# Patient Record
Sex: MALE | Race: WHITE | NOT HISPANIC OR LATINO | Employment: OTHER | URBAN - METROPOLITAN AREA
[De-identification: names, ages, dates, MRNs, and addresses within clinical notes are randomized per-mention and may not be internally consistent; named-entity substitution may affect disease eponyms.]

---

## 2017-02-15 ENCOUNTER — OFFICE VISIT (OUTPATIENT)
Dept: PSYCHIATRY | Facility: CLINIC | Age: 62
End: 2017-02-15
Attending: NURSE PRACTITIONER
Payer: MEDICARE

## 2017-02-15 VITALS — WEIGHT: 137.6 LBS | SYSTOLIC BLOOD PRESSURE: 137 MMHG | DIASTOLIC BLOOD PRESSURE: 77 MMHG | HEART RATE: 73 BPM

## 2017-02-15 DIAGNOSIS — F42.9 OCD (OBSESSIVE COMPULSIVE DISORDER): ICD-10-CM

## 2017-02-15 PROCEDURE — 99212 OFFICE O/P EST SF 10 MIN: CPT | Mod: ZF

## 2017-02-15 NOTE — MR AVS SNAPSHOT
After Visit Summary   2/15/2017    Berto Pretty    MRN: [id number]           Patient Information     Date Of Birth          1955        Visit Information        Provider Department      2/15/2017 1:30 PM Jerica Shah APRN CNP Psychiatry Clinic        Today's Diagnoses     OCD (obsessive compulsive disorder)           Follow-ups after your visit        Follow-up notes from your care team     Return in about 12 weeks (around 5/10/2017).      Your next 10 appointments already scheduled     May 10, 2017  1:30 PM CDT   Adult Med Follow UP with MANUELITO Leslie CNP   Psychiatry Clinic ([facility])    [address]  [address] [facility] [address] [phone]              Who to contact     Please call your clinic at [phone] to:    Ask questions about your health    Make or cancel appointments    Discuss your medicines    Learn about your test results    Speak to your doctor   If you have compliments or concerns about an experience at your clinic, or if you wish to file a complaint, please contact [facility] Patient Relations at [phone] or email us at [email or web address]         Additional Information About Your Visit        MyChart Information     Wigixhart is an electronic gateway that provides easy, online access to your medical records. With NI, you can request a clinic appointment, read your test results, renew a prescription or communicate with your care team.     To sign up for ngmocot visit the website at [email or web address]   You will be asked to enter the access code listed below, as well as some personal information. Please follow the directions to create your username and password.     Your access code is: [email or web address]  Expires: 2017  2:12 PM     Your access code will  in 90 days. If you need help or a new code, please contact your [facility] or call [phone] for assistance.        Care EveryWhere ID     This is your Care EveryWhere ID. This could be used by other organizations to access your [place] medical records  [id number]        Your Vitals Were     Pulse                   73            Blood Pressure from Last 3 Encounters:   02/15/17 137/77   11/03/16 124/75   06/27/16 121/79    Weight from Last 3 Encounters:   02/15/17 62.4 kg (137 lb 9.6 oz)   11/03/16 64.2 kg (141 lb 9.6 oz)   06/27/16 63.7 kg (140 lb 6.4 oz)              Today, you had the following     No orders found for display       Primary Care Provider Office Phone # Fax #    Antwon Mcmillan [phone]       [facility] [address]  [facility] [address]        Thank you!     Thank you for choosing PSYCHIATRY CLINIC  for your care. Our goal is always to provide you with excellent care. Hearing back from our patients is one way we can continue to improve our services. Please take a few minutes to complete the written survey that you may receive in the mail after your visit with us. Thank you!             Your Updated Medication List - Protect others around you: Learn how to safely use, store and throw away your medicines at www.disposemymeds.org.          This list is accurate as of: 2/15/17  2:12 PM.  Always use your most recent med list.                   Brand Name Dispense Instructions for use    escitalopram 10 MG tablet    LEXAPRO    30 tablet    Take 1 tablet (10 mg) by mouth daily along with one 20 mg tablet for a daily dose of 30 mg       loratadine 10 MG capsule      Take 10 mg by mouth daily.       MULTIVITAMIN ADULT PO      Take 1 tablet by mouth       VITAMIN D (CHOLECALCIFEROL) PO      Take 2,000 Units by mouth daily

## 2017-02-15 NOTE — PROGRESS NOTES
"  Outpatient Psychiatry Progress Note     Provider: MANUELITO Ambriz CNP  Date: 2/15/2017  Service:  Medication management.   Patient Identification: Berto Pretty  : 1955   MRN: [id number]    Berto Pretty is a 61 year old year old male who presents for ongoing psychiatric care.  Berto Pretty was last seen in clinic on 11/3/16.   At that time, he chose to continue Lexapro 30mg daily.    2/15/2017  Today Berto reports he is doing better and is pleased to share he has been purging some of the items he's hoarded in his house. He was pleased to share he had his HVAC system replaced which he tolerated better than he thought as a team of 4-5 people were in and out of his house.    He denies side effects of medication.    Psychiatric Review of Systems:  He denies significant depression and anxiety symptoms.     With family history of OCD in his paternal cousins; client report OCD onset in childhood. His obsessions manifest differently from year to year; he is not focused on germs recently but more increasing his awareness about compulsively buying things that he hoards and willingness to get rid of his belongings.     He denies lethality, óscar, psychosis.     Review of Medical Systems:  Appetite: \"good\"  Sleep: onset of sleep changes between 11p and 3a every 3-4 days; sleep ranges between 8-12 hours overnight    Self Care: enjoys reading, classical music, walking; wants to go skiing again, has started home exercise regimen to increase his muscle strength and ability to ski; serves at his [employer]; enjoys collecting movies    Housework and hygiene: managing as is normal for him  Energy: adequate  Concentration: intact    Current Substance Use:  Social History     Social History     Marital status: Single     Spouse name: N/A     Number of children: N/A     Years of education: N/A     Social History Main Topics     Smoking status: Former Smoker     Types: Cigarettes     Quit date: 1974     Smokeless " tobacco: Not on file     Alcohol use No     Drug use: No     Sexual activity: Not Currently     Other Topics Concern     Not on file     Social History Narrative     Denies all substances, trying to taper soda    Past Medical History: No past medical history on file.    Medical health update: recent diarrhea, will follow up with PCP if needed; blood pressure stable when he's out at [facility]; continues vit D3 2000 IUs daily    Allergies:   Allergies   Allergen Reactions     Pollen Extract         Current Medications     Current Outpatient Prescriptions Ordered in [facility]   Medication Sig Dispense Refill     escitalopram (LEXAPRO) 10 MG tablet Take 1 tablet (10 mg) by mouth daily along with one 20 mg tablet for a daily dose of 30 mg 30 tablet 0     Multiple Vitamins-Minerals (MULTIVITAMIN ADULT PO) Take 1 tablet by mouth       VITAMIN D, CHOLECALCIFEROL, PO Take 2,000 Units by mouth daily       loratadine 10 MG capsule Take 10 mg by mouth daily.       No current Epic-ordered facility-administered medications on file.       Mental Status Exam     Appearance:  Casually dressed and Adequately groomed  Behavior/relationship to examiner/demeanor:  Cooperative and Engaged  Orientation: Oriented to person, place, time and situation  Psychomotor: WNL  Speech Rate:  Normal and Rapid  Speech Spontaneity:  Normal  Mood:  better  Affect:  Appropriate/mood-congruent and Euthymic  Thought Process (Associations):  Goal directed and Circumstantial  Thought Content:  no evidence of suicidal or homicidal ideation, denies suicidal ideation, intent or thoughts and patient denies auditory and visual hallucinations  Abnormal Perception:  None  Attention/Concentration:  Normal  Language:  Intact  Insight:  Fair  Judgment:  Adequate for safety      Results     Vital signs: /77  Pulse 73  Wt 62.4 kg (137 lb 9.6 oz)    Laboratory Data:   Lab Results   Component Value Date    WBC 5.2 09/11/2015    HGB 14.8 09/11/2015    HCT 44.4 09/11/2015      09/11/2015    CHOL 199 03/19/2014    TRIG 41 03/19/2014    HDL 84 03/19/2014    TSH 1.68 09/11/2015     Assessment & Plan      Berto Pretty is seen today for medication management.     Diagnosis  Axis 1: OCD, no evidence of seasonal mood symptoms today, takes D3 supplement  Axis 2: OCPD traits    Plan:  He chooses to continue Lexapro 30mg (10mg + 20mg tabs) daily (he wants his pharmacy to call when he needs a RF as he has unknown RFs remaining from Dr. Matthews) and RTC in 12 weeks, sooner as needed. Encouraged self care with exercise and at his Jehovah's witness.    He voices understanding of the treatment plan including discussion of options, risks/ benefits. He has clinic contact information and will seek emergency services if urgent or life threatening symptoms present. He understands risks associated with drug and alcohol use.     Over 50% of this time was spent counseling the patient and/or coordinating care regarding review of social and occupational functioning.  In addition patient was counseled on health and wellness practices to augment medication treatment of symptoms. See note for details.    PHQ-9 score:  14  PHQ-9 SCORE 11/3/2016   Total Score -   Total Score 13     GAD7: No flowsheet data found.    : 11/2016    MANUELITO Ambriz CNP 2/15/2017

## 2017-02-17 ASSESSMENT — PATIENT HEALTH QUESTIONNAIRE - PHQ9: SUM OF ALL RESPONSES TO PHQ QUESTIONS 1-9: 13

## 2017-06-29 ENCOUNTER — OFFICE VISIT (OUTPATIENT)
Dept: PSYCHIATRY | Facility: CLINIC | Age: 62
End: 2017-06-29
Attending: NURSE PRACTITIONER
Payer: MEDICARE

## 2017-06-29 VITALS — WEIGHT: 141.6 LBS | SYSTOLIC BLOOD PRESSURE: 137 MMHG | HEART RATE: 80 BPM | DIASTOLIC BLOOD PRESSURE: 85 MMHG

## 2017-06-29 DIAGNOSIS — F42.2 MIXED OBSESSIONAL THOUGHTS AND ACTS: ICD-10-CM

## 2017-06-29 PROCEDURE — 99212 OFFICE O/P EST SF 10 MIN: CPT | Mod: ZF

## 2017-06-29 RX ORDER — ESCITALOPRAM OXALATE 10 MG/1
10 TABLET ORAL DAILY
Qty: 30 TABLET | Refills: 0 | Status: SHIPPED | OUTPATIENT
Start: 2017-06-29 | End: 2017-07-10

## 2017-06-29 RX ORDER — ESCITALOPRAM OXALATE 20 MG/1
TABLET ORAL
Qty: 30 TABLET | Refills: 5 | Status: SHIPPED | OUTPATIENT
Start: 2017-06-29 | End: 2017-12-22

## 2017-06-29 NOTE — PROGRESS NOTES
Outpatient Psychiatry Progress Note     Provider: MANUELITO Ambriz CNP  Date: 2017  Service:  Medication management.   Patient Identification: Berto Pretty  : 1955   MRN: [id number]    Berto Pretty is a 61 year old year old male who presents for ongoing psychiatric care.  Berto was last seen in clinic on  2/15/17.  At that time, he chose to continue Lexapro 30mg daily (10mg and 20mg tabs) daily.    PERTINENT HISTORY:  Onset of OCD as a child. He recalls his Dad checking things. Obsessions/ compulsions involve thoughts about germs, asbestos, things that might harm him; he performs handwashing, checking the oven, door knobs, lights. He wears long sleeve shirts to limit skin exposure. Buys clothing, movies, books compulsively. Most recent inpatient stay was in the 1970s. History of late arrivals for his appts. Has worked for the post office and hospital, stopped attending [facility] due to OCD. SSDI started for mental health.  - Prozac (only mildly helpful for OCD); Citalopram (80mg; mildly helpful for OCD); Parnate (not helpful); Stelazine (not helpful); Haldol (not helpful). Clomipramine (only as augmentation; highest dose 25mg)    He arrives alone and >15 min late.    2017  Today Berto reports he rated his mood in the high 70s last week on his personal scale where 100 is the best. The week before his mood worsened. He discusses a pattern of symptoms and mood fluctuations that wax and wane. Seasonal allergies have impacted his mood but these recently resolved.    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 Discusses his home in the [facility] but he enjoys his home in [place] more for the local walking trails. He is thinking on bidding on a new home in [place].    Daily med compliant and he denies side effects of medication.    Psychiatric Review of Systems:  He  "denies significant depression, anxiety and irritability. He wonders if he suffers SAD and his improved mood is due to the timing of the summer sun.    He denies lethality.    Review of Medical Systems:  Appetite: intact  Sleep: varied, appears to follow his mood charting/ rating scale; stays up when he feels better and keeps engaged in hobbies that interest him.  Self Care: encouraged, he enjoys reading, listening to music; signed up tennis lessons   Housework and hygiene: managing as is normal for him; hoarding habit is well documented  Energy: intact  Concentration: intact    Current Substance Use:  Social History     Social History     Marital status: Single     Spouse name: N/A     Number of children: N/A     Years of education: N/A     Social History Main Topics     Smoking status: Former Smoker     Types: Cigarettes     Quit date: 1/1/1974     Smokeless tobacco: None     Alcohol use No     Drug use: No     Sexual activity: Not Currently     Other Topics Concern     None     Social History Narrative     He denies substances; continues trying to limit soda (drinks 5-6 sodas 8oz, or 2-3 cans of soda \"when I'm really thirsty\"; wants to drink more gatorade and avoid high fructose corn syrup; avoids coffee.     Past Medical History: No past medical history on file.    Medical health update: recently resolved seasonal allergies; he wonders if his low energy is related to his stopping vit D supplements    Allergies:   Allergies   Allergen Reactions     Pollen Extract         Current Medications     Current Outpatient Prescriptions Ordered in [facility]   Medication Sig Dispense Refill     escitalopram (LEXAPRO) 10 MG tablet Take 1 tablet (10 mg) by mouth daily along with one 20 mg tablet for a daily dose of 30 mg 30 tablet 0     Multiple Vitamins-Minerals (MULTIVITAMIN ADULT PO) Take 1 tablet by mouth       VITAMIN D, CHOLECALCIFEROL, PO Take 2,000 Units by mouth daily       loratadine 10 MG capsule Take 10 mg by mouth " "daily.       No current Epic-ordered facility-administered medications on file.       Mental Status Exam     Appearance:  Casually dressed and Disheveled  Behavior/relationship to examiner/demeanor:  Cooperative, Engaged and Pleasant  Orientation: Oriented to person, place, time and situation  Psychomotor: WNL  Speech Rate:  Normal  Speech Spontaneity:  Normal  Mood:  \"okay\"  Affect:  Appropriate/mood-congruent, Euthymic and Bright  Thought Process (Associations):  Logical, Linear, Goal directed and analytical  Thought Content:  no evidence of suicidal or homicidal ideation, denies suicidal ideation, intent or thoughts and patient denies auditory and visual hallucinations  Abnormal Perception:  None  Attention/Concentration:  Normal  Language:  Intact  Insight:  Limited  Judgment:  Adequate for safety      Results     Vital signs: /85  Pulse 80  Wt 64.2 kg (141 lb 9.6 oz)    Assessment & Plan      Berto Pretty is seen today for follow up.    Diagnosis  Axis 1: OCD; waking earlier and motivated with the sun; monitor SAD  Axis 2: OCPD traits     Plan:  He chooses to continue Lexapro 30mg daily and declines any med change. He requests refills today. He elects to RTC in 6 months, sooner as needed.      He voices understanding of the treatment plan including discussion of options, risks/ benefits. He has clinic contact information and will seek emergency services if urgent or life threatening symptoms present. He understands risks associated with drug and alcohol use.      Over 50% of this time was spent counseling the patient and/or coordinating care regarding review of social and occupational functioning.  In addition patient was counseled on health and wellness practices to augment medication treatment of symptoms. See note for details.     PHQ-9 score:  13, very difficult on daily functioning  PHQ-9 SCORE 2/15/2017   Total Score 13     GAD7: No flowsheet data found.  : 11/2016  Jerica Shah, MANUELITO CNP " 6/29/2017

## 2017-06-29 NOTE — PATIENT INSTRUCTIONS
Thank you for coming to the PSYCHIATRY CLINIC.    Lab Testing:  If you had lab testing today and your results are reassuring or normal they will be mailed to you or sent through [employer] within 7 days.   If the lab tests need quick action we will call you with the results.  The phone number we will call with results is # [phone] (home) . If this is not the best number please call our clinic and change the number.    Medication Refills:  If you need any refills please call your pharmacy and they will contact us. Our fax number for refills is [phone]. Please allow three business for refill processing.   If you need to  your refill at a new pharmacy, please contact the new pharmacy directly. The new pharmacy will help you get your medications transferred.     Scheduling:  If you have any concerns about today's visit or wish to schedule another appointment please call our office during normal business hours [phone] (8-5:00 M-F)    Contact Us:  Please call [phone] during business hours (8-5:00 M-F).  If after clinic hours, or on the weekend, please call  [phone].    Financial Assistance [phone]  [facility] Billing [phone]  [facility] Billing [phone]  Medical Records [phone]      MENTAL HEALTH CRISIS NUMBERS:  [facility]:   [facility] - [phone]   Crisis Residence [facility] - [phone]   Walk-In Counseling [facility] [phone]   COPE 24/7 [place] Mobile Team for Adults - [[phone]]; Child - [[phone]]     Crisis Connection - [phone]     [facility]:   [facility] - [phone]   Walk-in counseling [facility] - [phone]   Walk-in counseling [facility] - [phone]   Crisis Residence [facility] - [phone]   Urgent Care Adult Mental Health:   --Drop-in, 24/7 crisis line, and [place] Mobile Team [[phone]]    CRISIS TEXT  LINE: Text [phone] from anywhere, anytime, any crisis 24/7;    OR SEE [email or web address]     Poison Control Center - [phone]    CHILD: [facility] needs assessment team - [phone]     [facility] - [phone]; or [facility] - [phone]    If you have a medical emergency please call 911or go to the nearest ER.                    _____________________________________________    Again thank you for choosing PSYCHIATRY CLINIC and please let us know how we can best partner with you to improve you and your family's health.  You may be receiving a survey in the mail regarding this appointment. We would love to have your feedback, both positive and negative, so please fill out the survey and return it using the provided envolpe. The survey is done by an external company, so your answers are anonymous.

## 2017-06-29 NOTE — NURSING NOTE
Chief Complaint   Patient presents with     Recheck Medication     OCD     Reviewed allergies, smoking status, and pharmacy preference  Administered abuse screening questions   Obtained weight, blood pressure and heart rate

## 2017-06-29 NOTE — MR AVS SNAPSHOT
After Visit Summary   6/29/2017    Berto Pretty    MRN: [id number]           Patient Information     Date Of Birth          1955        Visit Information        Provider Department      6/29/2017 12:30 PM Jerica Shah APRN [facility]        Today's Diagnoses     Mixed obsessional thoughts and acts          Care Instructions    Thank you for coming to the PSYCHIATRY CLINIC.    Lab Testing:  If you had lab testing today and your results are reassuring or normal they will be mailed to you or sent through Xymogen within 7 days.   If the lab tests need quick action we will call you with the results.  The phone number we will call with results is # [phone] (home) . If this is not the best number please call our clinic and change the number.    Medication Refills:  If you need any refills please call your pharmacy and they will contact us. Our fax number for refills is [phone]. Please allow three business for refill processing.   If you need to  your refill at a new pharmacy, please contact the new pharmacy directly. The new pharmacy will help you get your medications transferred.     Scheduling:  If you have any concerns about today's visit or wish to schedule another appointment please call our office during normal business hours [phone] (8-5:00 M-F)    Contact Us:  Please call [phone] during business hours (8-5:00 M-F).  If after clinic hours, or on the weekend, please call  [phone].    Financial Assistance [phone]  [employer] Billing [phone]  [place] Billing [phone]  Medical Records [phone]      MENTAL HEALTH CRISIS NUMBERS:  [facility]:   [facility] - [phone]   Crisis Residence [facility] - [phone]   Walk-In Counseling Center [facility] - [phone]   COPE 24/7 [place] Mobile Team for Adults - [[phone]]; Child - [[phone]]     Crisis Connection - [phone]     [place]:   [facility] - [phone]   Walk-in counseling [facility] - [phone]   Walk-in counseling [facility] - [phone]   Crisis Residence [facility] - [phone]   Urgent Care Adult Mental Health:   --Drop-in, 24/7 crisis line, and [place] Mobile Team [[phone]]    CRISIS TEXT LINE: Text [phone] from anywhere, anytime, any crisis 24/7;    OR SEE www.crisistextline.org     Poison Control Center - [phone]    CHILD: [facility] needs assessment team - [phone]     Trans Lifeline - 1-795.186.7935; or [employer] Lifeline - 1-744.137.7454    If you have a medical emergency please call 911or go to the nearest ER.                    _____________________________________________    Again thank you for choosing PSYCHIATRY CLINIC and please let us know how we can best partner with you to improve you and your family's health.  You may be receiving a survey in the mail regarding this appointment. We would love to have your feedback, both positive and negative, so please fill out the survey and return it using the provided envolpe. The survey is done by an external company, so your answers are anonymous.             Follow-ups after your visit        Your next 10 appointments already scheduled     Dec 22, 2017  1:30 PM CST   Adult Med Follow UP with MANUELITO Leslie CNP   Psychiatry Clinic ([facility])    [address] [facility] [address] [phone]              Who to contact     Please call your clinic at [phone] to:    Ask questions about your health    Make or cancel appointments    Discuss your medicines    Learn about your test results    Speak to your doctor   If you have compliments or concerns about an experience at your clinic, or if you wish to file a complaint, please contact [facility] Patient Relations at  [phone] or email us at [email or web address]         Additional Information About Your Visit        Liquipel Information     Liquipel is an electronic gateway that provides easy, online access to your medical records. With Liquipel, you can request a clinic appointment, read your test results, renew a prescription or communicate with your care team.     To sign up for Liquipel visit the website at [email or web address]   You will be asked to enter the access code listed below, as well as some personal information. Please follow the directions to create your username and password.     Your access code is: [email or web address]  Expires: 2017  1:19 PM     Your access code will  in 90 days. If you need help or a new code, please contact your [facility] or call [phone] for assistance.        Care EveryWhere ID     This is your Care EveryWhere ID. This could be used by other organizations to access your [place] medical records  [id number]        Your Vitals Were     Pulse                   80            Blood Pressure from Last 3 Encounters:   17 137/85   02/15/17 137/77   16 124/75    Weight from Last 3 Encounters:   17 64.2 kg (141 lb 9.6 oz)   02/15/17 62.4 kg (137 lb 9.6 oz)   16 64.2 kg (141 lb 9.6 oz)              Today, you had the following     No orders found for display         Today's Medication Changes          These changes are accurate as of: 17  1:19 PM.  If you have any questions, ask your nurse or doctor.               These medicines have changed or have updated prescriptions.        Dose/Directions    * escitalopram 10 MG tablet   Commonly known as:  LEXAPRO   This may have changed:  Another medication with the same name was added. Make sure you understand how and when to take each.   Used for:  Mixed obsessional thoughts and acts   Changed by:  Jerica Shah, MANUELITO CNP        Dose:  10 mg   Take 1 tablet (10  mg) by mouth daily along with one 20 mg tablet for a daily dose of 30 mg   Quantity:  30 tablet   Refills:  0       * escitalopram 20 MG tablet   Commonly known as:  LEXAPRO   This may have changed:  You were already taking a medication with the same name, and this prescription was added. Make sure you understand how and when to take each.   Used for:  Mixed obsessional thoughts and acts   Changed by:  Jerica Shah, MANUELITO CNP        Take 20mg tab with 10mg tab daily for a combined daily dose of 30mg daily   Quantity:  30 tablet   Refills:  5       * Notice:  This list has 2 medication(s) that are the same as other medications prescribed for you. Read the directions carefully, and ask your doctor or other care provider to review them with you.         Where to get your medicines      These medications were sent to [facility] [address]  [address], [facility] [address]     Phone:  [phone]     escitalopram 10 MG tablet    escitalopram 20 MG tablet                Primary Care Provider Office Phone # Fax #    Antwon Mcmillan [phone]       [facility] [address] [facility] [address]  [place] [address]        Equal Access to Services     MAITE VICENTE : Hadii sumanth diazo Soloni, waaxda luqadaha, qaybta kaalmada ademaryyada, holger salmaanca. So [facility] [phone].    ATENCIÓN: Si habla español, tiene a hu disposición servicios gratuitos de asistencia lingüística. Jayme al [phone].    We comply with applicable federal civil rights laws and [place] laws. We do not discriminate on the basis of race, color, national origin, age, disability sex, sexual orientation or gender identity.            Thank you!     Thank you for choosing PSYCHIATRY CLINIC  for your care. Our goal is always to provide you with excellent care. Hearing back from our patients is one way we can continue to improve our services.  Please take a few minutes to complete the written survey that you may receive in the mail after your visit with us. Thank you!             Your Updated Medication List - Protect others around you: Learn how to safely use, store and throw away your medicines at www.disposemymeds.org.          This list is accurate as of: 6/29/17  1:19 PM.  Always use your most recent med list.                   Brand Name Dispense Instructions for use Diagnosis    * escitalopram 10 MG tablet    LEXAPRO    30 tablet    Take 1 tablet (10 mg) by mouth daily along with one 20 mg tablet for a daily dose of 30 mg    Mixed obsessional thoughts and acts       * escitalopram 20 MG tablet    LEXAPRO    30 tablet    Take 20mg tab with 10mg tab daily for a combined daily dose of 30mg daily    Mixed obsessional thoughts and acts       loratadine 10 MG capsule      Take 10 mg by mouth daily.        MULTIVITAMIN ADULT PO      Take 1 tablet by mouth        VITAMIN D (CHOLECALCIFEROL) PO      Take 2,000 Units by mouth daily        * Notice:  This list has 2 medication(s) that are the same as other medications prescribed for you. Read the directions carefully, and ask your doctor or other care provider to review them with you.

## 2017-07-01 ASSESSMENT — PATIENT HEALTH QUESTIONNAIRE - PHQ9: SUM OF ALL RESPONSES TO PHQ QUESTIONS 1-9: 13

## 2017-07-10 RX ORDER — ESCITALOPRAM OXALATE 10 MG/1
10 TABLET ORAL DAILY
Qty: 30 TABLET | Refills: 5 | Status: SHIPPED | OUTPATIENT
Start: 2017-07-10 | End: 2017-12-22

## 2017-12-22 ENCOUNTER — OFFICE VISIT (OUTPATIENT)
Dept: PSYCHIATRY | Facility: CLINIC | Age: 62
End: 2017-12-22
Attending: NURSE PRACTITIONER
Payer: MEDICARE

## 2017-12-22 VITALS — WEIGHT: 138.6 LBS | DIASTOLIC BLOOD PRESSURE: 90 MMHG | HEART RATE: 80 BPM | SYSTOLIC BLOOD PRESSURE: 137 MMHG

## 2017-12-22 DIAGNOSIS — F42.2 MIXED OBSESSIONAL THOUGHTS AND ACTS: ICD-10-CM

## 2017-12-22 PROCEDURE — 99212 OFFICE O/P EST SF 10 MIN: CPT | Mod: ZF

## 2017-12-22 RX ORDER — ESCITALOPRAM OXALATE 10 MG/1
10 TABLET ORAL DAILY
Qty: 30 TABLET | Refills: 5 | Status: SHIPPED | OUTPATIENT
Start: 2017-12-22 | End: 2018-10-26

## 2017-12-22 RX ORDER — ESCITALOPRAM OXALATE 20 MG/1
TABLET ORAL
Qty: 30 TABLET | Refills: 5 | Status: SHIPPED | OUTPATIENT
Start: 2017-12-22 | End: 2018-10-26

## 2017-12-22 ASSESSMENT — PATIENT HEALTH QUESTIONNAIRE - PHQ9: SUM OF ALL RESPONSES TO PHQ QUESTIONS 1-9: 12

## 2017-12-22 NOTE — PATIENT INSTRUCTIONS
Thank you for coming to the PSYCHIATRY CLINIC.    Lab Testing:  If you had lab testing today and your results are reassuring or normal they will be mailed to you or sent through E-Generator within 7 days.   If the lab tests need quick action we will call you with the results.  The phone number we will call with results is # [phone] (home) . If this is not the best number please call our clinic and change the number.    Medication Refills:  If you need any refills please call your pharmacy and they will contact us. Our fax number for refills is [phone]. Please allow three business for refill processing.   If you need to  your refill at a new pharmacy, please contact the new pharmacy directly. The new pharmacy will help you get your medications transferred.     Scheduling:  If you have any concerns about today's visit or wish to schedule another appointment please call our office during normal business hours [phone] (8-5:00 M-F)    Contact Us:  Please call [phone] during business hours (8-5:00 M-F).  If after clinic hours, or on the weekend, please call  [phone].    Financial Assistance [phone]  [employer] Billing [phone]  [employer] Billing [phone]  Medical Records [phone]      MENTAL HEALTH CRISIS NUMBERS:  [facility]:   [facility] - [phone]   Crisis Residence [facility] - [phone]   Walk-In Counseling [facility] [phone]   COPE 24/7 [place] Mobile Team for Adults - [[phone]]; Child - [[phone]]     Crisis Connection - [phone]     [facility]:   [facility] - [phone]   Walk-in counseling [facility] - [phone]   Walk-in counseling [facility] - [phone]   Crisis Residence [facility] - [phone]   Urgent Care Adult Mental Health:   --Drop-in, 24/7 crisis line, and [place] Mobile Team [[phone]]    CRISIS TEXT  LINE: Text [phone] from anywhere, anytime, any crisis 24/7;    OR SEE www.crisistextline.org     Poison Control Center - [phone]    CHILD: [facility] needs assessment team - [phone]     [facility] - [phone]; or [employer] Lifeline - [phone]    If you have a medical emergency please call 911or go to the nearest ER.                    _____________________________________________    Again thank you for choosing PSYCHIATRY CLINIC and please let us know how we can best partner with you to improve you and your family's health.  You may be receiving a survey in the mail regarding this appointment. We would love to have your feedback, both positive and negative, so please fill out the survey and return it using the provided envolpe. The survey is done by an external company, so your answers are anonymous.

## 2017-12-22 NOTE — MR AVS SNAPSHOT
After Visit Summary   12/22/2017    Berto Pretty    MRN: [id number]           Patient Information     Date Of Birth          1955        Visit Information        Provider Department      12/22/2017 3:00 PM Jerica Shah APRN [facility]        Today's Diagnoses     Mixed obsessional thoughts and acts          Care Instructions    Thank you for coming to the PSYCHIATRY CLINIC.    Lab Testing:  If you had lab testing today and your results are reassuring or normal they will be mailed to you or sent through [employer] within 7 days.   If the lab tests need quick action we will call you with the results.  The phone number we will call with results is # [phone] (home) . If this is not the best number please call our clinic and change the number.    Medication Refills:  If you need any refills please call your pharmacy and they will contact us. Our fax number for refills is [phone]. Please allow three business for refill processing.   If you need to  your refill at a new pharmacy, please contact the new pharmacy directly. The new pharmacy will help you get your medications transferred.     Scheduling:  If you have any concerns about today's visit or wish to schedule another appointment please call our office during normal business hours [phone] (8-5:00 M-F)    Contact Us:  Please call [phone] during business hours (8-5:00 M-F).  If after clinic hours, or on the weekend, please call  [phone].    Financial Assistance [phone]  [employer] Billing [phone]  [place] Billing [phone]  Medical Records [phone]      MENTAL HEALTH CRISIS NUMBERS:  [facility]:   [facility] - [phone]   Crisis Residence [facility] - [phone]   Walk-In Counseling Center [facility] - [phone]   COPE 24/7 [place] Mobile Team for Adults - [[phone]]; Child - [[phone]]     Crisis Connection - [phone]     [place]:   [facility] - [phone]   Walk-in counseling [facility] - [phone]   Walk-in counseling [facility] - [phone]   Crisis Residence [facility] - [phone]   Urgent Care Adult Mental Health:   --Drop-in, 24/7 crisis line, and [place] Mobile Team [[phone]]    CRISIS TEXT LINE: Text [phone] from anywhere, anytime, any crisis 24/7;    OR SEE www.crisistextline.org     Poison Control Center - [phone]    CHILD: [facility] needs assessment team - [phone]     Trans Lifeline - 1-711.435.5205; or [employer] Lifeline - 1-452.500.1947    If you have a medical emergency please call 911or go to the nearest ER.                    _____________________________________________    Again thank you for choosing PSYCHIATRY CLINIC and please let us know how we can best partner with you to improve you and your family's health.  You may be receiving a survey in the mail regarding this appointment. We would love to have your feedback, both positive and negative, so please fill out the survey and return it using the provided envolpe. The survey is done by an external company, so your answers are anonymous.             Follow-ups after your visit        Your next 10 appointments already scheduled     May 24, 2018  3:00 PM CDT   Adult Med Follow UP with MANUELITO Leslie CNP   Psychiatry Clinic ([facility])    [address] [facility] [address] [phone]              Who to contact     Please call your clinic at [phone] to:    Ask questions about your health    Make or cancel appointments    Discuss your medicines    Learn about your test results    Speak to your doctor   If you have compliments or concerns about an experience at your clinic, or if you wish to file a complaint, please contact [facility] Patient Relations at  [phone] or email us at [email or web address]         Additional Information About Your Visit        Renkoohart Information     RiskIQt is an electronic gateway that provides easy, online access to your medical records. With Devtoo, you can request a clinic appointment, read your test results, renew a prescription or communicate with your care team.     To sign up for Devtoo visit the website at [email or web address]   You will be asked to enter the access code listed below, as well as some personal information. Please follow the directions to create your username and password.     Your access code is: [email or web address]  Expires: 3/22/2018  3:49 PM     Your access code will  in 90 days. If you need help or a new code, please contact your [facility] or call [phone] for assistance.        Care EveryWhere ID     This is your Care EveryWhere ID. This could be used by other organizations to access your [place] medical records  [id number]        Your Vitals Were     Pulse                   80            Blood Pressure from Last 3 Encounters:   17 137/90   17 137/85   02/15/17 137/77    Weight from Last 3 Encounters:   17 62.9 kg (138 lb 9.6 oz)   17 64.2 kg (141 lb 9.6 oz)   02/15/17 62.4 kg (137 lb 9.6 oz)              Today, you had the following     No orders found for display         Where to get your medicines      These medications were sent to [facility]  [address]  [address], [facility] [address]     Phone:  [phone]     escitalopram 10 MG tablet    escitalopram 20 MG tablet          Primary Care Provider Office Phone # Fax #    Antwon Mcmillan -[phone]       [facility] [address] [facility] [address]  [facility] [address]        Equal Access to Services     GLADYS VICENTE AH: Phan Valladares, grayson mcdaniels, smita quesada  holger sloanmary mezaaan ah. [facility] [phone].    ATENCIÓN: Si alf martinez, tiene a hu disposición servicios gratuitos de asistencia lingüística. Jayme al [phone].    We comply with applicable federal civil rights laws and [place] laws. We do not discriminate on the basis of race, color, national origin, age, disability, sex, sexual orientation, or gender identity.            Thank you!     Thank you for choosing PSYCHIATRY CLINIC  for your care. Our goal is always to provide you with excellent care. Hearing back from our patients is one way we can continue to improve our services. Please take a few minutes to complete the written survey that you may receive in the mail after your visit with us. Thank you!             Your Updated Medication List - Protect others around you: Learn how to safely use, store and throw away your medicines at www.disposemymeds.org.          This list is accurate as of: 12/22/17  3:49 PM.  Always use your most recent med list.                   Brand Name Dispense Instructions for use Diagnosis    * escitalopram 10 MG tablet    LEXAPRO    30 tablet    Take 1 tablet (10 mg) by mouth daily along with one 20 mg tablet for a daily dose of 30 mg    Mixed obsessional thoughts and acts       * escitalopram 20 MG tablet    LEXAPRO    30 tablet    Take 20mg tab with 10mg tab daily for a combined daily dose of 30mg daily    Mixed obsessional thoughts and acts       loratadine 10 MG capsule      Take 10 mg by mouth daily.        MULTIVITAMIN ADULT PO      Take 1 tablet by mouth        VITAMIN D (CHOLECALCIFEROL) PO      Take 2,000 Units by mouth daily        * Notice:  This list has 2 medication(s) that are the same as other medications prescribed for you. Read the directions carefully, and ask your doctor or other care provider to review them with you.

## 2017-12-22 NOTE — PROGRESS NOTES
Outpatient Psychiatry Progress Note     Provider: MANUELITO Ambriz CNP  Date: 2017  Service:  Medication management.   Patient Identification: Berto Pretty  : 1955   MRN: [id number]    Berto Pretty is a 62 year old male who presents for ongoing psychiatric care.  Berto was last seen in clinic on 17. At that time, he chose to continue Lexapro 30mg daily.    PERTINENT HISTORY:  Onset of OCD as a child. He recalls his Dad checking things. Obsessions/ compulsions involve thoughts about germs, asbestos, things that might harm him; he performs handwashing, checking the oven, door knobs, lights. He wears long sleeve shirts to limit skin exposure. Buys clothing, movies, books compulsively. Most recent inpatient stay was in the 1970s. History of late arrivals for his appts. Has worked for the post office and hospital, stopped attending [facility] [employer] [facility] due to OCD. SSDI started for mental health.    MED HISTORY:  - Prozac (mild efficacy for OCD)  - Citalopram (at 80mg, mild efficacy for OCD)  - Parnate (ineffective)  - Stelazine (ineffective)  - Haldol (ineffective)   - Clomipramine (prescribed only as adjunct, highest dose 25mg)    2017  Today Berto reports he's had his ups and downs. PHQ 14 with extreme difficulty on daily functioning    - He didn't leave his house for 3 weeks in August. He also stopped taking his meds, including Lexapro.   -  He felt well in  and July. He primarily enjoyed Sept and Oct but his mood worsened a bit in later Oct and Nov which he attributes to seasonal symptoms.   - Active in his [employer]. 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 He acted in their nativity play as one of the shepherds.  - he wants to work on cleaning and organizing his home now that his [employer] commitments are lessening.  - he plans to spend Galt Otilia with his Mom's side of the family. He is helping at [employer] for  "their Nicole Day services and dinner.  - motivation to reduce clutter at home and organize in part because he considers selling his two homes, buying a new one and easing the moving process    Compliance: daily  Side effects: denies    Psychiatric Review of Systems:  Depression: mood varied, he noticed some mood decrease starting with the shorter days in October but this he thinks will improve with the longer days after the solstice yesterday. He's not tried a light box before but feels his symptoms are improving and doesn't need to trial this year.    Anxiety: insignificant  OCD: describes his obsessions as \"the same\"; he obsesses about hygiene, toxic chemicals, germs, washing his hands frequently and for longer periods than he'd like. Can now use hand  rather than washing his hands in the sink  Lethality: denies    Review of Medical Systems:  Appetite: OK, weight stable  Sleep: his sleep is improving and has been over the last two weeks; waking earlier when he feels rested after sleeping 11p-7a.  Self Care: enjoys his Taoism, seeing movies, stock market  Housework and hygiene: managing as is normal for him  Energy: adequate  Concentration: intact    Current Substance Use:    Social History     Social History     Marital status: Single     Spouse name: N/A     Number of children: N/A     Years of education: N/A     Social History Main Topics     Smoking status: Former Smoker     Types: Cigarettes     Quit date: 1/1/1974     Smokeless tobacco: Not on file     Alcohol use No     Drug use: No     Sexual activity: Not Currently     Other Topics Concern     Not on file     Social History Narrative     He drinks more soda than he'd like (40-50oz daily) and reports this can cause diarrhea. Denies other substances. Wants to drink green tea.    Past Medical History: No past medical history on file.  Medical health update: sees PCP next week for his annual physical.    Allergies:   Allergies   Allergen Reactions " "    Pollen Extract         Current Medications     Current Outpatient Prescriptions Ordered in [facility]   Medication Sig Dispense Refill     escitalopram (LEXAPRO) 10 MG tablet Take 1 tablet (10 mg) by mouth daily along with one 20 mg tablet for a daily dose of 30 mg 30 tablet 5     escitalopram (LEXAPRO) 20 MG tablet Take 20mg tab with 10mg tab daily for a combined daily dose of 30mg daily 30 tablet 5     Multiple Vitamins-Minerals (MULTIVITAMIN ADULT PO) Take 1 tablet by mouth       VITAMIN D, CHOLECALCIFEROL, PO Take 2,000 Units by mouth daily       loratadine 10 MG capsule Take 10 mg by mouth daily.       No current Epic-ordered facility-administered medications on file.       Mental Status Exam     Appearance:  Casually dressed and Disheveled  Behavior/relationship to examiner/demeanor:  Cooperative, Engaged and Pleasant  Orientation: Oriented to person, place, time and situation  Psychomotor: WNL  Speech Rate:  Normal  Speech Spontaneity:  Normal  Mood:  \"okay\"  Affect:  Appropriate/mood-congruent, Euthymic and Bright; can hold his eyes closed during conversation  Thought Process (Associations):  Logical, Linear, Goal directed, analytical  Thought Content:  no evidence of suicidal or homicidal ideation, no overt psychosis, denies suicidal ideation, intent or thoughts, patient denies auditory and visual hallucinations, patient does not appear to be responding to internal stimuli and Obsessions in partial remission  Abnormal Perception:  None  Attention/Concentration:  Normal  Language:  Intact  Insight:  Limited  Judgment:  Adequate for safety      Results     Vital signs: There were no vitals taken for this visit.    Laboratory Data:   Lab Results   Component Value Date    WBC 5.2 09/11/2015    HGB 14.8 09/11/2015    HCT 44.4 09/11/2015     09/11/2015    CHOL 199 03/19/2014    TRIG 41 03/19/2014    HDL 84 03/19/2014    TSH 1.68 09/11/2015     Assessment & Plan     Berto is seen today for follow up.  - " discussed risks of dosing above FDA's max dose; he'll inform all providers of med and dosing.    Diagnosis  Axis 1: OCD, monitor SAD  Axis 2: OCPD traits    Plan:  Medication: continue Lexapro 30mg daily, he thinks he has a few RFs remaining from June 2017 prescriptions  OTC Recommendations: none  Other: none, he declines to start light box today but will consider  Lab Orders: none  Referrals: none  Release of Information: none  Future Treatment Considerations: monitor tolerability and efficacy  Return for Follow Up: 6 months, sooner as needed    He voices understanding of the treatment plan including discussion of options, risks/ benefits. He has clinic contact information and will seek emergency services if urgent or life threatening symptoms present. Berto understands risks associated with drug and alcohol use.     Visit was spent counseling the patient and/or coordinating care regarding review of social and occupational functioning.  In addition patient was counseled on health and wellness practices to augment medication treatment of symptoms. See note for details.    PHQ-9 score:    PHQ-9 SCORE 6/29/2017   Total Score -   Total Score 13     GAD7: No flowsheet data found.  : 12/2017- no file  MANUELITO Ambriz CNP 12/22/2017     PSYCHIATRY CLINIC INDIVIDUAL PSYCHOTHERAPY NOTE                                                     [16]   Start time: 3:30  End time: 3:46  Date reviewed - 12/22/2017       Date next due - 2/22/17     Subjective: This supportive psychotherapy session addressed issues related to goals of therapy.  Patient's reaction: Contemplation in the context of mental status appropriate for ambulatory setting.  Progress: fair  Plan: RTC 6 months   Psychotherapy services during this visit included myself and Berto.  TREATMENT  PLAN          SYMPTOMS; PROBLEMS   MEASURABLE GOALS;    FUNCTIONAL IMPROVEMENT INTERVENTIONS;   GAINS MADE DISCHARGE CRITERIA   Anxiety: obsessions [monitoring symptoms  and medicine dose]   take medications as prescribed on a daily basis monitoring of refills and med compliance to support his symptoms marked symptom improvement   Psychosocial: hoarding   make a plan to manage 2-3 anxiety-provoking situations and work on organizing and reducing clutter in his home wants to build on the progress he made last fall to declutter marked symptom improvement and significant improvement in self-reports for 6 consecutive visits     PROVIDER:  MANUELITO Ambriz CNP

## 2017-12-27 ENCOUNTER — OFFICE VISIT - HEALTHEAST (OUTPATIENT)
Dept: INTERNAL MEDICINE | Facility: CLINIC | Age: 62
End: 2017-12-27

## 2017-12-27 DIAGNOSIS — F42.2 MIXED OBSESSIONAL THOUGHTS AND ACTS: ICD-10-CM

## 2017-12-27 DIAGNOSIS — Z79.899 MEDICATION MANAGEMENT: ICD-10-CM

## 2017-12-27 DIAGNOSIS — Z12.5 SCREENING PSA (PROSTATE SPECIFIC ANTIGEN): ICD-10-CM

## 2017-12-27 DIAGNOSIS — E55.9 VITAMIN D DEFICIENCY: ICD-10-CM

## 2017-12-27 ASSESSMENT — MIFFLIN-ST. JEOR: SCORE: 1347.58

## 2017-12-28 LAB
CHOLEST SERPL-MCNC: 208 MG/DL
FASTING STATUS PATIENT QL REPORTED: ABNORMAL
HDLC SERPL-MCNC: 78 MG/DL
LDLC SERPL CALC-MCNC: 116 MG/DL
PSA SERPL-MCNC: 0.7 NG/ML (ref 0–4.5)
TRIGL SERPL-MCNC: 70 MG/DL

## 2017-12-29 ENCOUNTER — COMMUNICATION - HEALTHEAST (OUTPATIENT)
Dept: INTERNAL MEDICINE | Facility: CLINIC | Age: 62
End: 2017-12-29

## 2017-12-29 ENCOUNTER — COMMUNICATION - HEALTHEAST (OUTPATIENT)
Dept: SCHEDULING | Facility: CLINIC | Age: 62
End: 2017-12-29

## 2018-03-06 ENCOUNTER — COMMUNICATION - HEALTHEAST (OUTPATIENT)
Dept: INTERNAL MEDICINE | Facility: CLINIC | Age: 63
End: 2018-03-06

## 2018-03-14 ENCOUNTER — AMBULATORY - HEALTHEAST (OUTPATIENT)
Dept: INTERNAL MEDICINE | Facility: CLINIC | Age: 63
End: 2018-03-14

## 2018-03-14 ENCOUNTER — OFFICE VISIT - HEALTHEAST (OUTPATIENT)
Dept: INTERNAL MEDICINE | Facility: CLINIC | Age: 63
End: 2018-03-14

## 2018-03-14 DIAGNOSIS — F42.9 OBSESSIVE-COMPULSIVE DISORDER: ICD-10-CM

## 2018-03-14 DIAGNOSIS — I63.9 CVA (CEREBRAL VASCULAR ACCIDENT) (H): ICD-10-CM

## 2018-03-14 DIAGNOSIS — E04.1 THYROID NODULE: ICD-10-CM

## 2018-03-14 ASSESSMENT — MIFFLIN-ST. JEOR: SCORE: 1329.44

## 2018-03-15 LAB
ANION GAP SERPL CALCULATED.3IONS-SCNC: 11 MMOL/L (ref 5–18)
BUN SERPL-MCNC: 14 MG/DL (ref 8–22)
CALCIUM SERPL-MCNC: 9.1 MG/DL (ref 8.5–10.5)
CHLORIDE BLD-SCNC: 109 MMOL/L (ref 98–107)
CHOLEST SERPL-MCNC: 120 MG/DL
CO2 SERPL-SCNC: 25 MMOL/L (ref 22–31)
CREAT SERPL-MCNC: 0.79 MG/DL (ref 0.7–1.3)
FASTING STATUS PATIENT QL REPORTED: NORMAL
GFR SERPL CREATININE-BSD FRML MDRD: >60 ML/MIN/1.73M2
GLUCOSE BLD-MCNC: 78 MG/DL (ref 70–125)
HDLC SERPL-MCNC: 56 MG/DL
LDLC SERPL CALC-MCNC: 56 MG/DL
POTASSIUM BLD-SCNC: 3.6 MMOL/L (ref 3.5–5)
SODIUM SERPL-SCNC: 145 MMOL/L (ref 136–145)
T4 FREE SERPL-MCNC: 0.9 NG/DL (ref 0.7–1.8)
TRIGL SERPL-MCNC: 41 MG/DL
TSH SERPL DL<=0.005 MIU/L-ACNC: 1.12 UIU/ML (ref 0.3–5)

## 2018-03-19 ENCOUNTER — COMMUNICATION - HEALTHEAST (OUTPATIENT)
Dept: INTERNAL MEDICINE | Facility: CLINIC | Age: 63
End: 2018-03-19

## 2018-03-23 ENCOUNTER — CARE COORDINATION (OUTPATIENT)
Dept: PSYCHIATRY | Facility: CLINIC | Age: 63
End: 2018-03-23

## 2018-03-23 NOTE — PROGRESS NOTES
Message  Received: Today       Andreia Lin, Andreia Man, RN       Phone Number: [phone]                       Previous Messages       ----- Message -----      From: Bia Larson      Sent: 3/22/2018   4:52 PM        To: KRISTA Lake, would like for you to know that he had a stroke and he's on some new medications that he wants to discuss with Jerica Shah.     Thanks!

## 2018-03-23 NOTE — PROGRESS NOTES
Writer returned pt's phone call, but pt did not answer. Left VM for pt asking for a c/b to discuss new medications. Will route to provider as FYI.

## 2018-03-26 NOTE — PROGRESS NOTES
Message  Received: Today       Annamarie Bhatti RN Pratt, Laura, RN       Phone Number: [phone]                       Previous Messages       ----- Message -----      From: Carol Santos      Sent: 3/23/2018   4:41 PM        To: KRISTA Lacy/Annamarie     Patient is caller. He says that he is returning a call to Andreia. He says that since the last time he was in he has had a stroke and he would like to talk about this and the medications that his dr are prescribing.

## 2018-03-26 NOTE — PROGRESS NOTES
Pt returned writer's phone call and discussed recent medication changes. Pt states he had a stroke during the month of February. At this time, he stopped taking the Lexapro and started 85 mg of Aspirin and Atorvastatin 40 mg daily. Pt is wondering if it's okay to start right up with the 30 mg of Lexapro again or if he should start at a lower dose. Pt is also concerned that the stroke may be caused by a heart arrhythmia, and pt has heard from prior psychiatrists that it's possible high dosages of Lexapro can cause heart arrhythmias. He would like to discuss this further with provider, along with other lifestyle changes.

## 2018-03-26 NOTE — PROGRESS NOTES
Following CVA in late Feb, he stopped Lexapro completely. He's not noticed a return of OCD, depression or anxiety symptoms.    He plans to start 30 day cardiac monitoring to rule out an arrhythmia. He was started on aspirin 81mg and atorvastatin. Discussed risks for bleeding with SSRI and aspirin.    His Lexapro dose was reduced, as he recalls, from 40mg to 30mg daily several years ago.    Discussed risks and benefits of antidepressants. He chooses to continue off Lexapro until the results of his cardiac study are known. 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     He is open to consider how he's handling hoarding symptoms after emergency responders took pictures when they answered his 911 call. He asks about the fire department and a  conducting a follow up at his house in a couple weeks and his preference to not meet until after his taxes are filed about 4/15/18.    He will keep his 5/24/18 appt and call the office sooner as needed if he'd like to restart Lexapro.

## 2018-04-17 ENCOUNTER — RECORDS - HEALTHEAST (OUTPATIENT)
Dept: ADMINISTRATIVE | Facility: OTHER | Age: 63
End: 2018-04-17

## 2018-04-27 ENCOUNTER — COMMUNICATION - HEALTHEAST (OUTPATIENT)
Dept: INTERNAL MEDICINE | Facility: CLINIC | Age: 63
End: 2018-04-27

## 2018-04-27 ENCOUNTER — OFFICE VISIT - HEALTHEAST (OUTPATIENT)
Dept: INTERNAL MEDICINE | Facility: CLINIC | Age: 63
End: 2018-04-27

## 2018-04-27 DIAGNOSIS — F42.2 MIXED OBSESSIONAL THOUGHTS AND ACTS: ICD-10-CM

## 2018-04-27 DIAGNOSIS — I63.9 CEREBROVASCULAR ACCIDENT (CVA), UNSPECIFIED MECHANISM (H): ICD-10-CM

## 2018-04-27 ASSESSMENT — MIFFLIN-ST. JEOR: SCORE: 1315.83

## 2018-05-08 ENCOUNTER — COMMUNICATION - HEALTHEAST (OUTPATIENT)
Dept: SCHEDULING | Facility: CLINIC | Age: 63
End: 2018-05-08

## 2018-05-10 ENCOUNTER — RECORDS - HEALTHEAST (OUTPATIENT)
Dept: ADMINISTRATIVE | Facility: OTHER | Age: 63
End: 2018-05-10

## 2018-05-10 ENCOUNTER — COMMUNICATION - HEALTHEAST (OUTPATIENT)
Dept: INTERNAL MEDICINE | Facility: CLINIC | Age: 63
End: 2018-05-10

## 2018-05-30 ENCOUNTER — RECORDS - HEALTHEAST (OUTPATIENT)
Dept: ADMINISTRATIVE | Facility: OTHER | Age: 63
End: 2018-05-30

## 2018-07-05 ENCOUNTER — COMMUNICATION - HEALTHEAST (OUTPATIENT)
Dept: INTERNAL MEDICINE | Facility: CLINIC | Age: 63
End: 2018-07-05

## 2018-07-06 ENCOUNTER — COMMUNICATION - HEALTHEAST (OUTPATIENT)
Dept: INTERNAL MEDICINE | Facility: CLINIC | Age: 63
End: 2018-07-06

## 2018-07-12 ENCOUNTER — COMMUNICATION - HEALTHEAST (OUTPATIENT)
Dept: INTERNAL MEDICINE | Facility: CLINIC | Age: 63
End: 2018-07-12

## 2018-07-24 ENCOUNTER — COMMUNICATION - HEALTHEAST (OUTPATIENT)
Dept: INTERNAL MEDICINE | Facility: CLINIC | Age: 63
End: 2018-07-24

## 2018-07-24 ENCOUNTER — RECORDS - HEALTHEAST (OUTPATIENT)
Dept: ADMINISTRATIVE | Facility: OTHER | Age: 63
End: 2018-07-24

## 2018-08-17 ENCOUNTER — OFFICE VISIT - HEALTHEAST (OUTPATIENT)
Dept: INTERNAL MEDICINE | Facility: CLINIC | Age: 63
End: 2018-08-17

## 2018-08-17 ENCOUNTER — COMMUNICATION - HEALTHEAST (OUTPATIENT)
Dept: INTERNAL MEDICINE | Facility: CLINIC | Age: 63
End: 2018-08-17

## 2018-08-17 DIAGNOSIS — G47.33 OSA (OBSTRUCTIVE SLEEP APNEA): ICD-10-CM

## 2018-08-17 DIAGNOSIS — I63.9 CEREBROVASCULAR ACCIDENT (CVA), UNSPECIFIED MECHANISM (H): ICD-10-CM

## 2018-08-17 DIAGNOSIS — F42.2 MIXED OBSESSIONAL THOUGHTS AND ACTS: ICD-10-CM

## 2018-08-17 ASSESSMENT — MIFFLIN-ST. JEOR: SCORE: 1320.37

## 2018-08-24 ENCOUNTER — COMMUNICATION - HEALTHEAST (OUTPATIENT)
Dept: INTERNAL MEDICINE | Facility: CLINIC | Age: 63
End: 2018-08-24

## 2018-10-19 ENCOUNTER — OFFICE VISIT (OUTPATIENT)
Dept: PSYCHIATRY | Facility: CLINIC | Age: 63
End: 2018-10-19
Attending: NURSE PRACTITIONER
Payer: MEDICARE

## 2018-10-19 VITALS — DIASTOLIC BLOOD PRESSURE: 80 MMHG | HEART RATE: 82 BPM | WEIGHT: 137.4 LBS | SYSTOLIC BLOOD PRESSURE: 144 MMHG

## 2018-10-19 DIAGNOSIS — F42.2 MIXED OBSESSIONAL THOUGHTS AND ACTS: Primary | ICD-10-CM

## 2018-10-19 PROCEDURE — G0463 HOSPITAL OUTPT CLINIC VISIT: HCPCS | Mod: ZF

## 2018-10-19 ASSESSMENT — PAIN SCALES - GENERAL: PAINLEVEL: NO PAIN (0)

## 2018-10-19 NOTE — MR AVS SNAPSHOT
After Visit Summary   10/19/2018    Berto Pretty    MRN: [id number]           Patient Information     Date Of Birth          1955        Visit Information        Provider Department      10/19/2018 3:00 PM Jerica Shah APRN CNP Psychiatry Clinic        Care Instructions    Thank you for coming to the PSYCHIATRY CLINIC.    Lab Testing:  If you had lab testing today and your results are reassuring or normal they will be mailed to you or sent through [employer] within 7 days.   If the lab tests need quick action we will call you with the results.  The phone number we will call with results is # [phone] (home) . If this is not the best number please call our clinic and change the number.    Medication Refills:  If you need any refills please call your pharmacy and they will contact us. Our fax number for refills is [phone]. Please allow three business for refill processing.   If you need to  your refill at a new pharmacy, please contact the new pharmacy directly. The new pharmacy will help you get your medications transferred.     Scheduling:  If you have any concerns about today's visit or wish to schedule another appointment please call our office during normal business hours [phone] (8-5:00 M-F)    Contact Us:  Please call [phone] during business hours (8-5:00 M-F).  If after clinic hours, or on the weekend, please call  [phone].    Financial Assistance [phone]  [employer] Billing [phone]  [place] Billing [phone]  Medical Records [phone]      MENTAL HEALTH CRISIS NUMBERS:  [facility]:   [facility] - [phone]   Crisis Residence [facility] - [phone]   Walk-In Counseling Center [facility] - [phone]   COPE 24/7 [place] Mobile Team for Adults - [[phone]]; Child - [[phone]]     Crisis Connection - [phone]     [facility]:   [facility] - [phone]   Walk-in counseling  [facility] - [phone]   Walk-in counseling [facility] - [phone]   Crisis Residence [facility] - [phone]   Urgent Care Adult Mental Health:   --Drop-in, 24/7 crisis line, and [facility] Mobile Team [[phone]]    CRISIS TEXT LINE: Text [phone] from anywhere, anytime, any crisis 24/7;    OR SEE www.crisistextline.org     Poison Control Center - [phone]    CHILD: [facility] needs assessment team - [phone]     Trans Lifeline - 1-991.810.3883; or "[employer] Lifeline - 1-314.502.8521    If you have a medical emergency please call 911or go to the nearest ER.                    _____________________________________________    Again thank you for choosing PSYCHIATRY CLINIC and please let us know how we can best partner with you to improve you and your family's health.  You may be receiving a survey in the mail regarding this appointment. We would love to have your feedback, both positive and negative, so please fill out the survey and return it using the provided envelope. The survey is done by an external company, so your answers are anonymous.             Follow-ups after your visit        Your next 10 appointments already scheduled     Feb 15, 2019  3:00 PM CST   Adult Med Follow UP with MANUELITO Leslie CNP   Psychiatry Clinic ([facility])    [address] [address] [phone]              Who to contact     Please call your clinic at [phone] to:    Ask questions about your health    Make or cancel appointments    Discuss your medicines    Learn about your test results    Speak to your doctor            Additional Information About Your Visit        Care EveryWhere ID     This is your Care EveryWhere ID. This could be used by other organizations to access your [place] medical records  [id number]        Your Vitals Were     Pulse                    82            Blood Pressure from Last 3 Encounters:   10/19/18 144/80   12/22/17 137/90   06/29/17 137/85    Weight from Last 3 Encounters:   10/19/18 62.3 kg (137 lb 6.4 oz)   12/22/17 62.9 kg (138 lb 9.6 oz)   06/29/17 64.2 kg (141 lb 9.6 oz)              Today, you had the following     No orders found for display       Primary Care Provider Office Phone # Fax #    Antwon Mcmillan [phone]       [facility] [address] [facility] [address]  [facility] [address]        Equal Access to Services     [facility] CenterSUBHASH : Hadii sumanth Valladares, wamerleda arslan, qaybta kaalmada luther, holger corley . So [facility] [phone].    ATENCIÓN: Si habla español, tiene a hu disposición servicios gratuitos de asistencia lingüística. Ll[facility] [phone].    We comply with applicable federal civil rights laws and [place] laws. We do not discriminate on the basis of race, color, national origin, age, disability, sex, sexual orientation, or gender identity.            Thank you!     Thank you for choosing PSYCHIATRY CLINIC  for your care. Our goal is always to provide you with excellent care. Hearing back from our patients is one way we can continue to improve our services. Please take a few minutes to complete the written survey that you may receive in the mail after your visit with us. Thank you!             Your Updated Medication List - Protect others around you: Learn how to safely use, store and throw away your medicines at www.disposemymeds.org.          This list is accurate as of 10/19/18  3:41 PM.  Always use your most recent med list.                   Brand Name Dispense Instructions for use Diagnosis    ASPIRIN PO      Take 81 mg by mouth daily        ATORVASTATIN CALCIUM PO      Take 40 mg by mouth daily        * escitalopram 10 MG tablet    LEXAPRO    30 tablet    Take 1 tablet (10 mg) by mouth daily along with one 20 mg tablet for a daily dose of 30 mg     Mixed obsessional thoughts and acts       * escitalopram 20 MG tablet    LEXAPRO    30 tablet    Take 20mg tab with 10mg tab daily for a combined daily dose of 30mg daily    Mixed obsessional thoughts and acts       loratadine 10 MG capsule      Take 10 mg by mouth daily.        MULTIVITAMIN ADULT PO      Take 1 tablet by mouth        VITAMIN D (CHOLECALCIFEROL) PO      Take 2,000 Units by mouth daily        * Notice:  This list has 2 medication(s) that are the same as other medications prescribed for you. Read the directions carefully, and ask your doctor or other care provider to review them with you.

## 2018-10-19 NOTE — PATIENT INSTRUCTIONS
Thank you for coming to the PSYCHIATRY CLINIC.    Lab Testing:  If you had lab testing today and your results are reassuring or normal they will be mailed to you or sent through [employer] within 7 days.   If the lab tests need quick action we will call you with the results.  The phone number we will call with results is # [phone] (home) . If this is not the best number please call our clinic and change the number.    Medication Refills:  If you need any refills please call your pharmacy and they will contact us. Our fax number for refills is [phone]. Please allow three business for refill processing.   If you need to  your refill at a new pharmacy, please contact the new pharmacy directly. The new pharmacy will help you get your medications transferred.     Scheduling:  If you have any concerns about today's visit or wish to schedule another appointment please call our office during normal business hours [phone] (8-5:00 M-F)    Contact Us:  Please call [phone] during business hours (8-5:00 M-F).  If after clinic hours, or on the weekend, please call  [phone].    Financial Assistance [phone]  [employer] Billing [phone]  [employer] Billing [phone]  Medical Records [phone]      MENTAL HEALTH CRISIS NUMBERS:  [facility]:   [facility] - [phone]   Crisis Residence [facility] - [phone]   Walk-In Counseling [facility] [phone]   COPE 24/7 [employer] Mobile Team for Adults - [[phone]]; Child - [[phone]]     Crisis Connection - [phone]     [facility]:   [facility] - [phone]   Walk-in counseling [facility] - [phone]   Walk-in counseling [facility] - [phone]   Crisis Residence [facility] - [phone]   Urgent Care Adult Mental Health:   --Drop-in, 24/7 crisis line, and [place] Mobile Team [[phone]]    CRISIS TEXT  LINE: Text [phone] from anywhere, anytime, any crisis 24/7;    OR SEE www.crisistextline.org     Poison Control Center - [phone]    CHILD: [facility] needs assessment team - [phone]     [facility] - [phone]; or [employer] Lifeline - 2-543-283-4039    If you have a medical emergency please call 911or go to the nearest ER.                    _____________________________________________    Again thank you for choosing PSYCHIATRY CLINIC and please let us know how we can best partner with you to improve you and your family's health.  You may be receiving a survey in the mail regarding this appointment. We would love to have your feedback, both positive and negative, so please fill out the survey and return it using the provided envelope. The survey is done by an external company, so your answers are anonymous.

## 2018-10-19 NOTE — NURSING NOTE
Chief Complaint   Patient presents with     Recheck Medication     Mixed obsessional thoughts and acts
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
## 2018-10-23 ASSESSMENT — PATIENT HEALTH QUESTIONNAIRE - PHQ9: SUM OF ALL RESPONSES TO PHQ QUESTIONS 1-9: 16

## 2018-12-07 ENCOUNTER — OFFICE VISIT - HEALTHEAST (OUTPATIENT)
Dept: INTERNAL MEDICINE | Facility: CLINIC | Age: 63
End: 2018-12-07

## 2018-12-07 DIAGNOSIS — G47.33 OSA (OBSTRUCTIVE SLEEP APNEA): ICD-10-CM

## 2018-12-07 DIAGNOSIS — F42.2 MIXED OBSESSIONAL THOUGHTS AND ACTS: ICD-10-CM

## 2018-12-07 DIAGNOSIS — I63.449 CEREBROVASCULAR ACCIDENT (CVA) DUE TO EMBOLISM OF CEREBELLAR ARTERY, UNSPECIFIED BLOOD VESSEL LATERALITY (H): ICD-10-CM

## 2018-12-07 ASSESSMENT — MIFFLIN-ST. JEOR: SCORE: 1284.08

## 2019-02-08 ENCOUNTER — TELEPHONE (OUTPATIENT)
Dept: PSYCHIATRY | Facility: CLINIC | Age: 64
End: 2019-02-08

## 2019-02-08 NOTE — TELEPHONE ENCOUNTER
[facility] Call Center    Phone Message    May a detailed message be left on voicemail: yes    Reason for Call: Other:   Patient called to cancel appointment on 2/15/19 because he has been doing better than he was previously. He will call in the next couple months to schedule another appointment. If needed, Jerica or RN can call the patient to check in, etc.    Action Taken: Message routed to:  Other: RHETT Liriano

## 2019-02-11 NOTE — TELEPHONE ENCOUNTER
Writer placed a call to patient at [phone] to gather additional details on the reason for cancellation. Per patient, will call back in few weeks to reschedule an appt. At this time he is doing well without any medications. Reports OCD has gotten better at this time. He also informed this writer has has had multiple strokes since the past year but is recovering well from that. Denies any concerns at this time.     Will route to provider as FYI.

## 2019-03-05 ENCOUNTER — COMMUNICATION - HEALTHEAST (OUTPATIENT)
Dept: SCHEDULING | Facility: CLINIC | Age: 64
End: 2019-03-05

## 2019-03-06 ENCOUNTER — COMMUNICATION - HEALTHEAST (OUTPATIENT)
Dept: INTERNAL MEDICINE | Facility: CLINIC | Age: 64
End: 2019-03-06

## 2019-03-08 ENCOUNTER — COMMUNICATION - HEALTHEAST (OUTPATIENT)
Dept: INTERNAL MEDICINE | Facility: CLINIC | Age: 64
End: 2019-03-08

## 2019-05-01 ENCOUNTER — OFFICE VISIT - HEALTHEAST (OUTPATIENT)
Dept: INTERNAL MEDICINE | Facility: CLINIC | Age: 64
End: 2019-05-01

## 2019-05-01 DIAGNOSIS — I10 BENIGN ESSENTIAL HTN: ICD-10-CM

## 2019-05-01 DIAGNOSIS — Z12.5 SCREENING PSA (PROSTATE SPECIFIC ANTIGEN): ICD-10-CM

## 2019-05-01 DIAGNOSIS — R91.8 PULMONARY NODULES: ICD-10-CM

## 2019-05-01 LAB
ALBUMIN SERPL-MCNC: 3.8 G/DL (ref 3.5–5)
ALP SERPL-CCNC: 79 U/L (ref 45–120)
ALT SERPL W P-5'-P-CCNC: 28 U/L (ref 0–45)
ANION GAP SERPL CALCULATED.3IONS-SCNC: 9 MMOL/L (ref 5–18)
AST SERPL W P-5'-P-CCNC: 29 U/L (ref 0–40)
BILIRUB SERPL-MCNC: 0.5 MG/DL (ref 0–1)
BUN SERPL-MCNC: 15 MG/DL (ref 8–22)
CALCIUM SERPL-MCNC: 9.6 MG/DL (ref 8.5–10.5)
CHLORIDE BLD-SCNC: 108 MMOL/L (ref 98–107)
CHOLEST SERPL-MCNC: 146 MG/DL
CO2 SERPL-SCNC: 27 MMOL/L (ref 22–31)
CREAT SERPL-MCNC: 0.85 MG/DL (ref 0.7–1.3)
ERYTHROCYTE [DISTWIDTH] IN BLOOD BY AUTOMATED COUNT: 11.5 % (ref 11–14.5)
FASTING STATUS PATIENT QL REPORTED: NORMAL
GFR SERPL CREATININE-BSD FRML MDRD: >60 ML/MIN/1.73M2
GLUCOSE BLD-MCNC: 78 MG/DL (ref 70–125)
HCT VFR BLD AUTO: 41.9 % (ref 40–54)
HDLC SERPL-MCNC: 78 MG/DL
HGB BLD-MCNC: 14.1 G/DL (ref 14–18)
LDLC SERPL CALC-MCNC: 60 MG/DL
MCH RBC QN AUTO: 30.5 PG (ref 27–34)
MCHC RBC AUTO-ENTMCNC: 33.7 G/DL (ref 32–36)
MCV RBC AUTO: 91 FL (ref 80–100)
PLATELET # BLD AUTO: 178 THOU/UL (ref 140–440)
PMV BLD AUTO: 7.9 FL (ref 7–10)
POTASSIUM BLD-SCNC: 4.5 MMOL/L (ref 3.5–5)
PROT SERPL-MCNC: 6.3 G/DL (ref 6–8)
PSA SERPL-MCNC: 0.7 NG/ML (ref 0–4.5)
RBC # BLD AUTO: 4.62 MILL/UL (ref 4.4–6.2)
SODIUM SERPL-SCNC: 144 MMOL/L (ref 136–145)
TRIGL SERPL-MCNC: 40 MG/DL
WBC: 5 THOU/UL (ref 4–11)

## 2019-05-01 ASSESSMENT — MIFFLIN-ST. JEOR: SCORE: 1320.37

## 2019-05-03 ENCOUNTER — COMMUNICATION - HEALTHEAST (OUTPATIENT)
Dept: INTERNAL MEDICINE | Facility: CLINIC | Age: 64
End: 2019-05-03

## 2019-09-06 ENCOUNTER — OFFICE VISIT - HEALTHEAST (OUTPATIENT)
Dept: INTERNAL MEDICINE | Facility: CLINIC | Age: 64
End: 2019-09-06

## 2019-09-06 DIAGNOSIS — F42.2 MIXED OBSESSIONAL THOUGHTS AND ACTS: ICD-10-CM

## 2019-09-06 DIAGNOSIS — J98.4 CALCIFIED GRANULOMA OF LUNG: ICD-10-CM

## 2019-09-06 DIAGNOSIS — Z12.11 SPECIAL SCREENING FOR MALIGNANT NEOPLASMS, COLON: ICD-10-CM

## 2019-09-06 DIAGNOSIS — E78.2 MIXED HYPERLIPIDEMIA: ICD-10-CM

## 2019-09-06 ASSESSMENT — MIFFLIN-ST. JEOR: SCORE: 1293.15

## 2019-09-06 ASSESSMENT — PATIENT HEALTH QUESTIONNAIRE - PHQ9: SUM OF ALL RESPONSES TO PHQ QUESTIONS 1-9: 11

## 2020-01-29 NOTE — NURSING NOTE
Chief Complaint   Patient presents with     Recheck Medication     OCD    Reviewed allergies, smoking status, and pharmacy preference  Administered abuse screening questions   Obtained weight, blood pressure and heart rate      Wt Readings from Last 3 Encounters:   12/11/14 71 2 kg (157 lb)   11/21/14 70 3 kg (155 lb)   10/27/14 70 8 kg (156 lb)       Does not appear fluid overloaded  EF 20-25%  Hold lasix for now in setting of dehydration

## 2020-02-19 ENCOUNTER — COMMUNICATION - HEALTHEAST (OUTPATIENT)
Dept: INTERNAL MEDICINE | Facility: CLINIC | Age: 65
End: 2020-02-19

## 2020-03-06 ENCOUNTER — COMMUNICATION - HEALTHEAST (OUTPATIENT)
Dept: SCHEDULING | Facility: CLINIC | Age: 65
End: 2020-03-06

## 2020-03-13 ENCOUNTER — OFFICE VISIT - HEALTHEAST (OUTPATIENT)
Dept: INTERNAL MEDICINE | Facility: CLINIC | Age: 65
End: 2020-03-13

## 2020-03-13 DIAGNOSIS — R41.0 CONFUSION: ICD-10-CM

## 2020-03-13 DIAGNOSIS — G47.33 OSA (OBSTRUCTIVE SLEEP APNEA): ICD-10-CM

## 2020-03-13 DIAGNOSIS — E55.9 VITAMIN D DEFICIENCY: ICD-10-CM

## 2020-03-13 DIAGNOSIS — F42.2 MIXED OBSESSIONAL THOUGHTS AND ACTS: ICD-10-CM

## 2020-03-13 DIAGNOSIS — I63.449 CEREBROVASCULAR ACCIDENT (CVA) DUE TO EMBOLISM OF CEREBELLAR ARTERY, UNSPECIFIED BLOOD VESSEL LATERALITY (H): ICD-10-CM

## 2020-03-13 LAB
ALBUMIN SERPL-MCNC: 4 G/DL (ref 3.5–5)
ALP SERPL-CCNC: 92 U/L (ref 45–120)
ALT SERPL W P-5'-P-CCNC: 21 U/L (ref 0–45)
ANION GAP SERPL CALCULATED.3IONS-SCNC: 11 MMOL/L (ref 5–18)
AST SERPL W P-5'-P-CCNC: 27 U/L (ref 0–40)
BILIRUB SERPL-MCNC: 0.7 MG/DL (ref 0–1)
BUN SERPL-MCNC: 19 MG/DL (ref 8–22)
CALCIUM SERPL-MCNC: 9.3 MG/DL (ref 8.5–10.5)
CHLORIDE BLD-SCNC: 105 MMOL/L (ref 98–107)
CHOLEST SERPL-MCNC: 140 MG/DL
CO2 SERPL-SCNC: 24 MMOL/L (ref 22–31)
CREAT SERPL-MCNC: 0.96 MG/DL (ref 0.7–1.3)
ERYTHROCYTE [DISTWIDTH] IN BLOOD BY AUTOMATED COUNT: 12.4 % (ref 11–14.5)
FASTING STATUS PATIENT QL REPORTED: YES
GFR SERPL CREATININE-BSD FRML MDRD: >60 ML/MIN/1.73M2
GLUCOSE BLD-MCNC: 98 MG/DL (ref 70–125)
HCT VFR BLD AUTO: 43.1 % (ref 40–54)
HDLC SERPL-MCNC: 73 MG/DL
HGB BLD-MCNC: 14.5 G/DL (ref 14–18)
LDLC SERPL CALC-MCNC: 59 MG/DL
MCH RBC QN AUTO: 31 PG (ref 27–34)
MCHC RBC AUTO-ENTMCNC: 33.7 G/DL (ref 32–36)
MCV RBC AUTO: 92 FL (ref 80–100)
PLATELET # BLD AUTO: 160 THOU/UL (ref 140–440)
PMV BLD AUTO: 8.1 FL (ref 7–10)
POTASSIUM BLD-SCNC: 3.7 MMOL/L (ref 3.5–5)
PROT SERPL-MCNC: 6.9 G/DL (ref 6–8)
RBC # BLD AUTO: 4.69 MILL/UL (ref 4.4–6.2)
SODIUM SERPL-SCNC: 140 MMOL/L (ref 136–145)
TRIGL SERPL-MCNC: 40 MG/DL
TSH SERPL DL<=0.005 MIU/L-ACNC: 0.63 UIU/ML (ref 0.3–5)
VIT B12 SERPL-MCNC: 607 PG/ML (ref 213–816)
WBC: 6.2 THOU/UL (ref 4–11)

## 2020-03-13 ASSESSMENT — MIFFLIN-ST. JEOR: SCORE: 1302.22

## 2020-03-15 LAB — 25(OH)D3 SERPL-MCNC: 38.7 NG/ML (ref 30–80)

## 2020-03-16 ENCOUNTER — COMMUNICATION - HEALTHEAST (OUTPATIENT)
Dept: INTERNAL MEDICINE | Facility: CLINIC | Age: 65
End: 2020-03-16

## 2020-03-16 ENCOUNTER — HOSPITAL ENCOUNTER (OUTPATIENT)
Dept: MRI IMAGING | Facility: HOSPITAL | Age: 65
Discharge: HOME | End: 2020-03-16

## 2020-03-16 DIAGNOSIS — R41.0 CONFUSION: ICD-10-CM

## 2020-03-16 DIAGNOSIS — I63.449 CEREBROVASCULAR ACCIDENT (CVA) DUE TO EMBOLISM OF CEREBELLAR ARTERY, UNSPECIFIED BLOOD VESSEL LATERALITY (H): ICD-10-CM

## 2020-03-17 ENCOUNTER — COMMUNICATION - HEALTHEAST (OUTPATIENT)
Dept: INTERNAL MEDICINE | Facility: CLINIC | Age: 65
End: 2020-03-17

## 2020-03-20 ENCOUNTER — COMMUNICATION - HEALTHEAST (OUTPATIENT)
Dept: INTERNAL MEDICINE | Facility: CLINIC | Age: 65
End: 2020-03-20

## 2020-03-20 DIAGNOSIS — I63.449 CEREBROVASCULAR ACCIDENT (CVA) DUE TO EMBOLISM OF CEREBELLAR ARTERY, UNSPECIFIED BLOOD VESSEL LATERALITY (H): ICD-10-CM

## 2020-03-30 ENCOUNTER — RECORDS - HEALTHEAST (OUTPATIENT)
Dept: ADMINISTRATIVE | Facility: OTHER | Age: 65
End: 2020-03-30

## 2020-04-16 ENCOUNTER — COMMUNICATION - HEALTHEAST (OUTPATIENT)
Dept: INTERNAL MEDICINE | Facility: CLINIC | Age: 65
End: 2020-04-16

## 2020-04-17 ENCOUNTER — OFFICE VISIT - HEALTHEAST (OUTPATIENT)
Dept: INTERNAL MEDICINE | Facility: CLINIC | Age: 65
End: 2020-04-17

## 2020-04-17 DIAGNOSIS — F33.1 MODERATE EPISODE OF RECURRENT MAJOR DEPRESSIVE DISORDER (H): ICD-10-CM

## 2020-04-17 DIAGNOSIS — I63.449 CEREBROVASCULAR ACCIDENT (CVA) DUE TO EMBOLISM OF CEREBELLAR ARTERY, UNSPECIFIED BLOOD VESSEL LATERALITY (H): ICD-10-CM

## 2020-04-17 DIAGNOSIS — F42.2 MIXED OBSESSIONAL THOUGHTS AND ACTS: ICD-10-CM

## 2020-04-17 ASSESSMENT — PATIENT HEALTH QUESTIONNAIRE - PHQ9: SUM OF ALL RESPONSES TO PHQ QUESTIONS 1-9: 19

## 2020-05-26 ENCOUNTER — RECORDS - HEALTHEAST (OUTPATIENT)
Dept: LAB | Facility: HOSPITAL | Age: 65
End: 2020-05-26

## 2020-05-26 ENCOUNTER — HOSPITAL ENCOUNTER (OUTPATIENT)
Dept: ULTRASOUND IMAGING | Facility: HOSPITAL | Age: 65
Discharge: HOME | End: 2020-05-26
Attending: PSYCHIATRY & NEUROLOGY

## 2020-05-26 DIAGNOSIS — I63.413 CEREBRAL INFARCTION DUE TO BILATERAL EMBOLISM OF MIDDLE CEREBRAL ARTERIES (H): ICD-10-CM

## 2020-05-26 DIAGNOSIS — I63.9 CEREBROVASCULAR ACCIDENT (CVA), UNSPECIFIED MECHANISM (H): ICD-10-CM

## 2020-05-26 LAB
FOLATE SERPL-MCNC: 15.3 NG/ML
TSH SERPL DL<=0.005 MIU/L-ACNC: 0.92 UIU/ML (ref 0.3–5)
VIT B12 SERPL-MCNC: 702 PG/ML (ref 213–816)

## 2020-05-27 LAB — T PALLIDUM AB SER QL: NEGATIVE

## 2020-05-28 LAB — LYME TOTAL ANTIBODY - HISTORICAL: 0.04 INDEX VALUE

## 2020-05-29 LAB — METHYLMALONATE SERPL-SCNC: 0.11 UMOL/L (ref 0–0.4)

## 2020-05-31 LAB
ARSENIC, WHOLE BLOOD: <10 NG/ML
LAB SAMPLE TYPE: NORMAL
LAB STATE REPORTED TO: NORMAL
LEAD, WHOLE BLOOD - HISTORICAL: <1 UG/DL
MERCURY, WHOLE BLOOD - HISTORICAL: <5 NG/ML
SPECIMEN STATUS: NORMAL

## 2020-06-03 LAB
ACHR BIND IGG+IGM SER IA-SCNC: 0 NMOL/L
ACHR MOD AB/ACHR TOTAL SFR SER: 0 %
IMMUNOLOGIST REVIEW: NORMAL
STRIA MUS AB TITR SER: NEGATIVE TITER

## 2020-06-05 ENCOUNTER — AMBULATORY - HEALTHEAST (OUTPATIENT)
Dept: SLEEP MEDICINE | Facility: CLINIC | Age: 65
End: 2020-06-05

## 2020-06-11 ENCOUNTER — COMMUNICATION - HEALTHEAST (OUTPATIENT)
Dept: SCHEDULING | Facility: CLINIC | Age: 65
End: 2020-06-11

## 2020-06-18 ENCOUNTER — OFFICE VISIT - HEALTHEAST (OUTPATIENT)
Dept: SLEEP MEDICINE | Facility: CLINIC | Age: 65
End: 2020-06-18

## 2020-06-18 DIAGNOSIS — Z86.69 HISTORY OF SLEEP APNEA: ICD-10-CM

## 2020-06-18 DIAGNOSIS — R06.83 SNORING: ICD-10-CM

## 2020-06-18 DIAGNOSIS — G47.10 HYPERSOMNIA: ICD-10-CM

## 2020-06-18 DIAGNOSIS — Z86.73 HISTORY OF CVA (CEREBROVASCULAR ACCIDENT): ICD-10-CM

## 2020-06-22 DIAGNOSIS — R06.83 SNORING: ICD-10-CM

## 2020-06-22 DIAGNOSIS — G47.10 HYPERSOMNIA: ICD-10-CM

## 2020-06-22 DIAGNOSIS — Z86.73 HISTORY OF CVA (CEREBROVASCULAR ACCIDENT): ICD-10-CM

## 2020-06-22 DIAGNOSIS — Z86.69 HISTORY OF OBSTRUCTIVE SLEEP APNEA: Primary | ICD-10-CM

## 2020-06-30 ENCOUNTER — ANCILLARY PROCEDURE (OUTPATIENT)
Dept: NEUROLOGY | Facility: CLINIC | Age: 65
End: 2020-06-30
Attending: PSYCHIATRY & NEUROLOGY
Payer: MEDICARE

## 2020-06-30 ENCOUNTER — OFFICE VISIT (OUTPATIENT)
Dept: NEUROLOGY | Facility: CLINIC | Age: 65
End: 2020-06-30
Payer: MEDICARE

## 2020-06-30 VITALS
BODY MASS INDEX: 20.16 KG/M2 | WEIGHT: 121 LBS | DIASTOLIC BLOOD PRESSURE: 71 MMHG | HEIGHT: 65 IN | HEART RATE: 58 BPM | SYSTOLIC BLOOD PRESSURE: 169 MMHG

## 2020-06-30 DIAGNOSIS — R41.89 PSEUDODEMENTIA: Primary | ICD-10-CM

## 2020-06-30 DIAGNOSIS — R41.3 MEMORY CHANGES: ICD-10-CM

## 2020-06-30 PROBLEM — E78.5 HYPERLIPIDEMIA: Status: ACTIVE | Noted: 2018-02-26

## 2020-06-30 PROBLEM — R53.81 OTHER MALAISE AND FATIGUE: Status: ACTIVE | Noted: 2020-06-30

## 2020-06-30 PROBLEM — R53.83 OTHER MALAISE AND FATIGUE: Status: ACTIVE | Noted: 2020-06-30

## 2020-06-30 PROBLEM — Z80.0 FAMILY HISTORY OF COLON CANCER: Status: ACTIVE | Noted: 2018-02-26

## 2020-06-30 PROBLEM — E55.9 VITAMIN D DEFICIENCY: Status: ACTIVE | Noted: 2018-02-26

## 2020-06-30 PROBLEM — G47.33 OSA (OBSTRUCTIVE SLEEP APNEA): Status: ACTIVE | Noted: 2018-08-18

## 2020-06-30 PROBLEM — E78.2 MIXED HYPERLIPIDEMIA: Status: ACTIVE | Noted: 2018-02-26

## 2020-06-30 PROBLEM — I63.419 CEREBRAL INFARCTION DUE TO EMBOLISM OF MIDDLE CEREBRAL ARTERY (H): Status: ACTIVE | Noted: 2020-06-30

## 2020-06-30 PROBLEM — I63.9 CEREBROVASCULAR ACCIDENT (CVA) (H): Status: ACTIVE | Noted: 2018-02-27

## 2020-06-30 PROCEDURE — 95816 EEG AWAKE AND DROWSY: CPT | Performed by: PSYCHIATRY & NEUROLOGY

## 2020-06-30 PROCEDURE — 99214 OFFICE O/P EST MOD 30 MIN: CPT | Mod: 25 | Performed by: PSYCHIATRY & NEUROLOGY

## 2020-06-30 RX ORDER — ATORVASTATIN CALCIUM 40 MG/1
40 TABLET, FILM COATED ORAL
COMMUNITY
Start: 2020-03-13 | End: 2021-12-09

## 2020-06-30 ASSESSMENT — MIFFLIN-ST. JEOR: SCORE: 1260.73

## 2020-06-30 NOTE — NURSING NOTE
Chief Complaint   Patient presents with     Results     Discuss US, Lab and EEG results- Patient is feeling better      Leslie Burrows CMA on 6/30/2020 at 10:13 AM

## 2020-06-30 NOTE — PATIENT INSTRUCTIONS
Patient Education     Using Blood Thinners (Anticoagulants)  Blood thinners or anticoagulants are medicines that help prevent blood clots from forming. They include warfarin, heparin, dabigatran, rivaroxaban, apixaban, and edoxaban. Your healthcare provider will help you decide which medicine is best for you.    Taking an anticoagulant safely  When you are taking a blood thinner, you will need to take certain steps to stay safe. Too much blood thinner puts you at risk for bleeding. Too little puts you at risk for stroke. Follow these guidelines. Also follow any others that your healthcare provider gives you.    You may be told you need regular lab testing while taking these medicines. Warfarin requires routine testing while the other medicines do not.    Tell your doctor about all medicines you take. This includes over-the-counter medicines, supplements, or herbal remedies. Don't take any medicines (including ones you buy over-the-counter) that your doctor doesn t know about. Some medicines can interact with blood thinners and cause serious problems.    Tell any healthcare provider that you see for care (such as doctors, dentists, chiropractors, home health nurses) that you take a blood thinner.    Carry a medical ID card or wear a medical-alert bracelet that says you take an anticoagulant.    Before taking aspirin, check with your doctor. Aspirin can significantly increase your risk of bleeding.    This medicine makes bleeding harder to stop. To protect yourself:  ? Don't do any activities that may cause injury. If you fall or are injured, contact your healthcare provider right away. Blood thinners prevent clotting, so you could be bleeding inside without realizing it.  ? Use a soft-bristle toothbrush and waxed dental floss. Shave with an electric razor rather than a blade.  ? Don t go barefoot. Don t trim corns or calluses yourself.  Warfarin: Other important information  Several precautions are especially  important when you are taking warfarin. Always keep these points in mind:    Be sure to follow your healthcare provider's instructions for taking warfarin.    Take this medicine at the same time each day. Take it with a full glass of water, with or without food. If you miss a dose, contact your doctor to find out how much to take. Don't take a double dose.    Warfarin is an effective medicine, but it can be dangerous if not taken correctly. It makes your blood less likely to form clots. If you take too much, it can cause serious internal or external bleeding.    You will need to have regular monitoring while you are taking warfarin. This includes blood tests to check your international normalized ratio (INR) and prothrombin time (PT). These tests show how quickly your blood clots. You will also have a complete blood count (CBC) once in a while. This looks at your blood and platelet levels. Both of these need to be followed while you're on warfarin. Talk with your healthcare provider about whether you need to visit the clinic every week, or if services are available for monitoring in your home.    Certain medicines can affect your INR and PT levels. Tell your healthcare provider if there are any changes in your medicines. This includes any over-the-counter medicines, supplements like vitamin K, or herbal remedies.    Your diet can also affect your INR and PT levels. Because of this, it's important to eat a consistent diet. It is especially important to eat a consistent amount of foods that are high in vitamin K. Talk with your healthcare provider before making any big changes in your diet.    Remember that warfarin increases your risk of bleeding. Be careful not to injure yourself. If you have a significant injury, contact your healthcare provider right away. It's important to alert your provider if you've fallen or hurt yourself, even if you don't break your skin. You could be bleeding inside your body without  realizing it.     Warfarin: Watch your INR/PT blood levels  Two tests are used to find out how your blood is clotting. One is prothrombin time (PT), the other is the international normalized ratio (INR).    Go for your blood tests (INR/PT) as often as directed. Your diet and the other medicines you take can affect your INR/PT levels.    My next INR/PT blood draw is due on _____________ (date) at ___________ (time) by ___________ (name of doctor or clinic).    The name of the doctor who is monitoring my anticoagulation therapy is _____________________ and the phone number is _________________.    Follow up with your doctor or as advised by his or her staff. It usually takes a few hours for your doctor to get the results of your clotting tests. Call to get your lab results to find out if your doctor needs to make further changes to your warfarin dose.    If your blood is drawn for these tests at a location other than your doctor's office, tell your doctor as soon as you get your lab results.   Warfarin: Watch what you eat  Vitamin K helps your blood clot. So you have to watch how much you eat of foods that contain vitamin K. These foods can affect the way warfarin works. They don't affect the other non-warfarin blood thinners. Here are some specific tips:    Try to keep your diet about the same each day. If you change your diet for any reason, such as for illness or to lose weight, tell your doctor.    Each day, eat the same amount of foods that are high in vitamin K. These include asparagus, avocado, broccoli, cabbage, kale, spinach, and some other leafy green vegetables. Oils, such as soybean, canola, and olive oils, are also high in vitamin K.    Limit fats to 2 to 4 tablespoons a day.    Ask your healthcare provider if you should not drink alcohol while you are taking a blood thinner.    Avoid teas that contain sweet clover, sweet robbi, or tonka beans. These can affect how your medicine works.    Talk with  your healthcare provider and pharmacist about specific foods or special diets that can affect anticoagulant levels. These include grapefruit juice, cranberries and cranberry juice, fish oil supplements, garlic, derrick, licorice, turmeric, and herbal teas and supplements.  Talk with your healthcare provider if you have concerns about these or other food products and their effects on warfarin.     When to call your healthcare provider  Call your provider right away if you have any of these:    Bleeding that doesn t stop in 10 minutes    A heavier-than-normal menstrual period or bleeding between periods    Coughing or throwing up blood    Bloody diarrhea or bleeding hemorrhoids     Dark-colored urine or black stools    Red or black-and-blue marks on the skin that get larger    Dizziness or fatigue    Chest pain or trouble breathing  Allergic reactions:    Rash    Itching    Swelling    Trouble swallowing or breathing   Medical conditions and anticoagulants  Before starting a blood thinner, tell your healthcare provider if you have any of these conditions:    Stomach ulcer now or in the past    Vomited blood or had bloody stools (black or red color)    Aneurysm, pericarditis, or pericardial effusion    Blood disorder    Recent surgery, stroke, mini-stroke, or spinal puncture    Kidney or liver disease, uncontrolled high blood pressure, diabetes, vasculitis, heart failure, lupus, or other collagen-vascular disease, or high cholesterol    Pregnancy or breastfeeding    Younger than 18 years old    Recent or planned dental procedure  Medicine interactions and anticoagulants  Many medicines interfere with the effect of blood thinners. Before starting these medicines, tell your healthcare provider about any prescription, over-the-counter, or herbal supplements you take. In particular, tell your provider about:    Antibiotics    Heart medicines    Cimetidine    Aspirin or other anti-inflammatory drugs such as ibuprofen,  naproxen, ketoprofen, or other arthritis medicines    Medicines for depression, cancer, HIV (protease inhibitors), diabetes, seizures, gout, high cholesterol, or thyroid replacement    Vitamins containing vitamin K or herbal products such as ginkgo, Co-Q10, garlic, or Amanda's wort     Note: This information topic may not include all directions, precautions, medical conditions, medicine/food interactions, and warnings for these medicines. Check with your doctor, nurse, or pharmacist for any questions you have.    Date Last Reviewed: 7/1/2017 2000-2019 [employer]. [address] [place] [id number]. All rights reserved. This information is not intended as a substitute for professional medical care. Always follow your healthcare professional's instructions.

## 2020-06-30 NOTE — PROGRESS NOTES
NEUROLOGY FOLLOW UP VISIT  NOTE       [facility] NEUROLOGY [place]  [address] [place] [address]  Tel: [phone]  Fax: [phone]  [email or web address]     Berto Pretty,  1955, MRN [id number]  PCP: Antwon Mcmillan, [phone]  Date: 2020     ASSESSMENT & PLAN     Diagnosis code: Pseudodementia     Pseudodementia  64-year-old male with history of OCD, depression who was recently evaluated for cognitive decline along with feeling fatigued, groggy and slowing down.  He is quite concerned about the possibility of Alzheimer's dementia.  He had extensive work-up that included carotid ultrasound, MRI, EEG and lab work for common causes of cognitive decline that were normal.  I have reassured him that his work-up is negative and that his symptoms are likely related to his underlying OCD and depression.  Previously he was on Celexa that he discontinued after he had a CVA and I have encouraged him to talk to his primary care physician and start taking that medicine.  Follow-up will be on PRN basis.    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  He had an extensive work-up that included a 30-day event monitor that did not show any cardiac arrhythmia.  He is on aspirin and a statin.  Recently MRI was repeated that did not show any recurrent stroke.  Also as noted above, carotid ultrasound was normal.  I have encouraged him to continue on current medication and that no further work-up is needed.  Follow-up will be on PRN basis    Thank you again for this referral, please feel free to contact me if you have any questions.    Rk Alfred MD  [facility]  (Former[facility], [facility]     HISTORY OF PRESENT ILLNESS     Patient is 64-year-old male with history of hyperlipidemia, OCD,  RACHNA, vitamin D deficiency and left precentral gyrus infarction who was seen on 3/30/2020 for evaluation of feeling fatigued, groggy and slowing down.  He was initially seen in our clinic in 2018 when he was admitted to [facility] with right-sided weakness and his MRI of the head at that time showed left precentral gyrus infarction.  He was started on aspirin and statin.  At that time he also had a carotid ultrasound that did not show any hemodynamically significant stenosis.  He had some recurrent symptoms and was readmitted and was told he had a TIA.  He also had a 30-day event monitor that did not show any cardiac source of emboli.  He was on Prozac that was discontinued but afterwards started feeling more slow in his cognition and was slow in answering question.  According to patient that he felt even sharper after 2018 stroke and was concerned that he is developed cognitive delay.  He had lab work since his last visit that included a normal folic acid, heavy metal panel, B12, TSH, methylmalonic acid level, Lyme titer, RPR and myasthenia gravis panel.  He also had a repeat carotid ultrasound that did not show any hemodynamically significant stenosis.       PROBLEM LIST   Patient Active Problem List   Diagnosis Code     Obsessive compulsive disorder F42.9     Cerebral infarction due to embolism of middle cerebral artery (H) I63.419     Cerebrovascular accident (CVA) (H) I63.9     Mixed hyperlipidemia E78.2     RACHNA (obstructive sleep apnea) G47.33     Other malaise and fatigue R53.81, R53.83     Vitamin D deficiency E55.9     Family history of colon cancer Z80.0         PAST MEDICAL & SURGICAL HISTORY     Past Medical History:   Patient  has no past medical history on file.    Surgical History:  He  has no past surgical history on file.     SOCIAL HISTORY     Reviewed, and he  reports that he quit smoking about 46 years ago. His smoking use included cigarettes. He has never used smokeless tobacco. He reports  "that he does not drink alcohol or use drugs.     FAMILY HISTORY     Reviewed, and family history includes Cancer in his father; Cerebrovascular Disease in his father and mother.     ALLERGIES     Allergies   Allergen Reactions     Pollen Extract      Latex Rash         REVIEW OF SYSTEMS     A 12 point review of system was performed and was negative except as outlined in the history of present illness.     HOME MEDICATIONS       Current Outpatient Medications:      ASPIRIN PO, Take 81 mg by mouth daily, Disp: , Rfl:      atorvastatin (LIPITOR) 40 MG tablet, Take 40 mg by mouth, Disp: , Rfl:      loratadine 10 MG capsule, Take 10 mg by mouth daily., Disp: , Rfl:       PHYSICAL EXAM     Vital signs  BP (!) 169/71 (BP Location: Right arm)   Pulse 58   Ht 1.651 m (5' 5\")   Wt 54.9 kg (121 lb)   BMI 20.14 kg/m      Weight:   121 lbs 0 oz    GENERAL PHYSICAL EXAM: Patient is alert and oriented x 4 in no acute distress. Neck was supple, no carotid bruits, thyromegaly, lymphadenopathy or JVD noted.     NEUROLOGICAL EXAM:  MENTAL STATUS  Patient is A&O x 4. Patient recalls 3/3 objects at 5 minutes.  SPEECH  Speech is clear and fluent with good repetition, comprehension, and naming both for objects and parts of an object. Written and verbal comprehension is intact.  CRANIAL NERVES  CN II: Visual fields are full to confrontation. Fundoscopic exam is normal with sharp discs and no vascular changes. Venous pulsations are present bilaterally. Pupils are equal and reactive to light.   CN III, IV, VI: EOMI, PERRLA  CN V: Facial sensation is intact to pinprick in all 3 divisions bilaterally. Corneal responses are intact.  CN VII: Face is symmetric with normal eye closure and smile.  CN VII: Hearing is normal to rubbing fingers  CN IX, X: Palate elevates symmetrically. Phonation is normal.  CN XI: Head turning and shoulder shrug are intact  CN XII: Tongue is midline with normal movements and no atrophy.  MOTOR  Muscle bulk and " tone are normal. No pronator drift. Strength is 5/5 bilaterally. No fasciculations noted.  REFLEXES  Reflexes are symmetric. Plantar responses are flexor.  SENSORY  Light touch, pinprick, position sense, and vibration sense are intact bilaterally. No astereognosia, agraphesthesia or extinction to bilateral simultaneous stimulation.  COORDINATION  Rapid alternating movements and fine finger movements are intact. No dysmetria on FNF and HKS. Romberg negative.  GAIT  Posture is normal.Tandem gait is normal. Able to walk on toes and heels.     DIAGNOSTIC STUDIES     PERTINENT RADIOLOGY  Following imaging studies were reviewed     EEG 6/30/2020  Normal awake and sleep    Carotid ultrasound 5/26/2020  1. Mild atheromatous plaque in the carotid arteries.  2. No significant stenosis on either side.     Evaluation based on velocities and NASCET criteria.    MRI HEAD 3/16/20  1. No acute/subacute infarction, intracranial hemorrhage, mass effect, hydrocephalus, or abnormal enhancement.  2. Chronic infarctions in the left precentral gyrus and right cerebellum with additional presumed sequelae of mild to moderate chronic small vessel ischemic disease [1]    CUS 2/26/18  1. Mild atheromatous plaque in the left carotid bulb.  2. No significant stenosis on either side.  3. Mostly cystic nodule involving the right lobe of the thyroid. This is   likely  benign [2]    BRAIN MRI 2/25/18  1. 8 mm acute or early subacute infarct involving the left precentral   gyrus in   the posterior frontal lobe. No significant mass effect or evidence of   associated   hemorrhage.   2. Underlying mild cerebral volume loss and presumed chronic small vessel   ischemic changes.   3. There is a tiny chronic infarct in the right cerebellar hemisphere. [3]    30 Day Event Monitor 7/19/18  Normal      PERTINENT LABS  Following labs were reviewed  Result Name Current Result Previous Result Reference Range   TSH Cascade (uIU/mL)  0.92 5/26/2020  0.30 - 5.00    Vitamin B12 Level (pg/mL)  702 5/26/2020  213 - 816   Folate (ng/mL)  15.3 5/26/2020  >=3.5 -    Treponema Ab  Negative 5/26/2020  Negative -    Lyme Ab  0.04 5/26/2020   - <0.90   Methylmalonic Acid (umol/L)  0.11 5/26/2020  0.00 - 0.40   Arsenic, Whole Bld (ng/mL)  <10 5/26/2020   - <10   Lead, Whole Blood (ug/dL)  <1 5/26/2020   - <5   Metals Comments I  [place] 5/26/2020     Sample Type  Venous 5/26/2020     Mercury, Whole Blood (ng/mL)  <5 5/26/2020   - <8   Myasthenia Gravis Eval  SEE BELOW 5/26/2020  0 5/26/2020    AChR Receptor Ab  0.00 5/26/2020   - <=0.02   Striated Muscle Ab Scrn (Titer)  Negative 5/26/2020   - <1:120              Total time spent for face to face visit, reviewing labs/imaging studies, counseling and coordination of care was: 30 Minutes More than 50% of this time was spent on counseling and coordination of care.      This note was dictated using voice recognition software.  Any grammatical or context distortions are unintentional and inherent to the software.

## 2020-06-30 NOTE — LETTER
2020         RE: Berto Pretty  [address] [facility] [address]        Dear Colleague,    Thank you for referring your patient, Berto Pretty, to the [facility] NEUROLOGY [place]. Please see a copy of my visit note below.    NEUROLOGY FOLLOW UP VISIT  NOTE       [facility]UROLOGY [place]  [address] [place] [address]  Tel: [phone]  Fax: [phone]  [email or web address]     Berto Pretty,  1955, MRN [id number]  PCP: Antwon Mcmillan, [phone]  Date: 2020     ASSESSMENT & PLAN     Diagnosis code: Pseudodementia     Pseudodementia  64-year-old male with history of OCD, depression who was recently evaluated for cognitive decline along with feeling fatigued, groggy and slowing down.  He is quite concerned about the possibility of Alzheimer's dementia.  He had extensive work-up that included carotid ultrasound, MRI, EEG and lab work for common causes of cognitive decline that were normal.  I have reassured him that his work-up is negative and that his symptoms are likely related to his underlying OCD and depression.  Previously he was on Celexa that he discontinued after he had a CVA and I have encouraged him to talk to his primary care physician and start taking that medicine.  Follow-up will be on PRN basis.    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  He had an extensive work-up that included a 30-day event monitor that did not show any cardiac arrhythmia.  He is on aspirin and a statin.  Recently MRI was repeated that did not show any recurrent stroke.  Also as noted above, carotid ultrasound was normal.  I have encouraged him to continue on current medication and that no further work-up is needed.  Follow-up will be on PRN basis    Thank you again for this referral, please feel free  to contact me if you have any questions.    Rk Alfred MD  [facility]  ([facility], [facility]     HISTORY OF PRESENT ILLNESS     Patient is 64-year-old male with history of hyperlipidemia, OCD, RACHNA, vitamin D deficiency and left precentral gyrus infarction who was seen on 3/30/2020 for evaluation of feeling fatigued, groggy and slowing down.  He was initially seen in our clinic in 2018 when he was admitted to [facility] with right-sided weakness and his MRI of the head at that time showed left precentral gyrus infarction.  He was started on aspirin and statin.  At that time he also had a carotid ultrasound that did not show any hemodynamically significant stenosis.  He had some recurrent symptoms and was readmitted and was told he had a TIA.  He also had a 30-day event monitor that did not show any cardiac source of emboli.  He was on Prozac that was discontinued but afterwards started feeling more slow in his cognition and was slow in answering question.  According to patient that he felt even sharper after 2018 stroke and was concerned that he is developed cognitive delay.  He had lab work since his last visit that included a normal folic acid, heavy metal panel, B12, TSH, methylmalonic acid level, Lyme titer, RPR and myasthenia gravis panel.  He also had a repeat carotid ultrasound that did not show any hemodynamically significant stenosis.       PROBLEM LIST   Patient Active Problem List   Diagnosis Code     Obsessive compulsive disorder F42.9     Cerebral infarction due to embolism of middle cerebral artery (H) I63.419     Cerebrovascular accident (CVA) (H) I63.9     Mixed hyperlipidemia E78.2     RACHNA (obstructive sleep apnea) G47.33     Other malaise and fatigue R53.81, R53.83     Vitamin D deficiency E55.9     Family history of colon cancer Z80.0         PAST MEDICAL & SURGICAL HISTORY     Past Medical History:   Patient  has no past medical  "history on file.    Surgical History:  He  has no past surgical history on file.     SOCIAL HISTORY     Reviewed, and he  reports that he quit smoking about 46 years ago. His smoking use included cigarettes. He has never used smokeless tobacco. He reports that he does not drink alcohol or use drugs.     FAMILY HISTORY     Reviewed, and family history includes Cancer in his father; Cerebrovascular Disease in his father and mother.     ALLERGIES     Allergies   Allergen Reactions     Pollen Extract      Latex Rash         REVIEW OF SYSTEMS     A 12 point review of system was performed and was negative except as outlined in the history of present illness.     HOME MEDICATIONS       Current Outpatient Medications:      ASPIRIN PO, Take 81 mg by mouth daily, Disp: , Rfl:      atorvastatin (LIPITOR) 40 MG tablet, Take 40 mg by mouth, Disp: , Rfl:      loratadine 10 MG capsule, Take 10 mg by mouth daily., Disp: , Rfl:       PHYSICAL EXAM     Vital signs  BP (!) 169/71 (BP Location: Right arm)   Pulse 58   Ht 1.651 m (5' 5\")   Wt 54.9 kg (121 lb)   BMI 20.14 kg/m      Weight:   121 lbs 0 oz    GENERAL PHYSICAL EXAM: Patient is alert and oriented x 4 in no acute distress. Neck was supple, no carotid bruits, thyromegaly, lymphadenopathy or JVD noted.     NEUROLOGICAL EXAM:  MENTAL STATUS  Patient is A&O x 4. Patient recalls 3/3 objects at 5 minutes.  SPEECH  Speech is clear and fluent with good repetition, comprehension, and naming both for objects and parts of an object. Written and verbal comprehension is intact.  CRANIAL NERVES  CN II: Visual fields are full to confrontation. Fundoscopic exam is normal with sharp discs and no vascular changes. Venous pulsations are present bilaterally. Pupils are equal and reactive to light.   CN III, IV, VI: EOMI, PERRLA  CN V: Facial sensation is intact to pinprick in all 3 divisions bilaterally. Corneal responses are intact.  CN VII: Face is symmetric with normal eye closure and " smile.  CN VII: Hearing is normal to rubbing fingers  CN IX, X: Palate elevates symmetrically. Phonation is normal.  CN XI: Head turning and shoulder shrug are intact  CN XII: Tongue is midline with normal movements and no atrophy.  MOTOR  Muscle bulk and tone are normal. No pronator drift. Strength is 5/5 bilaterally. No fasciculations noted.  REFLEXES  Reflexes are symmetric. Plantar responses are flexor.  SENSORY  Light touch, pinprick, position sense, and vibration sense are intact bilaterally. No astereognosia, agraphesthesia or extinction to bilateral simultaneous stimulation.  COORDINATION  Rapid alternating movements and fine finger movements are intact. No dysmetria on FNF and HKS. Romberg negative.  GAIT  Posture is normal.Tandem gait is normal. Able to walk on toes and heels.     DIAGNOSTIC STUDIES     PERTINENT RADIOLOGY  Following imaging studies were reviewed     EEG 6/30/2020  Normal awake and sleep    Carotid ultrasound 5/26/2020  1. Mild atheromatous plaque in the carotid arteries.  2. No significant stenosis on either side.     Evaluation based on velocities and NASCET criteria.    MRI HEAD 3/16/20  1. No acute/subacute infarction, intracranial hemorrhage, mass effect, hydrocephalus, or abnormal enhancement.  2. Chronic infarctions in the left precentral gyrus and right cerebellum with additional presumed sequelae of mild to moderate chronic small vessel ischemic disease [1]    CUS 2/26/18  1. Mild atheromatous plaque in the left carotid bulb.  2. No significant stenosis on either side.  3. Mostly cystic nodule involving the right lobe of the thyroid. This is   likely  benign [2]    BRAIN MRI 2/25/18  1. 8 mm acute or early subacute infarct involving the left precentral   gyrus in   the posterior frontal lobe. No significant mass effect or evidence of   associated   hemorrhage.   2. Underlying mild cerebral volume loss and presumed chronic small vessel   ischemic changes.   3. There is a tiny  chronic infarct in the right cerebellar hemisphere. [3]    30 Day Event Monitor 7/19/18  Normal      PERTINENT LABS  Following labs were reviewed  Result Name Current Result Previous Result Reference Range   TSH Cascade (uIU/mL)  0.92 5/26/2020  0.30 - 5.00   Vitamin B12 Level (pg/mL)  702 5/26/2020  213 - 816   Folate (ng/mL)  15.3 5/26/2020  >=3.5 -    Treponema Ab  Negative 5/26/2020  Negative -    Lyme Ab  0.04 5/26/2020   - <0.90   Methylmalonic Acid (umol/L)  0.11 5/26/2020  0.00 - 0.40   Arsenic, Whole Bld (ng/mL)  <10 5/26/2020   - <10   Lead, Whole Blood (ug/dL)  <1 5/26/2020   - <5   Metals Comments I  [place] 5/26/2020     Sample Type  Venous 5/26/2020     Mercury, Whole Blood (ng/mL)  <5 5/26/2020   - <8   Myasthenia Gravis Eval  SEE BELOW 5/26/2020  0 5/26/2020    AChR Receptor Ab  0.00 5/26/2020   - <=0.02   Striated Muscle Ab Scrn (Titer)  Negative 5/26/2020   - <1:120              Total time spent for face to face visit, reviewing labs/imaging studies, counseling and coordination of care was: 30 Minutes More than 50% of this time was spent on counseling and coordination of care.      This note was dictated using voice recognition software.  Any grammatical or context distortions are unintentional and inherent to the software.               Again, thank you for allowing me to participate in the care of your patient.        Sincerely,        Rk Alfred MD

## 2020-07-30 ENCOUNTER — COMMUNICATION - HEALTHEAST (OUTPATIENT)
Dept: INTERNAL MEDICINE | Facility: CLINIC | Age: 65
End: 2020-07-30

## 2020-08-26 ENCOUNTER — COMMUNICATION - HEALTHEAST (OUTPATIENT)
Dept: SLEEP MEDICINE | Facility: CLINIC | Age: 65
End: 2020-08-26

## 2020-08-28 ENCOUNTER — COMMUNICATION - HEALTHEAST (OUTPATIENT)
Dept: SCHEDULING | Facility: CLINIC | Age: 65
End: 2020-08-28

## 2020-10-02 ENCOUNTER — THERAPY VISIT (OUTPATIENT)
Dept: SLEEP MEDICINE | Facility: CLINIC | Age: 65
End: 2020-10-02
Payer: MEDICARE

## 2020-10-02 ENCOUNTER — COMMUNICATION - HEALTHEAST (OUTPATIENT)
Dept: INTERNAL MEDICINE | Facility: CLINIC | Age: 65
End: 2020-10-02

## 2020-10-02 DIAGNOSIS — F42.2 MIXED OBSESSIONAL THOUGHTS AND ACTS: ICD-10-CM

## 2020-10-02 DIAGNOSIS — Z86.73 HISTORY OF CVA (CEREBROVASCULAR ACCIDENT): ICD-10-CM

## 2020-10-02 DIAGNOSIS — Z86.69 HISTORY OF OBSTRUCTIVE SLEEP APNEA: ICD-10-CM

## 2020-10-02 DIAGNOSIS — G47.10 HYPERSOMNIA: ICD-10-CM

## 2020-10-02 DIAGNOSIS — R06.83 SNORING: ICD-10-CM

## 2020-10-02 PROCEDURE — 95810 POLYSOM 6/> YRS 4/> PARAM: CPT | Performed by: INTERNAL MEDICINE

## 2020-10-07 LAB — SLPCOMP: NORMAL

## 2020-10-12 ENCOUNTER — TELEPHONE (OUTPATIENT)
Dept: SLEEP MEDICINE | Facility: CLINIC | Age: 65
End: 2020-10-12

## 2020-10-12 ENCOUNTER — VIRTUAL VISIT (OUTPATIENT)
Dept: SLEEP MEDICINE | Facility: CLINIC | Age: 65
End: 2020-10-12
Payer: MEDICARE

## 2020-10-12 DIAGNOSIS — G47.10 HYPERSOMNIA: ICD-10-CM

## 2020-10-12 DIAGNOSIS — G47.33 OBSTRUCTIVE SLEEP APNEA: Primary | ICD-10-CM

## 2020-10-12 PROCEDURE — 99443 PR PHYSICIAN TELEPHONE EVALUATION 21-30 MIN: CPT | Performed by: INTERNAL MEDICINE

## 2020-10-12 NOTE — PROGRESS NOTES
"The patient has been notified of following:      \"This telephone visit will be conducted via a call between you and your physician/provider. We have found that certain health care needs can be provided without the need for a physical exam.  This service lets us provide the care you need with a short phone conversation.  If a prescription is necessary we can send it directly to your pharmacy.  If lab work is needed we can place an order for that and you can then stop by our lab to have the test done at a later time.     Telephone visits are billed at different rates depending on your insurance coverage. During this emergency period, for some insurers they may be billed the same as an in-person visit.  Please reach out to your insurance provider with any questions.     If during the course of the call the physician/provider feels a telephone visit is not appropriate, you will not be charged for this service.\"     Patient has given verbal consent to a Telephone visit? Yes     What phone number would you like to be contacted at? [phone]     Patient would like to receive their AVS by AVS Preference: Mail    Thank you for the opportunity to participate in the care of Berto Pretty.     He is a 65 year old  male patient who comes to the sleep medicine clinic for review of sleep study results. The study was completed on 10/02/20  which showed that the patient had mild obstructive sleep apnea and REM sleep without atonia    Assessment and Plan:  In summary Berto Pretty is a 65 year old year old male here for review of sleep study results.  1. Obstructive Sleep Apnea  We had an extensive conversation to review the results of his sleep study and to  him on the importance of treating sleep apnea. We discussed the options of treatment with oral appliance versus CPAP. Patient decided to proceed with CPAP. He will start using the device as soon as he receives it with the intention to use if for the entire night. " We discussed some tips to increase PAP tolerance as well as the normal curve of adaptation. CPAP is going to provide improved respiratory function during the night but it can cause some sleep disruption that tends to improve with continuous usage. He should return to the clinic in 6 weeks to review compliance and efficacy monitoring. Since the patient does not have any preference, we will use [employer] as the durable medical equipment company.   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      Current Outpatient Medications   Medication Sig Dispense Refill     ASPIRIN PO Take 81 mg by mouth daily       atorvastatin (LIPITOR) 40 MG tablet Take 40 mg by mouth       loratadine 10 MG capsule Take 10 mg by mouth daily.         Allergies   Allergen Reactions     Pollen Extract      Latex Rash        Labs/Studies:  - We reviewed the results of the overnight study as described on the HPI.     No components found for: FERRITIN      Patient verbalized understanding of these issues, agrees with the plan and all questions were answered today. Patient was given an opportuntity to voice any other symptoms or concerns not listed above. Patient did not have any other symptoms or concerns.        Devon Ramirez DO  Board Certified in Internal Medicine and Sleep Medicine    I spent a total of 28 minutes of phone encounter and in preparation for this clinic visit.    (Note created with Dragon voice recognition and unintended spelling errors and word substitutions may occur)

## 2020-10-12 NOTE — PATIENT INSTRUCTIONS
"What is sleep apnea?    Sleep apnea is a condition that makes you stop breathing for short periods while you are asleep. There are 2 types of sleep apnea. One is called \"obstructive sleep apnea,\" and the other is called \"central sleep apnea.\"  In obstructive sleep apnea, you stop breathing because your throat narrows or closes. In central sleep apnea, you stop breathing because your brain does not send the right signals to your muscles to make you breathe. When people talk about sleep apnea, they are usually referring to obstructive sleep apnea, which is what this article is about.  People with sleep apnea do not know that they stop breathing when they are asleep. But they do sometimes wake up startled or gasping for breath. They also often hear from loved ones that they snore.  What are the symptoms of sleep apnea? -- The main symptoms of sleep apnea are loud snoring, tiredness, and daytime sleepiness. Other symptoms can include:  ?Restless sleep  ?Waking up choking or gasping  ?Morning headaches, dry mouth, or sore throat  ?Waking up often to urinate  ?Waking up feeling unrested or groggy  ?Trouble thinking clearly or remembering things  Some people with sleep apnea don't have symptoms, or they don't know they have them. They might figure that it's normal to be tired or to snore a lot.  Should I see a doctor or nurse? -- Yes. If you think you might have sleep apnea, see your doctor.  Is there a test for sleep apnea? -- Yes. If your doctor or nurse suspects you have sleep apnea, he or she might send you for a \"sleep study.\" Sleep studies can sometimes be done at home, but they are usually done in a sleep lab. For the study, you spend the night in the lab, and you are hooked up to different machines that monitor your heart rate, breathing, and other body functions. The results of the test will tell your doctor or nurse if you have the disorder.  Is there anything I can do on my own to help my sleep apnea? -- Yes. " "Here are some things that might help:  ?Stay off your back when sleeping. (This is not always practical, because people cannot control their position while asleep. Plus, it only helps some people.)  ?Lose weight, if you are overweight  ?Avoid alcohol, because it can make sleep apnea worse  How is sleep apnea treated? -- The most effective treatment for sleep apnea is a device that keeps your airway open while you sleep. Treatment with this device is called \"continuous positive airway pressure,\" or CPAP. People getting CPAP wear a face mask at night that keeps them breathing.  If your doctor or nurse recommends a CPAP machine, try to be patient about using it. The mask might seem uncomfortable to wear at first, and the machine might seem noisy, but using the machine can really pay off. People with sleep apnea who use a CPAP machine feel more rested and generally feel better.  There is also another device that you wear in your mouth called an \"oral appliance\" or \"mandibular advancement device.\" It also helps keep your airway open while you sleep.  In rare cases, when nothing else helps, doctors recommend surgery to keep the airway open. Surgery to do this is not always effective, and even when it is, the problem can come back.  Is sleep apnea dangerous? -- It can be. People with sleep apnea do not get good-quality sleep, so they are often tired and not alert. This puts them at risk for car accidents and other types of accidents. Plus, studies show that people with sleep apnea are more likely than others to have high blood pressure, heart attacks, and other serious heart problems. In people with severe sleep apnea, getting treated (for example, with a CPAP machine) can help prevent some of these problems.  Normally when a person sleeps, the airway remains open, and air can pass from the nose and mouth to the lungs. In a person with sleep apnea, parts of the throat and mouth drop into the airway and block off the flow " of air. This can cause loud snoring and interrupt breathing for short periods.    The CPAP mask gently blows air into your nose while you sleep. It puts just enough pressure on your airway to keep it from closing. The mask in this picture fits over just the nose. Other CPAP devices have masks that fit over the nose and mouth.         Pressure Desensitization Protocol    1.  Put the mask on your face without putting the straps on while doing an activity that you enjoy such as watching TV, listening to the radio, surfing the Internet, or reading.  Try to do this for 15 minutes a day for one week.    2.  Put the mask on your face with the straps on while doing activity that you enjoy.  Try to do this for 15 minutes a day for another week.    3.  Put the mask on and attach the hose to the machine and turn on the machine.  Try to focus on activities that you enjoy for 15 minutes.  Perform this activity for one week.    4.  The mask on and attach the hose in the machine while doing an activity as you enjoy for 30 minutes.  Try this for one week.    5.  Repeat step 4.  Until you can increase the hours of usage to 2 hours.    May go back to previous steps if there is any discomfort at any time.  Also try to sleep with the machine every night for as long as you can tolerate it.    Equipment Instructions    We will process your PAP order and send it to a Durable Medical Equipment (DME) provider.    The medical equipment company should call you within 7 days.  If you have not heard from the company, please contact them to see if they received your order and are planning to call you.    Please call us at [phone] if you are unable to contact the medical equipment company or if they do not have the order.    If you are starting a new PAP machine, please call us after you use it the first night to let us know how it went. This call also helps us know that you received your equipment and that everything is ready. Please use  our central phone number [phone]    Contact information for [employer] company:    [facility] Tel: [phone]

## 2020-10-13 ENCOUNTER — COMMUNICATION - HEALTHEAST (OUTPATIENT)
Dept: INTERNAL MEDICINE | Facility: CLINIC | Age: 65
End: 2020-10-13

## 2020-10-15 ENCOUNTER — VIRTUAL VISIT (OUTPATIENT)
Dept: PSYCHIATRY | Facility: CLINIC | Age: 65
End: 2020-10-15
Attending: NURSE PRACTITIONER
Payer: MEDICARE

## 2020-10-15 DIAGNOSIS — F42.2 MIXED OBSESSIONAL THOUGHTS AND ACTS: Primary | ICD-10-CM

## 2020-10-15 PROCEDURE — 99443 PR PHYSICIAN TELEPHONE EVALUATION 21-30 MIN: CPT | Mod: 95 | Performed by: NURSE PRACTITIONER
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
## 2020-10-16 ENCOUNTER — COMMUNICATION - HEALTHEAST (OUTPATIENT)
Dept: SCHEDULING | Facility: CLINIC | Age: 65
End: 2020-10-16

## 2020-10-17 ENCOUNTER — COMMUNICATION - HEALTHEAST (OUTPATIENT)
Dept: SCHEDULING | Facility: CLINIC | Age: 65
End: 2020-10-17

## 2020-10-17 ENCOUNTER — TELEPHONE (OUTPATIENT)
Dept: SLEEP MEDICINE | Facility: CLINIC | Age: 65
End: 2020-10-17

## 2020-10-17 NOTE — TELEPHONE ENCOUNTER
Called 10/17 and left a voicemail for patient to please return call to schedule 1 month follow up with Dr. Ramirez.    Shimon Taylor  Sleep Clinic Specialist - [place]

## 2020-10-19 ENCOUNTER — OFFICE VISIT - HEALTHEAST (OUTPATIENT)
Dept: INTERNAL MEDICINE | Facility: CLINIC | Age: 65
End: 2020-10-19

## 2020-10-19 ENCOUNTER — TELEPHONE (OUTPATIENT)
Dept: SLEEP MEDICINE | Facility: CLINIC | Age: 65
End: 2020-10-19

## 2020-10-19 DIAGNOSIS — I63.449 CEREBROVASCULAR ACCIDENT (CVA) DUE TO EMBOLISM OF CEREBELLAR ARTERY, UNSPECIFIED BLOOD VESSEL LATERALITY (H): ICD-10-CM

## 2020-10-19 DIAGNOSIS — G47.33 OSA (OBSTRUCTIVE SLEEP APNEA): ICD-10-CM

## 2020-10-19 DIAGNOSIS — F42.2 MIXED OBSESSIONAL THOUGHTS AND ACTS: ICD-10-CM

## 2020-10-19 ASSESSMENT — ANXIETY QUESTIONNAIRES
1. FEELING NERVOUS, ANXIOUS, OR ON EDGE: NEARLY EVERY DAY
IF YOU CHECKED OFF ANY PROBLEMS ON THIS QUESTIONNAIRE, HOW DIFFICULT HAVE THESE PROBLEMS MADE IT FOR YOU TO DO YOUR WORK, TAKE CARE OF THINGS AT HOME, OR GET ALONG WITH OTHER PEOPLE: EXTREMELY DIFFICULT
4. TROUBLE RELAXING: NEARLY EVERY DAY
5. BEING SO RESTLESS THAT IT IS HARD TO SIT STILL: MORE THAN HALF THE DAYS
GAD7 TOTAL SCORE: 20
6. BECOMING EASILY ANNOYED OR IRRITABLE: NEARLY EVERY DAY
2. NOT BEING ABLE TO STOP OR CONTROL WORRYING: NEARLY EVERY DAY
7. FEELING AFRAID AS IF SOMETHING AWFUL MIGHT HAPPEN: NEARLY EVERY DAY
3. WORRYING TOO MUCH ABOUT DIFFERENT THINGS: NEARLY EVERY DAY

## 2020-10-19 ASSESSMENT — PATIENT HEALTH QUESTIONNAIRE - PHQ9: SUM OF ALL RESPONSES TO PHQ QUESTIONS 1-9: 17

## 2020-10-19 NOTE — TELEPHONE ENCOUNTER
I called patient today 10/19/2020 at 9:58 am to see if he would like to get the pap machine. No answer, left message with my direct phone and [facility] phone number to call back and schedule pap machine setup appointment.

## 2020-10-19 NOTE — TELEPHONE ENCOUNTER
Received a voicemail from patient who states that he has not yet received his CPAP machine and how does he obtain his device.      Called 10/19 and left a voicemail for patient to please reach out to [facility] with number. Once he has received his device please call the clinic to schedule his 1 month CPAP follow up with provider.    Shimon Taylor  Sleep Clinic Specialist - [place]

## 2020-10-28 ENCOUNTER — DOCUMENTATION ONLY (OUTPATIENT)
Dept: SLEEP MEDICINE | Facility: CLINIC | Age: 65
End: 2020-10-28

## 2020-10-28 NOTE — PROGRESS NOTES
Patient was offered choice of vendor and chose [facility].  Patient Berto Pretty was set up at [facility]  on October 28, 2020. Patient received a Resmed AirSense 10 Auto. Pressures were set at 5-15 cm H2O.   Patient s ramp is 5 cm H2O for Off and FLEX/EPR is EPR, 2.  Patient received a Resmed Mask name: Airfit [id number]  Nasal mask size Medium, heated tubing and heated humidifier.  Patient does need to meet compliance. Patient has a follow up on TBD with Dr. Ramirez.    Lorne Ortega

## 2020-11-02 ENCOUNTER — DOCUMENTATION ONLY (OUTPATIENT)
Dept: SLEEP MEDICINE | Facility: CLINIC | Age: 65
End: 2020-11-02
Payer: MEDICARE

## 2020-11-02 PROCEDURE — 99457 RPM TX MGMT 1ST 20 MIN: CPT | Performed by: INTERNAL MEDICINE

## 2020-11-02 NOTE — PROGRESS NOTES
3 DAY STM VISIT    Diagnostic AHI: 12.3    PSG    Patient contacted for 3 day STM visit    Confirmed with patient at time of call- N/A Patient is still interested in STM service     Message left for patient to return call    Replacement device: No  STM ordered by provider: Yes     Device type: Auto-CPAP  PAP settings from order::  CPAP min 5 cm  H20       CPAP max 15 cm  H20       Mask type:    Nasal Mask           Assessment: No usage reporting but has a profile in [employer].  Action plan: Patient to have 14 day STM visit. Patient has a follow up visit scheduled:   not required by insurance.     Total time spent on accessing and  interpreting remote patient PAP therapy data  10 minutes  Total time spent counseling, coaching  and reviewing PAP therapy data with patient  0 minutes  [id number] no

## 2020-11-02 NOTE — PROGRESS NOTES
Patient returned call.    Subjective measures:   Discussed therapy with patient.  He has only used one day during the day.     Reviewed therapy and mask in detail and RACHNA in detail.     Assessment: pt to have 14 day STM     Total time spent counseling, coaching  and reviewing PAP therapy data with patient  10 minutes     [id number] yes (this call)    [id number] no (previous call)

## 2020-11-03 ENCOUNTER — OFFICE VISIT - HEALTHEAST (OUTPATIENT)
Dept: INTERNAL MEDICINE | Facility: CLINIC | Age: 65
End: 2020-11-03

## 2020-11-03 DIAGNOSIS — G47.33 OSA (OBSTRUCTIVE SLEEP APNEA): ICD-10-CM

## 2020-11-03 DIAGNOSIS — R35.0 INCREASED FREQUENCY OF URINATION: ICD-10-CM

## 2020-11-03 DIAGNOSIS — F42.2 MIXED OBSESSIONAL THOUGHTS AND ACTS: ICD-10-CM

## 2020-11-03 DIAGNOSIS — R07.89 CHEST DISCOMFORT: ICD-10-CM

## 2020-11-03 DIAGNOSIS — Z12.5 SCREENING FOR PROSTATE CANCER: ICD-10-CM

## 2020-11-03 DIAGNOSIS — Z23 NEED FOR IMMUNIZATION AGAINST INFLUENZA: ICD-10-CM

## 2020-11-03 DIAGNOSIS — I63.449 CEREBROVASCULAR ACCIDENT (CVA) DUE TO EMBOLISM OF CEREBELLAR ARTERY, UNSPECIFIED BLOOD VESSEL LATERALITY (H): ICD-10-CM

## 2020-11-03 DIAGNOSIS — Z00.00 WELLNESS EXAMINATION: ICD-10-CM

## 2020-11-03 DIAGNOSIS — R63.4 WEIGHT LOSS: ICD-10-CM

## 2020-11-03 LAB
ALBUMIN SERPL-MCNC: 4.4 G/DL (ref 3.5–5)
ALBUMIN UR-MCNC: NEGATIVE MG/DL
ALP SERPL-CCNC: 91 U/L (ref 45–120)
ALT SERPL W P-5'-P-CCNC: 23 U/L (ref 0–45)
AMYLASE SERPL-CCNC: 104 U/L (ref 5–120)
ANION GAP SERPL CALCULATED.3IONS-SCNC: 11 MMOL/L (ref 5–18)
APPEARANCE UR: CLEAR
AST SERPL W P-5'-P-CCNC: 29 U/L (ref 0–40)
ATRIAL RATE - MUSE: 82 BPM
BACTERIA #/AREA URNS HPF: ABNORMAL HPF
BILIRUB DIRECT SERPL-MCNC: 0.4 MG/DL
BILIRUB SERPL-MCNC: 0.9 MG/DL (ref 0–1)
BILIRUB UR QL STRIP: ABNORMAL
BUN SERPL-MCNC: 23 MG/DL (ref 8–22)
CALCIUM SERPL-MCNC: 9.5 MG/DL (ref 8.5–10.5)
CHLORIDE BLD-SCNC: 103 MMOL/L (ref 98–107)
CHOLEST SERPL-MCNC: 145 MG/DL
CO2 SERPL-SCNC: 27 MMOL/L (ref 22–31)
COLOR UR AUTO: YELLOW
CREAT SERPL-MCNC: 0.94 MG/DL (ref 0.7–1.3)
DIASTOLIC BLOOD PRESSURE - MUSE: NORMAL
ERYTHROCYTE [DISTWIDTH] IN BLOOD BY AUTOMATED COUNT: 11.7 % (ref 11–14.5)
FASTING STATUS PATIENT QL REPORTED: YES
GFR SERPL CREATININE-BSD FRML MDRD: >60 ML/MIN/1.73M2
GLUCOSE BLD-MCNC: 84 MG/DL (ref 70–125)
GLUCOSE UR STRIP-MCNC: NEGATIVE MG/DL
HCT VFR BLD AUTO: 42.7 % (ref 40–54)
HDLC SERPL-MCNC: 83 MG/DL
HGB BLD-MCNC: 14.5 G/DL (ref 14–18)
HGB UR QL STRIP: ABNORMAL
INTERPRETATION ECG - MUSE: NORMAL
KETONES UR STRIP-MCNC: ABNORMAL MG/DL
LDLC SERPL CALC-MCNC: 50 MG/DL
LEUKOCYTE ESTERASE UR QL STRIP: NEGATIVE
LIPASE SERPL-CCNC: 60 U/L (ref 0–52)
MCH RBC QN AUTO: 32 PG (ref 27–34)
MCHC RBC AUTO-ENTMCNC: 34 G/DL (ref 32–36)
MCV RBC AUTO: 94 FL (ref 80–100)
NITRATE UR QL: NEGATIVE
P AXIS - MUSE: 78 DEGREES
PH UR STRIP: 5.5 [PH] (ref 5–8)
PLATELET # BLD AUTO: 198 THOU/UL (ref 140–440)
PMV BLD AUTO: 7.8 FL (ref 7–10)
POTASSIUM BLD-SCNC: 3.8 MMOL/L (ref 3.5–5)
PR INTERVAL - MUSE: 148 MS
PROT SERPL-MCNC: 7 G/DL (ref 6–8)
PSA SERPL-MCNC: 0.7 NG/ML (ref 0–4.5)
QRS DURATION - MUSE: 84 MS
QT - MUSE: 378 MS
QTC - MUSE: 441 MS
R AXIS - MUSE: 73 DEGREES
RBC # BLD AUTO: 4.54 MILL/UL (ref 4.4–6.2)
RBC #/AREA URNS AUTO: >100 HPF
SODIUM SERPL-SCNC: 141 MMOL/L (ref 136–145)
SP GR UR STRIP: >=1.03 (ref 1–1.03)
SQUAMOUS #/AREA URNS AUTO: ABNORMAL LPF
SYSTOLIC BLOOD PRESSURE - MUSE: NORMAL
T AXIS - MUSE: 79 DEGREES
TRIGL SERPL-MCNC: 58 MG/DL
TSH SERPL DL<=0.005 MIU/L-ACNC: 1.07 UIU/ML (ref 0.3–5)
UROBILINOGEN UR STRIP-ACNC: ABNORMAL
VENTRICULAR RATE- MUSE: 82 BPM
WBC #/AREA URNS AUTO: ABNORMAL HPF
WBC: 5.6 THOU/UL (ref 4–11)

## 2020-11-03 ASSESSMENT — MIFFLIN-ST. JEOR: SCORE: 1252.33

## 2020-11-04 ENCOUNTER — COMMUNICATION - HEALTHEAST (OUTPATIENT)
Dept: INTERNAL MEDICINE | Facility: CLINIC | Age: 65
End: 2020-11-04

## 2020-11-06 ENCOUNTER — COMMUNICATION - HEALTHEAST (OUTPATIENT)
Dept: INTERNAL MEDICINE | Facility: CLINIC | Age: 65
End: 2020-11-06

## 2020-11-07 ENCOUNTER — HOSPITAL ENCOUNTER (OUTPATIENT)
Dept: CT IMAGING | Facility: CLINIC | Age: 65
Discharge: HOME | End: 2020-11-07

## 2020-11-07 DIAGNOSIS — R35.0 INCREASED FREQUENCY OF URINATION: ICD-10-CM

## 2020-11-07 DIAGNOSIS — R63.4 WEIGHT LOSS: ICD-10-CM

## 2020-11-10 ENCOUNTER — COMMUNICATION - HEALTHEAST (OUTPATIENT)
Dept: INTERNAL MEDICINE | Facility: CLINIC | Age: 65
End: 2020-11-10

## 2020-11-10 ENCOUNTER — OFFICE VISIT - HEALTHEAST (OUTPATIENT)
Dept: INTERNAL MEDICINE | Facility: CLINIC | Age: 65
End: 2020-11-10

## 2020-11-10 DIAGNOSIS — N32.89 BLADDER WALL THICKENING: ICD-10-CM

## 2020-11-10 DIAGNOSIS — R31.29 MICROSCOPIC HEMATURIA: ICD-10-CM

## 2020-11-10 DIAGNOSIS — F42.2 MIXED OBSESSIONAL THOUGHTS AND ACTS: ICD-10-CM

## 2020-11-11 ENCOUNTER — COMMUNICATION - HEALTHEAST (OUTPATIENT)
Dept: INTERNAL MEDICINE | Facility: CLINIC | Age: 65
End: 2020-11-11

## 2020-11-12 ENCOUNTER — COMMUNICATION - HEALTHEAST (OUTPATIENT)
Dept: INTERNAL MEDICINE | Facility: CLINIC | Age: 65
End: 2020-11-12

## 2020-11-13 ENCOUNTER — DOCUMENTATION ONLY (OUTPATIENT)
Dept: SLEEP MEDICINE | Facility: CLINIC | Age: 65
End: 2020-11-13
Payer: MEDICARE

## 2020-11-13 NOTE — PROGRESS NOTES
14  DAY STM VISIT    Diagnostic AHI: 12.3  PSG    Subjective measures:   Patient very involved in his care and had many questions about his data.  Patient stated he is is starting to feel more rested.     Assessment: Pt meeting objective benchmarks.  Patient meeting subjective benchmarks.     Action plan: pt to have 30 day STM visit.      Device type: Auto-CPAP    PAP settings: CPAP min 5.0 cm  H20       CPAP max 15.0 cm  H20      95th% pressure 11.5 cm  H20        RESMED EPR level Setting: TWO    RESMED Soft response setting:  OFF    Mask type:  Nasal Mask    Objective measures: 14 day rolling measures      Compliance  69 %      Leak  15  lpm  last  upload      AHI 22.7   last  upload      Average number of minutes 298      Objective measure goal  Compliance   Goal >70%  Leak   Goal < 24 lpm  AHI  Goal < 5  Usage  Goal >240        Total time spent on accessing and interpreting remote patient PAP therapy data  10 minutes    Total time spent counseling, coaching  and reviewing PAP therapy data with patient  31 minutes    [id number]  [id number]  no (3 day STM)

## 2020-12-02 ENCOUNTER — DOCUMENTATION ONLY (OUTPATIENT)
Dept: SLEEP MEDICINE | Facility: CLINIC | Age: 65
End: 2020-12-02
Payer: MEDICARE

## 2020-12-02 NOTE — PROGRESS NOTES
30 DAY STM VISIT    Diagnostic AHI: 12.3  PSG    Message left for patient to return call.     Assessment: Pt meeting objective benchmarks.     Action plan: waiting for patient to return call.  and pt to have 6 month [facility] visit  Patient has scheduled a follow up visit with Dr. Ramirez on 12/8/2020.   Device type: Auto-CPAP  PAP settings: CPAP min 5.0 cm  H20     CPAP max 15.0 cm  H20    95th% pressure 12.3 cm  H20      RESMED EPR level Setting: TWO    RESMED Soft response setting:  OFF  Mask type:  Nasal Mask  Objective measures: 14 day rolling measures      Compliance  64 %      Leak  20.04 lpm  last  upload      AHI 18.93   last  upload      Average number of minutes 203      Objective measure goal  Compliance   Goal >70%  Leak   Goal < 24 lpm  AHI  Goal < 5  Usage  Goal >240        Total time spent on accessing and interpreting remote patient PAP therapy data  10 minutes    Total time spent counseling, coaching  and reviewing PAP therapy data with patient  1 minutes     [id number] this call  [id number] no  at 3 or 14 day [facility]

## 2020-12-07 ENCOUNTER — OFFICE VISIT - HEALTHEAST (OUTPATIENT)
Dept: BEHAVIORAL HEALTH | Facility: CLINIC | Age: 65
End: 2020-12-07

## 2020-12-07 DIAGNOSIS — F42.2 MIXED OBSESSIONAL THOUGHTS AND ACTS: ICD-10-CM

## 2020-12-08 ENCOUNTER — VIRTUAL VISIT (OUTPATIENT)
Dept: SLEEP MEDICINE | Facility: CLINIC | Age: 65
End: 2020-12-08
Payer: MEDICARE

## 2020-12-08 DIAGNOSIS — G47.20 CIRCADIAN DYSREGULATION: ICD-10-CM

## 2020-12-08 DIAGNOSIS — G47.10 HYPERSOMNIA: ICD-10-CM

## 2020-12-08 DIAGNOSIS — G47.33 OBSTRUCTIVE SLEEP APNEA: Primary | ICD-10-CM

## 2020-12-08 PROCEDURE — 99443 PR PHYSICIAN TELEPHONE EVALUATION 21-30 MIN: CPT | Performed by: INTERNAL MEDICINE

## 2020-12-08 NOTE — PROGRESS NOTES
"The patient has been notified of following:      \"This telephone visit will be conducted via a call between you and your physician/provider. We have found that certain health care needs can be provided without the need for a physical exam.  This service lets us provide the care you need with a short phone conversation.  If a prescription is necessary we can send it directly to your pharmacy.  If lab work is needed we can place an order for that and you can then stop by our lab to have the test done at a later time.     Telephone visits are billed at different rates depending on your insurance coverage. During this emergency period, for some insurers they may be billed the same as an in-person visit.  Please reach out to your insurance provider with any questions.     If during the course of the call the physician/provider feels a telephone visit is not appropriate, you will not be charged for this service.\"     Patient has given verbal consent to a Telephone visit? Yes     What phone number would you like to be contacted at? [phone]     Patient would like to receive their AVS by AVS Preference: Mail    I spent a total of 24 minutes of phone encounter and in preparation for this clinic visit.    Video was not available for this visit.    Thank you for the opportunity to participate in the care of Berto Pretty.     He is a 65 year old y/o male patient who comes to the sleep medicine clinic for follow up.  This is the patient's 1st clinical visit since starting CPAP therapy. The patient states that he is feeling a bit more alert in the morning since starting CPAP. He does feel the pressure is a bit too high. He has difficulty inhalation past the pressure of 9 cwp.     Assessment and Plan:  In summary Berto Pretty is a 65 year old year old male who is here for follow up.    1. Obstructive sleep apnea  I congratulated patient on his excellent usage of CPAP.  As per his request I will narrow his pressure " range to 5-9 CWP.  His elevated AHI is most likely secondary to central events due to his history of CVA.  I suspect it may decrease over time.  - COMPREHENSIVE DME    2. Hypersomnia  Improving    3. Circadian dysregulation  We discussed instituting proper sleep hygiene such as regular sleep-wake schedules.  I also recommend decreasing lighted surface exposure past 7 PM.  I will also give the patient a handout on sleep hygiene.      12/7/2020   Number days on 36   AHI Rolling Average 14 Day (New) 22.18   AHI Rolling Average 30 Days (New) 19.61   AHI Rolling Average 180 Days (New) 20.81   Apnea-Hypopnea Index 25.0  Apnea-hypopnea index in events per hour   ObString Typeructive Apnea Index 0.9  Obstructive apnea index in events per hour   Central Apnea Index 22.7  Central apnea index in events per hour   Hypopnea Index 1.1  Hypopnea index in events per hour   (Retired) % compliance greater than four hours accumulative [id number]   % compliance greater than four hours rolling average 14 days 42   Time mask on face 14 day average 163   Time mask on face 30 day average 228   Mask Removal Events 2  Number of mask on/off events   Usage 264  Total usage duration in minutes   95% of leak in LPM (ResMed) 15.6   95% OF Leak in litres Rolling Average 14 Days 17.55   95% OF Leak in litres Rolling Average 30 Days 19.2   Minimum CPAP/ASV Pressure setting 5.0  Minimum allowable pressure in cmH2O   Maximum CPAP/ASV Pressure setting 15.0  Maximum allowable pressure in cmH2O   IPAP 95% 8.52  95% of target IPAP in cmH2O   Target IPAP (95% of Target) 14 day average (Resmed) 11.7   Target IPAP (95% of Target) 30 day average (Resmed) 11.8         Past Medical History:   Diagnosis Date     Cerebrovascular accident (H)      Dyslipidemia      RACHNA (obstructive sleep apnea)        No past surgical history on file.    Social History     Socioeconomic History     Marital status: Single     Spouse name: Not on file     Number of  children: Not on file     Years of education: Not on file     Highest education level: Not on file   Occupational History     Not on file   Social Needs     Financial resource strain: Not on file     Food insecurity     Worry: Not on file     Inability: Not on file     Transportation needs     Medical: Not on file     Non-medical: Not on file   Tobacco Use     Smoking status: Former Smoker     Types: Cigarettes     Quit date: 1974     Years since quittin.9     Smokeless tobacco: Never Used   Substance and Sexual Activity     Alcohol use: No     Drug use: No     Sexual activity: Not Currently   Lifestyle     Physical activity     Days per week: Not on file     Minutes per session: Not on file     Stress: Not on file   Relationships     Social connections     Talks on phone: Not on file     Gets together: Not on file     Attends [employer] service: Not on file     Active member of club or organization: Not on file     Attends meetings of clubs or organizations: Not on file     Relationship status: Not on file     Intimate partner violence     Fear of current or ex partner: Not on file     Emotionally abused: Not on file     Physically abused: Not on file     Forced sexual activity: Not on file   Other Topics Concern     Parent/sibling w/ CABG, MI or angioplasty before 65F 55M? Not Asked   Social History Narrative     Not on file       Current Outpatient Medications   Medication Sig Dispense Refill     ASPIRIN PO Take 81 mg by mouth daily       atorvastatin (LIPITOR) 40 MG tablet Take 40 mg by mouth       loratadine 10 MG capsule Take 10 mg by mouth daily.         Allergies   Allergen Reactions     Pollen Extract      Latex Rash     Labs/Studies:    I reviewed the efficacy and compliance report from his device. Data summarized on the HPI and the PAP compliance flow sheet.      Patient verbalized understanding of these issues, agrees with the plan and all questions were answered today. Patient was given an  opportuntity to voice any other symptoms or concerns not listed above. Patient did not have any other symptoms or concerns.      Devon Ramirez DO  Board Certified in Internal Medicine and Sleep Medicine    (Note created with Dragon voice recognition and unintended spelling errors and word substitutions may occur)

## 2020-12-08 NOTE — PATIENT INSTRUCTIONS
"SLEEP HYGIENE    Sleep only as much as you need to feel rested and then get out of bed   Keep a regular sleep schedule   Avoid forcing sleep   Exercise regularly for at least 20 minutes, preferably 4 to 5 hours before bedtime   Avoid caffeinated beverages after lunch   Avoid alcohol near bedtime: no \"night cap\"   Avoid smoking, especially in the evening   Do not go to bed hungry   Adjust bedroom environment   Avoid prolonged use of light-emitting screens before bedtime    Deal with your worries before bedtime         "

## 2020-12-16 ENCOUNTER — HOSPITAL ENCOUNTER (OUTPATIENT)
Dept: NEUROLOGY | Facility: CLINIC | Age: 65
Setting detail: THERAPIES SERIES
Discharge: STILL ADMITTED OR ADMINISTRATIVE | End: 2020-12-16
Attending: PSYCHIATRY & NEUROLOGY

## 2020-12-16 DIAGNOSIS — F42.2 MIXED OBSESSIONAL THOUGHTS AND ACTS: ICD-10-CM

## 2020-12-31 ENCOUNTER — HOSPITAL ENCOUNTER (OUTPATIENT)
Dept: NEUROLOGY | Facility: CLINIC | Age: 65
Setting detail: THERAPIES SERIES
Discharge: STILL ADMITTED OR ADMINISTRATIVE | End: 2020-12-31
Attending: PSYCHIATRY & NEUROLOGY

## 2020-12-31 DIAGNOSIS — F42.2 MIXED OBSESSIONAL THOUGHTS AND ACTS: ICD-10-CM

## 2021-01-08 ENCOUNTER — HOSPITAL ENCOUNTER (OUTPATIENT)
Dept: NEUROLOGY | Facility: CLINIC | Age: 66
Setting detail: THERAPIES SERIES
Discharge: STILL ADMITTED OR ADMINISTRATIVE | End: 2021-01-08
Attending: PSYCHIATRY & NEUROLOGY

## 2021-01-08 DIAGNOSIS — F33.2 SEVERE EPISODE OF RECURRENT MAJOR DEPRESSIVE DISORDER, WITHOUT PSYCHOTIC FEATURES (H): ICD-10-CM

## 2021-02-01 ENCOUNTER — HOSPITAL ENCOUNTER (OUTPATIENT)
Dept: NEUROLOGY | Facility: CLINIC | Age: 66
Setting detail: THERAPIES SERIES
Discharge: STILL ADMITTED OR ADMINISTRATIVE | End: 2021-02-01
Attending: PSYCHIATRY & NEUROLOGY

## 2021-02-01 DIAGNOSIS — F33.2 SEVERE EPISODE OF RECURRENT MAJOR DEPRESSIVE DISORDER, WITHOUT PSYCHOTIC FEATURES (H): ICD-10-CM

## 2021-02-08 ENCOUNTER — HOSPITAL ENCOUNTER (OUTPATIENT)
Dept: NEUROLOGY | Facility: CLINIC | Age: 66
Setting detail: THERAPIES SERIES
Discharge: STILL ADMITTED OR ADMINISTRATIVE | End: 2021-02-08
Attending: PSYCHIATRY & NEUROLOGY

## 2021-02-08 ENCOUNTER — OFFICE VISIT - HEALTHEAST (OUTPATIENT)
Dept: BEHAVIORAL HEALTH | Facility: CLINIC | Age: 66
End: 2021-02-08

## 2021-02-08 DIAGNOSIS — F42.2 MIXED OBSESSIONAL THOUGHTS AND ACTS: ICD-10-CM

## 2021-02-08 DIAGNOSIS — F33.2 SEVERE EPISODE OF RECURRENT MAJOR DEPRESSIVE DISORDER, WITHOUT PSYCHOTIC FEATURES (H): ICD-10-CM

## 2021-02-15 ENCOUNTER — HOSPITAL ENCOUNTER (OUTPATIENT)
Dept: NEUROLOGY | Facility: CLINIC | Age: 66
Setting detail: THERAPIES SERIES
Discharge: STILL ADMITTED OR ADMINISTRATIVE | End: 2021-02-15
Attending: PSYCHIATRY & NEUROLOGY

## 2021-02-15 DIAGNOSIS — F33.2 SEVERE EPISODE OF RECURRENT MAJOR DEPRESSIVE DISORDER, WITHOUT PSYCHOTIC FEATURES (H): ICD-10-CM

## 2021-03-08 ENCOUNTER — HOSPITAL ENCOUNTER (OUTPATIENT)
Dept: NEUROLOGY | Facility: CLINIC | Age: 66
Setting detail: THERAPIES SERIES
Discharge: STILL ADMITTED OR ADMINISTRATIVE | End: 2021-03-08
Attending: PSYCHIATRY & NEUROLOGY

## 2021-03-08 DIAGNOSIS — F33.2 SEVERE EPISODE OF RECURRENT MAJOR DEPRESSIVE DISORDER, WITHOUT PSYCHOTIC FEATURES (H): ICD-10-CM

## 2021-03-11 ENCOUNTER — AMBULATORY - HEALTHEAST (OUTPATIENT)
Dept: NURSING | Facility: CLINIC | Age: 66
End: 2021-03-11

## 2021-03-29 ENCOUNTER — HOSPITAL ENCOUNTER (OUTPATIENT)
Dept: NEUROLOGY | Facility: CLINIC | Age: 66
Setting detail: THERAPIES SERIES
Discharge: STILL ADMITTED OR ADMINISTRATIVE | End: 2021-03-29
Attending: PSYCHIATRY & NEUROLOGY

## 2021-03-29 DIAGNOSIS — F33.2 SEVERE EPISODE OF RECURRENT MAJOR DEPRESSIVE DISORDER, WITHOUT PSYCHOTIC FEATURES (H): ICD-10-CM

## 2021-04-05 ENCOUNTER — AMBULATORY - HEALTHEAST (OUTPATIENT)
Dept: NURSING | Facility: CLINIC | Age: 66
End: 2021-04-05

## 2021-04-06 ENCOUNTER — OFFICE VISIT - HEALTHEAST (OUTPATIENT)
Dept: INTERNAL MEDICINE | Facility: CLINIC | Age: 66
End: 2021-04-06

## 2021-04-06 DIAGNOSIS — R31.29 MICROSCOPIC HEMATURIA: ICD-10-CM

## 2021-04-06 DIAGNOSIS — E78.2 MIXED HYPERLIPIDEMIA: ICD-10-CM

## 2021-04-06 DIAGNOSIS — F42.2 MIXED OBSESSIONAL THOUGHTS AND ACTS: ICD-10-CM

## 2021-04-06 DIAGNOSIS — I63.449 CEREBROVASCULAR ACCIDENT (CVA) DUE TO EMBOLISM OF CEREBELLAR ARTERY, UNSPECIFIED BLOOD VESSEL LATERALITY (H): ICD-10-CM

## 2021-04-06 LAB
ALBUMIN SERPL-MCNC: 3.8 G/DL (ref 3.5–5)
ALP SERPL-CCNC: 93 U/L (ref 45–120)
ALT SERPL W P-5'-P-CCNC: 72 U/L (ref 0–45)
ANION GAP SERPL CALCULATED.3IONS-SCNC: 7 MMOL/L (ref 5–18)
AST SERPL W P-5'-P-CCNC: 56 U/L (ref 0–40)
BILIRUB SERPL-MCNC: 0.9 MG/DL (ref 0–1)
BUN SERPL-MCNC: 13 MG/DL (ref 8–22)
CALCIUM SERPL-MCNC: 9 MG/DL (ref 8.5–10.5)
CHLORIDE BLD-SCNC: 105 MMOL/L (ref 98–107)
CHOLEST SERPL-MCNC: 153 MG/DL
CO2 SERPL-SCNC: 29 MMOL/L (ref 22–31)
CREAT SERPL-MCNC: 0.8 MG/DL (ref 0.7–1.3)
ERYTHROCYTE [DISTWIDTH] IN BLOOD BY AUTOMATED COUNT: 13.3 % (ref 11–14.5)
FASTING STATUS PATIENT QL REPORTED: YES
GFR SERPL CREATININE-BSD FRML MDRD: >60 ML/MIN/1.73M2
GLUCOSE BLD-MCNC: 75 MG/DL (ref 70–125)
HCT VFR BLD AUTO: 37.9 % (ref 40–54)
HDLC SERPL-MCNC: 81 MG/DL
HGB BLD-MCNC: 12.4 G/DL (ref 14–18)
LDLC SERPL CALC-MCNC: 65 MG/DL
MCH RBC QN AUTO: 31.6 PG (ref 27–34)
MCHC RBC AUTO-ENTMCNC: 32.7 G/DL (ref 32–36)
MCV RBC AUTO: 97 FL (ref 80–100)
PLATELET # BLD AUTO: 135 THOU/UL (ref 140–440)
PMV BLD AUTO: 10.3 FL (ref 7–10)
POTASSIUM BLD-SCNC: 4.2 MMOL/L (ref 3.5–5)
PROT SERPL-MCNC: 6.3 G/DL (ref 6–8)
RBC # BLD AUTO: 3.92 MILL/UL (ref 4.4–6.2)
SODIUM SERPL-SCNC: 141 MMOL/L (ref 136–145)
TRIGL SERPL-MCNC: 35 MG/DL
TSH SERPL DL<=0.005 MIU/L-ACNC: 0.84 UIU/ML (ref 0.3–5)
WBC: 4.9 THOU/UL (ref 4–11)

## 2021-04-06 ASSESSMENT — PATIENT HEALTH QUESTIONNAIRE - PHQ9: SUM OF ALL RESPONSES TO PHQ QUESTIONS 1-9: 2

## 2021-04-07 ENCOUNTER — COMMUNICATION - HEALTHEAST (OUTPATIENT)
Dept: INTERNAL MEDICINE | Facility: CLINIC | Age: 66
End: 2021-04-07

## 2021-04-07 DIAGNOSIS — D64.9 ANEMIA, UNSPECIFIED TYPE: ICD-10-CM

## 2021-04-12 ENCOUNTER — HOSPITAL ENCOUNTER (OUTPATIENT)
Dept: NEUROLOGY | Facility: CLINIC | Age: 66
Setting detail: THERAPIES SERIES
Discharge: STILL ADMITTED OR ADMINISTRATIVE | End: 2021-04-12
Attending: PSYCHIATRY & NEUROLOGY

## 2021-04-12 DIAGNOSIS — F33.1 MAJOR DEPRESSIVE DISORDER, RECURRENT EPISODE, MODERATE (H): ICD-10-CM

## 2021-05-02 ENCOUNTER — HOSPITAL ENCOUNTER (OUTPATIENT)
Dept: NEUROLOGY | Facility: CLINIC | Age: 66
Setting detail: THERAPIES SERIES
Discharge: STILL ADMITTED OR ADMINISTRATIVE | End: 2021-05-03
Attending: PSYCHIATRY & NEUROLOGY
Payer: COMMERCIAL

## 2021-05-02 DIAGNOSIS — F33.1 MAJOR DEPRESSIVE DISORDER, RECURRENT EPISODE, MODERATE (H): ICD-10-CM

## 2021-05-03 ENCOUNTER — OFFICE VISIT - HEALTHEAST (OUTPATIENT)
Dept: BEHAVIORAL HEALTH | Facility: CLINIC | Age: 66
End: 2021-05-03

## 2021-05-03 DIAGNOSIS — F42.2 MIXED OBSESSIONAL THOUGHTS AND ACTS: ICD-10-CM

## 2021-05-26 ASSESSMENT — PATIENT HEALTH QUESTIONNAIRE - PHQ9
SUM OF ALL RESPONSES TO PHQ QUESTIONS 1-9: 17
SUM OF ALL RESPONSES TO PHQ QUESTIONS 1-9: 11

## 2021-05-27 ASSESSMENT — PATIENT HEALTH QUESTIONNAIRE - PHQ9
SUM OF ALL RESPONSES TO PHQ QUESTIONS 1-9: 2
SUM OF ALL RESPONSES TO PHQ QUESTIONS 1-9: 19

## 2021-05-28 ASSESSMENT — ANXIETY QUESTIONNAIRES: GAD7 TOTAL SCORE: 20

## 2021-05-28 NOTE — PROGRESS NOTES
Office Visit - Follow Up   Berto Pretty   63 y.o. male    Date of Visit: 5/1/2019    Chief Complaint   Patient presents with     Follow-up     Fasting for lab work - 5 month check / check lump on right lower abdomen, no pain, first noticed last summer / Getting panic attacks since stroke last year / Feels like his mind isn't working right         Assessment and Plan   1. Pulmonary nodules  I reviewed the x-rays with Berto.  He has small very calcified nodular densities.  Await radiology's interpretation in comparison to previous x-rays.  SPECT that these are all stable and do not need any further intervention.  - XR Chest 2 Views    2. Screening PSA (prostate specific antigen)    - PSA (Prostatic-Specific Antigen), Annual Screen    3. Benign essential HTN  He is not on any blood pressure medications.  Blood pressure today is 110 systolic.  Comes in fasting for labs.  Continue on atorvastatin and he takes aspirin.  History of a cerebellar stroke in 2/2018.  No residual neurologic deficit.  - HM2(CBC w/o Differential)  - Comprehensive Metabolic Panel  - Lipid Cascade          No follow-ups on file.     History of Present Illness   This 63 y.o. old comes in fasting for routine follow-up.  He needs a repeat chest x-ray today to follow-up on some rather benign-appearing pulmonary nodules seen on previous chest x-ray.  Has a history of a cerebellar stroke as described above.  He has had no further symptoms with this he has no residual effect.  Remains on aspirin and atorvastatin.  I will by psychiatry for his OCD.  He had a panic attack earlier this winter.  He had a slightly less common attacks where he feels panicky.  He then has a trouble concentrating his mind seems to stop.  I note that his psychiatrist stopped Lexapro in February and these attacks began in March.  I told him he might need to restart that medication and he should talk with his psychiatrist.  He complains of a mild lump in his right lower  "quadrant for many months.  Is not getting any larger.  It is a bit worse when he uses his rectus abdominal muscles.  There is no pain with this.    Review of Systems: A comprehensive review of systems was negative except as noted.     Medications, Allergies and Problem List   Reviewed, reconciled and updated     Physical Exam   General Appearance:       /74 (Patient Site: Right Arm, Patient Position: Sitting, Cuff Size: Adult Large)   Pulse 76   Ht 5' 4.5\" (1.638 m)   Wt 137 lb (62.1 kg)   SpO2 99%   BMI 23.15 kg/m      Blood pressure is excellent as above..  Heart rhythm is regular no murmurs.  Abdomen is soft and benign.  He has a mild weakness in the right lower quadrant of his abdominal wall.  There really is not any bulging hernia at this point.  I do not think he needs surgical evaluation at this time but we can continue to watch this.  He has no abdominal masses throughout his abdomen.  Lungs are clear.  Extremities show no edema.     Additional Information   Current Outpatient Medications   Medication Sig Dispense Refill     aspirin 81 MG EC tablet Take 81 mg by mouth daily.       atorvastatin (LIPITOR) 40 MG tablet Take 1 tablet (40 mg total) by mouth daily. 30 tablet 11     loratadine 10 mg cap Take 10 mg by mouth as needed.       No current facility-administered medications for this visit.      Allergies   Allergen Reactions     Latex Rash     Social History     Tobacco Use     Smoking status: Former Smoker     Last attempt to quit: 1974     Years since quittin.3     Smokeless tobacco: Never Used   Substance Use Topics     Alcohol use: Not on file     Drug use: Not on file     Xr Chest 2 Views    Result Date: 2019  EXAM: XR CHEST 2 VIEWS LOCATION: [facility] DATE/TIME: 2019 4:18 PM INDICATION: Cough. COMPARISON: 2018. FINDINGS: Negative chest. Lungs are clear. Stable calcified granulomas. No significant change.       Review and/or order of clinical lab " tests:  Review and/or order of radiology tests:  Review and/or order of medicine tests:  Discussion of test results with performing physician:  Decision to obtain old records and/or obtain history from someone other than the patient:  Review and summarization of old records and/or obtaining history from someone other than the patient and.or discussion of case with another health care provider:  Independent visualization of image, tracing or specimen itself:    Time: total time spent with the patient was 25 minutes of which >50% was spent in counseling and coordination of care     Antwon Mcmillan MD

## 2021-05-31 VITALS — HEIGHT: 65 IN | WEIGHT: 143 LBS | BODY MASS INDEX: 23.82 KG/M2

## 2021-05-31 VITALS — WEIGHT: 139 LBS | BODY MASS INDEX: 23.16 KG/M2 | HEIGHT: 65 IN

## 2021-06-01 ENCOUNTER — COMMUNICATION - HEALTHEAST (OUTPATIENT)
Dept: INTERNAL MEDICINE | Facility: CLINIC | Age: 66
End: 2021-06-01

## 2021-06-01 VITALS — WEIGHT: 136 LBS | BODY MASS INDEX: 22.66 KG/M2 | HEIGHT: 65 IN

## 2021-06-01 VITALS — HEIGHT: 65 IN | WEIGHT: 137 LBS | BODY MASS INDEX: 22.82 KG/M2

## 2021-06-01 NOTE — PROGRESS NOTES
Office Visit - Follow Up   Berto Pretty   64 y.o. male    Date of Visit: 2019    Chief Complaint   Patient presents with     Follow-up     4 month follow up - Stress and anxiety more lately (bought new home and moving)         Assessment and Plan   1. Mixed obsessional thoughts and acts  Followed by his psychiatrist at the [facility] he is on no medications at this time.    2. Mixed hyperlipidemia  Needs fasting lipid panel in 6 months    3. Special screening for malignant neoplasms, colon  Last colonoscopy was in 10/2014.  He has a family history of colon cancer in his father who  of this disease.  Thus he is on the 5-year plan.  - Ambulatory referral for Colonoscopy    4. Calcified granuloma of lung (H)  Has benign-appearing calcified granulomas of the lung.  Last chest x-ray was done 6 months ago.  I think he should have one more chest x-ray done in 6 months and if these are stable I would quit following these lesions.    5.  History of cerebellar stroke with no residual      No follow-ups on file.     History of Present Illness   This 64 y.o. old eccentric and somewhat verbose patient who lives on his own alone in [place].  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  At the [facility] for OCD.  19 at that time his blood pressure is good we did laboratory studies were that were all okay.  Cholesterol levels were good.  We did a chest x-ray and that showed no change in his calcified granulomas.  He complains again with a bit of bulging in his right lower quadrant of his abdomen.  Last visit I was not impressed that there was any hernia present.  He did like this rechecked again today.    He is a history of a cerebellar stroke with inpatient evaluation done at [facility].  He has no residual from this.  He continues on daily aspirin and Lipitor.        Review of Systems: A comprehensive review of systems was negative except as noted.     Medications, Allergies and Problem List  "  Reviewed, reconciled and updated  Post Discharge Medication Reconciliation Status:      Physical Exam   General Appearance:       /72 (Patient Site: Left Arm, Patient Position: Sitting, Cuff Size: Adult Large)   Pulse 75   Ht 5' 4.5\" (1.638 m)   Wt 131 lb (59.4 kg)   SpO2 97%   BMI 22.14 kg/m      Blood pressure is excellent..  Weight is down 6 pounds.  I do not feel any ventral hernia in his right lower quadrant of his abdomen.  No abdominal masses.  No skin masses.  Liver and spleen are not enlarged.  Extremities show no edema.     Additional Information   Current Outpatient Medications   Medication Sig Dispense Refill     aspirin 81 MG EC tablet Take 81 mg by mouth daily.       atorvastatin (LIPITOR) 40 MG tablet Take 1 tablet (40 mg total) by mouth daily. 30 tablet 11     loratadine 10 mg cap Take 10 mg by mouth as needed.       No current facility-administered medications for this visit.      Allergies   Allergen Reactions     Latex Rash     Social History     Tobacco Use     Smoking status: Former Smoker     Last attempt to quit: 1974     Years since quittin.7     Smokeless tobacco: Never Used   Substance Use Topics     Alcohol use: Not on file     Drug use: Not on file       Review and/or order of clinical lab tests:  Review and/or order of radiology tests:  Review and/or order of medicine tests:  Discussion of test results with performing physician:  Decision to obtain old records and/or obtain history from someone other than the patient:  Review and summarization of old records and/or obtaining history from someone other than the patient and.or discussion of case with another health care provider:  Independent visualization of image, tracing or specimen itself:    Time: total time spent with the patient was 25 minutes of which >50% was spent in counseling and coordination of care     Antwon Mcmillan MD    "

## 2021-06-02 VITALS — WEIGHT: 137 LBS | HEIGHT: 65 IN | BODY MASS INDEX: 22.82 KG/M2

## 2021-06-02 VITALS — BODY MASS INDEX: 21.49 KG/M2 | HEIGHT: 65 IN | WEIGHT: 129 LBS

## 2021-06-03 ENCOUNTER — AMBULATORY - HEALTHEAST (OUTPATIENT)
Dept: LAB | Facility: CLINIC | Age: 66
End: 2021-06-03

## 2021-06-03 VITALS
SYSTOLIC BLOOD PRESSURE: 110 MMHG | HEIGHT: 65 IN | DIASTOLIC BLOOD PRESSURE: 72 MMHG | HEART RATE: 75 BPM | WEIGHT: 131 LBS | OXYGEN SATURATION: 97 % | BODY MASS INDEX: 21.83 KG/M2

## 2021-06-03 DIAGNOSIS — D64.9 ANEMIA, UNSPECIFIED TYPE: ICD-10-CM

## 2021-06-03 LAB
ERYTHROCYTE [DISTWIDTH] IN BLOOD BY AUTOMATED COUNT: 12.8 % (ref 11–14.5)
FERRITIN SERPL-MCNC: 141 NG/ML (ref 27–300)
HCT VFR BLD AUTO: 39.1 % (ref 40–54)
HGB BLD-MCNC: 12.7 G/DL (ref 14–18)
MCH RBC QN AUTO: 30.8 PG (ref 27–34)
MCHC RBC AUTO-ENTMCNC: 32.5 G/DL (ref 32–36)
MCV RBC AUTO: 95 FL (ref 80–100)
PLATELET # BLD AUTO: 153 THOU/UL (ref 140–440)
PMV BLD AUTO: 10.4 FL (ref 7–10)
RBC # BLD AUTO: 4.13 MILL/UL (ref 4.4–6.2)
VIT B12 SERPL-MCNC: 697 PG/ML (ref 213–816)
WBC: 4.4 THOU/UL (ref 4–11)

## 2021-06-04 VITALS
HEIGHT: 65 IN | WEIGHT: 133 LBS | DIASTOLIC BLOOD PRESSURE: 80 MMHG | BODY MASS INDEX: 22.16 KG/M2 | OXYGEN SATURATION: 100 % | SYSTOLIC BLOOD PRESSURE: 102 MMHG | HEART RATE: 100 BPM

## 2021-06-04 VITALS
WEIGHT: 122 LBS | HEART RATE: 101 BPM | DIASTOLIC BLOOD PRESSURE: 82 MMHG | SYSTOLIC BLOOD PRESSURE: 100 MMHG | HEIGHT: 65 IN | BODY MASS INDEX: 20.33 KG/M2 | OXYGEN SATURATION: 96 %

## 2021-06-05 VITALS
BODY MASS INDEX: 21.8 KG/M2 | DIASTOLIC BLOOD PRESSURE: 68 MMHG | HEART RATE: 80 BPM | OXYGEN SATURATION: 98 % | WEIGHT: 129 LBS | SYSTOLIC BLOOD PRESSURE: 124 MMHG

## 2021-06-06 NOTE — TELEPHONE ENCOUNTER
Please read his letter to him on his labs.  His MRI showed the chronic changes from his previous strokes but no new strokes.  There were not changes of alzheimer's noted but that is a disease that we diagnose more with memory testing.  I would not change anything that he is doing.  If he would like a referral to Neurology, we could sure set that up.  Thanks.

## 2021-06-06 NOTE — PROGRESS NOTES
Office Visit   Berto Pretty   64 y.o. male    Date of Visit: 3/13/2020    Chief Complaint   Patient presents with     Get established visit     Mind is foggy and feels like he may have had a stroke        Assessment and Plan   1. Cerebrovascular accident (CVA) due to embolism of cerebellar artery, unspecified blood vessel laterality (H)  He has a history of strokes and is on atorvastatin.  Recently he has been feeling groggy and having some difficulty with word finding and possible confusion.  States it feels like he might be having more strokes.  He has not had any chest pain, fevers or chills or other systemic symptoms.  I will go ahead and do labs today.  He is fasting.  I will get back to him with his test results.  I am also going to repeat his MRI to look for any new abnormalities.  He is in agreement with this plan.  - Lipid Cascade  - Comprehensive Metabolic Panel  - Thyroid Cascade  - Vitamin B12  - MR Brain With Without Contrast; Future  - atorvastatin (LIPITOR) 40 MG tablet; Take 1 tablet (40 mg total) by mouth daily.  Dispense: 90 tablet; Refill: 3    2. Confusion  - HM2(CBC w/o Differential)  - Comprehensive Metabolic Panel  - Thyroid Cascade  - Vitamin B12  - MR Brain With Without Contrast; Future    3. Mixed obsessional thoughts and acts  He has chronic problems with OCD.  He feels he has been a little bit more depressed recently.  He has not been on any medication and I do think he should follow-up with his psychiatrist.    4. RACHNA (obstructive sleep apnea)  He has been diagnosed with sleep apnea but does not have a CPAP machine.  He is interested in seeing a sleep specialist to be reassessed.  - Ambulatory referral to Sleep Medicine    5. Vitamin D deficiency  - Vitamin D, Total (25-Hydroxy)       Return in about 2 months (around 5/13/2020) for Annual physical.     History of Present Illness   This 64 y.o. old male comes in to establish care and discuss new symptoms.  He has a history of stroke  "in the past of unclear etiology.  He does not have any ongoing deficits.  Over the last couple of weeks he has felt more foggy and slightly confused.  At times he is felt that he could not come up with words.  He is concerned that he is having more strokes.  He has not had any chest pain or palpitations.  He has not had any symptoms of infection.  He is on atorvastatin.  He also has a history of significant OCD and has worked closely with a psychiatrist over the years.  He has not been on any medication the last couple of years and feels that he is becoming a little bit more depressed.  He is interested in going back on medication.  He has no other acute concerns today.    Review of Systems: As above, systems otherwise reviewed and negative.     Medications, Allergies and Problem List   Patient Active Problem List   Diagnosis     Mixed hyperlipidemia     Obsessive Compulsive Disorder     Fatigue     Vitamin D Deficiency     Cerebrovascular accident (CVA) (H)     Family history of colon cancer     RACHNA (obstructive sleep apnea)     Current Outpatient Medications   Medication Sig Dispense Refill     aspirin 81 MG EC tablet Take 81 mg by mouth daily.       loratadine 10 mg cap Take 10 mg by mouth as needed.       atorvastatin (LIPITOR) 40 MG tablet Take 1 tablet (40 mg total) by mouth daily. 90 tablet 3     No current facility-administered medications for this visit.      Allergies   Allergen Reactions     Latex Rash          Physical Exam     /80 (Patient Site: Right Arm, Patient Position: Sitting)   Pulse 100   Ht 5' 4.5\" (1.638 m)   Wt 133 lb (60.3 kg)   SpO2 100%   BMI 22.48 kg/m      General:  Patient is alert and in no apparent distress.  Neck:  Supple with no adenopathy or thyroid abnormality noted.  Cardiovascular:  Regular rate and rhythm, normal S1/S2, no murmurs, rubs, or gallop.  Pulmonary:  Lungs are clear to auscultation bilaterally with normal respiratory effort.  Neurologic Cranial nerves are " intact.  No focal deficits.  Psychiatric:  Pleasant, no confusion or agitation          Additional Information   Social History     Tobacco Use     Smoking status: Former Smoker     Last attempt to quit: 1974     Years since quittin.2     Smokeless tobacco: Never Used   Substance Use Topics     Alcohol use: Not on file     Drug use: Not on file            Tyesha Ventura MD

## 2021-06-06 NOTE — TELEPHONE ENCOUNTER
1. Ok to take lipitor at any time including morning   2. He can skip todays dose if he is not sure if he has taken it or not

## 2021-06-06 NOTE — TELEPHONE ENCOUNTER
Medication Question or Clarification  Who is calling: Patient  What medication are you calling about (include dose and sig)?:   Disp Refills Start End     atorvastatin (LIPITOR) 40 MG tablet 30 tablet 11 3/6/2019 3/5/2020    Sig - Route: Take 1 tablet (40 mg total) by mouth daily. - Oral    Sent to pharmacy as: atorvastatin (LIPITOR) 40 MG tablet    E-Prescribing Status: Receipt confirmed by pharmacy (3/6/2019  9:34 AM CST)      Who prescribed the medication?: Dr. Sidhu  What is your question/concern?: Patient stated he has 2 questions:    1) Patient stated he was told by his pharmacist that the patient can take the Lipitor at any time of the day and not just at night. Patient stated he wanted to make sure that it's okay that he takes it in morning.    2) Patient stated he does not recall if he took his atorvastatin this morning and he would like to know if he should take another dose or should he skip today's dose?    Please call the patient back with an answer to these questions.  Requested Pharmacy: n/a  Okay to leave a detailed message?: Yes

## 2021-06-06 NOTE — TELEPHONE ENCOUNTER
"Pt reports he 2-3 strokes about two years ago. Was seeing Dr. Mcmillan about every six months. \"In the last month going down hill\". \"Battling depression, feel like in a dark hole and getting darker, problems with brain and thinking and exhausted every day\". Pt reports \"long standing problems with OCD and depression\". Pt reports he thinks he may be having mini strokes and is most concerned with some physical cause for his depression, declines crisis mental health information. Pt reports he has not seen a mental health therapist in about two years and previously \"just saw someone for meds\". Pt no longer takes an anti depressant. Pt wondering if possible to see Dr. Ventura sooner than his scheduled visit for 3/12/20.     Advised pt to be seen within 24 hours per protocol and advised pt at this time that would be the ER.     Pt reports he will go to the ER if he feels worse. He requests a call back from clinic today to let him know if a sooner appointment than 3/12/20 is available.       Reason for Disposition    [1] Depression AND [2] worsening (e.g.,sleeping poorly, less able to do activities of daily living)    Answer Assessment - Initial Assessment Questions  1. CONCERN: \"What happened that made you call today?\"      Worsening depression \"feel foggy\"   2. DEPRESSION SYMPTOM SCREENING: \"How are you feeling overall?\" (e.g., decreased energy, increased sleeping or difficulty sleeping, difficulty concentrating, feelings of sadness, guilt, hopelessness, or worthlessness)      Lack of energy, fogginess etc. See note  3. RISK OF HARM - SUICIDAL IDEATION:  \"Do you ever have thoughts of hurting or killing yourself?\"  (e.g., yes, no, no but preoccupation with thoughts about death)    - INTENT:  \"Do you have thoughts of hurting or killing yourself right NOW?\" (e.g., yes, no, N/A)    - PLAN: \"Do you have a specific plan for how you would do this?\" (e.g., gun, knife, overdose, no plan, N/A)      Pt denies  4. RISK OF HARM - " "HOMICIDAL IDEATION:  \"Do you ever have thoughts of hurting or killing someone else?\"  (e.g., yes, no, no but preoccupation with thoughts about death)    - INTENT:  \"Do you have thoughts of hurting or killing someone right NOW?\" (e.g., yes, no, N/A)    - PLAN: \"Do you have a specific plan for how you would do this?\" (e.g., gun, knife, no plan, N/A)       Pt denies  5. FUNCTIONAL IMPAIRMENT: \"How have things been going for you overall in your life? Have you had any more difficulties than usual doing your normal daily activities?\"  (e.g., better, same, worse; self-care, school, work, interactions)     Pt reports increased difficulty with daily actitivites  6. SUPPORT: \"Who is with you now?\" \"Who do you live with?\" \"Do you have family or friends nearby who you can talk to?\"       n/a  7. THERAPIST: \"Do you have a counselor or therapist? Name?\"      Not for past two years  8. STRESSORS: \"Has there been any new stress or recent changes in your life?\"      n/a  9. DRUG ABUSE/ALCOHOL: \"Do you drink alcohol or use any illegal drugs?\"      n/a  10. OTHER: \"Do you have any other health or medical symptoms right now?\" (e.g., fever)       Feeling very tired, mild confusion (pt does not sound confused to Writer  11. PREGNANCY: \"Is there any chance you are pregnant?\" \"When was your last menstrual period?\"       n/a    Protocols used: DEPRESSION-A-AH      "

## 2021-06-06 NOTE — TELEPHONE ENCOUNTER
"Test Results  Who is calling?:  The patient   Who ordered the test:  The patient states is calling for the lab and MRI results. This writer informed the patient the letter was sent of his lab results.   Type of test: Lab and Imaging  Date of test:  3/16/20  Where was the test performed:  MRI [place]   What are your questions/concerns?:  The patient states \" did I have another small CVA or new brain damage ?\" The patient also wants to know if he has an early onset of Alzheimers ?  Okay to leave a detailed message?:  Yes         "

## 2021-06-06 NOTE — TELEPHONE ENCOUNTER
Left detailed message for the patient relaying message below from Dr. Ventura.  Asked that the patient call with any further questions.    Renee CORREA CMA/EDILIA....................8:27 AM

## 2021-06-06 NOTE — TELEPHONE ENCOUNTER
Spoke with the patient and relayed message below from Dr. Ventura.  Patient verbalized understanding and had no further questions at this time.  He has been scheduled for Monday, 3/9/2020 at 11 am.  Renee CORREA CMA/EDILIA....................4:32 PM

## 2021-06-07 NOTE — TELEPHONE ENCOUNTER
Patient Returning Call  Reason for call:  Patient stated he has more questions.  Information relayed to patient:  n/a  Patient has additional questions:  Yes  If YES, what are your questions/concerns:  Patient stated he would like to know if there was any plaque billed up from the contrast.  Okay to leave a detailed message?: Yes

## 2021-06-07 NOTE — TELEPHONE ENCOUNTER
Referral Request  Type of referral: Neurology  Who s requesting: Patient  Why the request: See test result note. Patient would like a referral to see a neurologist.  Have you been seen for this request: Yes  Does patient have a preference on a group/provider? No but patient wants to be seen in the [place].  Okay to leave a detailed message?  Yes  [phone]

## 2021-06-07 NOTE — TELEPHONE ENCOUNTER
Who is calling:  Patient  Reason for Call:  Pt wanting to make sure referral information gets sent to Neurology appointment.  Writer stated if they are in fact [facility] they will have access to records.  Pt agrees, and understands.  Date of last appointment with primary care: N/A  Okay to leave a detailed message: No

## 2021-06-07 NOTE — TELEPHONE ENCOUNTER
I've ordered a Neurology consult.  Also, to answer his other questions regarding if there was plaque seen on the MRI, this was not a vascular test so there were no images of his arteries.  Please help him schedule the MRI.

## 2021-06-07 NOTE — PROGRESS NOTES
"Berto Pretty is a 64 y.o. male who is being evaluated via a billable telephone visit.      The patient has been notified of following:     \"This telephone visit will be conducted via a call between you and your physician/provider. We have found that certain health care needs can be provided without the need for a physical exam.  This service lets us provide the care you need with a short phone conversation.  If a prescription is necessary we can send it directly to your pharmacy.  If lab work is needed we can place an order for that and you can then stop by our lab to have the test done at a later time.    Telephone visits are billed at different rates depending on your insurance coverage. During this emergency period, for some insurers they may be billed the same as an in-person visit.  Please reach out to your insurance provider with any questions.    If during the course of the call the physician/provider feels a telephone visit is not appropriate, you will not be charged for this service.\"    Patient has given verbal consent to a Telephone visit? Yes      Telephone Visit   Berto Pretty   64 y.o. male    Date of Visit: 4/17/2020    Chief Complaint   Patient presents with     Follow-up     Pt complains of loss of appetite, pt feels \"foggy\"        Assessment and Plan   1. Moderate episode of recurrent major depressive disorder (H)  He is having worsening depression.  He states he has been feeling very foggy and tired.  He also has underlying obsessive-compulsive disorder and some panic attacks.  He is in the process of being evaluated by neurology for the fogginess.  He has an EEG scheduled as well as some lab work.  He has a follow-up scheduled in June.  I did discuss that if his symptoms are not able to be treated or explained through his neurologic work-up then I do think we need to get him back to psychiatry to discuss his mood and his anxiety and OCD.  He is in agreement with this plan.  In the past he " was on escitalopram but at this point is not interested in resuming that.  He will let me know if he changes his mind.    2. 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 Mixed obsessional thoughts and acts       No follow-ups on file.     History of Present Illness   This 64 y.o. old male was evaluated with a telephone visit today.  He has a history of depression, OCD, panic attacks, and multiple strokes in the past.  He has been having difficulty with fogginess and decreased energy.  We did blood work about a month ago that was all normal.  He had an MRI of the brain that did not show any new strokes.  He has been working with neurology and is going to be doing an EEG as well as a recheck and some blood work in the next month or so.  He did do a PHQ 9 today and at 19.  He has no suicidal thoughts or ideation.  In the past he was on escitalopram which worked pretty well for him.  He does not want to go on a right now.  He does not currently have a psychiatrist that he is working with but would be willing to go back to psychiatry once the neurology evaluation is over.  Recently he has been having a little bit of weight loss because he states it is been difficult for him to go buy groceries due to the pandemic.  He has been driving through though and feels that this is leveled off and he is starting to put some weight back on.  He has no other acute concerns today.    Review of Systems: As above, systems otherwise reviewed and negative.     Medications, Allergies and Problem List   Patient Active Problem List   Diagnosis     Mixed hyperlipidemia     Obsessive Compulsive Disorder     Fatigue     Vitamin D Deficiency     Cerebrovascular accident (CVA) (H)     Family history of colon cancer     RACHNA (obstructive sleep apnea)     Current Outpatient Medications   Medication Sig Dispense Refill     aspirin 81 MG EC tablet Take 81 mg by mouth daily.        atorvastatin (LIPITOR) 40 MG tablet Take 1 tablet (40 mg total) by mouth daily. 90 tablet 3     loratadine 10 mg cap Take 10 mg by mouth as needed.       No current facility-administered medications for this visit.      Allergies   Allergen Reactions     Latex Rash          Physical Exam     There were no vitals taken for this visit.    During our discussion there was no evidence of shortness of breath.     Additional Information   Social History     Tobacco Use     Smoking status: Former Smoker     Last attempt to quit: 1974     Years since quittin.3     Smokeless tobacco: Never Used   Substance Use Topics     Alcohol use: Not on file     Drug use: Not on file            Tyesha Ventura MD          Phone call duration:  30 minutes    Manjula Sanders, [facility]

## 2021-06-07 NOTE — TELEPHONE ENCOUNTER
Symptom    Describe your symptoms:    Decrease appetite  4/1,2,3/2020 500 calories  4/4,5,6/2020- no food at all  4/7,8/[id number]   4/9,10- no food at all.    Eating about [id number] simeon per day with take out.    Lost 10 lb    Any pain: no    New/Ongoing: New     How long have you been having symptoms: 2  Week(s)    Have you been seen for this:    No     Appointment offered?:   Patient declines     Triage offered?:   Yes, declined     Home remedies tried:   NA    Requested Pharmacy:   [id number] - [address]      Okay to leave a detailed message?   Yes     Question if theres any tests that should be done to check if he any problems developed from poor nutrition intake?    Please call patient to discuss concerns.

## 2021-06-07 NOTE — TELEPHONE ENCOUNTER
Dr. Ventura,  Patient had a MRI done 3/16/2020.  Did you want another one ordered?   Please advise.  Thank you.  Renee CORREA, NEETU/EDILIA....................10:09 AM

## 2021-06-08 NOTE — PROGRESS NOTES
Sleep study records received today: 6/5/2020    From: [employer]    They have been sent to scan into patients chart.

## 2021-06-08 NOTE — PROGRESS NOTES
"Berto Pretty is a 64 y.o. male who is being evaluated via a billable telephone visit.      The patient has been notified of following:     \"This telephone visit will be conducted via a call between you and your physician/provider. We have found that certain health care needs can be provided without the need for a physical exam.  This service lets us provide the care you need with a short phone conversation.  If a prescription is necessary we can send it directly to your pharmacy.  If lab work is needed we can place an order for that and you can then stop by our lab to have the test done at a later time.    Telephone visits are billed at different rates depending on your insurance coverage. During this emergency period, for some insurers they may be billed the same as an in-person visit.  Please reach out to your insurance provider with any questions.    If during the course of the call the physician/provider feels a telephone visit is not appropriate, you will not be charged for this service.\"    Patient has given verbal consent to a Telephone visit? Yes    What phone number would you like to be contacted at? [phone] (H)    Patient would like to receive their AVS by AVS Preference: Mail a copy.    Shyla NAINASherron DE ANDA, SLEEP MEDICINE, 6/18/2020 8:58 AM    Dear [facility]  [address] [place] [address]    Thank you for the opportunity to participate in the care of Mr. Berto Pretty.    Assessment and Plan:    In summary Berto Pretty is a 64 y.o. year old male here for transfer of care.  1. History of sleep apnea  Since it has been over a year when the patient had the sleep study, I will repeat that patient's sleep study to see if he still qualifies for CPAP therapy. We will invite the patient to return to review the results of the sleep study with me after it has been completed. I educated the patient on the underlying pathophysiology of RACHNA  - Baseline Diagnostic Sleep Study; " Future    2. Hypersomnia  - Baseline Diagnostic Sleep Study; Future    3. Snoring  - Baseline Diagnostic Sleep Study; Future    4. History of CVA (cerebrovascular accident)  - Baseline Diagnostic Sleep Study; Future      History of present illness:    He is a 64 y.o. male who comes to the clinic for the transfer of care of his obstructive sleep apnea. The patient was diagnosed with obstructive sleep apnea in 2018 from another center. He did not pursue CPAP at that time. As a result he continues to suffer from excessive daytime sleepiness that has been going on for more than 2 years. He still continues to snore and feels that he stops breathing during sleep. The patient is a stroke survivor and wants to see if RACHNA might have contributed to his risk of CVA. The patient admits that he has poor sleep cycle 2nd to his OCD. The patient's review of systems is otherwise negative.     Ideal Sleep-Wake Cycle(devoid of societal pressure):    Patient would try to initiate sleep at around 11-12 at night with a sleep latency of less than 20 minutes. The patient would have variable awakenings. Final wake up time is around 7-8 AM.    Patient told to return in one week after the sleep study is interpreted.    Past Medical History  Past Medical History:   Diagnosis Date     OCD (obsessive compulsive disorder)      RACHNA (obstructive sleep apnea)         Past Surgical History  History reviewed. No pertinent surgical history.     Meds  Current Outpatient Medications   Medication Sig Dispense Refill     aspirin 81 MG EC tablet Take 81 mg by mouth daily.       atorvastatin (LIPITOR) 40 MG tablet Take 1 tablet (40 mg total) by mouth daily. 90 tablet 3     loratadine 10 mg cap Take 10 mg by mouth as needed.       No current facility-administered medications for this visit.         Allergies  Latex     Social History  Social History     Socioeconomic History     Marital status: Single     Spouse name: Not on file     Number of children: 0      Years of education: Not on file     Highest education level: Not on file   Occupational History     Occupation: Disability due to OCD   Social Needs     Financial resource strain: Not on file     Food insecurity     Worry: Not on file     Inability: Not on file     Transportation needs     Medical: Not on file     Non-medical: Not on file   Tobacco Use     Smoking status: Former Smoker     Last attempt to quit: 1974     Years since quittin.4     Smokeless tobacco: Never Used   Substance and Sexual Activity     Alcohol use: Not on file     Drug use: Not on file     Sexual activity: Not on file   Lifestyle     Physical activity     Days per week: Not on file     Minutes per session: Not on file     Stress: Not on file   Relationships     Social connections     Talks on phone: Not on file     Gets together: Not on file     Attends [employer] service: Not on file     Active member of club or organization: Not on file     Attends meetings of clubs or organizations: Not on file     Relationship status: Not on file     Intimate partner violence     Fear of current or ex partner: Not on file     Emotionally abused: Not on file     Physically abused: Not on file     Forced sexual activity: Not on file   Other Topics Concern     Not on file   Social History Narrative     Not on file        Family History  Family History   Problem Relation Age of Onset     Stroke Mother      Snoring Mother      Snoring Father      Review of Systems:  Constitutional: Negative except as noted in HPI.   Eyes: Negative except as noted in HPI.   ENT: Negative except as noted in HPI.   Cardiovascular: Negative except as noted in HPI.   Respiratory: Negative except as noted in HPI.   Gastrointestinal: Negative except as noted in HPI.   Genitourinary: Negative except as noted in HPI.   Musculoskeletal: Negative except as noted in HPI.   Integumentary: Negative except as noted in HPI.   Neurological: Negative except as noted in HPI.    Psychiatric: Negative except as noted in HPI.   Endocrine: Negative except as noted in HPI.   Hematologic/Lymphatic: Negative except as noted in HPI.       Labs/Studies:     Lab Results   Component Value Date    WBC 6.2 03/13/2020    HGB 14.5 03/13/2020    HCT 43.1 03/13/2020    MCV 92 03/13/2020     03/13/2020         Chemistry        Component Value Date/Time     03/13/2020 1545    K 3.7 03/13/2020 1545     03/13/2020 1545    CO2 24 03/13/2020 1545    BUN 19 03/13/2020 1545    CREATININE 0.96 03/13/2020 1545    GLU 98 03/13/2020 1545        Component Value Date/Time    CALCIUM 9.3 03/13/2020 1545    ALKPHOS 92 03/13/2020 1545    AST 27 03/13/2020 1545    ALT 21 03/13/2020 1545    BILITOT 0.7 03/13/2020 1545            No results found for: FERRITIN  Lab Results   Component Value Date    TSH 0.92 05/26/2020         Patient verbalized understanding of these issues, agrees with the plan and all questions were answered today. Patient was given an opportuntity to voice any other symptoms or concerns not listed above. Patient did not have any other symptoms or concerns.      Devon Ramirez DO  Board Certified in Internal Medicine and Sleep Medicine    I spent a total of 34 minutes of phone encounter and more than 50% of the encounter was used for counseling or coordination of care.    The patient does not have video capability.

## 2021-06-08 NOTE — TELEPHONE ENCOUNTER
Pt is calling in about finding a wood tick on him that must have latched onto him yesterday, on the left leg behind, and above the knee. Pt found it 3 hours ago, is trying to take it off now and is looking for instructions. Pt reports it is flat, but will save the tick after he gets it off just in case. Pt also will call if he develops a headache and fever or bullseye rash.   Care advice given, how to remove, and signs and symptoms to watch for was advised. Advised pt to apply antibiotic ointment on the bite after the tick is removed. Pt agrees with plan, and will call back if she has further questions, or develops symptoms.     Reed Hammonds, RN [facility] Triage/Medication Refill    Reason for Disposition    Tick bite with no complications    Protocols used: TICK BITE-A-

## 2021-06-10 NOTE — TELEPHONE ENCOUNTER
Upcoming Appointment Question  When is the appointment: Today  What is your appointment for?: AWV  Who is your appointment scheduled with?: PCP only  What is your question/concern?: Patient is unable to make the 1:40 appointment.  Could Dr. Ventura see him later today?  Patient had a dead battery in his car and just got it fixed.  But he can't make it by 1:40 p.m.  Okay to leave a detailed message?: Yes

## 2021-06-11 NOTE — TELEPHONE ENCOUNTER
"Patient states he has Left foot and Left leg swelling since yesterday.  Describes as \"moderate.\" Has been sitting for extended number of hours past 2 days.  Denies pain. Describes as Left calf feels \"lagunas\" than usual.  Able to walk and bear weight.    Afebrile.  No redness or red streaks.  Denies shortness of breath. No chest pain.    History of leg swelling. States this is worse than in the past.  History of obsessive compulsive disorder per patient.   States it has been getting worse since Spring.   States would like a call from PCP next week.    Protocol- Leg Swelling   Care advice given.  Disposition- Per Visit Selection Guide  Advised to go to Urgent Care  Advised to call back if symptoms change or progress.    COVID 19 Nurse Triage Plan/Patient Instructions    Please be aware that novel coronavirus (COVID-19) may be circulating in the community. If you develop symptoms such as fever, cough, or SOB or if you have concerns about the presence of another infection including coronavirus (COVID-19), please contact your health care provider or visit [email or web address].     Disposition/Instructions    In-Person Visit with provider recommended. Reference Visit Selection Guide.    Thank you for taking steps to prevent the spread of this virus.  o Limit your contact with others.  o Wear a simple mask to cover your cough.  o Wash your hands well and often.    Resources    [facility]: About COVID-19: [email or web address]    CDC: What to Do If You're Sick: www.cdc.gov/coronavirus/2019-ncov/about/steps-when-sick.html    CDC: Ending Home Isolation: www.cdc.gov/coronavirus/2019-ncov/hcp/disposition-in-home-patients.html     CDC: Caring for Someone: www.cdc.gov/coronavirus/2019-ncov/if-you-are-sick/care-for-someone.html     [facility]: Interim Guidance for Hospital Discharge to Home: www.health.[facility].[place].us/diseases/coronavirus/hcp/hospdischarge.pdf    [facility] clinical trials (COVID-19 research " studies): clinicalaffairs.[facility]-clinical-trials     Below are the [facility]-19 hotlines at the [place] [facility] ([facility]). Interpreters are available.   o For health questions: Call 351-908-0077 or 1-333.444.5994 (7 a.m. to 7 p.m.)  o For questions about schools and childcare: Call 223-279-9870 or 1-278.440.6863 (7 a.m. to 7 p.m.)         Additional Information    Negative: Severe difficulty breathing (e.g., struggling for each breath, speaks in single words)    Negative: Looks like a broken bone or dislocated joint (e.g., crooked or deformed)    Negative: Sounds like a life-threatening emergency to the triager    Negative: Difficulty breathing at rest    Negative: Entire foot is cool or blue in comparison to other side    Negative: [1] Can't walk or can barely walk AND [2] new onset    Negative: [1] Difficulty breathing with exertion (e.g., walking) AND [2] new onset or worsening    Negative: [1] Red area or streak AND [2] fever    Negative: [1] Swelling is painful to touch AND [2] fever    Negative: [1] Cast on leg or ankle AND [2] now increased pain    Negative: Patient sounds very sick or weak to the triager    Negative: SEVERE leg swelling (e.g., swelling extends above knee, entire leg is swollen, weeping fluid)    Negative: [1] Red area or streak [2] large (> 2 in. or 5 cm)    Negative: [1] Thigh or calf pain AND [2] only 1 side AND [3] present > 1 hour    [1] Thigh, calf, or ankle swelling AND [2] only 1 side    Protocols used: LEG SWELLING AND EDEMA-A-AH

## 2021-06-11 NOTE — TELEPHONE ENCOUNTER
Who is calling:  Patient   Reason for Call:  Patient stated to please call him back tomorrow around 8 am.   Date of last appointment with primary care: n/a  Okay to leave a detailed message: Yes  [phone]

## 2021-06-11 NOTE — TELEPHONE ENCOUNTER
Who is calling:  Patient   Reason for Call:  Patient stated that he was expecting a call back from clinical staffs. Patient stated he does not want to speak to a nurse triage and he only wants to speak to Tyesha Ventura MD or her assistant. Patient stated he was advised to go to urgent care but he didn't go and was expecting a call from the clinic. Patient stated to call him back today if possible.  Date of last appointment with primary care: n/a  Okay to leave a detailed message: Yes  [phone]

## 2021-06-12 NOTE — TELEPHONE ENCOUNTER
Upcoming Appointment Question  When is the appointment: Today  What is your appointment for?: AWV and Chronic disease management  Who is your appointment scheduled with?: PCP only  What is your question/concern?: Need to reschedule will be over 10 min. Late. The next appointment is out to Nov.   Okay to leave a detailed message?: Yes

## 2021-06-12 NOTE — TELEPHONE ENCOUNTER
Who is calling:  Patient   Reason for Call:  Caller stated that he has some concerns that he would like to speak with Tyesha Ventura MD nurse only. If they can call him back to discuss appointments and sleeps studies.   Date of last appointment with primary care:   Okay to leave a detailed message: Yes

## 2021-06-12 NOTE — TELEPHONE ENCOUNTER
I think he could start escitalopram before his visit with psychiatry but it's really important that he have that visit.  Also, I don't think he needs to see me any sooner than his current visit which is not far off.  If he agrees, I'll send the prescription to his pharmacy. Thanks.

## 2021-06-12 NOTE — PROGRESS NOTES
1. Wellness examination  His examination is satisfactory.  We will provide the flu shot today.  He is fasting and will do labs today.  We reviewed the documentation for his wellness visit.  He has severe depression and anxiety with OCD.  He does not have an advanced directive.  We have given him information on getting 1.  Memory screening is satisfactory.  He is otherwise up-to-date on age-appropriate health maintenance.    2. Mixed obsessional thoughts and acts  He has significant OCD and has not been doing well.  I am going to refer him to psychiatry.  We will also increase his Lexapro dosing to 20 mg daily.  - Ambulatory referral to Psychiatry  - escitalopram oxalate (LEXAPRO) 20 MG tablet; Take 1 tablet (20 mg total) by mouth daily.  Dispense: 90 tablet; Refill: 3  - Lipid Cascade    3. Cerebrovascular accident (CVA) due to embolism of cerebellar artery, unspecified blood vessel laterality (H)  He recently saw neurology and will continue with risk factor modification.  We will check his lipids today.  - HM2(CBC w/o Differential)  - Lipid [place]    4. RACHNA (obstructive sleep apnea)  He recently got set up with CPAP.  He has been doing well with that.    5. Chest discomfort  He has had a couple episodes of chest discomfort.  I am going to get an EKG today.  Recently it has gone away.  Is not exertional in nature.  - Electrocardiogram Perform - Clinic    6. Weight loss  His weight loss is of unclear etiology.  I am going to set up a CT scan of his abdomen.  He has had some discomfort in the mid epigastric area.  We will also check his blood work today and a chest x-ray.  We will get back to him with those results.  I do think he should add Ensure or boost to his daily regimen to try to get his weight back up.  - HM2(CBC w/o Differential)  - Thyroid Cascade  - Basic Metabolic Panel  - Hepatic Profile  - Lipase  - Amylase  - Urinalysis-UC if Indicated  - CT Abdomen Pelvis Without Oral With IV Contrast; Future  - XR  Chest 2 Views    7. Increased frequency of urination  He has noticed a change in his urine.  It could be related to his decreased p.o. intake.  I will check his urine analysis today as well as a CT.  - Urinalysis-UC if Indicated  - CT Abdomen Pelvis Without Oral With IV Contrast; Future    8. Screening for prostate cancer  - PSA (Prostatic-Specific Antigen), Annual Screen    9. Need for immunization against influenza  - Influenza,Quad,High Dose,PF 65 YR+      The patient's current medical problems were reviewed.      The following health maintenance schedule was reviewed with the patient and provided in printed form in the after visit summary:   Health Maintenance   Topic Date Due     DEPRESSION ACTION PLAN  1955     ZOSTER VACCINES (1 of 2) 08/11/2004     Pneumococcal Vaccine: 65+ Years (1 of 1 - PPSV23) 08/11/2019     INFLUENZA VACCINE RULE BASED (1) 08/01/2020     MEDICARE ANNUAL WELLNESS VISIT  11/03/2021     FALL RISK ASSESSMENT  11/03/2021     TD 18+ HE  12/27/2021     ADVANCE CARE PLANNING  12/27/2022     COLORECTAL CANCER SCREENING  10/10/2024     LIPID  03/13/2025     Pneumococcal Vaccine: Pediatrics (0 to 5 Years) and At-Risk Patients (6 to 64 Years)  Aged Out     HEPATITIS B VACCINES  Aged Out     HEPATITIS C SCREENING  Discontinued        Subjective:   Chief Complaint: Berto Pretty is an 65 y.o. male here for an Annual Wellness visit.   HPI: He comes in for an annual wellness evaluation and to discuss multiple medical concerns.  He has a history of OCD which has been poorly controlled recently.  The pandemic has been very difficult for him.  He is currently taking Lexapro 10 mg daily.  He has not been working with a psychiatrist.  I think he would benefit from seeing a psychiatrist and have recommended that a couple times in the past.  I am going to plan to just order a psychiatry consult for him.  He has not had any problems with memory and no problems with falling.  He does not have an  advanced directive and we discussed getting 1.  He is due for flu shot today as well as a prostate cancer screen.  He also notes significant weight loss.  He is lost about 12 pounds over the last several months.  Has times when he just does not eat.  He is also had a couple of episodes where he had a discomfort in the mid epigastric and chest area.  It was not related to exertion.  He has not had any fevers or chills but does state he just feels foggy and fatigued.  That has been going on a long time and he was evaluated by neurology last spring.  He had a negative evaluation.    Review of Systems:   Please see above.  The rest of the review of systems are negative for all systems.    Patient Care Team:  Tyesha Ventura MD as PCP - General (Internal Medicine)  Tyesha Ventura MD as Assigned PCP     Patient Active Problem List   Diagnosis     Mixed hyperlipidemia     Obsessive Compulsive Disorder     Fatigue     Vitamin D Deficiency     Cerebrovascular accident (CVA) (H)     Family history of colon cancer     RACHNA (obstructive sleep apnea)     Past Medical History:   Diagnosis Date     OCD (obsessive compulsive disorder)      RACHNA (obstructive sleep apnea)       History reviewed. No pertinent surgical history.   Family History   Problem Relation Age of Onset     Stroke Mother      Snoring Mother      Snoring Father       Social History     Socioeconomic History     Marital status: Single     Spouse name: Not on file     Number of children: 0     Years of education: Not on file     Highest education level: Not on file   Occupational History     Occupation: Disability due to OCD   Social Needs     Financial resource strain: Not on file     Food insecurity     Worry: Not on file     Inability: Not on file     Transportation needs     Medical: Not on file     Non-medical: Not on file   Tobacco Use     Smoking status: Former Smoker     Quit date: 1974     Years since quittin.8     Smokeless tobacco: Never  "Used   Substance and Sexual Activity     Alcohol use: Not on file     Drug use: Not on file     Sexual activity: Not on file   Lifestyle     Physical activity     Days per week: Not on file     Minutes per session: Not on file     Stress: Not on file   Relationships     Social connections     Talks on phone: Not on file     Gets together: Not on file     Attends Restorationist service: Not on file     Active member of club or organization: Not on file     Attends meetings of clubs or organizations: Not on file     Relationship status: Not on file     Intimate partner violence     Fear of current or ex partner: Not on file     Emotionally abused: Not on file     Physically abused: Not on file     Forced sexual activity: Not on file   Other Topics Concern     Not on file   Social History Narrative     Not on file      Current Outpatient Medications   Medication Sig Dispense Refill     aspirin 81 MG EC tablet Take 81 mg by mouth daily.       atorvastatin (LIPITOR) 40 MG tablet Take 1 tablet (40 mg total) by mouth daily. 90 tablet 3     escitalopram oxalate (LEXAPRO) 20 MG tablet Take 1 tablet (20 mg total) by mouth daily. 90 tablet 3     loratadine 10 mg cap Take 10 mg by mouth as needed.       No current facility-administered medications for this visit.       Objective:   Vital Signs:   Visit Vitals  /82 (Patient Site: Left Arm, Patient Position: Sitting, Cuff Size: Adult Large)   Pulse (!) 101   Ht 5' 4.5\" (1.638 m)   Wt 122 lb (55.3 kg)   SpO2 96%   BMI 20.62 kg/m           VisionScreening:  No exam data present     PHYSICAL EXAM  General:  Patient is alert and in no apparent distress.  Neck:  Supple with no adenopathy or thyroid abnormality noted.  Cardiovascular:  Regular rate and rhythm, normal S1/S2, no murmurs, rubs, or gallop.  Pulmonary:  Lungs are clear to auscultation bilaterally with normal respiratory effort.  Gastrointestinal:  Abdomen is soft, non-tender, non-distended, with no organomegaly, rebound or " guarding.  Extremities:  No joint abnormalities with no LE edema.  Strong dorsalis pedis pulses.  Neurologic Cranial nerves are intact.  No focal deficits.  Psychiatric:  Pleasant, no confusion or agitation   Skin:  Warm and well perfused with no rashes or abnormalities.        Assessment Results 11/3/2020   Get Up and Go Score Less than 12 seconds   Mini Cog Total Score 4   Some recent data might be hidden     A Mini-Cog score of 0-2 suggests the possibility of dementia, score of 3-5 suggests no dementia        Identified Health Risks:     The patient was provided with suggestions to help him develop a healthy physical lifestyle.   He is at risk for lack of exercise and has been provided with information to increase physical activity for the benefit of his well-being.  The patient was counseled and encouraged to consider modifying their diet and eating habits. He was provided with information on recommended healthy diet options.  The patient was provided with suggestions to help him develop a healthy emotional lifestyle.   Information regarding advance directives (living [place]), including where he can download the appropriate form, was provided to the patient via the AVS.

## 2021-06-12 NOTE — TELEPHONE ENCOUNTER
He has a wellness exam coming up in 2 weeks. He is just not feeling well, and doesn't know if he can wait that long to be seen.  He started Lexapro 5 days ago.  He talked with the pharmacist to see if the medications were the cause of the way he was feeling, and they didn't think so. He is feeling tense, weak, feelings of dread,filled with anxiety,he felt like his body was just going to give out and a decrease in appetite. He feels like his mind and body is shutting down. He found out he has severe sleep apnea with poor quality of sleep, and has to figure that out.  He didn't eat yesterday , because it seemed like too much work. He's hungry today. He now has local numbers for mental health that he can call if he needs them. The plan is to make an appointment on Monday to be seen,and if anything changes over the weekend, to call us back or go into the ED to be evaluated.  Obsessive compulsive disorder.    Giovana Warner RN, Nurse Advisor      Reason for Disposition    Symptoms interfere with work or school    Additional Information    Negative: SEVERE difficulty breathing (e.g., struggling for each breath, speaks in single words)    Negative: Bluish (or gray) lips or face    Negative: Difficult to awaken or acting confused  (e.g., disoriented, slurred speech)    Negative: Hysterical or combative behavior    Negative: Sounds like a life-threatening emergency to the triager    Negative: Chest pain    Negative: Cough is main symptom    Negative: Palpitations, skipped heart beat, or rapid heart beat    Negative: Suicide thoughts, threats, attempts, or questions    Negative: Depression is main problem or symptom (e.g., feelings of sadness or hopelessness)    Negative: Difficulty breathing and persists > 10 minutes and not relieved by reassurance provided by triager    Negative: Lightheadedness or dizziness and persists > 10 minutes and not relieved by reassurance provided by triager    Negative: Alcohol or drug  abuse, known or suspected, and feeling very shaky (i.e., visible tremors of hands)    Negative: Patient sounds very sick or weak to the triager    Negative: Patient sounds very upset or troubled to the triager    Protocols used: ANXIETY AND PANIC ATTACK-A-OH

## 2021-06-12 NOTE — PROGRESS NOTES
Berto Pretty is a 65 y.o. male who is being evaluated via a billable telephone visit.        Telephone Visit   Berto Pretty   65 y.o. male    Date of Visit: 10/19/2020    Chief Complaint   Patient presents with     Anxiety     Has gotten worse in the last week        Assessment and Plan   1. Mixed obsessional thoughts and acts  He has restarted his escitalopram and is now working with his psychiatrist.  I recommend he continue to work with his Psychiatrist.  I recommend he continue to work with her as he has pretty significant OCD and I am not comfortable monitoring this problems.  Recommend when he has increased symptoms, he should reach out to his psychiatrist.    2. RACHNA (obstructive sleep apnea)  He recently had a sleep study and is awaiting a CPAP machine.    3. Cerebrovascular accident (CVA) due to embolism of cerebellar artery, unspecified blood vessel laterality (H)  He recently had an evaluation in Neurology.  No new problems were noted.         No follow-ups on file.     History of Present Illness   This 65 y.o. old male is evaluated with a telephone visit today.  He has a history of OCD, CVA, and RACHNA.  He hasn't been feeling well.  Feeling tired, anxious, not wanting to eat, not going out, sometimes very tired like he might fall over.  Just resumed Lexapro a couple weeks ago.  Had a recent evaluation with Neurology which did not find a cause for his symptoms and his Neurologist felt they were likely due to his OCD.  He had a sleep study recently and is awaiting CPAP.  He has many symptoms and questions.  He is scheduled for a physical with me in 2 weeks.    Review of Systems: As above, systems otherwise reviewed and negative.     Medications, Allergies and Problem List   Patient Active Problem List   Diagnosis     Mixed hyperlipidemia     Obsessive Compulsive Disorder     Fatigue     Vitamin D Deficiency     Cerebrovascular accident (CVA) (H)     Family history of colon cancer     RACHNA (obstructive  "sleep apnea)     Current Outpatient Medications   Medication Sig Dispense Refill     aspirin 81 MG EC tablet Take 81 mg by mouth daily.       atorvastatin (LIPITOR) 40 MG tablet Take 1 tablet (40 mg total) by mouth daily. 90 tablet 3     escitalopram oxalate (LEXAPRO) 10 MG tablet Take 1 tablet (10 mg total) by mouth daily. 90 tablet 3     loratadine 10 mg cap Take 10 mg by mouth as needed.       No current facility-administered medications for this visit.      Allergies   Allergen Reactions     Latex Rash          Physical Exam     There were no vitals taken for this visit.    During our discussion there was no evidence of shortness of breath.     Additional Information   Social History     Tobacco Use     Smoking status: Former Smoker     Quit date: 1974     Years since quittin.8     Smokeless tobacco: Never Used   Substance Use Topics     Alcohol use: Not on file     Drug use: Not on file            Tyesha Ventura MD    The patient has been notified of following:     \"This telephone visit will be conducted via a call between you and your physician/provider. We have found that certain health care needs can be provided without the need for a physical exam.  This service lets us provide the care you need with a short phone conversation.  If a prescription is necessary we can send it directly to your pharmacy.  If lab work is needed we can place an order for that and you can then stop by our lab to have the test done at a later time.    Telephone visits are billed at different rates depending on your insurance coverage. During this emergency period, for some insurers they may be billed the same as an in-person visit.  Please reach out to your insurance provider with any questions.    If during the course of the call the physician/provider feels a telephone visit is not appropriate, you will not be charged for this service.\"    Patient has given verbal consent to a Telephone visit? Yes    What phone number " would you like to be contacted at? [phone]    Patient would like to receive their AVS by AVS Preference: Lee.      Phone call duration: 35 minutes    Manjula Sanders, CMA

## 2021-06-12 NOTE — TELEPHONE ENCOUNTER
Pt is calling in to verify note in chart from yesterdays conversation. Pt also wants hours for the Urgent Care, and to know if he should go to the ER or the Urgent Care. Per note, MD suggested ER if he needs to be seen before Monday. Pt does not feel he needs to go now, but wants to clarify this just in case. Pt is scheduled for a Monday morning appointment with his PCP in the clinic.  Pt was advised that the [facility] is closed until tomorrow and if he feels he needs to be seen today before Mondays appointment he needs to go to the ER. Pt verbalized understanding.     Reed Hammonds RN [facility] Triage/Medication Refill

## 2021-06-12 NOTE — TELEPHONE ENCOUNTER
"Saw you in March regarding memory concerns, \"foggyness\" had scan to rule out any new strokes. Few phone calls around spring regarding not feeling himself. He saw Dr Alfred in June and had EEG, US carotid artery. No neurological issues. June and July were pretty good months for him. Starting walking approx 5-6 miles per week. Now, August and September have been awful. Noticed swelling in L foot. Now getting better. Goes 3-4 days without eating. To tired to get up and make it, simply eating and/or preparing food. To scared to go into a busy store. Has gone to get food, but to many cars in parking lot scares him so he goes home. Most of his food for the past 6 months has been through a fast food place.  Patient takes a baby aspirin and atorvastatin. Patient ran out of atorvastatin on July 13 and is to scared to go to [facility]. Patient is scheduled to do the over night sleep study tonight. He is wondering if he should see you sooner then scheduled on 10/13/2020 for his AWV. He has concerns on why he is so exhausted to even function. Feels like he has panic attacks at night. His OCD is worse. He is having a telephone visit with Jerica Swanson on 10/15/2020 regarding estalapram. Would you like him to start it now? Wait till visit with Jerica Swanson since she is the one he started seeing 2 years ago for his OCD. He is wondering if he has early on set dementia? Mind feels like he is on the verge of his brain stop working. Feels like he is drowning and no one can help. Single man with no kids, no family. If someone needs to call him back, please leave a detailed message.    Delfina Kang, [facility]      "

## 2021-06-12 NOTE — TELEPHONE ENCOUNTER
Spoke with patient and he would like to start the medication now if you are willing to prescribe. Thank you.    Delfina Kang, CMA

## 2021-06-12 NOTE — TELEPHONE ENCOUNTER
Pt self cleaned and redressed own dressing.   Meal tray at bedside   Would you want to fit him in, or just schedule next available?     Delfina Kang, CMA

## 2021-06-13 NOTE — TELEPHONE ENCOUNTER
Left message to call back for: Vic  Information to relay to patient:  Please relay message from Dr Ventura below and let us know if pt would like to see urology. Thank you.

## 2021-06-13 NOTE — TELEPHONE ENCOUNTER
Patient Returning Call  Reason for call:  Return call   Information relayed to patient:    Patient has additional questions:  Yes  If YES, what are your questions/concerns:  Caller stated that he needed all his labs mailed to him and that he spoke to Leslie and she mention to tell Yael but he forgot.   Okay to leave a detailed message?: Yes

## 2021-06-13 NOTE — TELEPHONE ENCOUNTER
Patient Returning Call  Reason for call:  Returning phone call.  Information relayed to patient: Below message.  Patient has additional questions:  Yes  If YES, what are your questions/concerns:  Requesting results be mailed to address on file. Patient has many questions in regards to the CT Scan. Please reach out to the patient and advise.  Okay to leave a detailed message?: No

## 2021-06-13 NOTE — PROGRESS NOTES
Berto Pretty is a 65 y.o. male who is being evaluated via a billable telephone visit.        Telephone visit   Berto Pretty   65 y.o. male    Date of Visit: 11/10/2020    Chief Complaint   Patient presents with     Follow-up     Ct scan results        Assessment and Plan   1. Bladder wall thickening  He was found on CT to have some thickening of the bladder wall.  He also had red cells in his urine.  I am going to set him up with urology to evaluate this further.  He is in agreement with this plan.  - Ambulatory referral to Urology    2. Microscopic hematuria  - Ambulatory referral to Urology    3. Mixed obsessional thoughts and acts  He has significant OCD.  He is currently on Lexapro.  He does need to see psychiatry and I ordered a consult about a week ago.  Unfortunately that has not been scheduled yet and will look into getting that scheduled for him.           No follow-ups on file.     History of Present Illness   This 65 y.o. old male is evaluated with a telephone visit.  I saw him about a week ago for an annual exam.  He was having some changes in his urine we did a urine analysis.  It showed red cells.  He was also having some weight loss so we did a CT scan of the abdomen.  Today we are reviewing his test results and he has multiple questions regarding them.  The CT did not show any abnormalities other than some mild thickening of the bladder wall.  He has no acute new concerns today.  He has OCD and I did order a psychiatry consult last week but unfortunately that has not been scheduled yet.  He has seen neurology in the past year due to some memory concerns.  That evaluation was normal.    Review of Systems: As above, systems otherwise reviewed and negative.     Medications, Allergies and Problem List   Patient Active Problem List   Diagnosis     Mixed hyperlipidemia     Obsessive Compulsive Disorder     Fatigue     Vitamin D Deficiency     Cerebrovascular accident (CVA) (H)     Family history of  "colon cancer     RACHNA (obstructive sleep apnea)     Current Outpatient Medications   Medication Sig Dispense Refill     aspirin 81 MG EC tablet Take 81 mg by mouth daily.       atorvastatin (LIPITOR) 40 MG tablet Take 1 tablet (40 mg total) by mouth daily. 90 tablet 3     escitalopram oxalate (LEXAPRO) 20 MG tablet Take 1 tablet (20 mg total) by mouth daily. 90 tablet 3     loratadine 10 mg cap Take 10 mg by mouth as needed.       No current facility-administered medications for this visit.      Allergies   Allergen Reactions     Latex Rash          Physical Exam     There were no vitals taken for this visit.    General: During our discussion there was no evidence of shortness of breath.     Additional Information   Social History     Tobacco Use     Smoking status: Former Smoker     Quit date: 1974     Years since quittin.8     Smokeless tobacco: Never Used   Substance Use Topics     Alcohol use: Not on file     Drug use: Not on file            Tyesha Ventura MD      The patient has been notified of following:     \"This telephone visit will be conducted via a call between you and your physician/provider. We have found that certain health care needs can be provided without the need for a physical exam.  This service lets us provide the care you need with a short phone conversation.  If a prescription is necessary we can send it directly to your pharmacy.  If lab work is needed we can place an order for that and you can then stop by our lab to have the test done at a later time.    Telephone visits are billed at different rates depending on your insurance coverage. During this emergency period, for some insurers they may be billed the same as an in-person visit.  Please reach out to your insurance provider with any questions.    If during the course of the call the physician/provider feels a telephone visit is not appropriate, you will not be charged for this service.\"    Patient has given verbal consent " to a Telephone visit? Yes    What phone number would you like to be contacted at? [phone]    Patient would like to receive their AVS by AVS Preference: Lee.      Phone call duration:  21 minutes    Manjula Sanders, [facility]

## 2021-06-13 NOTE — TELEPHONE ENCOUNTER
----- Message from Tyesha Ventura MD sent at 11/9/2020 12:27 PM CST -----  Please call and let him know that fortunately his CT did not show any alarming finding or tumors.  In his bladder, there is an area that is a little bit thickened and with the blood in his urine, I recommend we have him see Urology so they can look in his bladder to evaluate this.  If he agrees, I'll order that consultation.  Thanks.

## 2021-06-13 NOTE — PROGRESS NOTES
This video/telephone visit will be conducted via a call between you and your physician/provider. We have found that certain health care needs can be provided without the need for an in-person physical exam. This service lets us provide the care you need with a video /telephone conversation. If a prescription is necessary we can send it directly to your pharmacy. If lab work is needed we can place an order for that and you can then stop by our lab to have the test done at a later time.    Just as we bill insurance for in-person visits, we also bill insurance for video/telephone visits. If you have questions about your insurance coverage, we recommend that you speak with your insurance company.    Patient has given verbal consent for video/Telephone visit? Yes  Patient would like telephone visit, please call:  [phone]  NEETU/GAGE WHITE CMA    Patient verified allergies, medications and pharmacy via phone. Patient states he is ready for visit.    Unable to review [place] , awaiting access from provider.
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
## 2021-06-13 NOTE — TELEPHONE ENCOUNTER
Test Results  Who is calling?:  Patient  Who ordered the test:    Tyesha Ventura MD  Type of test: Other, ECG  Date of test:  11/3/2020  Where was the test performed:    [facility]  What are your questions/concerns?:    Patient is requesting ECG results.  Patient is wanting to know what the test is supposed to show.  Please reach out to patient and advise.  Thank you.  Okay to leave a detailed message?:  Yes

## 2021-06-13 NOTE — TELEPHONE ENCOUNTER
Left message to call back for: Vic  Information to relay to patient:  Please relay message below from Dr. Ventura.  Thank you.  Renee CORREA, NEETU/CMT....................8:09 AM

## 2021-06-13 NOTE — PATIENT INSTRUCTIONS - HE
Please refer for the patient for an individual therapist to address his longstanding OCD history.  At this time we are going to continue with his Lexapro at 20 mg a day.  He will continue to follow-up with his medical team including his primary doctor, neurologist and sleep clinic doctor.  He will return to this clinic, virtually, in 2 months for a medication management visit.

## 2021-06-13 NOTE — TELEPHONE ENCOUNTER
Pt called back, message relayed to him.    Pt would like a copy of the ECG with Dr. Ventura's note mailed to him so he can keep a record of it. Thanks!

## 2021-06-13 NOTE — TELEPHONE ENCOUNTER
His ECG which looks for irregular heart rhythms or signs of heart attack was normal.  Please let him know.  Thanks.

## 2021-06-13 NOTE — TELEPHONE ENCOUNTER
Left message to notify patient that all physical labs, CT andchest X Ray reports have been mailed.    Delfina Kang, CMA

## 2021-06-13 NOTE — PROGRESS NOTES
Psychology Progress Note    Date: December 17, 2020    Time length and type of treatment: 52 minutes (11:04 AM - 11:56 AM), individual therapy    After review of the patient's situation, this visit was changed from an in-person visit to a telephone visit to reduce the risk of COVID 19 exposure. Patient was informed that policies and procedures that govern in-person sessions would also apply to telephone sessions. Patient was also informed that telephone sessions would be discontinued when COVID 19 exposure is no longer a concern (as determined by [facility]).     Patient location: Patient home in [place], [place]  Provider location:  [facility], [place], [place]    Patient was in agreement with proceeding with a telephone session.    Necessity: This session is necessary to establish rapport, complete the mental health and addiction care informed consent form, and obtain preliminary information regarding patient concerns. The reader is invited to review the patient's full treatment plan in the Media section of the patient's Epic medical record.    Intervention: This writer utilized motivational interviewing, active listening, reassurance and support in the context of cognitive behavioral therapy to address the above.      Mental Status:   Grooming: Unable to determine due to telephone visit  Attire: Unable to determine due to telephone visit  Age: Unable to determine due to telephone visit  Behavior Towards Examiner: Cooperative  Motor Activity:  Unable to determine due to telephone visit  Eye Contact: Unable to determine due to telephone visit  Mood: Unable to determine due to telephone visit  Affect: Anxious  Speech/Language: Mildly pressured  Attention: Distractible  Concentration: Brief  Thought Process: Circumstantial  Thought Content: No evidence of delusions or hallucinations  Orientation: Fully oriented to person, place, date, and time  Memory: No Evidence of  Impairment  Judgement: No Evidence of Impairment  Estimated Intelligence: Average  Demonstrated Insight: Adequate  Fund of Knowledge: adequate      Progress:   Patient reported that he has struggled with Obsessive Compulsive Disorder (OCD) and anxiety since childhood, with hoarding since he was in his forties, and, for the last year, has been gradually descending into a deep depression.  Patient noted that over the last few weeks, his depression has lifted some, which he credits to Lexapro, but noted that he remains very depressed.  Patient had many questions about the therapeutic process, and responses were remarkable for extensive details.      Patient affirmed that he has struggled with a range of obsessions, including about germs, the thought that his shirt was buttoned incorrectly, etc., and engages in a range of compulsions to manage his obsessions, including checking and handwashing. He spends several hours each day in compulsive behavior, which he finds distressing.  A diagnosis of Obsessive Compulsive Disorder, with good insight, is appropriate.  Other diagnoses will be confirmed as part of an initial assessment.      Plan:   We will meet again in 2 weeks to complete an initial assessment.    Diagnosis:  Obsessive Compulsive Disorder, with good insight

## 2021-06-13 NOTE — PROGRESS NOTES
Psychology Progress Note    Date: December 16, 2020    Time length and type of treatment: 52 minutes (11:04 AM - 11:56 AM), individual therapy    After review of the patient's situation, this visit was changed from an in-person visit to a telephone visit to reduce the risk of COVID 19 exposure. Patient was informed that policies and procedures that govern in-person sessions would also apply to telephone sessions. Patient was also informed that telephone sessions would be discontinued when COVID 19 exposure is no longer a concern (as determined by [facility]).     Patient location: Patient home in [place], [place]  Provider location:  [facility], [place], [place]    Patient was in agreement with proceeding with a telephone session.      Necessity: This session is necessary to establish rapport, complete the mental health and addiction care informed consent form, and obtain preliminary information regarding patient concerns.    Intervention: This writer utilized motivational interviewing, active listening, reassurance and support in the context of cognitive behavioral therapy to address the above.      Mental Status:   Grooming: Unable to observe due to telephone visit  Attire: Unable to observe due to telephone visit  Age: Unable to observe due to telephone visit  Behavior Towards Examiner: Cooperative  Motor Activity:  Unable to observe due to telephone visit  Eye Contact: Unable to observe due to telephone visit  Mood: Anxious  Affect: Anxious  Speech/Language: Within normal  Attention: Within normal  Concentration: Within normal  Thought Process: Circumstantial  Thought Content: No evidence of delusions or hallucinations  Orientation: Fully oriented to person, place, date, and time  Memory: No Evidence of Impairment  Judgement: No Evidence of Impairment  Estimated Intelligence: Average  Demonstrated Insight: Adequate  Fund of Knowledge: adequate      Progress:   Patient  reported that he has struggled with OCD and anxiety since childhood, with hoarding since he was in his forties, and with a gradually worsening depression over the last year.  He stated that his depression and anxiety have improved over the last few weeks, which he attributes to restarting Lexapro, but noted that he remains both anxious and depressed.    Patient affirmed that he has multiple obsessions, including of germs, the fear that his shirt is buttoned incorrectly, and the fear that lights are on.  He manages these obsessions by engaging in a variety of compulsions, including a lengthy hand washing and frequent checking.  These tasks take up several hours of the patient's day, and he acknowledged that it is distressing how much time he wastes on OCD-related tasks.  A diagnosis of Obsessive Compulsive Disorder with good insight is appropriate.  Further diagnoses will be clarified as we complete a diagnostic assessment.    Plan:   We will meet again in 2 weeks to complete a diagnostic assessment.     Diagnosis:  Obsessive Compulsive Disorder, with good insight

## 2021-06-14 ENCOUNTER — OFFICE VISIT - HEALTHEAST (OUTPATIENT)
Dept: NEUROLOGY | Facility: CLINIC | Age: 66
End: 2021-06-14

## 2021-06-14 ENCOUNTER — COMMUNICATION - HEALTHEAST (OUTPATIENT)
Dept: FAMILY MEDICINE | Facility: CLINIC | Age: 66
End: 2021-06-14

## 2021-06-14 DIAGNOSIS — G47.33 OSA (OBSTRUCTIVE SLEEP APNEA): ICD-10-CM

## 2021-06-14 DIAGNOSIS — E78.5 HYPERLIPIDEMIA LDL GOAL <100: ICD-10-CM

## 2021-06-14 DIAGNOSIS — R79.9 ABNORMAL FINDING OF BLOOD CHEMISTRY, UNSPECIFIED: ICD-10-CM

## 2021-06-14 DIAGNOSIS — R74.8 ELEVATED LIVER ENZYMES: ICD-10-CM

## 2021-06-14 DIAGNOSIS — I63.449 CEREBROVASCULAR ACCIDENT (CVA) DUE TO EMBOLISM OF CEREBELLAR ARTERY, UNSPECIFIED BLOOD VESSEL LATERALITY (H): ICD-10-CM

## 2021-06-14 DIAGNOSIS — F33.1 MAJOR DEPRESSIVE DISORDER, RECURRENT EPISODE, MODERATE (H): ICD-10-CM

## 2021-06-14 DIAGNOSIS — D64.89 ANEMIA DUE TO OTHER CAUSE, NOT CLASSIFIED: ICD-10-CM

## 2021-06-14 DIAGNOSIS — E55.9 VITAMIN D DEFICIENCY: ICD-10-CM

## 2021-06-14 NOTE — PROGRESS NOTES
Psychology Progress Note    Date: February 01, 2021    Time length and type of treatment: 52 minutes (11:03 AM - 11:55 AM), individual therapy    After review of the patient's situation, this visit was changed from an in-person visit to a telephone visit to reduce the risk of COVID 19 exposure. Patient was informed that policies and procedures that govern in-person sessions would also apply to telephone sessions. Patient was also informed that telephone sessions would be discontinued when COVID 19 exposure is no longer a concern (as determined by [facility]).     Patient location: Patient home in [place], [place]  Provider location:  [facility], [place], [place]    Patient was in agreement with proceeding with a  session.      Necessity: This session is necessary to address the patient's depression, anxiety, hoarding, and OCD.  Today we focus on the patient's treatment plan, specifically exploring coping strategies. The reader is invited to review the patient's full treatment plan in the Media section of the patient's Epic medical record.    Intervention: This writer utilized motivational interviewing, active listening, reassurance and support in the context of cognitive behavioral therapy to address the above.      Mental Status:   Grooming: Unable to determine due to telephone visit  Attire: Unable to determine due to telephone visit  Age: Unable to determine due to telephone visit  Behavior Towards Examiner: Cooperative  Motor Activity:  Unable to determine due to telephone visit  Eye Contact: Unable to determine due to telephone visit  Mood: Anxious  Affect: Anxious  Speech/Language: Within normal  Attention: Distractible  Concentration: Brief  Thought Process: Circumstantial  Thought Content: No evidence of delusions or hallucinations  Orientation: Appeared oriented to person, place, and time, though not formally established  Memory: No Evidence of Impairment  Judgement:  No Evidence of Impairment  Estimated Intelligence: Average  Demonstrated Insight: Adequate  Fund of Knowledge: adequate      Progress:   Patient was provided psychoeducation about OCD and anxiety, with special attention on the importance of distress tolerance.  Some distress tolerance skills were reviewed, and anxiety was reframed as a part of life rather than something aversive that needs to be eliminated.  We discussed the importance of becoming comfortable with feeling uncomfortable. Patient noted that his mental health difficulties are ego syntonic, and we discussed using his imagination to explore what he might do if he had two extra hours in a day to spend however he wished.  Patient was also encouraged to identify one obsession he would like to start working on reducing or eliminating.    Plan:   We will meet again in 1 week to address the patient's depression, anxiety, hoarding, and OCD.  Estimated duration of treatment is 10+ individual therapy sessions (81843) at weekly intervals. Treatment is expected to be completed by January of 2022.     Diagnosis:  Major Depressive Disorder, recurrent, severe  Generalized Anxiety Disorder  Hoarding Disorder  Obsessive Compulsive Disorder (OCD)
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
## 2021-06-14 NOTE — PROGRESS NOTES
Psychology Progress Note    Date: January 08, 2020    Time length and type of treatment: 52 minutes (10:04 AM - 10:56 AM), individual therapy    After review of the patient's situation, this visit was changed from an in-person visit to a telephone visit to reduce the risk of COVID 19 exposure. Patient was informed that policies and procedures that govern in-person sessions would also apply to telephone sessions. Patient was also informed that telephone sessions would be discontinued when COVID 19 exposure is no longer a concern (as determined by [facility]).     Patient location: Patient home in [place], [place]  Provider location:  [facility], [place], [place]    Patient was in agreement with proceeding with a telephone session.      Necessity: This session is necessary to develop a treatment plan for the patient's depression, anxiety, obsessive compulsive disorder (OCD), and hoarding.  The reader is invited to review the patient's full treatment plan in the Media section of the patient's Epic medical record.    Intervention: This writer utilized motivational interviewing, active listening, reassurance and support in the context of cognitive behavioral therapy to address the above.      Mental Status:   Grooming: Unable to determine due to telephone visit  Attire: Unable to determine due to telephone visit  Age: Unable to determine due to telephone visit  Behavior Towards Examiner: Cooperative  Motor Activity:  Unable to determine due to telephone visit  Eye Contact: Unable to determine due to telephone visit  Mood: Anxious  Affect: Anxious  Speech/Language: Pressured  Attention: Within normal  Concentration: Within normal  Thought Process: Circumstantial  Thought Content: No evidence of delusions or hallucinations  Orientation: Appeared oriented to person, place, and time, though not formally established  Memory: No Evidence of Impairment  Judgement: No Evidence of  Impairment  Estimated Intelligence: Above Average  Demonstrated Insight: Adequate  Fund of Knowledge: adequate      Progress:   The diagnostic assessment was explained and patient questions were answered.  A treatment plan was jointly developed. Patient acknowledged that OCD is ego syntonic at this point in his life, and he's unsure how willing he genuinely is to work on addressing his OCD, though he would like to have compulsions take up less of his time.  Patient identified getting rid of 50% of the things that are in his primary residence as a reasonable hoarding goal, and framing donating things to [employer] as a way of helping the community was suggested as a cognitive message that could help the patient as he works toward this goal.      Plan:   We will meet again in 1 week to address the patient's depression, anxiety, hoarding, and OCD.  Estimated duration of treatment is 10+ individual therapy sessions (36918) at weekly intervals. Treatment is expected to be completed by January of 2022.     Diagnosis:  Major Depressive Disorder, recurrent, severe  Generalized Anxiety Disorder  Hoarding Disorder  Obsessive-Compulsive Disorder (OCD)

## 2021-06-15 NOTE — PATIENT INSTRUCTIONS - HE
I will make no psychotropic medication changes today.  The patient continues on Lexapro and seems to be tolerating that.  The patient will continue with his individual therapy.  He will continue to follow-up with his urologist as well as his sleep apnea clinic.  He will return to this clinic in 3 months for medication check and agrees to call before then with any questions or concerns.  I told him that we would be happy to refill his Lexapro for him in the future so he does not have to go through his primary care clinic.

## 2021-06-15 NOTE — PROGRESS NOTES
This video/telephone visit will be conducted via a call between you and your physician/provider. We have found that certain health care needs can be provided without the need for an in-person physical exam. This service lets us provide the care you need with a video /telephone conversation. If a prescription is necessary we can send it directly to your pharmacy. If lab work is needed we can place an order for that and you can then stop by our lab to have the test done at a later time.    Just as we bill insurance for in-person visits, we also bill insurance for video/telephone visits. If you have questions about your insurance coverage, we recommend that you speak with your insurance company.    Patient has given verbal consent for video/Telephone visit? Yes  Patient would like telephone visit, please call:  [phone]  [facility]MA    Patient verified allergies, medications and pharmacy via phone.  Patient states he is ready for visit.    Unable to review [place] , awaiting access from provider.

## 2021-06-15 NOTE — PROGRESS NOTES
Psychology Progress Note    Date: February 15, 2021    Time length and type of treatment: 52 minutes (11:01 AM - 11:53 AM), individual therapy    After review of the patient's situation, this visit was changed from an in-person visit to a telephone visit to reduce the risk of COVID 19 exposure. Patient was informed that policies and procedures that govern in-person sessions would also apply to telephone sessions. Patient was also informed that telephone sessions would be discontinued when COVID 19 exposure is no longer a concern (as determined by [facility]).     Patient location: Patient home in [place], [place]  Provider location:  [facility], [place], [place]    Patient was in agreement with proceeding with a telephone session.      Necessity: This session is necessary to address the patient's depression, anxiety, hoarding, and OCD.  Today we focus on the patient's treatment plan, specifically exploring coping strategies and decreasing social isolation.  The reader is invited to review the patient's full treatment plan in the Media section of the patient's Epic medical record.    Intervention: This writer utilized motivational interviewing, active listening, reassurance and support in the context of cognitive behavioral therapy to address the above.      Mental Status:   Grooming: Unable to observe due to telephone visit  Attire: Unable to observe due to telephone visit  Age: Unable to observe due to telephone visit  Behavior Towards Examiner: Cooperative  Motor Activity:  Unable to observe due to telephone visit  Eye Contact: Unable to observe due to telephone visit  Mood: Anxious  Affect: Anxious  Speech/Language: Mildly pressured  Attention: Distractible  Concentration: Brief  Thought Process: Circumstantial  Thought Content: No evidence of delusions or hallucinations  Orientation: Appeared oriented to person, place, and time, though not formally established  Memory: No  Evidence of Impairment  Judgement: No Evidence of Impairment  Estimated Intelligence: Average  Demonstrated Insight: Adequate  Fund of Knowledge: adequate      Progress:   Patient stated he has been doing relatively well, noting that he has started writing poems again, which he hasn't done in 5 years, has been exercising every day despite the cold by walking his stairs, and has found that he has spent less time on his compulsions as he has busied himself with other tasks. We again discussed distress tolerance and opposite to action. Patient also expressed pride that he has been eating every day.  This is an improvement from the past where he would go 2 or even 3 days without eating, but has meant he has relied heavily on fast food which has a drive through.  Patient's fear of COVID-19 has him unwilling to enter a restaurant, even briefly, to  food.  Patient was informed that some restaurants will bring food to his car, and encouraged to work on continuing to work on improving his diet. Patient has only done some sorting through and discarding things, but expressed pride that he was able to move more quickly through his pile.  Patient admitted he has chosen piles he knew would be easier to sort, and this choice was validated and reinforced, noting that small successes can help the patient make and maintain longer term changes.  Patient was encouraged to continue reclaiming activities he used to enjoy, to continue writing and exercising, and committed to opening [employer] cards he still hasn't read and to call the senders in order to work on his commitment to decrease isolation.     Plan:   We will meet again in 1 week to address the patient's depression, anxiety, hoarding, and OCD.  Estimated duration of treatment is 10+ individual therapy sessions (40017) at weekly intervals. Treatment is expected to be completed by January 2022.     Diagnosis:  Major Depressive Disorder, recurrent, severe  Generalized  Anxiety Disorder  Hoarding Disorder  Obsessive Compulsive Disorder (OCD)

## 2021-06-15 NOTE — PROGRESS NOTES
________________________________________  Medications Phoned  to Pharmacy [] yes [x]no  Name of Pharmacist:  List Medications, including dose, quantity and instructions    Medications ordered this visit were e-scribed.  Verified by order class [] yes  [x] no    Medication changes or discontinuations were communicated to patient's pharmacy: [] yes  [x] no    Dictation completed at time of chart check: [x] yes  [] no    I have checked the documentation for today s encounters and the above information has been reviewed and completed.

## 2021-06-15 NOTE — PROGRESS NOTES
Psychology Progress Note    Date: February 08, 2021    Time length and type of treatment: 52 minutes (11:05 AM - 11:57 AM), individual therapy    After review of the patient's situation, this visit was changed from an in-person visit to a telephone visit to reduce the risk of COVID 19 exposure. Patient was informed that policies and procedures that govern in-person sessions would also apply to telephone sessions. Patient was also informed that telephone sessions would be discontinued when COVID 19 exposure is no longer a concern (as determined by [facility]).     Patient location: Patient home in [place], [place]  Provider location:  [facility], [place], [place]    Patient was in agreement with proceeding with a telephone session.      Necessity: This session is necessary to address the patient's depression, anxiety, hoarding, and OCD.  Today we focus on the patient's treatment plan, specifically exploring coping strategies and cognitive messages that exacerbate anxiety. The reader is invited to review the patient's full treatment plan in the Media section of the patient's Epic medical record.    Intervention: This writer utilized motivational interviewing, active listening, reassurance and support in the context of cognitive behavioral therapy to address the above.      Mental Status:   Grooming: Unable to determine due to telephone visits  Attire: Unable to determine due to telephone visits  Age: Unable to determine due to telephone visits  Behavior Towards Examiner: Cooperative  Motor Activity:  Unable to determine due to telephone visits  Eye Contact: Unable to determine due to telephone visits  Mood: Anxious  Affect: Anxious  Speech/Language: Pressured  Attention: Distractible  Concentration: Brief  Thought Process: Circumstantial  Thought Content: No evidence of delusions or hallucinations  Orientation: Appeared oriented to person, place, and time, though not formally  established  Memory: No Evidence of Impairment  Judgement: No Evidence of Impairment  Estimated Intelligence: Average  Demonstrated Insight: Adequate  Fund of Knowledge: adequate      Progress:   Patient was encouraged to reflect on who he is underneath the OCD in an effort to make his obsessions and compulsions ego dystonic.  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 Yoga and Dilshad [employer] were discussed, but patient does not have a computer and COVID-19 limits access to classes. Patient was encouraged to identify activities that are incompatible with his OCD, are enjoyable, and are sufficiently engaging to distract him from his OCD. He identified trading stocks, reading, and taking walks. Given the below zero weather, patient identified walking up an down his stairs as a reasonable alternative, and he was encouraged to commit to these activities. Patient has done some decluttering but progress has been very slow because he has spent an excess amount of time looking at each newspaper before he can throw it out. Patient committed to working on being more efficient with this process, and to spend more time thinking about activities he can reclaim.      Plan:   We will meet again in 1 week to address the patient's depression, anxiety, hoarding, and OCD.  Estimated duration of treatment is 10+ individual therapy sessions (92863) at weekly intervals. Treatment is expected to be completed by January 2022.     Diagnosis:  Major Depressive Disorder, recurrent, severe  Generalized Anxiety Disorder  Hoarding Disorder  Obsessive Compulsive Disorder (OCD)

## 2021-06-15 NOTE — PROGRESS NOTES
Psychology Progress Note    Date: March 08, 2021    Time length and type of treatment: 52 minutes (10:00 AM - 10:52 AM), individual therapy    After review of the patient's situation, this visit was changed from an in-person visit to a telephone visit  to reduce the risk of COVID 19 exposure. Patient was informed that policies and procedures that govern in-person sessions would also apply to telephone sessions. Patient was also informed that telephone sessions would be discontinued when COVID 19 exposure is no longer a concern (as determined by [facility]).     Patient location: Patient home in [place], [place]  Provider location:  [facility], [place], [place]    Patient was in agreement with proceeding with a telephone session.      Necessity: This session is necessary to address the patient's depression, anxiety, hoarding, and OCD.  Today we focus on the patient's treatment plan, specifically exploring increasing involvement in meaningful activities, and thoughts and expectations about self and others. The reader is invited to review the patient's full treatment plan in the Media section of the patient's Epic medical record.    Psychotherapeutic Technique: This writer utilized motivational interviewing, active listening, reassurance and support in the context of cognitive behavioral therapy to address the above.      Mental Status:   Grooming: Unable to determine due to telephone visit  Attire: Unable to determine due to telephone visit  Age: Unable to determine due to telephone visit  Behavior Towards Examiner: Cooperative  Motor Activity: Unable to determine due to telephone visit  Eye Contact: Unable to determine due to telephone visit  Mood: Anxious  Affect: Anxious  Speech/Language: Pressured  Attention: Distractible  Concentration: Brief  Thought Process: Circumstantial  Thought Content: No evidence of delusions or hallucinations  Orientation: Appeared oriented to  person, place, and time, though not formally established  Memory: No Evidence of Impairment  Judgement: No Evidence of Impairment  Estimated Intelligence: Average  Demonstrated Insight: Adequate  Fund of Knowledge: adequate      Intervention:   Patient reported he is generally doing well, noting he continues to exercise and has been writing a lot of poetry, and both have helped reduce the frequency and intensity of his OCD.  OCD remains ego syntonic and we discussed managing and reducing the frequency and intensity of his OCD, not eliminating it, which would be overwhelming for the patient.  Patient has also made slow progress on his hoarding and discussed both his happiness that he has made some progress, and disappointment that he is not able to work on this as consistently as he would like.  Patient feelings were validated and normalized, and patient was encouraged to build on his success.  Patient also reported that he wears Depends because of diarrhea, then shared that when he canceled our last appointment, it was due to diarrhea which he attributed to eating nothing but peanut butter and soda for 6 days.  We discussed the impact diet can have on mood and physical health, and patient was encouraged to consider a meal service since his COVID-19 related fears interfere with his willingness to order take out from restaurants or buy premade meals at the grocery store.     Progress:  Mr. Pretty has shown improvement in ability to utilize coping skills. We agreed to switch to meeting once every two weeks.    Plan:   We will meet again in 1 week to address the patient's depression, anxiety, hoarding, and OCD.  Estimated duration of treatment is 10+ individual therapy sessions (63791) at twice monthly intervals. Treatment is expected to be completed by January 2022.     Diagnosis:  Major Depressive Disorder, recurrent,severe  Generalized Anxiety Disorder  Hoarding Disorder  Obsessive Compulsive Disorder

## 2021-06-15 NOTE — PROGRESS NOTES
Initial Outpatient Psychiatry Consult Note     The patient presents for an initial psychiatric consultation on December 7 of 2020.  He carries a diagnosis of obsessive-compulsive disorder and has had symptoms throughout his entire life.  He presents at this time on Lexapro 20 mg a day.  He has been on this dose for 1 month having started it October 12, 2020 and increased 1 month prior to his initial intake with me.  He has been on psychotropic medication most of his adult life although has been off psychotropic medication for about 1 to 2 years prior to recent restarting of that medication.  Over the years the patient has been on a variety of psychotropic medication with limited success.  He has been on Prozac which she described as having mild efficacy for his OCD symptoms, Celexa with mild efficacy at doses of 80 mg a day, Parnate, Stelazine, Haldol and clomipramine as an adjunct at 25 mg which were all described as ineffective.    The patient describes having onset of OCD symptoms as a child.  His symptoms included obsessive thoughts and compulsive behaviors with constant worry and thoughts about germs, asbestos and he engaged in frequent handwashing checking doors and lights and excessively bought movies and books.  He had an inpatient hospital stay most recently in the 1970s.  He has OCD symptoms caused him to stop attendance at the [facility] and he has been on SSDI for years due to this.    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  It was at this time that he chose to come off his Lexapro.  He did fairly well from a psychiatric standpoint for a number of months but since the spring 2020 he has had an increase in obsessive thoughts and anxiety symptoms.  As stated previously he restarted his Lexapro on October 12 of 2020 and that was increased to 20 mg a day 1 month later.    At the initial visit, in part due to the fact that the patient had recently been increased  on his Lexapro we continued with that treatment plan.  He had previously been off all psychotropic medication for a about a year and a half.  He had done fairly well off that medication until the spring 2020 when he had return of his anxiety symptoms.  He was in the process of being reassessed for his sleep apnea.        HPI:  Patient returns today for a telephone visit.  We did increase the patient's Lexapro at his last visit.  He is restarted individual therapy.   On my interview with the patient today he again was quite talkative and sought quite a bit of reassurance.  He however does report he is doing a bit better.  He states he believes the individual therapy has been helpful.  He has had 4 appointments the first 3 hours to complete his intake he states it took him a while to provide all the necessary information and that he had a lot of questions.  He states however with the last phone call they have begun to work on the compulsive behavior and is hoping that leads to improvement.  He states he continues to frequently check light switches he still perseverates on worrying about germs which has been heightened during the coronavirus.  He does however report his mood is better.  He believes the Lexapro is helping and he denies side effects.    He denies having any desire to be dead or thoughts of suicide.  He denies having any psychotic symptoms.  He has had a bit better sleep as he is begun using the CPAP machine more since November.  He states he continues to perseverate on his diet and food.  He wanted to go to his refrigerator to read me off the various items he has in his refrigerator but I was able to redirect him.  He denied having any new medical issues or concerns.  He did want to talk over the overall treatment plan again in detail and we did spend some time doing that.  He responded fairly well to the reassurance today.        Current Medications:  Medications were personally reviewed at this  meeting.  Please see the chart for current medication list.      Mental Status Exam:  Appearance: The patient was interviewed by phone.  Behavior: The patient again was quite pleasant and polite.  He again sought reassurance and was extremely talkative but was able to engage and was appropriate with the conversation.  Speech: Again the patient was quite talkative.  His answers however were fairly targeted with only needing occasional redirection.  He again sought reassurance throughout the entire interview.  Not thick or slurred.  Mood/Affect: Pleasant with no obvious depression.  No irritability or lability.  Thought Content: No hallucinations or delusions.  Suicidal or Homicidal Thoughts: None apparent or reported  Thought Process/Formulation: Fairly rapid but was able to stay on task.  He again was somewhat overinclusive and tended to control the interview.  Associations: Grossly adequate  Fund of Knowledge: Grossly intact  Attention/Concentration: Attentive.  A bit more easily redirected today.  Concentration appeared adequate.  Over inclusion of detail again.  Insight: Fair..  He spent quite a bit of time self analyzing his thought pattern and behaviors.  Judgement: Fair  Memory: Grossly intact  Motor Status: Patient denies having any new issues.  No new tremors or asymmetries.  Fund of Knowledge: Adequate  Orientation: Grossly oriented      Recent Labs:  Please see the chart.      Diagnosis:  OCD, by history  Major depressive disorder, recurrent, moderate, without psychotic features      The patient's chart review, interview and documentation review took 36 minutes    Plan:  I recommend that I follow the patient from a psychiatric standpoint.    We will continue with the current medication regime.  The patient is tolerating the Lexapro.        I will assess for the appropriateness of possible psychotropic medication trials/changes.  The patient will seek out appropriate emergency services should that become  necessary.  I will make myself available if any questions, concerns, or problems arise.    Elie Jones MD

## 2021-06-16 NOTE — PROGRESS NOTES
Psychology Progress Note    Date: April 12, 2021    Time length and type of treatment: 52 minutes (9:01 AM to 9:53 AM), individual therapy    After review of the patient's situation, this visit was changed from an in-person visit to a  telephone visit to reduce the risk of COVID 19 exposure. Patient was informed that policies and procedures that govern in-person sessions would also apply to  telephone sessions. Patient was also informed that  telephone sessions would be discontinued when COVID 19 exposure is no longer a concern (as determined by [facility]).     Patient location: Patient home in [place], [place]  Provider location:  [facility], [place], [place]    Patient was in agreement with proceeding with a  telephone session.      Necessity: This session is necessary to address the patient's depression, anxiety, hoarding, and OCD.  Today we focus on revising the patient's treatment plan. The reader is invited to review the patient's full treatment plan in the Media section of the patient's Epic medical record.    Psychotherapeutic Technique: This writer utilized motivational interviewing, active listening, reassurance and support in the context of cognitive behavioral therapy to address the above.      Mental Status:   Grooming: Unable to determine due to telephone visit  Attire: Unable to determine due to telephone visit  Age: Unable to determine due to telephone visit  Behavior Towards Examiner: Cooperative  Motor Activity: Unable to determine due to telephone visit  Eye Contact: Unable to determine due to telephone visit  Mood: Anxious  Affect: Anxious  Speech/Language: Mildly pressured  Attention: Within normal  Concentration: Within normal  Thought Process: Circumstantial  Thought Content: No evidence of delusions or hallucinations  Orientation: Appeared oriented to person, place, and time, though not formally established  Memory: No Evidence of Impairment  Judgement:  No Evidence of Impairment  Estimated Intelligence: Average  Demonstrated Insight: Adequate  Fund of Knowledge: adequate      Intervention:   Patient was administered the PHQ-9 and JHON-7 as part of revising his treatment plan. His score of 12 on the PHQ-9 falls in the range suggestive of moderate depression and is a substantial improvement over his previous score of 22. His score of 11 on the JHON-7 is suggestive of moderate anxiety and is also much improved over his previous score of 17. Patient agreed that both his depression and anxiety have reduced from severe to moderate and this will be reflected with a revision of his depression diagnosis. Patient opined that he has made modest progress with both hoarding and OCD, though acknowledged he has a great deal of work remaining. He expressed pride that he is actively getting rid of things, rather than adding to how much he hoards. Although not a formal treatment target, patient also expressed pride that he has gained 7 pounds, noting that although he remains underweight, he is proud that he is no longer going 2 - 3 days without eating.  Patient's treatment plan was jointly revised and remaining time was spent providing psychoeducation about sleep hygiene and introducing sleep hygiene strategies.      Progress:  Patient's depression and anxiety have reduced from the range suggestive of severe depression and anxiety to the range suggestive of moderate depression and anxiety.  Patient is making modest progress on discarding things he has hoarded and is not accumulating any new items.  He expressed pride that he is spending less time on checking behaviors, though this remains a significant impairment.      Plan:   We will meet again in 2 weeks to address the patient's depression, anxiety, hoarding, and OCD.  Estimated duration of treatment is 10+ individual therapy sessions (42554) at weekly intervals. Treatment is expected to be completed by January 2022.      Diagnosis:  Major Depressive Disorder, recurrent, moderate  Generalized Anxiety Disorder  Hoarding Disorder  Obsessive Compulsive Disorder (OCD)

## 2021-06-16 NOTE — TELEPHONE ENCOUNTER
Telephone Encounter by Delfina Walden at 3/12/2019  2:20 PM     Author: Delfina Walden Service: -- Author Type: --    Filed: 3/12/2019  2:32 PM Encounter Date: 3/8/2019 Status: Signed    : Delfina Walden       Tier exception was denied.  Medication is currently a Tier 2.       Patient has not tried and failed any lower cost medication that [employer] offers.   If provider would like to appeal please provide a letter of medical necessity with the following information:

## 2021-06-16 NOTE — PROGRESS NOTES
Psychology Progress Note    Date: March 29, 2021    Time length and type of treatment: 40 minutes (9:12 AM - 10:02 AM), individual therapy    After review of the patient's situation, this visit was changed from an in-person visit to a telephone visit to reduce the risk of COVID 19 exposure. Patient was informed that policies and procedures that govern in-person sessions would also apply to telephone sessions. Patient was also informed that telephone sessions would be discontinued when COVID 19 exposure is no longer a concern (as determined by [facility]).     Patient location: Patient home in [place], [place]  Provider location:  [facility], [place], [place]    Patient was in agreement with proceeding with a telephone session.      Necessity: This session is necessary to address the patient's depression, anxiety, hoarding, and OCD.  Today we focus on the patient's treatment plan, specifically coping strategies, and exploring developing an awareness of irrational fears and learning reality based messages.  The reader is invited to review the patient's full treatment plan in the Media section of the patient's Epic medical record.    Psychotherapeutic Technique: This writer utilized motivational interviewing, active listening, reassurance and support in the context of cognitive behavioral therapy to address the above.      Mental Status:   Grooming: Unable to determine due to telephone visit  Attire: Unable to determine due to telephone visit  Age: Unable to determine due to telephone visit  Behavior Towards Examiner: Cooperative  Motor Activity: Unable to determine due to telephone visit  Eye Contact: Unable to determine due to telephone visit  Mood: Anxious  Affect: Anxious  Speech/Language: Mildly pressured  Attention: Distractible  Concentration: Brief  Thought Process: Circumstantial  Thought Content: No evidence of delusions or hallucinations  Orientation: Appeared oriented  "to person, place, and time, though not formally established  Memory: No Evidence of Impairment  Judgement: No Evidence of Impairment  Estimated Intelligence: Average  Demonstrated Insight: Adequate  Fund of Knowledge: adequate      Intervention:   Patient lost track of time and requested the call start after 9:10 am, which was respected. Even with these additional 10 minutes, patient reported he was unable to wash his hands, and we used this as an opportunity to practice exposure therapy.  Patient was able to use his recognition of irrational behavior in others to help him with his OCD. He reported some regression in overall progress, explaining that he felt very fatigued after getting his COVID-19 shot and found it easier to engage in his compulsions than to try to \"fight\" them. We discussed alternative metaphors to \"fight\" and patient affirmed that it takes less energy to side step his compulsions than to fight them. Patient continues to work on eating regular meals and expressed pride that at least he is eating every day, even if many of his choices aren't healthy. We again problem solved to help patient eat a healthier diet.     Progress:  Patient has shown increased understanding of coping skills.     Plan:   We will meet again in 1 week to address the patient's depression, anxiety, hoarding, and OCD.  Estimated duration of treatment is 10+ individual therapy sessions (12071) at weekly intervals. Treatment is expected to be completed by January 2022.     Diagnosis:  Major Depressive Disorder, recurrent, severe  Generalized Anxiety Disorder  Hoarding Disorder  Obsessive Compulsive Disorder (OCD)  "

## 2021-06-16 NOTE — PROGRESS NOTES
"  Office Visit - Follow Up   Berto Pretty   62 y.o. male    Date of Visit: 3/14/2018    Chief Complaint   Patient presents with     Hospital Visit Follow Up     Fasting(new on Lipitor)- had a stroke 2-25 - feeling pretty good, tired - Needs to have thyroid checked        Assessment and Plan   1. CVA (cerebral vascular accident)  This is a small 8 mm possibly embolic stroke affecting the left pericentral gyrus/left hemisphere presenting with mild right arm weakness only.  This is completely resolved at this time.  - Lipid Profile  - Basic Metabolic Panel    2. Thyroid nodule  Thyroid nodule was noted ultrasound of the neck.  Should check these labs  - Thyroid Stimulating Hormone (TSH)  - T4, Free    3. Obsessive-compulsive disorder  Followed at the [facility] with current treatment of citalopram.          No Follow-up on file.     History of Present Illness   This 62 y.o. old suffered a stroke on 2/25/18 he presented to the emergency room at [facility] with tingling in his right fingers with subsequent weakness in his right arm.  MRI scan confirmed a stroke in the left hemisphere.  Echogram was done and this was normal.  Ultrasound was done showed minimal plaque in his carotid arteries and a cyst on his thyroid.  He is having a 7 day event monitor to see if there is any cardiac arrhythmia that could have contributed to his stroke.  He now is on Lipitor for stroke prevention in addition to daily aspirin.    Review of Systems: A comprehensive review of systems was negative except as noted.     Medications, Allergies and Problem List   Reviewed and updated     Physical Exam   General Appearance:       /66 (Patient Site: Left Arm, Patient Position: Sitting, Cuff Size: Adult Regular)  Pulse 64  Ht 5' 4.5\" (1.638 m)  Wt 139 lb (63 kg)  SpO2 97%  BMI 23.49 kg/m2     strength is quite normal and equal bilaterally.  He has a strong .  Upper arm and shoulder strength is normal.  Gait is " normal.  Cranial nerves III through XII are intact.  Heart rhythm is regular.  No murmurs.  No edema.     Additional Information   Current Outpatient Prescriptions   Medication Sig Dispense Refill     aspirin 81 mg chewable tablet Chew 81 mg daily.       atorvastatin (LIPITOR) 40 MG tablet Take 40 mg by mouth daily.       escitalopram oxalate (LEXAPRO) 10 MG tablet Take 20 mg by mouth daily.       loratadine 10 mg cap Take 10 mg by mouth as needed.       No current facility-administered medications for this visit.      Allergies   Allergen Reactions     Latex Rash     Social History   Substance Use Topics     Smoking status: Former Smoker     Quit date: 1/1/1974     Smokeless tobacco: Never Used     Alcohol use None       Review and/or order of clinical lab tests:  Review and/or order of radiology tests:  Review and/or order of medicine tests:  Discussion of test results with performing physician:  Decision to obtain old records and/or obtain history from someone other than the patient:  Review and summarization of old records and/or obtaining history from someone other than the patient and.or discussion of case with another health care provider:  Independent visualization of image, tracing or specimen itself:    Time: total time spent with the patient was 25 minutes of which >50% was spent in counseling and coordination of care     Antwon Mcmillan MD

## 2021-06-16 NOTE — PROGRESS NOTES
Office Visit   Berto Pretty   65 y.o. male    Date of Visit: 4/6/2021    Chief Complaint   Patient presents with     Follow-up     Medication Management        Assessment and Plan   1. Mixed hyperlipidemia  He is fasting and would like to get a recheck of his labs today.  We will go ahead and do so.  He has not had any recent cardiac symptoms.  His lipids have been well controlled in the past.  - HM2(CBC w/o Differential)  - Lipid Cascade  - Thyroid [place]  - Comprehensive Metabolic Panel    2. Cerebrovascular accident (CVA) due to embolism of cerebellar artery, unspecified blood vessel laterality (H)  He has not had any new strokelike symptoms.  He worries a little bit about his cognition.  He saw neurology last May and did recommend that he follow-up in May for an annual checkup.  He is in agreement with this plan.  - HM2(CBC w/o Differential)  - Lipid Cascade  - atorvastatin (LIPITOR) 40 MG tablet; Take 1 tablet (40 mg total) by mouth daily.  Dispense: 90 tablet; Refill: 3    3. Microscopic hematuria  He is scheduled to see urology to evaluate this further in a couple weeks.    4. Mixed obsessional thoughts and acts  He has been working with psychiatry and is doing better with escitalopram as well.           Return in about 3 months (around 7/6/2021) for Establish [facility] - [place].     History of Present Illness   This 65 y.o. old male comes into follow-up.  He has a history of a CVA and hyperlipidemia.  He is fasting and is interested in a recheck of his labs today.  He has not had any worrisome cardiac symptoms or neurologic symptoms.  He is worried about early dementia but had a normal MRI about a year ago and was evaluated by neurology last May.  He is thinking about following up with them.  He has not noticed any severe symptoms but just is concerned.  He also has a history of obsessive-compulsive disorder.  He is now on Lexapro and is doing much better.  He is working closely with psychiatry as well  as a therapist.  He was found to have hematuria and some thickening of his bladder.  He is scheduled to see urology in a couple weeks.  He has no other acute concerns today.    Review of Systems: As above, systems otherwise reviewed and negative.     Medications, Allergies and Problem List   Patient Active Problem List   Diagnosis     Mixed hyperlipidemia     Obsessive Compulsive Disorder     Fatigue     Vitamin D Deficiency     Cerebrovascular accident (CVA) (H)     Family history of colon cancer     RACHNA (obstructive sleep apnea)     Current Outpatient Medications   Medication Sig Dispense Refill     aspirin 81 MG EC tablet Take 81 mg by mouth daily.       atorvastatin (LIPITOR) 40 MG tablet Take 1 tablet (40 mg total) by mouth daily. 90 tablet 3     escitalopram oxalate (LEXAPRO) 20 MG tablet Take 1 tablet (20 mg total) by mouth daily. 90 tablet 3     loratadine 10 mg cap Take 10 mg by mouth as needed.       No current facility-administered medications for this visit.      Allergies   Allergen Reactions     Latex Rash          Physical Exam     /68 (Patient Site: Left Arm, Patient Position: Sitting, Cuff Size: Adult Regular)   Pulse 80   Wt 129 lb (58.5 kg)   SpO2 98%   BMI 21.80 kg/m      General: This is an alert male in no apparent distress.     Additional Information   Social History     Tobacco Use     Smoking status: Former Smoker     Quit date: 1974     Years since quittin.2     Smokeless tobacco: Never Used   Substance Use Topics     Alcohol use: Yes     Frequency: Monthly or less     Drinks per session: 1 or 2     Binge frequency: Never     Drug use: Never            Tyesha Ventura MD

## 2021-06-17 NOTE — PROGRESS NOTES
Psychology Progress Note    Date: May 3, 2021    Time length and type of treatment: 52 minutes (1:06 PM - 1:58 PM), individual therapy    After review of the patient's situation, this visit was changed from an in-person visit to a  telephone visit to reduce the risk of COVID 19 exposure. Patient was informed that policies and procedures that govern in-person sessions would also apply to  telephone sessions. Patient was also informed that  telephone sessions would be discontinued when COVID 19 exposure is no longer a concern (as determined by [facility]).     Patient location: Patient home in [place], [place]  Provider location:  [facility], [place], [place]    Patient was in agreement with proceeding with a  telephone session.      Necessity: This session is necessary to address the patient's depression, anxiety, hoarding, and OCD.  Today we focus on the patient's treatment plan, specifically exploring compliance with medical treatment plan and sleep hygiene.  The reader is invited to review the patient's full treatment plan in the Media section of the patient's Epic medical record.    Psychotherapeutic Technique: This writer utilized motivational interviewing, active listening, reassurance and support in the context of cognitive behavioral therapy to address the above.      Mental Status:   Grooming: Unable to determine due to telephone visit  Attire: Unable to determine due to telephone visit  Age: Unable to determine due to telephone visit  Behavior Towards Examiner: Cooperative  Motor Activity: Unable to determine due to telephone visit  Eye Contact: Unable to determine due to telephone visit  Mood: Anxious  Affect: Congruent w/content of speech  Speech/Language: Mildly pressured  Attention: Within normal  Concentration: Within normal  Thought Process: Circumstantial  Thought Content: No evidence of delusions or hallucinations  Orientation: Appeared oriented to person,  place, and time, though not formally established  Memory: No Evidence of Impairment  Judgement: No Evidence of Impairment  Estimated Intelligence: Average  Demonstrated Insight: Adequate  Fund of Knowledge: adequate      Intervention:   Patient continues to struggle with his diet and reported feeling physically unwell, in part because he has been getting the majority of his calories from soda.  We discussed healthier alternatives and the interaction between physical and emotional well being. Patient noted that when he wasn't feeling well, he started spending more time on compulsions, and it was only when he made a conscious decision to resume exercising and eat a healthier diet that his mood started to improve.  This was validated and reinforced. Patient continues to struggle with getting enough sleep. We re-reviewed sleep strategies taught last session and patient reported which strategies seemed to be most helpful.  Additional sleep strategies were also identified.      Progress:  Patient has shown an increased commitment to working on     Plan:   We will meet again in 2 weeks to address the patient's depression, anxiety, hoarding, and OCD.  Estimated duration of treatment is 10+ individual therapy sessions (67586) at twice monthly intervals. Treatment is expected to be completed by January 2022.     Diagnosis:  Major Depressive Disorder, recurrent, moderate  Generalized Anxiety Disorder  Hoarding Disorder  Obsessive Compulsive Disorder (OCD)

## 2021-06-17 NOTE — PATIENT INSTRUCTIONS - HE
We will continue with the current medication regime and his individual therapy which I believe has been helpful.  The patient is tolerating the medication.  He will return to this clinic in 3 months for medication check.  He agrees to call before then with any questions or concerns.

## 2021-06-17 NOTE — PROGRESS NOTES
Outpatient Psychiatry Note     The patient presented for an initial psychiatric consultation on December 7 of 2020.  He carried a diagnosis of obsessive-compulsive disorder and has had symptoms throughout his entire life.  He presents at this time on Lexapro 20 mg a day.  He has been on this dose for 1 month having started it October 12, 2020 and increased 1 month prior to his initial intake with me.  He has been on psychotropic medication most of his adult life although has been off psychotropic medication for about 1 to 2 years prior to recent restarting of that medication.  Over the years the patient has been on a variety of psychotropic medication with limited success.  He has been on Prozac which she described as having mild efficacy for his OCD symptoms, Celexa with mild efficacy at doses of 80 mg a day, Parnate, Stelazine, Haldol and clomipramine as an adjunct at 25 mg which were all described as ineffective.    The patient describes having onset of OCD symptoms as a child.  His symptoms included obsessive thoughts and compulsive behaviors with constant worry and thoughts about germs, asbestos and he engaged in frequent handwashing checking doors and lights and excessively bought movies and books.  He had an inpatient hospital stay most recently in the 1970s.  He has OCD symptoms caused him to stop attendance at the [facility] and he has been on SSDI for years due to this.    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  It was at this time that he chose to come off his Lexapro.  He did fairly well from a psychiatric standpoint for a number of months but since the spring 2020 he has had an increase in obsessive thoughts and anxiety symptoms.  As stated previously he restarted his Lexapro on October 12 of 2020 and that was increased to 20 mg a day 1 month later.    At the initial visit, in part due to the fact that the patient had recently been increased on his Lexapro  we continued with that treatment plan.  He had previously been off all psychotropic medication for a about a year and a half.  He had done fairly well off that medication until the spring 2020 when he had return of his anxiety symptoms.  He was in the process of being reassessed for his sleep apnea.    The patient return to the clinic on February 8 of 2021.  He had restarted individual psychotherapy.  He reported he was doing a bit better and stated that therapy was quite helpful.  He believed he was improving and responding well to the therapy and reassurance.  He was tolerating the recently restarted medication and we continued with that treatment plan.        HPI:  On my interview with the patient today he again was quite talkative and sought reassurance.  He felt that he was perhaps slightly more anxious and again was worried that he might be getting dementia but was vague on specifics.  He admits that his OCD symptoms continue.  He still hoarding and has obsessive thoughts and now was again quite clear during the interview as he was quite perseverative and difficult to redirect.  He did tell me that his mood was perhaps slightly down over the last month but significantly improved since November and December of this year.  He has had no desire to be dead or thoughts of suicide.  He has had no psychotic symptoms.  No changes in sleep.  He reports he continues to take quite a bit of time hand washing up to 1/2-hour a day.  He also was perseverative on his recent meeting with his primary care doctor.  He apparently will be switching providers and needed quite a bit of reassurance around that.  He states he has gained 7 pounds and now weighs approximately 130 pounds.  He continues with some diarrhea but states he still excessively uses soda pop and peanut butter and we did again discuss that.    Patient believes that he is overall improving.  He is able to contract for safety.  He states his individual therapy with  Dr. Stout is going extremely well and he believes he is making some gains.  We discussed the importance of going for walks and maintaining other activities and he states he is working on that.  He has no other questions or concerns and denies having any side effects to the medication.  He denies having any new medical issues or concerns.        Current Medications:  Medications were personally reviewed at this meeting.  Please see the chart for current medication list.      Mental Status Exam:  Appearance: The patient was interviewed by phone.  Behavior: Patient was cooperative and pleasant but again was quite perseverative.  He needed frequent reassurance.  Speech: Talkative.  Perseverative.  Sentence structure was intact.  He needed occasional redirection and was again somewhat difficult to redirect as he wanted to complete a idea completely before moving on.  Mood/Affect: Patient was pleasant and polite.  No evidence of any significant agitation.  No evidence of any significant depression.  No evidence of any óscar.  Thought Content: No hallucinations or delusions.  Suicidal or Homicidal Thoughts: None apparent or reported  Thought Process/Formulation: Patient again was a bit perseverative with somewhat rapid thought process but he was redirectable with some effort.  He again tended to try and control the interview.  Associations: Grossly adequate  Fund of Knowledge: Grossly intact  Attention/Concentration: Attentive.  Again he was a bit hyper alert and perseverative.  He was directable with some effort.  Insight: Fair.  No significant change.  Judgement: Fair  Memory: Grossly intact  Motor Status: Patient denies having any new issues.  No new tremors or asymmetries.  Fund of Knowledge: Adequate  Orientation: Grossly oriented      Recent Labs:  Please see the chart.      Diagnosis:  OCD, by history  Major depressive disorder, recurrent, moderate, without psychotic features      The patient's chart review,  interview and documentation review took 25 min    Plan:    We will continue with the current medication regime.  The patient is tolerating the Lexapro.    He continues in individual therapy which apparently has been helpful.    The patient will seek out appropriate emergency services should that become necessary.  I will make myself available if any questions, concerns, or problems arise.    Elie Jones MD

## 2021-06-17 NOTE — PROGRESS NOTES
Office Visit - Follow Up   Berto Pretty   62 y.o. male    Date of Visit: 4/27/2018    Chief Complaint   Patient presents with     Hospital Visit Follow Up     [facility] - follow up TIA - feeling okay, more stressed and tired        Assessment and Plan   1. Cerebrovascular accident (CVA), unspecified mechanism  CVA occurred on 2/18/2018 involving the right cerebellum.  This was small and have the appearance of a lacunar infarct he has no residual.  He had a five-minute spell that may be a TIA on 4/17/2018 was again evaluated at [facility] and not readmitted.  Further follow-up as outlined below through neurology.    2. Mixed obsessional thoughts and acts  He has a follow-up with his OCD psychiatrist on 5/24/18.  3.  He has a 5 mm pulmonary nodule seen on chest x-ray and likely will need a follow-up image study.  He never had a CT scan.  He seeing a sleep specialist on 5/7/who is also a pulmonologist.  He will discuss further follow-up on his pulmonary nodule and a [id number] with him.        No Follow-up on file.     History of Present Illness   This 62 y.o. old was treated at [facility] with a sudden onset of dizziness and gait instability.  He was found to have a right cerebellar infarct.  Evaluation at [facility] showed no  embolic source.  He had a complete recovery from the stroke.  He was discharged on aspirin and atorvastatin.  He now had a spell on 4/17/18 which included some confusion and aphasia and right hand numbness that all lasted 5 minutes.  He went back to the emergency  Room   [facility].  Evaluation needed and he was discharged from the ER.  Being followed by neurology at [facility] further follow-up includes an MRA to be done on 5/4.  A 30 day heart monitor is to be done on 5/7 he previously had 24 hour Holter monitor that was normal.  He also has a follow-up with his sleep specialist on 5/7 and he will talk with this pulmonology about his 5 mm pulmonary nodule as  Routing refill request to provider for review/approval because:  Drug not on the [facility] refill protocol         Maddison Mariee RN  [facility]       "described above.    Medications are reviewed today.  He is not taking his Lexapro.  This was held after his stroke.  On varying doses between 10 and 30 mg.  He will discuss resuming this medication with his psychiatrist on 5/24/18.    Review of Systems: A comprehensive review of systems was negative except as noted.     Medications, Allergies and Problem List   Reviewed and updated     Physical Exam   General Appearance:       /68 (Patient Site: Left Arm, Patient Position: Sitting, Cuff Size: Adult Regular)  Pulse 90  Ht 5' 4.5\" (1.638 m)  Wt 136 lb (61.7 kg)  SpO2 96%  BMI 22.98 kg/m2  He is alert and oriented.  His speech is normal.  Gait is normal.  Cranial nerves are intact.  Grasp is normal.  Heart rhythm is regular without murmurs.        Additional Information   Current Outpatient Prescriptions   Medication Sig Dispense Refill     aspirin 81 mg chewable tablet Chew 81 mg daily.       atorvastatin (LIPITOR) 40 MG tablet Take 40 mg by mouth daily.       loratadine 10 mg cap Take 10 mg by mouth as needed.       No current facility-administered medications for this visit.      Allergies   Allergen Reactions     Latex Rash     Social History   Substance Use Topics     Smoking status: Former Smoker     Quit date: 1/1/1974     Smokeless tobacco: Never Used     Alcohol use None       Review and/or order of clinical lab tests:  Review and/or order of radiology tests:  Review and/or order of medicine tests:  Discussion of test results with performing physician:  Decision to obtain old records and/or obtain history from someone other than the patient:  Review and summarization of old records and/or obtaining history from someone other than the patient and.or discussion of case with another health care provider:  Independent visualization of image, tracing or specimen itself:    Time: total time spent with the patient was 25 minutes of which >50% was spent in counseling and coordination of care     Antwon LEO" MD [place]

## 2021-06-18 NOTE — PATIENT INSTRUCTIONS - HE
Patient Instructions by Devon Ramirez DO at 6/18/2020  9:20 AM     Author: Devon Ramirez DO Service: -- Author Type: Physician    Filed: 6/18/2020  9:50 AM Encounter Date: 6/18/2020 Status: Signed    : Devon Ramirez DO (Physician)         Patient education: What is a sleep study?     What is a sleep study? -- A sleep study is a test that measures how well you sleep and checks for sleep problems. For some sleep studies, you stay overnight in a sleep lab at a hospital or sleep center.     What happens during a sleep study? -- Before you go to sleep, a technician attaches small, sticky patches called electrodes to your head, chest, and legs. He or she will also place a small tube beneath your nose and might wrap 1 or 2 belts around your chest.   Each of these items has wires that connect to monitors. The monitors record your movement, brain activity, breathing, and other body functions while you sleep.  If you have a history of trouble falling asleep, your doctor might prescribe a medicine to help you fall asleep in the lab. If you have never taken the medicine before, your doctor might ask you take it on a night before your sleep study to see how it affects you.   Why might my doctor order a sleep study? -- Your doctor will order a sleep study if he or she thinks you have sleep apnea or a different condition that makes you:   ?Have sudden jerking leg movements while you sleep, called periodic limb movements.   ?Feel very sleepy during the day and fall asleep all of a sudden, called narcolepsy.   ?Have trouble falling asleep or staying asleep over a long period of time, called chronic insomnia.   ?Do odd things while you sleep, such as walking.  How should I prepare for a sleep study? -- On the day of your sleep study, you should:   ?Avoid alcohol   ?Avoid drinking coffee, tea, sodas, and other drinks that have caffeine in the afternoon and evening   ?Take all of your regular medicines    The cost of  care estimate line is [phone]. They are able to give the patient an estimate of the charges and also an estimate of their insurance coverage/patient responsibility.  After your sleep study is performed, please call us at [phone] to schedule for a follow up to review the results of the sleep study.    Please bring one tab of low dose melatonin 3 mg or less to the night of the study.    Melatonin intake is completely voluntary.    You may take own melatonin after arrival to sleep center. Do not drive or operate machinery after intake of melatonin.

## 2021-06-19 NOTE — PROGRESS NOTES
Office Visit - Follow Up   Berto Pretty   63 y.o. male    Date of Visit: 8/17/2018    Chief Complaint   Patient presents with     Follow-up     Follow up stroke, sleep study and heart monitor - Feeling down, somewaht depressed        Assessment and Plan   1. Cerebrovascular accident (CVA), unspecified mechanism (H)   This occurred in 2/2018 hospitalized at [facility] he has had no residual neurologic deficit. I was concerned that the stroke was embolic. He has now completed a 50 day heart monitoring. I reviewed these results with Berto today there was no significant arrhythmia found. He had heart rates as high as 130 with only mild exercise. He has heard that you can get an implantable monitoring device that can measure your heart rates for up to three years. I think a 50 day event monitor is more than sufficient to exclude any atrial fibrillation as a cause for his stroke. I counseled him not to get an implantable device.   He remains on aspirin 81 mg per day    2. Mixed obsessional thoughts and acts   Followed at the [facility]. He has been off his Lexapro since his stroke in 2/2018. He thinks he feels a bit depressed being off this medication. He will reconnect with his psychiatrist at the [facility] And consider restarting Lexapro.    3. RACHNA (obstructive sleep apnea)   Since last visit he had a formal sleep study. This showed that he seems to have moderately severe obstructive sleep apnea. He is reluctant to get a CPAP machine in his home as he didn't sleep well during the sleep study when the CPAP machine was trialed.  I strongly encouraged him to get a CPAP machine and to work hard at trying to get comfortable with this. I discussed with him that newer machines can ramp up the pressure settings as you're falling asleep and this makes it more comfortable. He should get back to his sleep physician and start his home trial of a CPAP machine.          No Follow-up on file.  "    History of Present Illness   This 63 y.o. old  Returns for follow-up with several issues. Number one he wanted to go over and discuss with me the results of his 50 day heart monitor. I reviewed these with him as described above.   Number two he wanted to go over his results of his sleep study. These results were reviewed with him today. He was strongly encouraged to begin a trial of a home CPAP machine.   Number three he's concerned that his blood pressure needs to be excellent after his stroke. His at-home blood pressure readings are 105 - 115. Blood pressure today is excellent at 120/80. He is not on any blood pressure medications.   number four he complains of a bit of blurring of his vision ever since his stroke. He's had no loss of visions or amaurosis type visual changes. I suggested he get a ophthalmology evaluation.   Number five a chest x-ray done  At [facility] by their pulmonary physicians showed a 5 mm nodule that appeared to mostly be calcified. A CT scan or follow-up chest x-ray was advised. I told him with this small of a nodule he does not need a CT scan. He could get a follow-up chest x-ray in six months to ensure stability. By current criteria with no significant risk factors no further  follow-up  Is entirely needed.    Review of Systems: A comprehensive review of systems was negative except as noted.     Medications, Allergies and Problem List   Reviewed and updated     Physical Exam   General Appearance:       /80 (Patient Site: Right Arm, Patient Position: Sitting, Cuff Size: Adult Large)  Pulse 100  Ht 5' 4.5\" (1.638 m)  Wt 137 lb (62.1 kg)  SpO2 96%  BMI 23.15 kg/m2     His speech is normal. His gait is normal. Equal strength throughout. Cranial nerves are intact. Lungs are clear. Extremity show no edema.     Additional Information   Current Outpatient Prescriptions   Medication Sig Dispense Refill     aspirin 81 mg chewable tablet Chew 81 mg daily.       atorvastatin " (LIPITOR) 40 MG tablet Take 40 mg by mouth daily.       loratadine 10 mg cap Take 10 mg by mouth as needed.       No current facility-administered medications for this visit.      Allergies   Allergen Reactions     Latex Rash     Social History   Substance Use Topics     Smoking status: Former Smoker     Quit date: 1/1/1974     Smokeless tobacco: Never Used     Alcohol use None       Review and/or order of clinical lab tests:  Review and/or order of radiology tests:  Review and/or order of medicine tests:  Discussion of test results with performing physician:  Decision to obtain old records and/or obtain history from someone other than the patient:  Review and summarization of old records and/or obtaining history from someone other than the patient and.or discussion of case with another health care provider:  Independent visualization of image, tracing or specimen itself:    Time: total time spent with the patient was 25 minutes of which >50% was spent in counseling and coordination of care     Antwon Mcmillan MD

## 2021-06-19 NOTE — LETTER
Letter by Antwon Mcmillan MD at      Author: Antwon Mcmillan MD Service: -- Author Type: --    Filed:  Encounter Date: 5/3/2019 Status: (Other)         Berto Pretty  [address] [facility] [address]     Berto,   Your laboratory studies all look very good.  Your cholesterol numbers are nice and low.  Radiologist agreed with me that there is no significant problems with your chest x-ray and those white spots are completely benign calcified lesions.        May 3, 2019         Dear Mr. Pretty,    Below are the results from your recent visit:    Resulted Orders   HM2(CBC w/o Differential)   Result Value Ref Range    WBC 5.0 4.0 - 11.0 thou/uL    RBC 4.62 4.40 - 6.20 mill/uL    Hemoglobin 14.1 14.0 - 18.0 g/dL    Hematocrit 41.9 40.0 - 54.0 %    MCV 91 80 - 100 fL    MCH 30.5 27.0 - 34.0 pg    MCHC 33.7 32.0 - 36.0 g/dL    RDW 11.5 11.0 - 14.5 %    Platelets 178 140 - 440 thou/uL    MPV 7.9 7.0 - 10.0 fL   Comprehensive Metabolic Panel   Result Value Ref Range    Sodium 144 136 - 145 mmol/L    Potassium 4.5 3.5 - 5.0 mmol/L    Chloride 108 (H) 98 - 107 mmol/L    CO2 27 22 - 31 mmol/L    Anion Gap, Calculation 9 5 - 18 mmol/L    Glucose 78 70 - 125 mg/dL    BUN 15 8 - 22 mg/dL    Creatinine 0.85 0.70 - 1.30 mg/dL    GFR MDRD Af Amer >60 >60 mL/min/1.73m2    GFR MDRD Non Af Amer >60 >60 mL/min/1.73m2    Bilirubin, Total 0.5 0.0 - 1.0 mg/dL    Calcium 9.6 8.5 - 10.5 mg/dL    Protein, Total 6.3 6.0 - 8.0 g/dL    Albumin 3.8 3.5 - 5.0 g/dL    Alkaline Phosphatase 79 45 - 120 U/L    AST 29 0 - 40 U/L    ALT 28 0 - 45 U/L    Narrative    Fasting Glucose reference range is 70-99 mg/dL per  American Diabetes Association (ADA) guidelines.   Lipid Cascade   Result Value Ref Range    Cholesterol 146 <=199 mg/dL    Triglycerides 40 <=149 mg/dL    HDL Cholesterol 78 >=40 mg/dL    LDL Calculated 60 <=129 mg/dL    Patient Fasting > 8hrs? Unknown    PSA (Prostatic-Specific Antigen), Annual Screen   Result Value Ref  Range    PSA 0.7 0.0 - 4.5 ng/mL    Narrative    Method is Abbott Prostate-Specific Antigen (PSA)  Standard-WHO 1st International (90:10)       Xr Chest 2 Views    Result Date: 5/1/2019  EXAM: XR CHEST 2 VIEWS LOCATION: [facility] DATE/TIME: 5/1/2019 4:18 PM INDICATION: Cough. COMPARISON: 4/16/2018. FINDINGS: Negative chest. Lungs are clear. Stable calcified granulomas. No significant change.       Please call with questions or contact us using [employer].    Sincerely,        Electronically signed by Antwon Mcmillan MD

## 2021-06-20 NOTE — LETTER
Letter by Tyesha Ventura MD at      Author: Tyesha Ventura MD Service: -- Author Type: --    Filed:  Encounter Date: 3/16/2020 Status: (Other)         Berto Pretty  [address] [facility] [address]             March 16, 2020         Dear Mr. Pretty,    Below are the results from your recent visit:    Resulted Orders   HM2(CBC w/o Differential)   Result Value Ref Range    WBC 6.2 4.0 - 11.0 thou/uL    RBC 4.69 4.40 - 6.20 mill/uL    Hemoglobin 14.5 14.0 - 18.0 g/dL    Hematocrit 43.1 40.0 - 54.0 %    MCV 92 80 - 100 fL    MCH 31.0 27.0 - 34.0 pg    MCHC 33.7 32.0 - 36.0 g/dL    RDW 12.4 11.0 - 14.5 %    Platelets 160 140 - 440 thou/uL    MPV 8.1 7.0 - 10.0 fL   Lipid Cascade   Result Value Ref Range    Cholesterol 140 <=199 mg/dL    Triglycerides 40 <=149 mg/dL    HDL Cholesterol 73 >=40 mg/dL    LDL Calculated 59 <=129 mg/dL    Patient Fasting > 8hrs? Yes    Comprehensive Metabolic Panel   Result Value Ref Range    Sodium 140 136 - 145 mmol/L    Potassium 3.7 3.5 - 5.0 mmol/L    Chloride 105 98 - 107 mmol/L    CO2 24 22 - 31 mmol/L    Anion Gap, Calculation 11 5 - 18 mmol/L    Glucose 98 70 - 125 mg/dL    BUN 19 8 - 22 mg/dL    Creatinine 0.96 0.70 - 1.30 mg/dL    GFR MDRD Af Amer >60 >60 mL/min/1.73m2    GFR MDRD Non Af Amer >60 >60 mL/min/1.73m2    Bilirubin, Total 0.7 0.0 - 1.0 mg/dL    Calcium 9.3 8.5 - 10.5 mg/dL    Protein, Total 6.9 6.0 - 8.0 g/dL    Albumin 4.0 3.5 - 5.0 g/dL    Alkaline Phosphatase 92 45 - 120 U/L    AST 27 0 - 40 U/L    ALT 21 0 - 45 U/L    Narrative    Fasting Glucose reference range is 70-99 mg/dL per  American Diabetes Association (ADA) guidelines.   Thyroid Cascade   Result Value Ref Range    TSH 0.63 0.30 - 5.00 uIU/mL   Vitamin B12   Result Value Ref Range    Vitamin B-12 607 213 - 816 pg/mL   Vitamin D, Total (25-Hydroxy)   Result Value Ref Range    Vitamin D, Total (25-Hydroxy) 38.7 30.0 - 80.0 ng/mL    Narrative    Deficiency <10.0 ng/mL  Insufficiency 10.0-29.9  ng/mL  Sufficiency 30.0-80.0 ng/mL  Toxicity (possible) >100.0 ng/mL       Grady Thomson.  It was nice to meet you last week.  Your labs were all satisfactory and are detailed above.  Please let me know if you have questions.  [facility]          Electronically signed by Tyesha Ventura MD

## 2021-06-21 NOTE — LETTER
Letter by Tyesha Ventura MD at      Author: Tyesha Ventura MD Service: -- Author Type: --    Filed:  Encounter Date: 11/6/2020 Status: (Other)         Berto Pretty  [address] [facility] [address]             November 6, 2020         Dear Mr. Pretty,    Below are the results from your recent visit:    Resulted Orders   HM2(CBC w/o Differential)   Result Value Ref Range    WBC 5.6 4.0 - 11.0 thou/uL    RBC 4.54 4.40 - 6.20 mill/uL    Hemoglobin 14.5 14.0 - 18.0 g/dL    Hematocrit 42.7 40.0 - 54.0 %    MCV 94 80 - 100 fL    MCH 32.0 27.0 - 34.0 pg    MCHC 34.0 32.0 - 36.0 g/dL    RDW 11.7 11.0 - 14.5 %    Platelets 198 140 - 440 thou/uL    MPV 7.8 7.0 - 10.0 fL   Lipid Cascade   Result Value Ref Range    Cholesterol 145 <=199 mg/dL    Triglycerides 58 <=149 mg/dL    HDL Cholesterol 83 >=40 mg/dL    LDL Calculated 50 <=129 mg/dL    Patient Fasting > 8hrs? Yes    Thyroid Cascade   Result Value Ref Range    TSH 1.07 0.30 - 5.00 uIU/mL   PSA (Prostatic-Specific Antigen), Annual Screen   Result Value Ref Range    PSA 0.7 0.0 - 4.5 ng/mL    Narrative    Method is Abbott Prostate-Specific Antigen (PSA)  Standard-WHO 1st International (90:10)   Basic Metabolic Panel   Result Value Ref Range    Sodium 141 136 - 145 mmol/L    Potassium 3.8 3.5 - 5.0 mmol/L    Chloride 103 98 - 107 mmol/L    CO2 27 22 - 31 mmol/L    Anion Gap, Calculation 11 5 - 18 mmol/L    Glucose 84 70 - 125 mg/dL    Calcium 9.5 8.5 - 10.5 mg/dL    BUN 23 (H) 8 - 22 mg/dL    Creatinine 0.94 0.70 - 1.30 mg/dL    GFR MDRD Af Amer >60 >60 mL/min/1.73m2    GFR MDRD Non Af Amer >60 >60 mL/min/1.73m2    Narrative    Fasting Glucose reference range is 70-99 mg/dL per  American Diabetes Association (ADA) guidelines.   Hepatic Profile   Result Value Ref Range    Bilirubin, Total 0.9 0.0 - 1.0 mg/dL    Bilirubin, Direct 0.4 <=0.5 mg/dL    Protein, Total 7.0 6.0 - 8.0 g/dL    Albumin 4.4 3.5 - 5.0 g/dL    Alkaline Phosphatase 91 45 - 120 U/L    AST 29 0  - 40 U/L    ALT 23 0 - 45 U/L   Lipase   Result Value Ref Range    Lipase 60 (H) 0 - 52 U/L   Amylase   Result Value Ref Range    Amylase 104 5 - 120 U/L   Urinalysis-UC if Indicated   Result Value Ref Range    Color, UA Yellow Colorless, Yellow, Straw, Light Yellow    Clarity, UA Clear Clear    Glucose, UA Negative Negative    Bilirubin, UA Small (!) Negative    Ketones, UA 40 mg/dL (!) Negative    Specific Gravity, UA >=1.030 1.005 - 1.030    Blood, UA Large (!) Negative    pH, UA 5.5 5.0 - 8.0    Protein, UA Negative Negative mg/dL    Urobilinogen, UA 1.0 E.U./dL 0.2 E.U./dL, 1.0 E.U./dL    Nitrite, UA Negative Negative    Leukocytes, UA Negative Negative    Bacteria, UA Few (!) None Seen hpf    RBC, UA >100 (!) None Seen, 0-2 hpf    WBC, UA None Seen None Seen, 0-5 hpf    Squam Epithel, UA 0-5 None Seen, 0-5 lpf    Narrative    UC not indicated     Please call and let him know that his blood work all looked good including liver, pancreas, thyroid, prostate, kidney, and CBC.  Lipids are excellent.  His urine did show some red cells but not an infection.  Often this is due to a small kidney stone but it is important that he proceed with the CT scan that I ordered.  Please make sure he has that set up so we can make sure his kidneys look normal.  Thanks.    Please call with questions or contact us using [employer].    Sincerely,        Electronically signed by Tyesha Ventura MD

## 2021-06-21 NOTE — LETTER
Letter by Tyesha Ventura MD at      Author: Tyesha Ventura MD Service: -- Author Type: --    Filed:  Encounter Date: 4/7/2021 Status: (Other)         Berto Pretty  [address] [facility] [address]             April 7, 2021         Dear Mr. Pretty,    Below are the results from your recent visit:    Resulted Orders   HM2(CBC w/o Differential)   Result Value Ref Range    WBC 4.9 4.0 - 11.0 thou/uL    RBC 3.92 (L) 4.40 - 6.20 mill/uL    Hemoglobin 12.4 (L) 14.0 - 18.0 g/dL    Hematocrit 37.9 (L) 40.0 - 54.0 %    MCV 97 80 - 100 fL    MCH 31.6 27.0 - 34.0 pg    MCHC 32.7 32.0 - 36.0 g/dL    RDW 13.3 11.0 - 14.5 %    Platelets 135 (L) 140 - 440 thou/uL    MPV 10.3 (H) 7.0 - 10.0 fL   Lipid Cascade   Result Value Ref Range    Cholesterol 153 <=199 mg/dL    Triglycerides 35 <=149 mg/dL    HDL Cholesterol 81 >=40 mg/dL    LDL Calculated 65 <=129 mg/dL    Patient Fasting > 8hrs? Yes    Thyroid Cascade   Result Value Ref Range    TSH 0.84 0.30 - 5.00 uIU/mL   Comprehensive Metabolic Panel   Result Value Ref Range    Sodium 141 136 - 145 mmol/L    Potassium 4.2 3.5 - 5.0 mmol/L    Chloride 105 98 - 107 mmol/L    CO2 29 22 - 31 mmol/L    Anion Gap, Calculation 7 5 - 18 mmol/L    Glucose 75 70 - 125 mg/dL    BUN 13 8 - 22 mg/dL    Creatinine 0.80 0.70 - 1.30 mg/dL    GFR MDRD Af Amer >60 >60 mL/min/1.73m2    GFR MDRD Non Af Amer >60 >60 mL/min/1.73m2    Bilirubin, Total 0.9 0.0 - 1.0 mg/dL    Calcium 9.0 8.5 - 10.5 mg/dL    Protein, Total 6.3 6.0 - 8.0 g/dL    Albumin 3.8 3.5 - 5.0 g/dL    Alkaline Phosphatase 93 45 - 120 U/L    AST 56 (H) 0 - 40 U/L    ALT 72 (H) 0 - 45 U/L    Narrative    Fasting Glucose reference range is 70-99 mg/dL per  American Diabetes Association (ADA) guidelines.       Grady Ho.  Your labs are detailed above.  You have become slightly anemic and it's not clear why.  Fortunately, you recentlynhad that CT that was pretty normal but it is important that you proceed with your planned Urology  evaluation and your colonoscopy.  I will also order a recheck of your labs for 6 weeks.  Please call the desk to arrange the lab appointment.  Also, after you complete the labs, please call the clinic to make sure one of my partners is alerted to review them.  Take care and best wishes.  [facility] hospital        Electronically signed by Tyesha Ventura MD

## 2021-06-21 NOTE — LETTER
Letter by Tyesha Ventura MD at      Author: Tyesha Ventura MD Service: -- Author Type: --    Filed:  Encounter Date: 11/12/2020 Status: (Other)         Berto Pretty  [address] [facility] [address]             November 12, 2020         Dear Mr. Pretty,    Below are the results from your recent visit:    EKG which looks for irregular heart rhythms or signs of heart attack was normal.     Ref Range & Units 11/3/20 1456     SYSTOLIC BLOOD PRESSURE      DIASTOLIC BLOOD PRESSURE      VENTRICULAR RATE BPM 82     ATRIAL RATE BPM 82     P-R INTERVAL ms 148     QRS DURATION ms 84     Q-T INTERVAL ms 378     QTC CALCULATION (BEZET) ms 441     P Axis degrees 78     R AXIS degrees 73     T AXIS degrees 79     MUSE DIAGNOSIS  Normal sinus rhythm   Right atrial enlargement   Borderline ECG   No previous ECGs available   Confirmed by YADIEL NEWSOME MD LOC:JN ([id number]) on 11/3/2020 4:30:09 PM          Please call with questions or contact us using [employer].    Sincerely,        Electronically signed by Tyesha Ventura MD

## 2021-06-22 NOTE — PROGRESS NOTES
Office Visit - Follow Up   Berto Pretty   63 y.o. male    Date of Visit: 12/7/2018    Chief Complaint   Patient presents with     Follow-up     Follow up CVA - 4 month check        Assessment and Plan   1. 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  His balance is excellent now.  he remains on 81 mg of aspirin per day only.  I think he is okay to try and resume downhill skiing this year.  He was encouraged to wear a helmet.    2. RACHNA (obstructive sleep apnea)  He had an obstructive sleep apnea trial in 8/2018.  He has not done anything yet about getting a permanent CPAP machine.  I encouraged him to get this going again    3. Mixed obsessional thoughts and acts  He did not restart Lexapro 20 mg/day.  Following closely with his psychiatrist at the [facility].  He thinks his mood and OCD is adequately managed.          No Follow-up on file.     History of Present Illness   This 63 y.o. old comes in for routine follow-up.  Since last visit he did not restart Lexapro 20 mg/day.  He had a good discussion with the psychiatrist and decided to not use any medications at this time.  He had a stroke in 2/2018 and this was felt to be likely embolic stroke to his posterior circulation.  He had some right arm numbness and some dizziness and gait instability.  No cause for embolization was discovered.  Echocardiograms were normal.  He had a 50-day monitor and this was negative for any atrial fibrillation or other arrhythmia.  He remains on aspirin therapy alone.  He is had no further symptoms.  He like to begin downhill skiing this year.  He has not skied in quite a few years.  He thinks he would like to get back to this activity this winter.  With his current status I think this would be safe to do.  He has been diagnosed with obstructive sleep apnea.  He had a brief trial of CPAP machine.  He never purchased machine.  I  "encouraged him to revisit with his sleep specialist and see if he needs a regular CPAP machine at home.  He had a chest x-ray done in 2018 and evaluated by sleep pulmonologist.  He had 5 mm actually calcified nodule seen on chest x-ray.  He was told to get a follow-up chest x-ray in about 6 months.  I told him he could get a chest x-ray done in 2019 at his next office visit.  Nodule seen at [facility] has suggestion of calcification.  He is a non-smoker.    Review of Systems: A comprehensive review of systems was negative except as noted.     Medications, Allergies and Problem List   Reviewed, reconciled and updated     Physical Exam   General Appearance:       /76 (Patient Site: Left Arm, Patient Position: Sitting, Cuff Size: Adult Large)   Pulse 68   Ht 5' 4.5\" (1.638 m)   Wt 129 lb (58.5 kg)   SpO2 100%   BMI 21.80 kg/m      Heart rhythm is regular.  Lungs are clear.  O2 sats are 100%.  Blood pressure is excellent 130/76.  His weight is down slightly.  In the nursery through 12 are intact.  His gait is excellent.  He is able to stand on one foot with good stability with both of the his right and left foot.  Rapid alternating movements are good.  Heel to toe walking is good.     Additional Information   Current Outpatient Medications   Medication Sig Dispense Refill     aspirin 81 mg chewable tablet Chew 81 mg daily.       atorvastatin (LIPITOR) 40 MG tablet Take 40 mg by mouth daily.       loratadine 10 mg cap Take 10 mg by mouth as needed.       No current facility-administered medications for this visit.      Allergies   Allergen Reactions     Latex Rash     Social History     Tobacco Use     Smoking status: Former Smoker     Last attempt to quit: 1974     Years since quittin.9     Smokeless tobacco: Never Used   Substance Use Topics     Alcohol use: Not on file     Drug use: Not on file       Review and/or order of clinical lab tests:  Review and/or order of radiology " tests:  Review and/or order of medicine tests:  Discussion of test results with performing physician:  Decision to obtain old records and/or obtain history from someone other than the patient:  Review and summarization of old records and/or obtaining history from someone other than the patient and.or discussion of case with another health care provider:  Independent visualization of image, tracing or specimen itself:    Time: total time spent with the patient was 25 minutes of which >50% was spent in counseling and coordination of care     Antwon Mcmillan MD

## 2021-06-24 NOTE — TELEPHONE ENCOUNTER
"Prior Authorization Request  Who s requesting:  Patient  Pharmacy Name and Location: [facility]  Medication Name:   atorvastatin (LIPITOR) 40 MG tablet 30 tablet 11 3/6/2019 3/5/2020    Sig - Route: Take 1 tablet (40 mg total) by mouth daily. - Oral    Sent to pharmacy as: atorvastatin (LIPITOR) 40 MG tablet        Insurance Plan: Medicare A and B  Insurance Member ID Number:  [id number]  Informed patient that prior authorizations can take up to 10 business days for response:   Yes patient is requesting this to be done ASAP. Patient stated \"I will be out of this medication on Sunday and was told by [employer] they would be faxing over this form to get this process started and approved today.\" Writer advised patient this may not be approved today as it can take up to 10 days. Patient stressed I had a stroke last year and need this medication by Sunday, I have been working on getting this medication since Tuesday, it doubled in cost from last year and [employer] told me to call you to get this approved today. Please address ASAP  Okay to leave a detailed message: Yes    "

## 2021-06-24 NOTE — TELEPHONE ENCOUNTER
Refill Request  Did you contact pharmacy: Yes  Medication name: atorvastatin 40mg once a day  Requested Prescriptions      No prescriptions requested or ordered in this encounter     Who prescribed the medication: Doctor at [facility]   Pharmacy Name and Location: [facility]   Is patient out of medication: no  Patient notified refills processed in 72 hours:  yes  Okay to leave a detailed message: yes

## 2021-06-24 NOTE — TELEPHONE ENCOUNTER
Central PA team  [phone]  Pool: HE PA MED ([id number])          PA has been initiated.       PA form completed and faxed insurance via Cover My Meds     Key:  Manually faxed      Medication:  Atorvastatin    Insurance:  [employer]        Response will be received via fax and may take up to 5-10 business days depending on plan

## 2021-06-25 ENCOUNTER — AMBULATORY - HEALTHEAST (OUTPATIENT)
Dept: LAB | Facility: CLINIC | Age: 66
End: 2021-06-25

## 2021-06-25 DIAGNOSIS — R74.8 ELEVATED LIVER ENZYMES: ICD-10-CM

## 2021-06-25 DIAGNOSIS — D64.89 ANEMIA DUE TO OTHER CAUSE, NOT CLASSIFIED: ICD-10-CM

## 2021-06-25 DIAGNOSIS — R79.9 ABNORMAL FINDING OF BLOOD CHEMISTRY, UNSPECIFIED: ICD-10-CM

## 2021-06-25 LAB
ALBUMIN SERPL-MCNC: 3.9 G/DL (ref 3.5–5)
ALP SERPL-CCNC: 73 U/L (ref 45–120)
ALT SERPL W P-5'-P-CCNC: 37 U/L (ref 0–45)
ANION GAP SERPL CALCULATED.3IONS-SCNC: 11 MMOL/L (ref 5–18)
AST SERPL W P-5'-P-CCNC: 34 U/L (ref 0–40)
BASOPHILS # BLD AUTO: 0 THOU/UL (ref 0–0.2)
BASOPHILS NFR BLD AUTO: 1 % (ref 0–2)
BILIRUB DIRECT SERPL-MCNC: 0.3 MG/DL
BILIRUB SERPL-MCNC: 0.8 MG/DL (ref 0–1)
BUN SERPL-MCNC: 11 MG/DL (ref 8–22)
CALCIUM SERPL-MCNC: 9 MG/DL (ref 8.5–10.5)
CHLORIDE BLD-SCNC: 105 MMOL/L (ref 98–107)
CO2 SERPL-SCNC: 26 MMOL/L (ref 22–31)
CREAT SERPL-MCNC: 0.9 MG/DL (ref 0.7–1.3)
EOSINOPHIL # BLD AUTO: 0.1 THOU/UL (ref 0–0.4)
EOSINOPHIL NFR BLD AUTO: 2 % (ref 0–6)
ERYTHROCYTE [DISTWIDTH] IN BLOOD BY AUTOMATED COUNT: 12.6 % (ref 11–14.5)
FERRITIN SERPL-MCNC: 152 NG/ML (ref 27–300)
FOLATE SERPL-MCNC: 19.6 NG/ML
GFR SERPL CREATININE-BSD FRML MDRD: >60 ML/MIN/1.73M2
GLUCOSE BLD-MCNC: 84 MG/DL (ref 70–125)
HCT VFR BLD AUTO: 39 % (ref 40–54)
HGB BLD-MCNC: 12.7 G/DL (ref 14–18)
IMM GRANULOCYTES # BLD: 0 THOU/UL
IMM GRANULOCYTES NFR BLD: 0 %
IRON SATN MFR SERPL: 21 % (ref 20–50)
IRON SERPL-MCNC: 68 UG/DL (ref 42–175)
LYMPHOCYTES # BLD AUTO: 1.2 THOU/UL (ref 0.8–4.4)
LYMPHOCYTES NFR BLD AUTO: 28 % (ref 20–40)
MCH RBC QN AUTO: 30.7 PG (ref 27–34)
MCHC RBC AUTO-ENTMCNC: 32.6 G/DL (ref 32–36)
MCV RBC AUTO: 94 FL (ref 80–100)
MONOCYTES # BLD AUTO: 0.4 THOU/UL (ref 0–0.9)
MONOCYTES NFR BLD AUTO: 9 % (ref 2–10)
NEUTROPHILS # BLD AUTO: 2.6 THOU/UL (ref 2–7.7)
NEUTROPHILS NFR BLD AUTO: 60 % (ref 50–70)
PLATELET # BLD AUTO: 157 THOU/UL (ref 140–440)
PMV BLD AUTO: 10.8 FL (ref 7–10)
POTASSIUM BLD-SCNC: 3.6 MMOL/L (ref 3.5–5)
PROT SERPL-MCNC: 6.2 G/DL (ref 6–8)
RBC # BLD AUTO: 4.14 MILL/UL (ref 4.4–6.2)
SODIUM SERPL-SCNC: 142 MMOL/L (ref 136–145)
TIBC SERPL-MCNC: 320 UG/DL (ref 313–563)
TRANSFERRIN SERPL-MCNC: 256 MG/DL (ref 212–360)
TRANSFERRIN SERPL-MCNC: 256 MG/DL (ref 212–360)
WBC: 4.3 THOU/UL (ref 4–11)

## 2021-06-25 NOTE — TELEPHONE ENCOUNTER
Please give this to whomever is covering Dr Ventura today. Pt is not scheduled here until 7/14 for an establish care visit. Thanks.

## 2021-06-25 NOTE — TELEPHONE ENCOUNTER
Are you okay ordering labs ahead of appointment for patient or do you prefer to see him first?  Jennifer Bush CMA ............... 12:28 PM, 06/15/21

## 2021-06-25 NOTE — TELEPHONE ENCOUNTER
Hemoglobin 12.7 which is increased from 12.4 in April    Platelets 153,000 which is improved from 135,000 in April    Iron and B12 levels are normal

## 2021-06-25 NOTE — TELEPHONE ENCOUNTER
Reason for Call:  Other      Detailed comments: pt is calling, he was a previous patient of dr salgeuro. He is scheduled 7/14 for a visit with new pcp. He had some blood work done on 6/3 and when the nurse called him back with results, the numbers had gone up slightly. He has been trying to eat more spinach etc and is hoping his labs will be even better next time.He was hoping to get an order for a lab draw b4 his visit 7/14. Can the dr order a lab only and call the patient to schedule please?    Phone Number Patient can be reached at: Home number on file [phone] (home)    Best Time: any    Can we leave a detailed message on this number?: Yes    Call taken on 6/14/2021 at 4:27 PM by Kaylen Adams

## 2021-06-25 NOTE — TELEPHONE ENCOUNTER
6-4-21  Reason for Call:  Lab test resuls    Name of test or procedure: labs    Date of test of procedure: 6-3-21    Location of the test or procedure: [place]    OK to leave the result message on voice mail or with a family member? Yes    Phone number Patient can be reached at: Cell number on file:    No relevant phone numbers on file.       Additional comments: anytime    Call taken on 6/4/2021 at 3:43 PM by Kaitlin Middleton

## 2021-06-26 NOTE — PROGRESS NOTES
Psychology Progress Note    Date: June 14, 2021    Time length and type of treatment: 43 minutes (3:14 PM - 3:57 PM), individual therapy    After review of the patient's situation, this visit was changed from an in-person visit to a telephone visit to reduce the risk of COVID 19 exposure. Patient was informed that policies and procedures that govern in-person sessions would also apply to telephone sessions. Patient was also informed that telephone sessions would be discontinued when COVID 19 exposure is no longer a concern (as determined by [facility]).     Patient location: Patient home in [place], [place]  Provider location:  [facility], [place], [place]    Patient was in agreement with proceeding with a telephone session.      Necessity: This session is necessary to address the patient's depression, anxiety, hoarding, and OCD.  Today we focus on the patient's treatment plan, specifically exploring barriers to compliance with medical treatment plan, and tracking and holding patient accountable for hoarding targets.  The reader is invited to review the patient's full treatment plan in the Media section of the patient's Epic medical record.    Psychotherapeutic Technique: This writer utilized motivational interviewing, active listening, reassurance and support in the context of cognitive behavioral therapy to address the above.      Mental Status:   Grooming: Unable to determine due to telephone visit  Attire:  Unable to determine due to telephone visit  Age:  Unable to determine due to telephone visit  Behavior Towards Examiner: Cooperative  Motor Activity:  Unable to determine due to telephone visit  Eye Contact:  Unable to determine due to telephone visit  Mood: Anxious  Affect: Anxious  Speech/Language: Pressured  Attention: Distractible  Concentration: Brief  Thought Process: Circumstantial  Thought Content: No evidence of delusions or hallucinations  Orientation: Appeared  oriented to person, place, and time, though not formally established  Memory: No Evidence of Impairment  Judgement: No Evidence of Impairment  Estimated Intelligence: Average  Demonstrated Insight: Adequate  Fund of Knowledge: adequate      Intervention:   Patient reported that sleep hygiene strategies have been helpful. He has been generally doing better with his diet, but admitted this has been inconsistent, and now he is having health problems as a result. He discussed his recent medical appointments, the recognition that poor diet may be contributing to these problems, and the importance of eating good food regularly rather than surviving on soft drinks.  The possibility of an in person visit was discussed, with patient becoming quite anxious, but he committed to considering this after a few more phone appointments.      Patient's hoarding was addressed.  Patient expressed pride that he has a couple of rooms in his home that aren't hoarded and he has stopped accumulating additional items, but he acknowledged that he has piles 4-5 feet high throughout much of his house.  He initially opined that he is too busy focusing on his health and working on his OCD to be able to tackle his hoarding behavior, but patient ultimately committed to spending at least one 4-5 hour period each week working on getting rid of things he doesn't need. He will start in a room that doesn't contain important papers he needs to review individually in order to maximize the likelihood that his efforts will yield some progress he can actually see and take pride in.      Progress:  Patient articulated a commitment to resuming work on getting rid of hoarded items in his primary home.      Plan:   We will meet again in 2 weeks to address the patient's depression, anxiety, hoarding, and OCD.  Estimated duration of treatment is 10+ individual therapy sessions (35876) at twice monthly intervals. Treatment is expected to be completed by January  2022.     Diagnosis:  Major Depressive Disorder, recurrent, moderate  Generalized Anxiety Disorder  Hoarding Disorder  Obsessive Compulsive Disorder (OCD)

## 2021-06-26 NOTE — TELEPHONE ENCOUNTER
Left voicemail for patient to return call to clinic. When patient returns call, please give them below message.    Please help schedule lab appointment?    Also is he still seeing urology?  Jennifer Bush CMA ............... 2:09 PM, 06/18/21

## 2021-06-26 NOTE — TELEPHONE ENCOUNTER
Yes thank you.    He was last seen by Dr. Ventura April 6, 2021.  Hyperlipidemia, CVA secondary to embolism of cerebral artery.  Patient is on Lipitor 40 mg daily.  Also microscopic hematuria, has follow-up with urology.  Has also been seeing psychiatry, doing better with Lexapro.    Lab Results   Component Value Date    WBC 4.4 06/03/2021    HGB 12.7 (L) 06/03/2021    HCT 39.1 (L) 06/03/2021    MCV 95 06/03/2021     06/03/2021     Hemoglobin 12.7, Bela 3, 2012 0.4, April 6, 2014 0.5 November 3, MCV normal.  Rest of the CBC is normal.  Ferritin is normal at one hundred forty-one, June 7, 2021.  Vitamin B12 normal at 697, June 30, 2021.    Patient had abnormal liver enzymes, with an AST of 56, ALT o72, April 6, 2021.  I will repeat this before he comes to see me.  April 6, 2021, TSH normal at 0.84.  TSH has never been abnormal.    Lab tests, April 6, 2021: Cholesterol 153, triglyceride 35, HDL 81, LDL 65.  BMP normal, April 6, 2021.    CT scan of his abdomen and pelvis was done November 9, 2020: No hydronephrosis or genitourinary calculi.  Bladder is incompletely distended, accentuating circumferential wall thickening.  Tiny hypodensity in the dependent portion of the right bladder near the right ureteral orifice.  Degenerative changes of the spine and bilateral hips.    Please ask the patient if he is seeing urology.    I will order: CBC, TIBC and transferrin, Ferritin, hepatic study.

## 2021-06-30 LAB
ALBUMIN PERCENT: 68.8 % (ref 51–67)
ALBUMIN SERPL ELPH-MCNC: 4.3 G/DL (ref 3.2–4.7)
ALPHA 1 PERCENT: 2.8 % (ref 2–4)
ALPHA 2 PERCENT: 9.4 % (ref 5–13)
ALPHA1 GLOB SERPL ELPH-MCNC: 0.2 G/DL (ref 0.1–0.3)
ALPHA2 GLOB SERPL ELPH-MCNC: 0.6 G/DL (ref 0.4–0.9)
B-GLOBULIN SERPL ELPH-MCNC: 0.6 G/DL (ref 0.7–1.2)
BETA PERCENT: 9 % (ref 10–17)
GAMMA GLOB SERPL ELPH-MCNC: 0.6 G/DL (ref 0.6–1.4)
GAMMA GLOBULIN PERCENT: 10 % (ref 9–20)
PATH ICD:: ABNORMAL
PROT PATTERN SERPL ELPH-IMP: ABNORMAL
PROT SERPL-MCNC: 6.2 G/DL (ref 6–8)
REVIEWING PATHOLOGIST: ABNORMAL

## 2021-07-08 NOTE — PROGRESS NOTES
Progress Notes by Luba Moore MD at 6/25/2021  3:00 PM     Author: Luba Moore MD Service: -- Author Type: Physician    Filed: 7/8/2021  2:57 PM Encounter Date: 6/25/2021 Status: Signed    : Luba Moore MD (Physician)       Please let the patient know, regarding his blood results.  He was supposed to have a preoperative history and physical this week, and we will going to discuss his blood results, from a lab visit, June 25, 2021.  He has not established primary care with me yet.  There is an abnormal protein in his blood, and he would require through the blood tests, to figure out if this is significant or not.  The rest of his blood tests are normal.    Luba Moore MD

## 2021-07-09 ENCOUNTER — COMMUNICATION - HEALTHEAST (OUTPATIENT)
Dept: INTERNAL MEDICINE | Facility: CLINIC | Age: 66
End: 2021-07-09

## 2021-07-09 NOTE — PROGRESS NOTES
Progress Notes by Peterson RYDER CMA at 6/25/2021  3:00 PM     Author: Peterson RYDER CMA Service: -- Author Type: Certified Medical Assistant    Filed: 7/9/2021  8:08 AM Encounter Date: 6/25/2021 Status: Signed    : Peterson RYDER CMA (Certified Medical Assistant)       Left message to call back for: Patient  Information to relay to patient:  See below

## 2021-07-09 NOTE — TELEPHONE ENCOUNTER
Telephone Encounter by Peterson RYDER CMA at 7/9/2021  8:04 AM     Author: Peterson RYDER CMA Service: -- Author Type: Certified Medical Assistant    Filed: 7/9/2021  8:04 AM Encounter Date: 7/9/2021 Status: Signed    : Peterson RYDER CMA (Certified Medical Assistant)       ----- Message from Luba Moore MD sent at 7/8/2021  2:57 PM CDT -----  Please let the patient know, regarding his blood results.  He was supposed to have a preoperative history and physical this week, and we will going to discuss his blood results, from a lab visit, June 25, 2021.  He has not established primary care with me yet.  There is an abnormal protein in his blood, and he would require through the blood tests, to figure out if this is significant or not.  The rest of his blood tests are normal.    Luba Moore MD

## 2021-07-09 NOTE — TELEPHONE ENCOUNTER
Telephone Encounter by Peterson RYDER CMA at 7/9/2021  8:06 AM     Author: Peterson RYDER CMA Service: -- Author Type: Certified Medical Assistant    Filed: 7/9/2021  8:07 AM Encounter Date: 7/9/2021 Status: Signed    : Peterson RYDER CMA (Certified Medical Assistant)       Left message to call back for: Patient  Information to relay to patient:  See below results.    Patient has an appointment with  on 7/26/2021

## 2021-07-12 ENCOUNTER — TELEPHONE (OUTPATIENT)
Dept: INTERNAL MEDICINE | Facility: CLINIC | Age: 66
End: 2021-07-12

## 2021-07-12 NOTE — TELEPHONE ENCOUNTER
Please see messages below, sent from [employer].    Noreen Smalls, [facility]   Certified Medical Assistant      Telephone Encounter   Sign at exiting of workspace   Creation Time:  7/12/2021 10:36 AM               Sign at exiting of workspace           LMTCB. Please refer to new epic system.                        Tag Copy   C, Peterson Calabrese [facility]   Certified Medical Assistant      Telephone Encounter   Signed   Creation Time:  7/9/2021  8:06 AM               Signed           Left message to call back for: Patient  Information to relay to patient:  See below results.     Patient has an appointment with  on 7/26/2021                           Tag Copy   C, Peterson Calabrese [facility]   Certified Medical Assistant      Telephone Encounter   Signed   Creation Time:  7/9/2021  8:04 AM               Signed           ----- Message from Luba Moore MD sent at 7/8/2021  2:57 PM CDT -----  Please let the patient know, regarding his blood results.  He was supposed to have a preoperative history and physical this week, and we will going to discuss his blood results, from a lab visit, June 25, 2021.  He has not established primary care with me yet.  There is an abnormal protein in his blood, and he would require through the blood tests, to figure out if this is significant or not.  The rest of his blood tests are normal.     Luba Moore MD

## 2021-07-12 NOTE — TELEPHONE ENCOUNTER
Telephone Encounter by Noreen Smalls CMA at 7/12/2021 10:36 AM     Author: Noreen Smalls CMA Service: -- Author Type: Certified Medical Assistant    Filed: 7/12/2021 10:41 AM Encounter Date: 7/9/2021 Status: Signed    : Noreen Smalls CMA (Certified Medical Assistant)       TCB. Please refer to new epic system.

## 2021-07-12 NOTE — TELEPHONE ENCOUNTER
Reason for Call: patient is calling to follow up on pre op, ekg. Needs to know if he is cleared for surgery this Wednesday morning.     Detailed comments: please call patient back to let him know.    Phone Number Patient can be reached at: Home number on file [phone] (home)    Best Time: any    Can we leave a detailed message on this number? YES    Call taken on 7/12/2021 at 4:44 PM by Antoinette Martinez

## 2021-07-13 NOTE — TELEPHONE ENCOUNTER
Writer spoke with:  Vic  Message relayed:  Per Dr. Villanueva, patient is cleared for surgery.  Any further questions?  No.

## 2021-07-26 ENCOUNTER — OFFICE VISIT (OUTPATIENT)
Dept: INTERNAL MEDICINE | Facility: CLINIC | Age: 66
End: 2021-07-26
Payer: MEDICARE

## 2021-07-26 ENCOUNTER — VIRTUAL VISIT (OUTPATIENT)
Dept: NEUROLOGY | Facility: CLINIC | Age: 66
End: 2021-07-26
Payer: MEDICARE

## 2021-07-26 VITALS
DIASTOLIC BLOOD PRESSURE: 80 MMHG | SYSTOLIC BLOOD PRESSURE: 110 MMHG | HEART RATE: 58 BPM | HEIGHT: 64 IN | BODY MASS INDEX: 21.34 KG/M2 | OXYGEN SATURATION: 97 % | WEIGHT: 125 LBS

## 2021-07-26 DIAGNOSIS — I63.449 CEREBROVASCULAR ACCIDENT (CVA) DUE TO EMBOLISM OF CEREBELLAR ARTERY, UNSPECIFIED BLOOD VESSEL LATERALITY (H): Primary | ICD-10-CM

## 2021-07-26 DIAGNOSIS — R77.8 ABNORMAL SERUM PROTEIN ELECTROPHORESIS: ICD-10-CM

## 2021-07-26 DIAGNOSIS — D64.9 ANEMIA, UNSPECIFIED TYPE: ICD-10-CM

## 2021-07-26 DIAGNOSIS — E78.5 HYPERLIPIDEMIA LDL GOAL <100: ICD-10-CM

## 2021-07-26 DIAGNOSIS — E55.9 VITAMIN D DEFICIENCY: ICD-10-CM

## 2021-07-26 DIAGNOSIS — N32.89 BLADDER WALL THICKENING: ICD-10-CM

## 2021-07-26 DIAGNOSIS — G47.33 OBSTRUCTIVE SLEEP APNEA SYNDROME: ICD-10-CM

## 2021-07-26 DIAGNOSIS — F41.1 GAD (GENERALIZED ANXIETY DISORDER): Primary | ICD-10-CM

## 2021-07-26 DIAGNOSIS — C67.9 MALIGNANT NEOPLASM OF URINARY BLADDER, UNSPECIFIED SITE (H): ICD-10-CM

## 2021-07-26 DIAGNOSIS — F42.2 MIXED OBSESSIONAL THOUGHTS AND ACTS: ICD-10-CM

## 2021-07-26 DIAGNOSIS — Z13.1 ENCOUNTER FOR SCREENING EXAMINATION FOR IMPAIRED GLUCOSE REGULATION AND DIABETES MELLITUS: ICD-10-CM

## 2021-07-26 DIAGNOSIS — F33.1 MODERATE EPISODE OF RECURRENT MAJOR DEPRESSIVE DISORDER (H): ICD-10-CM

## 2021-07-26 DIAGNOSIS — D47.2 MONOCLONAL PARAPROTEINEMIA: ICD-10-CM

## 2021-07-26 DIAGNOSIS — Z13.1 SCREENING FOR DIABETES MELLITUS: ICD-10-CM

## 2021-07-26 PROCEDURE — 36415 COLL VENOUS BLD VENIPUNCTURE: CPT | Performed by: INTERNAL MEDICINE

## 2021-07-26 PROCEDURE — 86334 IMMUNOFIX E-PHORESIS SERUM: CPT | Performed by: INTERNAL MEDICINE

## 2021-07-26 PROCEDURE — 82607 VITAMIN B-12: CPT | Performed by: INTERNAL MEDICINE

## 2021-07-26 PROCEDURE — 82746 ASSAY OF FOLIC ACID SERUM: CPT | Performed by: INTERNAL MEDICINE

## 2021-07-26 PROCEDURE — 80061 LIPID PANEL: CPT | Performed by: INTERNAL MEDICINE

## 2021-07-26 PROCEDURE — 99214 OFFICE O/P EST MOD 30 MIN: CPT | Performed by: INTERNAL MEDICINE

## 2021-07-26 PROCEDURE — 82306 VITAMIN D 25 HYDROXY: CPT | Performed by: INTERNAL MEDICINE

## 2021-07-26 PROCEDURE — 90834 PSYTX W PT 45 MINUTES: CPT | Mod: 95 | Performed by: PSYCHOLOGIST

## 2021-07-26 PROCEDURE — 82947 ASSAY GLUCOSE BLOOD QUANT: CPT | Performed by: INTERNAL MEDICINE

## 2021-07-26 RX ORDER — ESCITALOPRAM OXALATE 20 MG/1
TABLET ORAL
COMMUNITY
Start: 2020-11-03 | End: 2021-12-27

## 2021-07-26 ASSESSMENT — MIFFLIN-ST. JEOR: SCORE: 1258

## 2021-07-26 NOTE — PROGRESS NOTES
Assessment & Plan   Problem List Items Addressed This Visit        Nervous and Auditory    Cerebrovascular accident (CVA) (H), patient states he was seen at [facility].  The right side of his body was weak.  This has completely resolved.   MRI March 16, 2020, 1 no acute or subacute infarction, or hemorrhage.  Chronic infarction in the left precentral gyrus and right cerebellum with additional presumed sequelae of mild to moderate chronic small vessel ischemic disease.    Patient also had an MRI, February 2018, which is included below, right cerebellar infarct was present.- Primary       Respiratory    Obstructive sleep apnea syndrome, patient states that he had a current sleep study October 2020, at [facility].  He did get treatment for this.  However he is not using it for the past 2-3 months as he did not want to clean it.  This is difficult with his obsessive-compulsive disorder.  We discussed that it would be important for him to use the CPAP, because of the bad effects on his body without using it.       Digestive    Vitamin D deficiency    Relevant Orders    Vitamin D Deficiency (Completed)       Endocrine    Hyperlipidemia LDL goal <100, patient has had a stroke in the past.  He also describes having had a TIA event in 2018.  Patient is on Lipitor 40 mg daily.    Relevant Orders    Lipid Profile (Chol, Trig, HDL, LDL calc) (Completed)       Urinary    Malignant neoplasm of urinary bladder, unspecified site (H), patient says that he had cystoscopy at [facility], at [facility] July 15.  Care Everywhere notes are reviewed.  Dr. Munroe.  Small bladder tumor, just above and lateral to the right ureteral orifice.  Appears to be superficial disease.  No other bladder tumors or suspicious mucosa.  Small transurethral resection of bladder tumor performed.  All tumor was removed.  Patient said he was in hospital until the next day.  They remove the catheter.  He does not have any pain, and no  bleeding.  He still awaiting pathology.     CT scan abdomen pelvis, November 7, 2020: Prostatomegaly, bladder is incompletely stented eccentric circumferential wall thickening.  Questionable punctate opacity at the ureteral orifice/tendon bladder which is incompletely evaluated.  The lesion cannot be completely excluded.  Consider repeat imaging with CT scan or ultrasound with a full bladder or direct visualization.       Behavioral    Moderate episode of recurrent major depressive disorder (H), patient is doing well with current management.    Relevant Medications    escitalopram (LEXAPRO) 20 MG tablet       Other    Abnormal serum protein electrophoresis, since his serum for protein immunofixation.  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    Relevant Orders    Protein Immunofixation Serum (Completed)      Other Visit Diagnoses     Anemia, unspecified type, hemoglobin has been low, 12.4, April 6, 2021, 12.7, Bela 3, 2021, and 12.7, June 25, 2021.  Perhaps this will improve, with resection of his bladder cancer.    12.4 (L) 12.7 (L) 12.7 (L)           Relevant Orders    Vitamin B12 (Completed)    Folate (Completed)    Oncology/Hematology Adult Referral    Bladder wall thickening, bladder cancer diagnosis as above.        Encounter for screening examination for impaired glucose regulation and diabetes mellitus        Screening for diabetes mellitus        Relevant Orders    Glucose (Completed)    Monoclonal paraproteinemia, monoclonal Ig G kappa immunoglobulin present, on blood tests that were done today.  Referral to hematology, discussed with the patient.       Relevant Orders    Oncology/Hematology Adult Referral         Obsessive-compulsive disorder.  Patient says that he did not leave the house, and his 20's for a year and a half.  He is on Social Security disability due to this.  This markedly impacted his life.  He brought his mother fattiness, she had a dementia.  Patient is on Lexapro 20 mg daily.  His  mood is bright.     MEDICATIONS:   Orders Placed This Encounter   Medications     escitalopram (LEXAPRO) 20 MG tablet     Sig: escitalopram 20 mg tablet   TAKE 1 TABLET BY MOUTH ONCE DAILY     Multiple Vitamins-Minerals (MULTIVITAL PO)     There are no discontinued medications.       - Continue other medications without change  There are no Patient Instructions on file for this visit.    Return in about 3 months (around 10/26/2021), or Chronic follow up with Dr. Moore.    Luba Moore MD  [facility]    Dima Ho is a 65 year old who presents for the following health issues, here today by himself.     HPI     Dr. Munroe urologist cystoscopy 7/15/2021:  There is a small bladder tumor, which lies just above and lateral to the right ureteral orifice. This appears superficial disease. There is no other bladder tumor or other suspicious mucosa. A small transurethral resection of bladder tumor is then performed, removing the bladder tumor entirely down to its base, and the resection site is cauterized with electrocautery.    Still getting the brown urine    Results for CONOR PÉREZ (MRN [id number]) as of 7/26/2021 17:01   Ref. Range 6/25/2021 16:05   Sodium Latest Ref Range: 136 - 145 mmol/L 142   Potassium Latest Ref Range: 3.5 - 5.0 mmol/L 3.6   Chloride Latest Ref Range: 98 - 107 mmol/L 105   Carbon Dioxide Latest Ref Range: 22 - 31 mmol/L 26   Urea Nitrogen Latest Ref Range: 8 - 22 mg/dL 11   Creatinine Latest Ref Range: 0.70 - 1.30 mg/dL 0.90   GFR Estimate Latest Ref Range: >60 mL/min/1.73m2 >60   GFR Estimate If Black Latest Ref Range: >60 mL/min/1.73m2 >60   Calcium Latest Ref Range: 8.5 - 10.5 mg/dL 9.0   Anion Gap Latest Ref Range: 5 - 18 mmol/L 11   Albumin Latest Ref Range: 3.5 - 5.0 g/dL 3.9   Protein Total Latest Ref Range: 6.0 - 8.0 g/dL 6.2   Bilirubin Total Latest Ref Range: 0.0 - 1.0 mg/dL 0.8   Alkaline Phosphatase Latest Ref Range: 45 - 120 U/L  "73   ALT Latest Ref Range: 0 - 45 U/L 37   AST Latest Ref Range: 0 - 40 U/L 34   Bilirubin Direct Latest Ref Range: <=0.5 mg/dL 0.3   Iron Latest Ref Range: 42 - 175 ug/dL 68   Transferrin Latest Ref Range: 212 - 360 mg/dL 256   Transferrin IBC Latest Ref Range: 313 - 563 ug/dL 320   Transferrin Saturation Latest Ref Range: 20 - 50 % 21   Glucose Latest Ref Range: 70 - 125 mg/dL 84     Results for CONOR PÉREZ P (MRN [id number]) as of 7/26/2021 17:01   Ref. Range 6/25/2021 16:05   WBC Latest Ref Range: 4.0 - 11.0 thou/uL 4.3   Hemoglobin Latest Ref Range: 14.0 - 18.0 g/dL 12.7 (L)   Hematocrit Latest Ref Range: 40.0 - 54.0 % 39.0 (L)   Platelet Count Latest Ref Range: 140 - 440 thou/uL 157     Results for CONOR PÉREZ P (MRN [id number]) as of 7/26/2021 17:12   Ref. Range 11/3/2020 15:16 4/6/2021 16:20 6/3/2021 16:13 6/25/2021 16:05   WBC Latest Ref Range: 4.0 - 11.0 thou/uL 5.6 4.9 4.4 4.3   Hemoglobin Latest Ref Range: 14.0 - 18.0 g/dL 14.5 12.4 (L) 12.7 (L) 12.7 (L)   Hematocrit Latest Ref Range: 40.0 - 54.0 % 42.7 37.9 (L) 39.1 (L) 39.0 (L)   Platelet Count Latest Ref Range: 140 - 440 thou/uL 198 135 (L) 153 157     Results for CONOR PÉREZ P (MRN [id number]) as of 7/26/2021 17:12   Ref. Range 6/25/2021 16:05   Ferritin Latest Ref Range: 27 - 300 ng/mL 152     Results for AKILDIANNECONOR P (MRN [id number]) as of 7/26/2021 17:12   Ref. Range 6/25/2021 16:05 6/25/2021 16:05   Transferrin Latest Ref Range: 212 - 360 mg/dL 256 256   Transferrin IBC Latest Ref Range: 313 - 563 ug/dL 320      Results for CONOR PÉREZ (MRN [id number]) as of 7/26/2021 17:12   Ref. Range 6/3/2021 16:13   Vitamin B12 Latest Ref Range: 213 - 816 pg/mL 697     Results for CONOR PÉREZ (MRN [id number]) as of 7/26/2021 17:12   Ref. Range 3/3/2014 00:00   Vitamin D,25-Hydroxy Latest Units: ng/mL 9.8     Patient has vitamin D in his MVT. He thinks there is 1000 units in the MVT.     6/25/2021  \"The densitometer tracing is normal in " "appearance, but the gel strip indicates a faint and possibly clonal band in the gamma region. The band is too small to quantify. Serum immunofixation may better characterize the anomalous protein.\"    Head MRI :  1.  8 mm acute or early subacute infarct involving the left precentral   gyrus in   the posterior frontal lobe. No significant mass effect or evidence of   associated   hemorrhage.   2.  Underlying mild cerebral volume loss and presumed chronic small vessel   ischemic changes.   3.  There is a tiny chronic infarct in the right cerebellar hemisphere.      Family History   Problem Relation Age of Onset     Cerebrovascular Disease Mother      Cerebrovascular Disease Father      Cancer Father      Snoring Mother      Snoring Father      Colon Cancer Father    Mother had numerous strokes, she had 2 heart attacks and high blood pressure.  She  from dementia.  Father and 5-6 cousins have  from colon cancer.    Patient lives alone.  Once a month or less alcohol.  Does not smoke.      Review of Systems   All other systems reviewed and are negative.       Objective    /80 (BP Location: Right arm, Patient Position: Sitting)   Pulse 58   Ht 1.626 m (5' 4\")   Wt 56.7 kg (125 lb)   SpO2 97%   BMI 21.46 kg/m    Body mass index is 21.46 kg/m .  Physical Exam   Patient is interactive and alert.  He talks a lot.  He discusses his obsessive-compulsive disorder which has really changed his life.  Chest is clear.  Normal heart sounds, no murmurs.  No peripheral edema.  Abdomen soft nontender, no organomegaly no masses.    "

## 2021-07-26 NOTE — LETTER
7/26/2021         RE: Berto Pretty  [address]  [facility] [address]        Dear Colleague,    Thank you for referring your patient, Berto Pretty, to the [facility]. Please see a copy of my visit note below.    Psychology Progress Note    Date: July 26, 2021    Time length and type of treatment: 46 minutes (11:01 AM - 11:47 AM) , individual therapy    After review of the patient's situation, this visit was changed from an in-person visit to a  telephone visit to reduce the risk of COVID 19 exposure. Patient was informed that policies and procedures that govern in-person sessions would also apply to  telephone sessions. Patient was also informed that  telephone sessions would be discontinued when COVID 19 exposure is no longer a concern (as determined by [facility]).     Patient location: Patient home in [place], [place]  Provider location:  [facility], [place], [place]    Patient was in agreement with proceeding with a  telephone session.      Necessity: This session is necessary to address the patient's depression, anxiety, hoarding, and OCD.  Today we focus on revising the patient's treatment plan.  The reader is invited to review the patient's full treatment plan in the Media section of the patient's Epic medical record.    Psychotherapeutic Technique: This writer utilized motivational interviewing, active listening, reassurance and support in the context of cognitive behavioral therapy to address the above.      MENTAL STATUS EVALUATION  Grooming: Unable to determine due to telephone visit  Attire: Unable to determine due to telephone visit  Age: Unable to determine due to telephone visit  Behavior Towards Examiner: Cooperative  Motor Activity: Unable to determine due to telephone visit  Eye Contact: Unable to determine due to telephone visit  Mood: Anxious   Affect: appropriate  Speech/Language: mildly pressured  Attention:  Fair  Concentration: Sufficient  Thought Process: tangential  Thought Content: Circumstantial   Orientation: Appeared oriented to person, place, and time, though not formally established  Memory: No evidence of impairment.  Judgement: Fair  Estimated Intelligence: Average  Demonstrated Insight: Adequate  Fund of Knowledge: Adequate    Intervention:  Patient was readministered the PHQ-9 and JHON-7 as part of revising his treatment plan. Patient's scores fell in the range suggestive of moderate depression and anxiety, which is 1 - 2 points higher than his previous moderate scores.  Patient opined that he is doing much better, especially with his anxiety, explaining that he was diagnosed with bladder cancer and 1 1/2 weeks ago, he had to spend 2 days at [facility] having a cancerous tumor removed.  Patient discussed using a range of skills taught in previous sessions to help him manage his OCD, and expressed pride that despite a germ phobia that makes it difficult for him to even go to a clinic, he was able to go to the hospital during a pandemic and spend two days.  Patient identified challenging irrational beliefs as particularly helpful.  Patient also expressed pride that even while dealing with the stress of cancer, he has continued to dedicate 3 - 4 hours/week to reducing his hoarding.         Progress:  Patient treatment plan was jointly revised.    Plan:   We will meet again in 2 weeks to address the patient's depression, anxiety, hoarding, and OCD.  Estimated duration of treatment is 10+ individual therapy sessions (55174) at twice monthly intervals. Treatment is expected to be completed by July 2022.     Diagnosis:  Generalized Anxiety Disorder  Major Depressive Disorder, recurrent, moderate  Hoarding Disorder  Obsessive-Compulsive Disorder (OCD)      Again, thank you for allowing me to participate in the care of your patient.        Sincerely,        Lisa Stout Psy.D, LP

## 2021-07-26 NOTE — PROGRESS NOTES
Psychology Progress Note    Date: July 26, 2021    Time length and type of treatment: 46 minutes (11:01 AM - 11:47 AM) , individual therapy    After review of the patient's situation, this visit was changed from an in-person visit to a  telephone visit to reduce the risk of COVID 19 exposure. Patient was informed that policies and procedures that govern in-person sessions would also apply to  telephone sessions. Patient was also informed that  telephone sessions would be discontinued when COVID 19 exposure is no longer a concern (as determined by [facility]).     Patient location: Patient home in [place], [place]  Provider location:  [facility], [place], [place]    Patient was in agreement with proceeding with a  telephone session.      Necessity: This session is necessary to address the patient's depression, anxiety, hoarding, and OCD.  Today we focus on revising the patient's treatment plan.  The reader is invited to review the patient's full treatment plan in the Media section of the patient's Epic medical record.    Psychotherapeutic Technique: This writer utilized motivational interviewing, active listening, reassurance and support in the context of cognitive behavioral therapy to address the above.      MENTAL STATUS EVALUATION  Grooming: Unable to determine due to telephone visit  Attire: Unable to determine due to telephone visit  Age: Unable to determine due to telephone visit  Behavior Towards Examiner: Cooperative  Motor Activity: Unable to determine due to telephone visit  Eye Contact: Unable to determine due to telephone visit  Mood: Anxious   Affect: appropriate  Speech/Language: mildly pressured  Attention: Fair  Concentration: Sufficient  Thought Process: tangential  Thought Content: Circumstantial   Orientation: Appeared oriented to person, place, and time, though not formally established  Memory: No evidence of impairment.  Judgement: Fair  Estimated  Intelligence: Average  Demonstrated Insight: Adequate  Fund of Knowledge: Adequate    Intervention:  Patient was readministered the PHQ-9 and JHON-7 as part of revising his treatment plan. Patient's scores fell in the range suggestive of moderate depression and anxiety, which is 1 - 2 points higher than his previous moderate scores.  Patient opined that he is doing much better, especially with his anxiety, explaining that he was diagnosed with bladder cancer and 1 1/2 weeks ago, he had to spend 2 days at [facility] having a cancerous tumor removed.  Patient discussed using a range of skills taught in previous sessions to help him manage his OCD, and expressed pride that despite a germ phobia that makes it difficult for him to even go to a clinic, he was able to go to the hospital during a pandemic and spend two days.  Patient identified challenging irrational beliefs as particularly helpful.  Patient also expressed pride that even while dealing with the stress of cancer, he has continued to dedicate 3 - 4 hours/week to reducing his hoarding.         Progress:  Patient treatment plan was jointly revised.    Plan:   We will meet again in 2 weeks to address the patient's depression, anxiety, hoarding, and OCD.  Estimated duration of treatment is 10+ individual therapy sessions ([id number]) at twice monthly intervals. Treatment is expected to be completed by July 2022.     Diagnosis:  Generalized Anxiety Disorder  Major Depressive Disorder, recurrent, moderate  Hoarding Disorder  Obsessive-Compulsive Disorder (OCD)

## 2021-07-27 LAB
CHOLEST SERPL-MCNC: 147 MG/DL
DEPRECATED CALCIDIOL+CALCIFEROL SERPL-MC: 41 UG/L (ref 30–80)
FASTING STATUS PATIENT QL REPORTED: YES
FASTING STATUS PATIENT QL REPORTED: YES
FOLATE SERPL-MCNC: 14.7 NG/ML
GLUCOSE BLD-MCNC: 82 MG/DL (ref 70–125)
HDLC SERPL-MCNC: 82 MG/DL
LDLC SERPL CALC-MCNC: 56 MG/DL
TRIGL SERPL-MCNC: 45 MG/DL
VIT B12 SERPL-MCNC: 727 PG/ML (ref 213–816)

## 2021-07-28 LAB
PATH ICD:: NORMAL
PROT PATTERN SERPL IFE-IMP: NORMAL
REVIEWING PATHOLOGIST: NORMAL

## 2021-07-28 NOTE — RESULT ENCOUNTER NOTE
Please let the patient know that blood test that was done to investigate his low blood count [anemia], has come back abnormal.  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  I will refer him to hematology, as they are the experts with this type of abnormal protein.    His vitamin D level is normal, please continue with any vitamin D that he is taking, as this is keeping his vitamin D in the normal range.  His vitamin B12 is normal.    His bad cholesterol, the LDL, is very good at 56.  He should continue with Lipitor 40 mg daily.    Luba Moore MD

## 2021-07-30 ENCOUNTER — TELEPHONE (OUTPATIENT)
Dept: INTERNAL MEDICINE | Facility: CLINIC | Age: 66
End: 2021-07-30

## 2021-07-30 NOTE — TELEPHONE ENCOUNTER
----- Message from Luba Moore MD sent at 7/28/2021  5:15 PM CDT -----  Please let the patient know that blood test that was done to investigate his low blood count [anemia], has come back abnormal.  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  I will refer him to hematology, as they are the experts with this type of abnormal protein.    His vitamin D level is normal, please continue with any vitamin D that he is taking, as this is keeping his vitamin D in the normal range.  His vitamin B12 is normal.    His bad cholesterol, the LDL, is very good at 56.  He should continue with Lipitor 40 mg daily.    Luba Moore MD

## 2021-07-30 NOTE — TELEPHONE ENCOUNTER
I would like him to be seen by hematology, as they can discuss with him, and do further blood tests, and other investigations, to determine what is causing this monoclonal protein.  There are number of different causes for this.    He did have liver enzymes done, this was done June 25, 2021 and these are normal.    Luba Moore MD

## 2021-07-30 NOTE — TELEPHONE ENCOUNTER
Called patient, he was notified of results below. Patient is questioning if Dr. Moore knows a possible cause for the monoclonal protein, could this be an indication of cancer? Patient states he is supposed to have labs to check his liver, it does not look like this was done, does he need to come back in to have this checked? Also, patient was given his glucose level result, he would like to know if this is a normal level. Please advise.    Patient requested lab results mailed to his home address and this was mailed.

## 2021-08-02 PROBLEM — D64.9 ANEMIA, UNSPECIFIED TYPE: Status: ACTIVE | Noted: 2021-08-02

## 2021-08-06 NOTE — PATIENT INSTRUCTIONS - HE
Patient Instructions by Tyesha Ventura MD at 11/3/2020  2:00 PM     Author: Tyesha Ventura MD Service: -- Author Type: Physician    Filed: 11/3/2020  2:38 PM Encounter Date: 11/3/2020 Status: Addendum    : Tyesha Ventura MD (Physician)    Related Notes: Original Note by Tyesha Ventura MD (Physician) filed at 11/3/2020  2:27 PM         Patient Education     Your Health Risk Assessment indicates you feel you are not in good physical health.    A healthy lifestyle helps keep the body fit and the mind alert. It helps protect you from disease, helps you fight disease, and helps prevent chronic disease (disease that doesn't go away) from getting worse. This is important as you get older and begin to notice twinges in muscles and joints and a decline in the strength and stamina you once took for granted. A healthy lifestyle includes good healthcare, good nutrition, weight control, recreation, and regular exercise. Avoid harmful substances and do what you can to keep safe. Another part of a healthy lifestyle is stay mentally active and socially involved.    Good healthcare     Have a wellness visit every year.     If you have new symptoms, let us know right away. Don't wait until the next checkup.     Take medicines exactly as prescribed and keep your medicines in a safe place. Tell us if your medicine causes problems.   Healthy diet and weight control     Eat 3 or 4 small, nutritious, low-fat, high-fiber meals a day. Include a variety of fruits, vegetables, and whole-grain foods.     Make sure you get enough calcium in your diet. Calcium, vitamin D, and exercise help prevent osteoporosis (bone thinning).     If you live alone, try eating with others when you can. That way you get a good meal and have company while you eat it.     Try to keep a healthy weight. If you eat more calories than your body uses for energy, it will be stored as fat and you will gain weight.     Recreation   Recreation is not  limited to sports and team events. It includes any activity that provides relaxation, interest, enjoyment, and exercise. Recreation provides an outlet for physical, mental, and social energy. It can give a sense of worth and achievement. It can help you stay healthy.       Patient Education     Exercise for a Healthier Heart  You may wonder how you can improve the health of your heart. If youre thinking about exercise, youre on the right track. You dont need to become an athlete, but you do need a certain amount of brisk exercise to help strengthen your heart. If you have been diagnosed with a heart condition, your doctor may recommend exercise to help stabilize your condition. To help make exercise a habit, choose safe, fun activities.       Be sure to check with your health care provider before starting an exercise program.    Why exercise?  Exercising regularly offers many healthy rewards. It can help you do all of the following:    Improve your blood cholesterol levels to help prevent further heart trouble    Lower your blood pressure to help prevent a stroke or heart attack    Control diabetes, or reduce your risk of getting this disease    Improve your heart and lung function    Reach and maintain a healthy weight    Make your muscles stronger and more limber so you can stay active    Prevent falls and fractures by slowing the loss of bone mass (osteoporosis)    Manage stress better  Exercise tips  Ease into your routine. Set small goals. Then build on them.  Exercise on most days. Aim for a total of 150 or more minutes of moderate to  vigorous intensity activity each week. Consider 40 minutes, 3 to 4 times a week. For best results, activity should last for 40 minutes on average. It is OK to work up to the 40 minute period over time. Examples of moderate-intensity activity is walking one mile in 15 minutes or 30 to 45 minutes of yard work.  Step up your daily activity level. Along with your exercise program,  try being more active throughout the day. Walk instead of drive. Do more household tasks or yard work.  Choose one or more activities you enjoy. Walking is one of the easiest things you can do. You can also try swimming, riding a bike, or taking an exercise class.  Stop exercising and call your doctor if you:    Have chest pain or feel dizzy or lightheaded    Feel burning, tightness, pressure, or heaviness in your chest, neck, shoulders, back, or arms    Have unusual shortness of breath    Have increased joint or muscle pain    Have palpitations or an irregular heartbeat      1559-4549 [employer]. [address] [place] [address]. All rights reserved. This information is not intended as a substitute for professional medical care. Always follow your healthcare professional's instructions.         Patient Education   Understanding [employer] MyPlate  The USDA (US Department of Agriculture) has guidelines to help you make healthy food choices. These are called MyPlate. MyPlate shows the food groups that make up healthy meals using the image of a place setting. Before you eat, think about the healthiest choices for what to put onto your plate or into your cup or bowl. To learn more about building a healthy plate, visit www.choosemyplate.gov.       The Food Groups    Fruits: Any fruit or 100% fruit juice counts as part of the Fruit Group. Fruits may be fresh, canned, frozen, or dried, and may be whole, cut-up, or pureed. Make half your plate fruits and vegetables.    Vegetables: Any vegetable or 100% vegetable juice counts as a member of the Vegetable Group. Vegetables may be fresh, frozen, canned, or dried. They can be served raw or cooked and may be whole, cut-up, or mashed. Make half your plate fruits and vegetables.     Grains: All foods made from grains are part of the Grains Group. These include wheat, rice, oats, cornmeal, and barley such as bread, pasta, oatmeal, cereal, tortillas, and grits.  Grains should be no more than a quarter of your plate. At least half of your grains should be whole grains.    Protein: This group includes meat, poultry, seafood, beans and peas, eggs, processed soy products (like tofu), nuts (including nut butters), and seeds. Make protein choices no more than a quarter of your plate. Meat and poultry choices should be lean or low fat.    Dairy: All fluid milk products and foods made from milk that contain calcium, like yogurt and cheese are part of the Dairy Group. (Foods that have little calcium, such as cream, butter, and cream cheese, are not part of the group.) Most dairy choices should be low-fat or fat-free.    Oils: These are fats that are liquid at room temperature. They include canola, corn, olive, soybean, and sunflower oil. Foods that are mainly oil include mayonnaise, certain salad dressings, and soft margarines. You should have only 5 to 7 teaspoons of oils a day. You probably already get this much from the food you eat.  Use [employer] to Help Build Your Meals  The SuperTracker can help you plan and track your meals and activity. You can look up individual foods to see or compare their nutritional value. You can get guidelines for what and how much you should eat. You can compare your food choices. And you can assess personal physical activities and see ways you can improve. Go to www.GlobeRanger.gov/supertracker/.    9586-2757 [employer]. [address] [place] [address], [place] [address]. All rights reserved. This information is not intended as a substitute for professional medical care. Always follow your healthcare professional's instructions.           Patient Education   Your Health Risk Assessment indicates you feel you are not in good emotional health.    Recreation   Recreation is not limited to sports and team events. It includes any activity that provides relaxation, interest, enjoyment, and exercise. Recreation provides an outlet for physical,  mental, and social energy. It can give a sense of worth and achievement. It can help you stay healthy.    Mental Exercise and Social Involvement  Mental and emotional health is as important as physical health. Keep in touch with friends and family. Stay as active as possible. Continue to learn and challenge yourself.   Things you can do to stay mentally active are:    Learn something new, like a foreign language or musical instrument.     Play SCRABBLE or do crossword puzzles. If you cannot find people to play these games with you at home, you can play them with others on your computer through the Internet.     Join a games club--anything from card games to chess or checkers or lawn bowling.     Start a new hobby.     Go back to school.     Volunteer.     Read.     Keep up with world events.       Patient Education   Depression and Suicide in Older Adults  Nearly 2 million older Americans have some type of depression. Sadly, some of them even take their own lives. Yet depression among older adults is often ignored. Learn the warning signs. You may help spare a loved one needless pain. You may also save a life.       What Is Depression?  Depression is a mood disorder that affects the way you think and feel. The most common symptom is a feeling of deep sadness. People who are depressed also may seem tired and listless. And nothing seems to give them pleasure. Its normal to grieve or be sad sometimes. But sadness lessens or passes with time. Depression rarely goes away or improves on its own. Other symptoms of depression are:    Sleeping more or less than normal    Eating more or less than normal    Having headaches, stomachaches, or other pains that dont go away    Feeling nervous, empty, or worthless    Crying a great deal    Thinking or talking about suicide or death    Feeling confused or forgetful  What Causes It?  The causes of depression arent fully known. Certain chemicals in the brain play a role. Depression  does run in families. And life stresses can also trigger depression in some people. That may be the case with older adults. They often face great burdens, such as the death of friends or a spouse. They may have failing health. And they are more likely to be alone, lonely, or poor.  How You Can Help  Often, depressed people may not want to ask for help. When they do, they may be ignored. Or, they may receive the wrong treatment. You can help by showing parents and older friends love and support. If they seem depressed, help them find the right treatment. Talk to your doctor. Or contact a local mental health center, social service agency, or hospital. With modern treatment, no one has to suffer from depression.  Resources:    National [place] of Mental Health  977.217.2910  www.nimh.nih.gov    National [place] on Mental Illness  668.963.2204  www.veronique.org    [facility]  704.840.8093  www.[facility].org    National Suicide Hotline  690.718.6155 (800-SUICIDE)      1748-9213 [employer]. [address] [place] [address]. All rights reserved. This information is not intended as a substitute for professional medical care. Always follow your healthcare professional's instructions.         Patient Education   Understanding Advance Care Planning  Advance care planning is the process of deciding ones own future medical care. It helps ensure that if you cant speak for yourself, your wishes can still be carried out. The plan is a series of legal documents that note a persons wishes. The documents vary by state. Advance care planning may be done when a person has a serious illness that is expected to get worse. It may be done before major surgery. And it can help you and your family be prepared in case of a major illness or injury. Advance care planning helps with making decisions at these times.       A health care proxy is a person who acts as the voice of a patient when the patient cant speak for  himself or herself. The name of this role varies by state. It may be called a Durable Medical Power of  or Durable Power of  for Healthcare. It may be called an agent, surrogate, or advocate. Or it may be called a representative or decision maker. It is an official duty that is identified by a legal document. The document also varies by state.    Why Is Advance Care Planning Important?  If a person communicates their healthcare wishes:    They will be given medical care that matches their values and goals.    Their family members will not be forced to make decisions in a crisis with no guidance.  Creating a Plan  Making an advance care plan is often done in 3 steps:    Thinking about ones wishes. To create an advance care plan, you should think about what kind of medical treatment you would want if you lose the ability to communicate. Are there any situations in which you would refuse or stop treatment? Are there therapies you would want or not want? And whom do you want to make decisions for you? There are many places to learn more about how to plan for your care. Ask your doctor or  for resources.    Picking a health care proxy. This means choosing a trusted person to speak for you only when you cant speak for yourself. When you cannot make medical decisions, your proxy makes sure the instructions in your advance care plan are followed. A proxy does not make decisions based on his or her own opinions. They must put aside those opinions and values if needed, and carry out your wishes.    Filling out the legal documents. There are several kinds of legal documents for advance care planning. Each one tells health care providers your wishes. The documents may vary by state. They must be signed and may need to be witnessed or notarized. You can cancel or change them whenever you wish. Depending on your state, the documents may include a Healthcare Proxy form, Living Will, Durable Medical  Power of , Advance Directive, or others.  The Familys Role  The best help a family can give is to support their loved ones wishes. Open and honest communication is vital. Family should express any concerns they have about the patients choices while the patient can still make decisions.    0981-9731 [employer]. [address] [place] [address]. All rights reserved. This information is not intended as a substitute for professional medical care. Always follow your healthcare professional's instructions.         Also, [facility] [employer] offers a free, downloadable health care directive that allows you to share your treatment choices and personal preferences if you cannot communicate your wishes. It also allows you to appoint another person (called a health care agent) to make health care decisions if you are unable to do so. You can download an advance directive by going here: [email or web address]     Patient Education   Personalized Prevention Plan  You are due for the preventive services outlined below.  Your care team is available to assist you in scheduling these services.  If you have already completed any of these items, please share that information with your care team to update in your medical record.  Health Maintenance   Topic Date Due   ? DEPRESSION ACTION PLAN  1955   ? HEPATITIS C SCREENING  1955   ? ZOSTER VACCINES (1 of 2) 08/11/2004   ? Pneumococcal Vaccine: 65+ Years (1 of 1 - PPSV23) 08/11/2019   ? INFLUENZA VACCINE RULE BASED (1) 08/01/2020   ? MEDICARE ANNUAL WELLNESS VISIT  11/03/2021   ? FALL RISK ASSESSMENT  11/03/2021   ? TD 18+ HE  12/27/2021   ? ADVANCE CARE PLANNING  12/27/2022   ? COLORECTAL CANCER SCREENING  10/10/2024   ? LIPID  03/13/2025   ? Pneumococcal Vaccine: Pediatrics (0 to 5 Years) and At-Risk Patients (6 to 64 Years)  Aged Out   ? HEPATITIS B VACCINES  Aged Out        Patient Education     Your Health  Risk Assessment indicates you feel you are not in good physical health.    A healthy lifestyle helps keep the body fit and the mind alert. It helps protect you from disease, helps you fight disease, and helps prevent chronic disease (disease that doesn't go away) from getting worse. This is important as you get older and begin to notice twinges in muscles and joints and a decline in the strength and stamina you once took for granted. A healthy lifestyle includes good healthcare, good nutrition, weight control, recreation, and regular exercise. Avoid harmful substances and do what you can to keep safe. Another part of a healthy lifestyle is stay mentally active and socially involved.    Good healthcare     Have a wellness visit every year.     If you have new symptoms, let us know right away. Don't wait until the next checkup.     Take medicines exactly as prescribed and keep your medicines in a safe place. Tell us if your medicine causes problems.   Healthy diet and weight control     Eat 3 or 4 small, nutritious, low-fat, high-fiber meals a day. Include a variety of fruits, vegetables, and whole-grain foods.     Make sure you get enough calcium in your diet. Calcium, vitamin D, and exercise help prevent osteoporosis (bone thinning).     If you live alone, try eating with others when you can. That way you get a good meal and have company while you eat it.     Try to keep a healthy weight. If you eat more calories than your body uses for energy, it will be stored as fat and you will gain weight.     Recreation   Recreation is not limited to sports and team events. It includes any activity that provides relaxation, interest, enjoyment, and exercise. Recreation provides an outlet for physical, mental, and social energy. It can give a sense of worth and achievement. It can help you stay healthy.       Patient Education     Exercise for a Healthier Heart  You may wonder how you can improve the health of your heart. If  youre thinking about exercise, youre on the right track. You dont need to become an athlete, but you do need a certain amount of brisk exercise to help strengthen your heart. If you have been diagnosed with a heart condition, your doctor may recommend exercise to help stabilize your condition. To help make exercise a habit, choose safe, fun activities.       Be sure to check with your health care provider before starting an exercise program.    Why exercise?  Exercising regularly offers many healthy rewards. It can help you do all of the following:    Improve your blood cholesterol levels to help prevent further heart trouble    Lower your blood pressure to help prevent a stroke or heart attack    Control diabetes, or reduce your risk of getting this disease    Improve your heart and lung function    Reach and maintain a healthy weight    Make your muscles stronger and more limber so you can stay active    Prevent falls and fractures by slowing the loss of bone mass (osteoporosis)    Manage stress better  Exercise tips  Ease into your routine. Set small goals. Then build on them.  Exercise on most days. Aim for a total of 150 or more minutes of moderate to  vigorous intensity activity each week. Consider 40 minutes, 3 to 4 times a week. For best results, activity should last for 40 minutes on average. It is OK to work up to the 40 minute period over time. Examples of moderate-intensity activity is walking one mile in 15 minutes or 30 to 45 minutes of yard work.  Step up your daily activity level. Along with your exercise program, try being more active throughout the day. Walk instead of drive. Do more household tasks or yard work.  Choose one or more activities you enjoy. Walking is one of the easiest things you can do. You can also try swimming, riding a bike, or taking an exercise class.  Stop exercising and call your doctor if you:    Have chest pain or feel dizzy or lightheaded    Feel burning, tightness,  pressure, or heaviness in your chest, neck, shoulders, back, or arms    Have unusual shortness of breath    Have increased joint or muscle pain    Have palpitations or an irregular heartbeat      3240-1805 [employer]. [address] [place] [address]. All rights reserved. This information is not intended as a substitute for professional medical care. Always follow your healthcare professional's instructions.         Patient Education   Understanding USDA MyPlate  The USDA (US Department of Agriculture) has guidelines to help you make healthy food choices. These are called MyPlate. MyPlate shows the food groups that make up healthy meals using the image of a place setting. Before you eat, think about the healthiest choices for what to put onto your plate or into your cup or bowl. To learn more about building a healthy plate, visit www.chooseCylanceplate.gov.       The Food Groups    Fruits: Any fruit or 100% fruit juice counts as part of the Fruit Group. Fruits may be fresh, canned, frozen, or dried, and may be whole, cut-up, or pureed. Make half your plate fruits and vegetables.    Vegetables: Any vegetable or 100% vegetable juice counts as a member of the Vegetable Group. Vegetables may be fresh, frozen, canned, or dried. They can be served raw or cooked and may be whole, cut-up, or mashed. Make half your plate fruits and vegetables.     Grains: All foods made from grains are part of the Grains Group. These include wheat, rice, oats, cornmeal, and barley such as bread, pasta, oatmeal, cereal, tortillas, and grits. Grains should be no more than a quarter of your plate. At least half of your grains should be whole grains.    Protein: This group includes meat, poultry, seafood, beans and peas, eggs, processed soy products (like tofu), nuts (including nut butters), and seeds. Make protein choices no more than a quarter of your plate. Meat and poultry choices should be lean or low fat.    Dairy: All  fluid milk products and foods made from milk that contain calcium, like yogurt and cheese are part of the Dairy Group. (Foods that have little calcium, such as cream, butter, and cream cheese, are not part of the group.) Most dairy choices should be low-fat or fat-free.    Oils: These are fats that are liquid at room temperature. They include canola, corn, olive, soybean, and sunflower oil. Foods that are mainly oil include mayonnaise, certain salad dressings, and soft margarines. You should have only 5 to 7 teaspoons of oils a day. You probably already get this much from the food you eat.  Use [employer] to Help Build Your Meals  The [employer] can help you plan and track your meals and activity. You can look up individual foods to see or compare their nutritional value. You can get guidelines for what and how much you should eat. You can compare your food choices. And you can assess personal physical activities and see ways you can improve. Go to www.[employer]ix.gov/Chicfycker/.    5579-6213 [employer]. [address] [place] [address]. All rights reserved. This information is not intended as a substitute for professional medical care. Always follow your healthcare professional's instructions.           Patient Education   Your Health Risk Assessment indicates you feel you are not in good emotional health.    Recreation   Recreation is not limited to sports and team events. It includes any activity that provides relaxation, interest, enjoyment, and exercise. Recreation provides an outlet for physical, mental, and social energy. It can give a sense of worth and achievement. It can help you stay healthy.    Mental Exercise and Social Involvement  Mental and emotional health is as important as physical health. Keep in touch with friends and family. Stay as active as possible. Continue to learn and challenge yourself.   Things you can do to stay mentally active are:    Learn something  new, like a foreign language or musical instrument.     Play SCRABBLE or do crossword puzzles. If you cannot find people to play these games with you at home, you can play them with others on your computer through the Internet.     Join a games club--anything from card games to chess or checkers or lawn bowling.     Start a new hobby.     Go back to school.     Volunteer.     Read.     Keep up with world events.       Patient Education   Depression and Suicide in Older Adults  Nearly 2 million older Americans have some type of depression. Sadly, some of them even take their own lives. Yet depression among older adults is often ignored. Learn the warning signs. You may help spare a loved one needless pain. You may also save a life.       What Is Depression?  Depression is a mood disorder that affects the way you think and feel. The most common symptom is a feeling of deep sadness. People who are depressed also may seem tired and listless. And nothing seems to give them pleasure. Its normal to grieve or be sad sometimes. But sadness lessens or passes with time. Depression rarely goes away or improves on its own. Other symptoms of depression are:    Sleeping more or less than normal    Eating more or less than normal    Having headaches, stomachaches, or other pains that dont go away    Feeling nervous, empty, or worthless    Crying a great deal    Thinking or talking about suicide or death    Feeling confused or forgetful  What Causes It?  The causes of depression arent fully known. Certain chemicals in the brain play a role. Depression does run in families. And life stresses can also trigger depression in some people. That may be the case with older adults. They often face great burdens, such as the death of friends or a spouse. They may have failing health. And they are more likely to be alone, lonely, or poor.  How You Can Help  Often, depressed people may not want to ask for help. When they do, they may be  ignored. Or, they may receive the wrong treatment. You can help by showing parents and older friends love and support. If they seem depressed, help them find the right treatment. Talk to your doctor. Or contact a local mental health center, social service agency, or hospital. With modern treatment, no one has to suffer from depression.  Resources:    National [place] of Mental Health  979.182.3306  www.[facility].nih.gov    National [place] on Mental Illness  490.468.1099  www.[facility]onique.org    Mental Health [place]  716.883.6044  www.[facility].org    National Suicide Hotline  930.917.7000 (800-SUICIDE)      6599-2225 [employer]. [address] [place] [address], [place] [address]. All rights reserved. This information is not intended as a substitute for professional medical care. Always follow your healthcare professional's instructions.         Patient Education   Understanding Advance Care Planning  Advance care planning is the process of deciding ones own future medical care. It helps ensure that if you cant speak for yourself, your wishes can still be carried out. The plan is a series of legal documents that note a persons wishes. The documents vary by state. Advance care planning may be done when a person has a serious illness that is expected to get worse. It may be done before major surgery. And it can help you and your family be prepared in case of a major illness or injury. Advance care planning helps with making decisions at these times.       A health care proxy is a person who acts as the voice of a patient when the patient cant speak for himself or herself. The name of this role varies by state. It may be called a Durable Medical Power of  or Durable Power of  for Healthcare. It may be called an agent, surrogate, or advocate. Or it may be called a representative or decision maker. It is an official duty that is identified by a legal document. The document also varies by state.    Why Is Advance  Care Planning Important?  If a person communicates their healthcare wishes:    They will be given medical care that matches their values and goals.    Their family members will not be forced to make decisions in a crisis with no guidance.  Creating a Plan  Making an advance care plan is often done in 3 steps:    Thinking about ones wishes. To create an advance care plan, you should think about what kind of medical treatment you would want if you lose the ability to communicate. Are there any situations in which you would refuse or stop treatment? Are there therapies you would want or not want? And whom do you want to make decisions for you? There are many places to learn more about how to plan for your care. Ask your doctor or  for resources.    Picking a health care proxy. This means choosing a trusted person to speak for you only when you cant speak for yourself. When you cannot make medical decisions, your proxy makes sure the instructions in your advance care plan are followed. A proxy does not make decisions based on his or her own opinions. They must put aside those opinions and values if needed, and carry out your wishes.    Filling out the legal documents. There are several kinds of legal documents for advance care planning. Each one tells health care providers your wishes. The documents may vary by state. They must be signed and may need to be witnessed or notarized. You can cancel or change them whenever you wish. Depending on your state, the documents may include a Healthcare Proxy form, Living Will, Durable Medical Power of , Advance Directive, or others.  The Familys Role  The best help a family can give is to support their loved ones wishes. Open and honest communication is vital. Family should express any concerns they have about the patients choices while the patient can still make decisions.    5193-7309 [employer]. [address] [place] [address], [place] [address]. All  rights reserved. This information is not intended as a substitute for professional medical care. Always follow your healthcare professional's instructions.         Also, [facility] offers a free, downloadable health care directive that allows you to share your treatment choices and personal preferences if you cannot communicate your wishes. It also allows you to appoint another person (called a health care agent) to make health care decisions if you are unable to do so. You can download an advance directive by going here: [email or web address]     Patient Education   Personalized Prevention Plan  You are due for the preventive services outlined below.  Your care team is available to assist you in scheduling these services.  If you have already completed any of these items, please share that information with your care team to update in your medical record.  Health Maintenance   Topic Date Due   ? DEPRESSION ACTION PLAN  1955   ? ZOSTER VACCINES (1 of 2) 08/11/2004   ? Pneumococcal Vaccine: 65+ Years (1 of 1 - PPSV23) 08/11/2019   ? INFLUENZA VACCINE RULE BASED (1) 08/01/2020   ? MEDICARE ANNUAL WELLNESS VISIT  11/03/2021   ? FALL RISK ASSESSMENT  11/03/2021   ? TD 18+ HE  12/27/2021   ? ADVANCE CARE PLANNING  12/27/2022   ? COLORECTAL CANCER SCREENING  10/10/2024   ? LIPID  03/13/2025   ? Pneumococcal Vaccine: Pediatrics (0 to 5 Years) and At-Risk Patients (6 to 64 Years)  Aged Out   ? HEPATITIS B VACCINES  Aged Out   ? HEPATITIS C SCREENING  Discontinued

## 2021-08-06 NOTE — TELEPHONE ENCOUNTER
Patient returned call and was given message.  Patient would like to know if his glucose was normal because he is concerned about diabetes and he would like to know if testing his glucose would tell him whether or not he has diabetes.      He would also like to know what kind of things can cause the abnormal monoclonal protein.  He knows the hematologist will have more information but he would like to know what are the causes of having that protein.      Patient also would like to let PCP know that urologist notified him 2 days ago that [facility] lost tissue sample from his bladder tumor that was removed on 7/15/2021. Patient states he was waiting on pathology reports to tell him what type of cancer it was but urologist called him to let him know that [facility] lost tumor sample and it was never examined by a pathologist.  Patient would like to know what PCP advice would be.      Ok to leave detailed message.    Rand Nicholson

## 2021-08-09 ENCOUNTER — APPOINTMENT (OUTPATIENT)
Dept: NEUROLOGY | Facility: CLINIC | Age: 66
End: 2021-08-09
Payer: MEDICARE

## 2021-08-09 NOTE — TELEPHONE ENCOUNTER
Vic called back, he did speak to Dr Munroe with [facility] later on Friday and he is going to reach out to speak to Dr Moore to discuss the bladder tumor issue that [facility] lost.  Vic would like you to reach out and advise on what the next step should be.   OK to leave a detailed message.

## 2021-08-09 NOTE — TELEPHONE ENCOUNTER
I think his next steps would be to discuss with [facility].  Hopefully will be able to  patient further.    Luba Moore MD

## 2021-08-26 ENCOUNTER — VIRTUAL VISIT (OUTPATIENT)
Dept: NEUROLOGY | Facility: CLINIC | Age: 66
End: 2021-08-26
Payer: MEDICARE

## 2021-08-26 ENCOUNTER — ONCOLOGY VISIT (OUTPATIENT)
Dept: ONCOLOGY | Facility: HOSPITAL | Age: 66
End: 2021-08-26
Attending: INTERNAL MEDICINE
Payer: MEDICARE

## 2021-08-26 VITALS
TEMPERATURE: 98.3 F | DIASTOLIC BLOOD PRESSURE: 69 MMHG | RESPIRATION RATE: 16 BRPM | OXYGEN SATURATION: 100 % | SYSTOLIC BLOOD PRESSURE: 137 MMHG | HEART RATE: 79 BPM | WEIGHT: 126.7 LBS | BODY MASS INDEX: 21.75 KG/M2

## 2021-08-26 DIAGNOSIS — D64.9 ANEMIA, UNSPECIFIED TYPE: ICD-10-CM

## 2021-08-26 DIAGNOSIS — F41.1 GAD (GENERALIZED ANXIETY DISORDER): Primary | ICD-10-CM

## 2021-08-26 DIAGNOSIS — D47.2 MONOCLONAL PARAPROTEINEMIA: ICD-10-CM

## 2021-08-26 LAB
IGA SERPL-MCNC: 85 MG/DL (ref 65–400)
IGG SERPL-MCNC: 918 MG/DL (ref 700–1700)
IGM SERPL-MCNC: 96 MG/DL (ref 60–280)

## 2021-08-26 PROCEDURE — G0463 HOSPITAL OUTPT CLINIC VISIT: HCPCS

## 2021-08-26 PROCEDURE — 90834 PSYTX W PT 45 MINUTES: CPT | Mod: 95 | Performed by: PSYCHOLOGIST

## 2021-08-26 PROCEDURE — 82784 ASSAY IGA/IGD/IGG/IGM EACH: CPT | Performed by: INTERNAL MEDICINE

## 2021-08-26 PROCEDURE — 36415 COLL VENOUS BLD VENIPUNCTURE: CPT | Performed by: INTERNAL MEDICINE

## 2021-08-26 PROCEDURE — 99205 OFFICE O/P NEW HI 60 MIN: CPT | Performed by: INTERNAL MEDICINE

## 2021-08-26 RX ORDER — ATORVASTATIN CALCIUM 40 MG/1
40 TABLET, FILM COATED ORAL DAILY
COMMUNITY
End: 2022-05-12

## 2021-08-26 ASSESSMENT — PAIN SCALES - GENERAL: PAINLEVEL: NO PAIN (0)

## 2021-08-26 NOTE — PROGRESS NOTES
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  Very pleasant 66-year-old man with mild anemia normocytic normochromic and type.  No evidence of any iron deficiency at this point.  2.  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  We will order quantitation of immunoglobulins as well as capital and lambda light chains.  He will need to follow-up on that on a yearly basis.  3.  History of bladder cancer superficial type treated with TURBT and currently on evaluation by urologist.  4.  History of obsessive-compulsive disorder.  5.  Good general health otherwise.    ECOG Performance    0 - Independent    Encounter Diagnoses:    Problem List Items Addressed This Visit        Hematologic    Anemia, unspecified type    Relevant Orders    Immunoglobulins A G and M    Kappa and lambda light chain    Immunoglobulin Total Light Chains, Urine    CBC with platelets    CBC with platelets    Immunoglobulins A G and M    Kappa and lambda light chain      Other Visit Diagnoses     Monoclonal paraproteinemia        Relevant Orders    Immunoglobulins A G and M    Kappa and lambda light chain    Immunoglobulin Total Light Chains, Urine    CBC with platelets    CBC with platelets    Immunoglobulins A G and M    Kappa  and lambda light chain              ______________________________________________________________________________    Staging History  Cancer Staging  No matching staging information was found for the patient.      History    Mr. Berto Pretty is a 66 year old gentleman who has been referred to us for evaluation of anemia as well as monoclonal gammopathy.  The patient was initially diagnosed to have mild anemia when his hemoglobin was noted to be 12.4 in April 2021.  This was normocytic normochromic type of anemia and he did have slight thrombocytopenia associated with it.  Subsequent evaluation revealed that his hemoglobin has stayed more or less the same however his platelet count has improved to normal.    He had full work-up done by Luba Moore including iron, vitamin B12 etc.  All those results were more or less normal.  However evaluation of his serum protein electrophoresis did reveal a small monoclonal peak which upon immunofixation was positive for IgG kappa type of monoclonal myopathy.    Therefore he has been sent here for further evaluation.    The patient is not terribly symptomatic from his anemia.      Review of systems.  No fever or night sweats.  3-5 pounds loss of weight.  No lumps or bumps anywhere.  No unusual headaches or eyesight issues.  Occasional dizziness.  No bleeding from the nose.  No sores in the mouth. No problems with swallowing.  No chest pain. No shortness of breath. No cough.  No abdominal pain. No nausea or vomiting.  No diarrhea or constipation.  No blood in stool or black colored stools.  He has been dealing with some bladder cancer issues.  Currently no problems passing urine.  No numbness or tingling in hands or feet.  No skin rashes.  He does have obsessive-compulsive disorder anxiety etc.  A 14 point review of systems is otherwise negative.      Past History    Past Medical History:   Diagnosis Date     Cancer (H)      Cerebrovascular accident (H)       Dyslipidemia      OCD (obsessive compulsive disorder)      RACHNA (obstructive sleep apnea)      RACHNA (obstructive sleep apnea)      Past Surgical History:   Procedure Laterality Date     NO PAST SURGERIES       Family History   Problem Relation Age of Onset     Cerebrovascular Disease Mother      Cerebrovascular Disease Father      Cancer Father      Snoring Mother      Snoring Father      Colon Cancer Father      Social History     Socioeconomic History     Marital status: Single     Spouse name: None     Number of children: 0     Years of education: None     Highest education level: None   Occupational History     None   Tobacco Use     Smoking status: Former Smoker     Types: Cigarettes     Quit date: 1974     Years since quittin.6     Smokeless tobacco: Never Used   Substance and Sexual Activity     Alcohol use: Yes     Drug use: Never     Sexual activity: None   Other Topics Concern     Parent/sibling w/ CABG, MI or angioplasty before 65F 55M? Not Asked   Social History Narrative    Lives alone. Retired.      Social Determinants of Health     Financial Resource Strain:      Difficulty of Paying Living Expenses:    Food Insecurity:      Worried About Running Out of Food in the Last Year:      Ran Out of Food in the Last Year:    Transportation Needs:      Lack of Transportation (Medical):      Lack of Transportation (Non-Medical):    Physical Activity:      Days of Exercise per Week:      Minutes of Exercise per Session:    Stress:      Feeling of Stress :    Social Connections:      Frequency of Communication with Friends and Family:      Frequency of Social Gatherings with Friends and Family:      Attends [employer] Services:      Active Member of Clubs or Organizations:      Attends Club or Organization Meetings:      Marital Status:    Intimate Partner Violence:      Fear of Current or Ex-Partner:      Emotionally Abused:      Physically Abused:      Sexually Abused:          Allergies    Allergies    Allergen Reactions     Pollen Extract      Latex Rash           Physical Exam    /69 (BP Location: Left arm, Patient Position: Sitting)   Pulse 79   Temp 98.3  F (36.8  C) (Oral)   Resp 16   Wt 57.5 kg (126 lb 11.2 oz)   SpO2 100%   BMI 21.75 kg/m        GENERAL: Alert and oriented to time place and person. Seated comfortably. In no distress.    HEAD: Atraumatic and normocephalic.    EYES: YANICK, EOMI.No pallor.No icterus.    Oral cavity: no mucosal lesion or tonsillar enlargement.    NECK: supple. JVP normal.No thyroid enlargement.    LYMPH NODES: No palpable, cervical, axillary or inguinal lymphadenopathy.    CHEST: clear to auscultation bilaterally. Symmetrical breath movements bilaterally.    CVS: S1 and S2 are heard. Regular rate and rhythm. No murmur or gallop or rub heard.  No peripheral edema.    ABDOMEN: Soft. Not tender. Not distended.No palpable hepatomegaly or splenomegaly. No other mass palpable. Bowel sounds heard.    EXTREMITIES: Warm.    SKIN: no rash, or bruising or purpura.      Lab Results    No results found for this or any previous visit (from the past 168 hour(s)).    Imaging Results    No results found.      Signed by: Zen Oliver MD, MD

## 2021-08-26 NOTE — PROGRESS NOTES
Psychology Progress Note    Date: August 26, 2021    Time length and type of treatment: 50 minutes (9:00 AM - 9:50 AM), individual therapy    After review of the patient's situation, this visit was changed from an in-person visit to a telephone visit to reduce the risk of COVID 19 exposure. Patient was informed that policies and procedures that govern in-person sessions would also apply to telephone sessions. Patient was also informed that telephone sessions would be discontinued when COVID 19 exposure is no longer a concern (as determined by [facility]).     Patient location: Patient home in [place], [place]  Provider location:  [facility], [place], [place]    Patient was in agreement with proceeding with a telephone session.      Necessity: This session is necessary to address the patient's anxiety, hoarding, and OCD.  Today we focus on the patient's treatment plan, specifically exploring coping skills.  The reader is invited to review the patient's full treatment plan in the Media section of the patient's Epic medical record.    Psychotherapeutic Technique: This writer utilized motivational interviewing, active listening, reassurance and support in the context of cognitive behavioral therapy to address the above.      MENTAL STATUS EVALUATION  Grooming: Unable to determine due to telephone visit  Attire:  Unable to determine due to telephone visit  Age:  Unable to determine due to telephone visit  Behavior Towards Examiner: Cooperative  Motor Activity:  Unable to determine due to telephone visit  Eye Contact:  Unable to determine due to telephone visit  Mood: Anxious   Affect: Anxious  Speech/Language: Mildly pressured  Attention: Easily distracted  Concentration: Distracted  Thought Process: tangential  Thought Content: Clear    Orientation: Appeared oriented to person, place, and time, though not formally established  Memory: No evidence of impairment.  Judgement:  Adequate  Estimated Intelligence: Average  Demonstrated Insight: Adequate  Fund of Knowledge: Adequate    Intervention:  Patient reported that [facility] lost his bladder cancer tumor sample which meant a pathology report isn't possible. He also had to buy a used car after his car broke, and received an offer on the  of his houses. As a result patient's anxiety has increased. He reported that exercise, distraction, and engaging in activities he enjoys have all been helpful, but requested additional assistance.  Patient was provided additional psychoeducation about anxiety and patient was taught box breathing since he reported previous breathing strategies felt overly complicated and difficult to remember. He also reported he has been eating every day, which is an improvement for him. Patient was also provided additional psychoeducation related to the concept of distress tolerance, and identified occasions when he felt he managed this well. Patient expressed pride that he was able to buy a used car, given his germ fears, and surprise that he has been able to make so much progress in therapy, noting that if he'd been told 3 months ago that he would have been able to do these things, he wouldn't have believed it.     Progress:  Patient has shown improvement in ability to utilize coping skills.     Plan:   We will meet again in 2 weeks to address the patient's depression, anxiety, hoarding, and OCD.  Estimated duration of treatment is 10+ individual therapy sessions (57132) at twice monthly intervals. Treatment is expected to be completed by July 2022.     Diagnosis:  Generalized Anxiety Disorder  Major Depressive Disorder, recurrent, moderate  Hoarding Disorder  Obsessive-Compulsive Disorder (OCD)

## 2021-08-26 NOTE — PROGRESS NOTES
"Oncology Rooming Note    August 26, 2021 3:32 PM   Berto Pretty is a 66 year old male who presents for:    Chief Complaint   Patient presents with     Hematology     Consult     Initial Vitals: /69 (BP Location: Left arm, Patient Position: Sitting)   Pulse 79   Temp 98.3  F (36.8  C) (Oral)   Resp 16   Wt 57.5 kg (126 lb 11.2 oz)   SpO2 100%   BMI 21.75 kg/m   Estimated body mass index is 21.75 kg/m  as calculated from the following:    Height as of 7/26/21: 1.626 m (5' 4\").    Weight as of this encounter: 57.5 kg (126 lb 11.2 oz). Body surface area is 1.61 meters squared.  No Pain (0) Comment: Data Unavailable   No LMP for male patient.  Allergies reviewed: Yes  Medications reviewed: Yes    Medications: Medication refills not needed today.  Pharmacy name entered into [facility]: [id number] - [address]    Clinical concerns: anemia consult        Zamzam Stock RN              "

## 2021-08-26 NOTE — LETTER
"    8/26/2021         RE: Berto Pretty  [address]  [facility] [address]        Dear Colleague,    Thank you for referring your patient, Berto Pretty, to the [facility] [place]. Please see a copy of my visit note below.    Oncology Rooming Note    August 26, 2021 3:32 PM   Berto Pretty is a 66 year old male who presents for:    Chief Complaint   Patient presents with     Hematology     Consult     Initial Vitals: /69 (BP Location: Left arm, Patient Position: Sitting)   Pulse 79   Temp 98.3  F (36.8  C) (Oral)   Resp 16   Wt 57.5 kg (126 lb 11.2 oz)   SpO2 100%   BMI 21.75 kg/m   Estimated body mass index is 21.75 kg/m  as calculated from the following:    Height as of 7/26/21: 1.626 m (5' 4\").    Weight as of this encounter: 57.5 kg (126 lb 11.2 oz). Body surface area is 1.61 meters squared.  No Pain (0) Comment: Data Unavailable   No LMP for male patient.  Allergies reviewed: Yes  Medications reviewed: Yes    Medications: Medication refills not needed today.  Pharmacy name entered into [facility]: 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  Very pleasant 66-year-old man with mild anemia normocytic normochromic and type.  No evidence of any iron deficiency at this point.  2.  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  We will order quantitation of immunoglobulins as well as capital and lambda light chains.  He will need to follow-up on that on a yearly basis.  3.  History of bladder cancer superficial type treated with TURBT and currently on evaluation by urologist.  4.  History of obsessive-compulsive disorder.  5.  Good general health otherwise.    ECOG Performance    0 - Independent    Encounter Diagnoses:    Problem List Items Addressed This Visit        Hematologic    Anemia, unspecified type    Relevant Orders    Immunoglobulins A G and M    Kappa and lambda light chain    Immunoglobulin Total Light Chains, Urine    CBC with platelets    CBC with platelets    Immunoglobulins A G and M    Kappa and lambda light chain      Other Visit Diagnoses     Monoclonal paraproteinemia        Relevant Orders    Immunoglobulins A G and M    Kappa and lambda light chain    Immunoglobulin Total Light Chains, Urine    CBC with platelets    CBC with platelets    Immunoglobulins A G and M    Kappa and lambda light chain              ______________________________________________________________________________    Staging History  Cancer Staging  No matching staging information was found for the patient.      History    Mr. Berto Pretty is a 66 year old gentleman who has been referred to us for evaluation of anemia as well as monoclonal gammopathy.  The patient was initially diagnosed to have mild anemia when his hemoglobin was noted to be 12.4 in April 2021.  This was normocytic normochromic type of anemia and he did have slight thrombocytopenia associated with it.  Subsequent evaluation revealed that his hemoglobin has stayed more or less the same however his platelet count has improved to normal.    He had  full work-up done by Luba Moore including iron, vitamin B12 etc.  All those results were more or less normal.  However evaluation of his serum protein electrophoresis did reveal a small monoclonal peak which upon immunofixation was positive for IgG kappa type of monoclonal myopathy.    Therefore he has been sent here for further evaluation.    The patient is not terribly symptomatic from his anemia.      Review of systems.  No fever or night sweats.  3-5 pounds loss of weight.  No lumps or bumps anywhere.  No unusual headaches or eyesight issues.  Occasional dizziness.  No bleeding from the nose.  No sores in the mouth. No problems with swallowing.  No chest pain. No shortness of breath. No cough.  No abdominal pain. No nausea or vomiting.  No diarrhea or constipation.  No blood in stool or black colored stools.  He has been dealing with some bladder cancer issues.  Currently no problems passing urine.  No numbness or tingling in hands or feet.  No skin rashes.  He does have obsessive-compulsive disorder anxiety etc.  A 14 point review of systems is otherwise negative.      Past History    Past Medical History:   Diagnosis Date     Cancer (H)      Cerebrovascular accident (H)      Dyslipidemia      OCD (obsessive compulsive disorder)      RACHNA (obstructive sleep apnea)      RACHNA (obstructive sleep apnea)      Past Surgical History:   Procedure Laterality Date     NO PAST SURGERIES       Family History   Problem Relation Age of Onset     Cerebrovascular Disease Mother      Cerebrovascular Disease Father      Cancer Father      Snoring Mother      Snoring Father      Colon Cancer Father      Social History     Socioeconomic History     Marital status: Single     Spouse name: None     Number of children: 0     Years of education: None     Highest education level: None   Occupational History     None   Tobacco Use     Smoking status: Former Smoker     Types: Cigarettes     Quit date: 1/1/1974     Years since  quittin.6     Smokeless tobacco: Never Used   Substance and Sexual Activity     Alcohol use: Yes     Drug use: Never     Sexual activity: None   Other Topics Concern     Parent/sibling w/ CABG, MI or angioplasty before 65F 55M? Not Asked   Social History Narrative    Lives alone. Retired.      Social Determinants of Health     Financial Resource Strain:      Difficulty of Paying Living Expenses:    Food Insecurity:      Worried About Running Out of Food in the Last Year:      Ran Out of Food in the Last Year:    Transportation Needs:      Lack of Transportation (Medical):      Lack of Transportation (Non-Medical):    Physical Activity:      Days of Exercise per Week:      Minutes of Exercise per Session:    Stress:      Feeling of Stress :    Social Connections:      Frequency of Communication with Friends and Family:      Frequency of Social Gatherings with Friends and Family:      Attends Christian Services:      Active Member of Clubs or Organizations:      Attends Club or Organization Meetings:      Marital Status:    Intimate Partner Violence:      Fear of Current or Ex-Partner:      Emotionally Abused:      Physically Abused:      Sexually Abused:          Allergies    Allergies   Allergen Reactions     Pollen Extract      Latex Rash           Physical Exam    /69 (BP Location: Left arm, Patient Position: Sitting)   Pulse 79   Temp 98.3  F (36.8  C) (Oral)   Resp 16   Wt 57.5 kg (126 lb 11.2 oz)   SpO2 100%   BMI 21.75 kg/m        GENERAL: Alert and oriented to time place and person. Seated comfortably. In no distress.    HEAD: Atraumatic and normocephalic.    EYES: YANICK, EOMI.No pallor.No icterus.    Oral cavity: no mucosal lesion or tonsillar enlargement.    NECK: supple. JVP normal.No thyroid enlargement.    LYMPH NODES: No palpable, cervical, axillary or inguinal lymphadenopathy.    CHEST: clear to auscultation bilaterally. Symmetrical breath movements bilaterally.    CVS: S1 and S2 are  heard. Regular rate and rhythm. No murmur or gallop or rub heard.  No peripheral edema.    ABDOMEN: Soft. Not tender. Not distended.No palpable hepatomegaly or splenomegaly. No other mass palpable. Bowel sounds heard.    EXTREMITIES: Warm.    SKIN: no rash, or bruising or purpura.      Lab Results    No results found for this or any previous visit (from the past 168 hour(s)).    Imaging Results    No results found.      Signed by: Zen Oliver MD, MD        Again, thank you for allowing me to participate in the care of your patient.        Sincerely,        Zen Oliver MD, MD

## 2021-08-26 NOTE — LETTER
8/26/2021         RE: Berto Pretty  [address] [facility] [address]        Dear Colleague,    Thank you for referring your patient, Berto Pretty, to the [facility]. Please see a copy of my visit note below.    Psychology Progress Note    Date: August 26, 2021    Time length and type of treatment: 50 minutes (9:00 AM - 9:50 AM), individual therapy    After review of the patient's situation, this visit was changed from an in-person visit to a telephone visit to reduce the risk of COVID 19 exposure. Patient was informed that policies and procedures that govern in-person sessions would also apply to telephone sessions. Patient was also informed that telephone sessions would be discontinued when COVID 19 exposure is no longer a concern (as determined by [facility]).     Patient location: Patient home in [place], [place]  Provider location:  [facility], [place], [place]    Patient was in agreement with proceeding with a telephone session.      Necessity: This session is necessary to address the patient's anxiety, hoarding, and OCD.  Today we focus on the patient's treatment plan, specifically exploring coping skills.  The reader is invited to review the patient's full treatment plan in the Media section of the patient's Epic medical record.    Psychotherapeutic Technique: This writer utilized motivational interviewing, active listening, reassurance and support in the context of cognitive behavioral therapy to address the above.      MENTAL STATUS EVALUATION  Grooming: Unable to determine due to telephone visit  Attire:  Unable to determine due to telephone visit  Age:  Unable to determine due to telephone visit  Behavior Towards Examiner: Cooperative  Motor Activity:  Unable to determine due to telephone visit  Eye Contact:  Unable to determine due to telephone visit  Mood: Anxious   Affect: Anxious  Speech/Language: Mildly  pressured  Attention: Easily distracted  Concentration: Distracted  Thought Process: tangential  Thought Content: Clear    Orientation: Appeared oriented to person, place, and time, though not formally established  Memory: No evidence of impairment.  Judgement: Adequate  Estimated Intelligence: Average  Demonstrated Insight: Adequate  Fund of Knowledge: Adequate    Intervention:  Patient reported that [facility] lost his bladder cancer tumor sample which meant a pathology report isn't possible. He also had to buy a used car after his car broke, and received an offer on the  of his houses. As a result patient's anxiety has increased. He reported that exercise, distraction, and engaging in activities he enjoys have all been helpful, but requested additional assistance.  Patient was provided additional psychoeducation about anxiety and patient was taught box breathing since he reported previous breathing strategies felt overly complicated and difficult to remember. He also reported he has been eating every day, which is an improvement for him. Patient was also provided additional psychoeducation related to the concept of distress tolerance, and identified occasions when he felt he managed this well. Patient expressed pride that he was able to buy a used car, given his germ fears, and surprise that he has been able to make so much progress in therapy, noting that if he'd been told 3 months ago that he would have been able to do these things, he wouldn't have believed it.     Progress:  Patient has shown improvement in ability to utilize coping skills.     Plan:   We will meet again in 2 weeks to address the patient's depression, anxiety, hoarding, and OCD.  Estimated duration of treatment is 10+ individual therapy sessions (10382) at twice monthly intervals. Treatment is expected to be completed by July 2022.     Diagnosis:  Generalized Anxiety Disorder  Major Depressive Disorder, recurrent,  moderate  Hoarding Disorder  Obsessive-Compulsive Disorder (OCD)      Again, thank you for allowing me to participate in the care of your patient.        Sincerely,        Lisa Stout Psy.D, LP

## 2021-08-30 ENCOUNTER — TRANSFERRED RECORDS (OUTPATIENT)
Dept: HEALTH INFORMATION MANAGEMENT | Facility: CLINIC | Age: 66
End: 2021-08-30

## 2021-09-08 ENCOUNTER — VIRTUAL VISIT (OUTPATIENT)
Dept: NEUROLOGY | Facility: CLINIC | Age: 66
End: 2021-09-08
Payer: MEDICARE

## 2021-09-08 DIAGNOSIS — F41.1 GAD (GENERALIZED ANXIETY DISORDER): Primary | ICD-10-CM

## 2021-09-08 PROCEDURE — 90834 PSYTX W PT 45 MINUTES: CPT | Mod: 95 | Performed by: PSYCHOLOGIST

## 2021-09-08 NOTE — PROGRESS NOTES
Psychology Progress Note    Date: September 08, 2021    Time length and type of treatment: 48 minutes (11:02 AM - 11:50 AM), individual therapy    After review of the patient's situation, this visit was changed from an in-person visit to a telephone visit to reduce the risk of COVID 19 exposure. Patient was informed that policies and procedures that govern in-person sessions would also apply to telelphone sessions. Patient was also informed that telephone sessions would be discontinued when COVID 19 exposure is no longer a concern (as determined by [facility]).     Patient location: Patient home in [place], [place]  Provider location:  [facility], [place], [place]    Patient was in agreement with proceeding with a video session.      Necessity: This session is necessary to address the patient's depression, anxiety, hoarding, and OCD.  Today we focus on the patient's treatment plan, specifically exploring coping strategies.  The reader is invited to review the patient's full treatment plan in the Media section of the patient's Epic medical record.    Psychotherapeutic Technique: This writer utilized motivational interviewing, active listening, reassurance and support in the context of cognitive behavioral therapy to address the above.      MENTAL STATUS EVALUATION  Grooming: Unable to determine due to telephone visit  Attire: Unable to determine due to telephone visit  Age: Unable to determine due to telephone visit  Behavior Towards Examiner: Cooperative  Motor Activity: Unable to determine due to telephone visit  Eye Contact: Unable to determine due to telephone visit  Mood: Anxious   Affect: Congruent with content  Speech/Language: mildly pressured  Attention: Adequate  Concentration: Sufficient  Thought Process: Circumstantial  Thought Content: Clear    Orientation: Appeared oriented to person, place, and time, though not formally established  Memory: No evidence of  impairment.  Judgement: Adequate  Estimated Intelligence: Average  Demonstrated Insight: Fair  Fund of Knowledge: Good    Intervention:   Patient has postponed a colonoscopy due to anxiety and then COVID-19, but is now planning to move forward with it. He discussed feeling overwhelmed by multiple commitments, both personal and medical, and we discussed prioritizing and postponing nonessential tasks.  We reviewed coping strategies that have helped patient manage anxiety, practiced a strategy to help patient slow his thinking, and the concept of distress tolerance was introduced.    Progress:  Patient has shown improvement in ability to utilize coping skills.     Plan:   We will meet again in 2 weeks to address the patient's depression, anxiety, hoarding, and OCD.  Estimated duration of treatment is 10+ individual therapy sessions (26093) at twice monthly intervals. Treatment is expected to be completed by July 2022.     Diagnosis:  Generalized Anxiety Disorder  Major Depressive Disorder, recurrent, moderate  Hoarding Disorder  Obsessive-Compulsive Disorder (OCD)

## 2021-09-08 NOTE — LETTER
9/8/2021         RE: Berto Pretty  [address]  [facility] [address]        Dear Colleague,    Thank you for referring your patient, Berto Pretty, to the [facility]. Please see a copy of my visit note below.    Psychology Progress Note    Date: September 08, 2021    Time length and type of treatment: 48 minutes (11:02 AM - 11:50 AM), individual therapy    After review of the patient's situation, this visit was changed from an in-person visit to a telephone visit to reduce the risk of COVID 19 exposure. Patient was informed that policies and procedures that govern in-person sessions would also apply to telelphone sessions. Patient was also informed that telephone sessions would be discontinued when COVID 19 exposure is no longer a concern (as determined by [facility]).     Patient location: Patient home in [place], [place]  Provider location:  [facility], [place], [place]    Patient was in agreement with proceeding with a video session.      Necessity: This session is necessary to address the patient's depression, anxiety, hoarding, and OCD.  Today we focus on the patient's treatment plan, specifically exploring coping strategies.  The reader is invited to review the patient's full treatment plan in the Media section of the patient's Epic medical record.    Psychotherapeutic Technique: This writer utilized motivational interviewing, active listening, reassurance and support in the context of cognitive behavioral therapy to address the above.      MENTAL STATUS EVALUATION  Grooming: Unable to determine due to telephone visit  Attire: Unable to determine due to telephone visit  Age: Unable to determine due to telephone visit  Behavior Towards Examiner: Cooperative  Motor Activity: Unable to determine due to telephone visit  Eye Contact: Unable to determine due to telephone visit  Mood: Anxious   Affect: Congruent with  content  Speech/Language: mildly pressured  Attention: Adequate  Concentration: Sufficient  Thought Process: Circumstantial  Thought Content: Clear    Orientation: Appeared oriented to person, place, and time, though not formally established  Memory: No evidence of impairment.  Judgement: Adequate  Estimated Intelligence: Average  Demonstrated Insight: Fair  Fund of Knowledge: Good    Intervention:   Patient has postponed a colonoscopy due to anxiety and then COVID-19, but is now planning to move forward with it. He discussed feeling overwhelmed by multiple commitments, both personal and medical, and we discussed prioritizing and postponing nonessential tasks.  We reviewed coping strategies that have helped patient manage anxiety, practiced a strategy to help patient slow his thinking, and the concept of distress tolerance was introduced.    Progress:  Patient has shown improvement in ability to utilize coping skills.     Plan:   We will meet again in 2 weeks to address the patient's depression, anxiety, hoarding, and OCD.  Estimated duration of treatment is 10+ individual therapy sessions (98517) at twice monthly intervals. Treatment is expected to be completed by July 2022.     Diagnosis:  Generalized Anxiety Disorder  Major Depressive Disorder, recurrent, moderate  Hoarding Disorder  Obsessive-Compulsive Disorder (OCD)      Again, thank you for allowing me to participate in the care of your patient.        Sincerely,        Lisa Stout Psy.D, LP

## 2021-09-09 ENCOUNTER — TELEPHONE (OUTPATIENT)
Dept: ONCOLOGY | Facility: HOSPITAL | Age: 66
End: 2021-09-09

## 2021-09-09 NOTE — TELEPHONE ENCOUNTER
Patient had called in and left of voicemail after hours yesterday regarding getting results of the labs that were drawn on 8/26/2021 ordered by Dr Oliver.  Patient had immunoglobulin G, A, M were drawn that day.  All of these came back in the normal range per Dr Oliver.  Nothing additional needs to be done at this time.  Lab orders are in for June 2022 and are to be drawn 1 week before we see him in the clinic.  When I called the patient back he did not answer but had asked that I leave a detailed message on his secure voicemail with an update on the information and what the lab values mean.  I did leave all of the values as well as that they were in the normal range and nothing further is needed at this time.  I did asked that he call back if he had further questions or concerns.    Heather Ortiz RN

## 2021-09-22 ENCOUNTER — VIRTUAL VISIT (OUTPATIENT)
Dept: NEUROLOGY | Facility: CLINIC | Age: 66
End: 2021-09-22
Payer: MEDICARE

## 2021-09-22 DIAGNOSIS — F41.1 GAD (GENERALIZED ANXIETY DISORDER): Primary | ICD-10-CM

## 2021-09-22 PROCEDURE — 90834 PSYTX W PT 45 MINUTES: CPT | Mod: 95 | Performed by: PSYCHOLOGIST

## 2021-09-22 NOTE — LETTER
9/22/2021         RE: Berto Pretty  [address]  [place] [address]        Dear Colleague,    Thank you for referring your patient, Berto Pretty, to the [facility]. Please see a copy of my visit note below.    Psychology Progress Note    Date: September 22, 2021    Time length and type of treatment: 39 minutes (9:13 AM - 9:52 AM), individual therapy    After review of the patient's situation, this visit was changed from an in-person visit to a  telephone visit to reduce the risk of COVID 19 exposure. Patient was informed that policies and procedures that govern in-person sessions would also apply to  telephone sessions. Patient was also informed that  telephone sessions would be discontinued when COVID 19 exposure is no longer a concern (as determined by [facility]).     Patient location: Patient home in [place], [place]  Provider location:  [facility], [place], [place]    Patient was in agreement with proceeding with a  telephone session.      Necessity: This session is necessary to address the patient's depression, anxiety, hoarding, and OCD.  Today we focus on the patient's treatment plan, specifically exploring barriers to compliance with medical treatment plan.  The reader is invited to review the patient's full treatment plan in the Media section of the patient's Epic medical record.    Psychotherapeutic Technique: This writer utilized motivational interviewing, active listening, reassurance and support in the context of cognitive behavioral therapy to address the above.      MENTAL STATUS EVALUATION  Grooming: Unable to determine due to telephone visit  Attire: Unable to determine due to telephone visit  Age: Unable to determine due to telephone visit  Behavior Towards Examiner: Cooperative  Motor Activity: Unable to determine due to telephone visit  Eye Contact: Unable to determine due to telephone visit  Mood: Anxious    Affect: appropriate  Speech/Language: ranged from mildly pressured to within normal limits  Attention: Adequate  Concentration: Sufficient  Thought Process: circumstantial  Thought Content: Clear    Orientation: Appeared oriented to person, place, and time, though not formally established  Memory: No evidence of impairment.  Judgement: Adequate  Estimated Intelligence: Average  Demonstrated Insight: Fair  Fund of Knowledge: Adequate    Intervention:   Patient again postponed his colonoscopy, stating he couldn't stick with the diet to clean himself out. He acknowledged that anxiety over the possibility that they will find colon cancer (patient's dad and multiple cousins  of colon cancer) as well as his anxiety about germs caused him to self sabotage.  Patient has reviewed with nursing how he might further modify his diet, but acknowledged that not wanting to know about possible bad news remains a problem and he continues to worry that he might self sabotage again. We worked on reframing patient's perspective, highlighting the advantages of knowing as soon as possible regardless of whether he has cancer or not, and we discussed how delaying the unknown increases overall anxiety.  We also re-reviewed coping skills and past successes to reinforce patient's self efficacy.     Progress:  Patient expressed a renewed commitment to completing his colonoscopy.      Plan:   We will meet again in 2 weeks to address the patient's depression, anxiety, hoarding, and OCD.  Estimated duration of treatment is 10+ individual therapy sessions (54288) at twice monthly intervals. Treatment is expected to be completed by 2022.     Diagnosis:  Generalized Anxiety Disorder  Major Depressive Disorder, recurrent, moderate  Hoarding Disorder  Obsessive-Compulsive Disorder (OCD)      Again, thank you for allowing me to participate in the care of your patient.        Sincerely,        Lisa Stout Psy.D, LP

## 2021-09-22 NOTE — PROGRESS NOTES
Psychology Progress Note    Date: September 22, 2021    Time length and type of treatment: 39 minutes (9:13 AM - 9:52 AM), individual therapy    After review of the patient's situation, this visit was changed from an in-person visit to a  telephone visit to reduce the risk of COVID 19 exposure. Patient was informed that policies and procedures that govern in-person sessions would also apply to  telephone sessions. Patient was also informed that  telephone sessions would be discontinued when COVID 19 exposure is no longer a concern (as determined by [facility]).     Patient location: Patient home in [place], [place]  Provider location:  [facility], [place], [place]    Patient was in agreement with proceeding with a  telephone session.      Necessity: This session is necessary to address the patient's depression, anxiety, hoarding, and OCD.  Today we focus on the patient's treatment plan, specifically exploring barriers to compliance with medical treatment plan.  The reader is invited to review the patient's full treatment plan in the Media section of the patient's Epic medical record.    Psychotherapeutic Technique: This writer utilized motivational interviewing, active listening, reassurance and support in the context of cognitive behavioral therapy to address the above.      MENTAL STATUS EVALUATION  Grooming: Unable to determine due to telephone visit  Attire: Unable to determine due to telephone visit  Age: Unable to determine due to telephone visit  Behavior Towards Examiner: Cooperative  Motor Activity: Unable to determine due to telephone visit  Eye Contact: Unable to determine due to telephone visit  Mood: Anxious   Affect: appropriate  Speech/Language: ranged from mildly pressured to within normal limits  Attention: Adequate  Concentration: Sufficient  Thought Process: circumstantial  Thought Content: Clear    Orientation: Appeared oriented to person, place, and time,  though not formally established  Memory: No evidence of impairment.  Judgement: Adequate  Estimated Intelligence: Average  Demonstrated Insight: Fair  Fund of Knowledge: Adequate    Intervention:   Patient again postponed his colonoscopy, stating he couldn't stick with the diet to clean himself out. He acknowledged that anxiety over the possibility that they will find colon cancer (patient's dad and multiple cousins  of colon cancer) as well as his anxiety about germs caused him to self sabotage.  Patient has reviewed with nursing how he might further modify his diet, but acknowledged that not wanting to know about possible bad news remains a problem and he continues to worry that he might self sabotage again. We worked on reframing patient's perspective, highlighting the advantages of knowing as soon as possible regardless of whether he has cancer or not, and we discussed how delaying the unknown increases overall anxiety.  We also re-reviewed coping skills and past successes to reinforce patient's self efficacy.     Progress:  Patient expressed a renewed commitment to completing his colonoscopy.      Plan:   We will meet again in 2 weeks to address the patient's depression, anxiety, hoarding, and OCD.  Estimated duration of treatment is 10+ individual therapy sessions (45226) at twice monthly intervals. Treatment is expected to be completed by 2022.     Diagnosis:  Generalized Anxiety Disorder  Major Depressive Disorder, recurrent, moderate  Hoarding Disorder  Obsessive-Compulsive Disorder (OCD)

## 2021-10-19 ENCOUNTER — VIRTUAL VISIT (OUTPATIENT)
Dept: NEUROLOGY | Facility: CLINIC | Age: 66
End: 2021-10-19
Payer: MEDICARE

## 2021-10-19 DIAGNOSIS — F41.1 GAD (GENERALIZED ANXIETY DISORDER): Primary | ICD-10-CM

## 2021-10-19 PROCEDURE — 90834 PSYTX W PT 45 MINUTES: CPT | Mod: 95 | Performed by: PSYCHOLOGIST

## 2021-10-19 NOTE — LETTER
10/19/2021         RE: Berto Pretty  [address]  [facility] [address]        Dear Colleague,    Thank you for referring your patient, Berto Pretty, to the [facility]. Please see a copy of my visit note below.    Psychology Progress Note    Date: October 19, 2021    Time length and type of treatment: 43 minutes (10:02 AM - 10:45 AM), individual therapy    After review of the patient's situation, this visit was changed from an in-person visit to a  telephone visit to reduce the risk of COVID 19 exposure. Patient was informed that policies and procedures that govern in-person sessions would also apply to  telephone sessions. Patient was also informed that  telephone sessions would be discontinued when COVID 19 exposure is no longer a concern (as determined by [facility]).     Patient location: Patient home in [place], [place]  Provider location:  [facility], [place], [place]    Patient was in agreement with proceeding with a telephone session.      Necessity: This session is necessary to address the patient's depression, anxiety, hoarding, and OCD.  Today we focus on the patient's treatment plan, specifically exploring coping strategies.  The reader is invited to review the patient's full treatment plan in the Media section of the patient's Epic medical record.    Psychotherapeutic Technique: This writer utilized motivational interviewing, active listening, reassurance and support in the context of cognitive behavioral therapy to address the above.      MENTAL STATUS EVALUATION  Grooming: Unable to determine due to telephone visit  Attire: Unable to determine due to telephone visit  Age: Unable to determine due to telephone visit  Behavior Towards Examiner: Cooperative  Motor Activity: Unable to determine due to telephone visit  Eye Contact: Unable to determine due to telephone visit  Mood: Anxious   Affect: full  range  Speech/Language: normal  Attention: Adequate  Concentration: Sufficient  Thought Process: unremarkable  Thought Content: Clear    Orientation: Appeared oriented to person, place, and time, though not formally established  Memory: No evidence of impairment.  Judgement: Adequate  Estimated Intelligence: Average  Demonstrated Insight: Adequate  Fund of Knowledge: Adequate    Intervention:   Patient reported he has generally been doing well, and even had a time when he washed his hands for only six minutes rather than his usual 30 minutes, and felt fine.  We again discussed mindfulness and disrupting ruminations.  Patient is selling his second home today and was concerned that he would be troubled with regrets. We discussed acknowledging the loss and focusing his attention on the positive aspects of this sale. Patient continues to work on slowing the pace at which he talks, and reported he received positive comments when he read a reading at [employer] more slowly. Patient has also started noticing how quickly some people talk, and has found that people seem to relax more when he talks more slowly.      Progress:  Patient has shown improvement in ability to utilize coping skills.     Plan:   We will meet again in 2 weeks to address the patient's depression, anxiety, hoarding, and OCD.  Estimated duration of treatment is 10+ individual therapy sessions (03867) at twice monthly intervals. Treatment is expected to be completed by July 2022.     Diagnosis:  Generalized Anxiety Disorder  Major Depressive Disorder, recurrent, moderate  Hoarding Disorder  Obsessive-Compulsive Disorder (OCD)      Again, thank you for allowing me to participate in the care of your patient.        Sincerely,        Lisa Stout Psy.D, LP

## 2021-10-19 NOTE — PROGRESS NOTES
Psychology Progress Note    Date: October 19, 2021    Time length and type of treatment: 43 minutes (10:02 AM - 10:45 AM), individual therapy    After review of the patient's situation, this visit was changed from an in-person visit to a  telephone visit to reduce the risk of COVID 19 exposure. Patient was informed that policies and procedures that govern in-person sessions would also apply to  telephone sessions. Patient was also informed that  telephone sessions would be discontinued when COVID 19 exposure is no longer a concern (as determined by [facility]).     Patient location: Patient home in [place], [place]  Provider location:  [facility], [place], [place]    Patient was in agreement with proceeding with a telephone session.      Necessity: This session is necessary to address the patient's depression, anxiety, hoarding, and OCD.  Today we focus on the patient's treatment plan, specifically exploring coping strategies.  The reader is invited to review the patient's full treatment plan in the Media section of the patient's Epic medical record.    Psychotherapeutic Technique: This writer utilized motivational interviewing, active listening, reassurance and support in the context of cognitive behavioral therapy to address the above.      MENTAL STATUS EVALUATION  Grooming: Unable to determine due to telephone visit  Attire: Unable to determine due to telephone visit  Age: Unable to determine due to telephone visit  Behavior Towards Examiner: Cooperative  Motor Activity: Unable to determine due to telephone visit  Eye Contact: Unable to determine due to telephone visit  Mood: Anxious   Affect: full range  Speech/Language: normal  Attention: Adequate  Concentration: Sufficient  Thought Process: unremarkable  Thought Content: Clear    Orientation: Appeared oriented to person, place, and time, though not formally established  Memory: No evidence of impairment.  Judgement:  Adequate  Estimated Intelligence: Average  Demonstrated Insight: Adequate  Fund of Knowledge: Adequate    Intervention:   Patient reported he has generally been doing well, and even had a time when he washed his hands for only six minutes rather than his usual 30 minutes, and felt fine.  We again discussed mindfulness and disrupting ruminations.  Patient is selling his second home today and was concerned that he would be troubled with regrets. We discussed acknowledging the loss and focusing his attention on the positive aspects of this sale. Patient continues to work on slowing the pace at which he talks, and reported he received positive comments when he read a reading at [employer] more slowly. Patient has also started noticing how quickly some people talk, and has found that people seem to relax more when he talks more slowly.      Progress:  Patient has shown improvement in ability to utilize coping skills.     Plan:   We will meet again in 2 weeks to address the patient's depression, anxiety, hoarding, and OCD.  Estimated duration of treatment is 10+ individual therapy sessions (92574) at twice monthly intervals. Treatment is expected to be completed by July 2022.     Diagnosis:  Generalized Anxiety Disorder  Major Depressive Disorder, recurrent, moderate  Hoarding Disorder  Obsessive-Compulsive Disorder (OCD)

## 2021-11-18 ENCOUNTER — TELEPHONE (OUTPATIENT)
Dept: INTERNAL MEDICINE | Facility: CLINIC | Age: 66
End: 2021-11-18

## 2021-11-18 NOTE — TELEPHONE ENCOUNTER
Reason for Call:  Other     Detailed comments: patient wondering if he is going to need any labs, or any testing requiring him to be fasting, on his appointment with Dr. Villanueva on 11/30. Please advise and call patient back.     Phone Number Patient can be reached at: Home number on file [phone] (home)    Best Time: Any    Can we leave a detailed message on this number? YES    Call taken on 11/18/2021 at 2:02 PM by Lyn Marie

## 2021-11-19 ENCOUNTER — TRANSFERRED RECORDS (OUTPATIENT)
Dept: HEALTH INFORMATION MANAGEMENT | Facility: CLINIC | Age: 66
End: 2021-11-19
Payer: MEDICARE

## 2021-12-09 ENCOUNTER — OFFICE VISIT (OUTPATIENT)
Dept: INTERNAL MEDICINE | Facility: CLINIC | Age: 66
End: 2021-12-09
Payer: MEDICARE

## 2021-12-09 VITALS
WEIGHT: 133.2 LBS | DIASTOLIC BLOOD PRESSURE: 76 MMHG | BODY MASS INDEX: 22.74 KG/M2 | HEART RATE: 72 BPM | SYSTOLIC BLOOD PRESSURE: 132 MMHG | OXYGEN SATURATION: 99 % | HEIGHT: 64 IN

## 2021-12-09 DIAGNOSIS — F42.2 MIXED OBSESSIONAL THOUGHTS AND ACTS: ICD-10-CM

## 2021-12-09 DIAGNOSIS — R25.1 TREMOR: ICD-10-CM

## 2021-12-09 DIAGNOSIS — I63.449 CEREBROVASCULAR ACCIDENT (CVA) DUE TO EMBOLISM OF CEREBELLAR ARTERY, UNSPECIFIED BLOOD VESSEL LATERALITY (H): Primary | ICD-10-CM

## 2021-12-09 DIAGNOSIS — R30.0 DYSURIA: ICD-10-CM

## 2021-12-09 DIAGNOSIS — D64.9 ANEMIA, UNSPECIFIED TYPE: ICD-10-CM

## 2021-12-09 DIAGNOSIS — C67.9 MALIGNANT NEOPLASM OF URINARY BLADDER, UNSPECIFIED SITE (H): ICD-10-CM

## 2021-12-09 DIAGNOSIS — Z23 HIGH PRIORITY FOR 2019-NCOV VACCINE: ICD-10-CM

## 2021-12-09 LAB
ALBUMIN UR-MCNC: NEGATIVE MG/DL
APPEARANCE UR: CLEAR
BACTERIA #/AREA URNS HPF: ABNORMAL /HPF
BILIRUB UR QL STRIP: NEGATIVE
COLOR UR AUTO: YELLOW
GLUCOSE UR STRIP-MCNC: NEGATIVE MG/DL
HGB UR QL STRIP: ABNORMAL
KETONES UR STRIP-MCNC: NEGATIVE MG/DL
LEUKOCYTE ESTERASE UR QL STRIP: NEGATIVE
NITRATE UR QL: NEGATIVE
PH UR STRIP: 6 [PH] (ref 5–8)
RBC #/AREA URNS AUTO: ABNORMAL /HPF
SP GR UR STRIP: >=1.03 (ref 1–1.03)
SQUAMOUS #/AREA URNS AUTO: ABNORMAL /LPF
UROBILINOGEN UR STRIP-ACNC: 0.2 E.U./DL
WBC #/AREA URNS AUTO: ABNORMAL /HPF

## 2021-12-09 PROCEDURE — 81001 URINALYSIS AUTO W/SCOPE: CPT | Performed by: INTERNAL MEDICINE

## 2021-12-09 PROCEDURE — 99214 OFFICE O/P EST MOD 30 MIN: CPT | Mod: 25 | Performed by: INTERNAL MEDICINE

## 2021-12-09 PROCEDURE — 91300 COVID-19,PF,PFIZER (12+ YRS): CPT | Performed by: INTERNAL MEDICINE

## 2021-12-09 PROCEDURE — 90662 IIV NO PRSV INCREASED AG IM: CPT | Performed by: INTERNAL MEDICINE

## 2021-12-09 PROCEDURE — G0008 ADMIN INFLUENZA VIRUS VAC: HCPCS | Performed by: INTERNAL MEDICINE

## 2021-12-09 PROCEDURE — 0004A COVID-19,PF,PFIZER (12+ YRS): CPT | Performed by: INTERNAL MEDICINE

## 2021-12-09 ASSESSMENT — MIFFLIN-ST. JEOR: SCORE: 1290.19

## 2021-12-09 NOTE — PROGRESS NOTES
Office Visit - Follow Up   Berto Pretty   66 year old male    Date of Visit: 12/9/2021    Chief Complaint   Patient presents with     RECHECK     Imm/Inj     COVID-19 VACCINE        Assessment and Plan   1. 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 High priority for 2019-nCoV vaccine  - COVID-19,PF,PFIZER (12+ Yrs PURPLE LABEL)    3. Malignant neoplasm of urinary bladder, unspecified site (H)  Following with urology, cystoscopy every 2 months    4. Dysuria  - UA with Microscopic reflex to Culture - lab collect; Future  - UA with Microscopic reflex to Culture - lab collect  - Urine Microscopic    5. Anemia, unspecified type  Follows with hematology    6. Mixed obsessional thoughts and acts  7. Tremor  We discussed that his tremor could be from Lexapro, he can monitor this and could discuss changing medication with his psychiatrist in could consider revisiting with his neurologist regarding his tremor.  He should follow-up closely with his primary internist for further evaluation and management.      Return in about 6 months (around 6/9/2022) for visit with PCP.     History of Present Illness   This 66 year old man comes in for follow-up.  He was unable to get in with his primary internist.  Overall doing okay.  Had a bladder cancer removed and unfortunately specimen was lost in pathology was not provided.  He is therefore having cystoscopy every 2 months for surveillance.  He has a history of cerebrovascular disease.  Neurologically has been okay, some question about occasional word slurring.  He has noticed a tremor recently, action, has had a mild anemia and follows with hematology.    Review of Systems: A comprehensive review of systems was negative except as noted.     Medications, Allergies and Problem List   Reviewed, reconciled and  "updated  Post Discharge Medication Reconciliation Status:      Physical Exam   General Appearance:   No acute distress    /76 (BP Location: Right arm, Patient Position: Sitting, Cuff Size: Adult Regular)   Pulse 72   Ht 1.626 m (5' 4\")   Wt 60.4 kg (133 lb 3.2 oz)   SpO2 99%   BMI 22.86 kg/m      Fine action tremor of his hands, heart rate controlled rhythm regular lungs clear abdomen soft     Additional Information   Current Outpatient Medications   Medication Sig Dispense Refill     aspirin (ASA) 81 MG EC tablet Take 1 tablet (81 mg) by mouth daily       atorvastatin (LIPITOR) 40 MG tablet Take 40 mg by mouth daily       escitalopram (LEXAPRO) 20 MG tablet escitalopram 20 mg tablet   TAKE 1 TABLET BY MOUTH ONCE DAILY       loratadine 10 MG capsule Take 10 mg by mouth daily as needed Spring only       Multiple Vitamins-Minerals (MULTIVITAL PO)        Allergies   Allergen Reactions     Pollen Extract      Latex Rash     Social History     Tobacco Use     Smoking status: Former Smoker     Types: Cigarettes     Quit date: 1974     Years since quittin.9     Smokeless tobacco: Never Used   Substance Use Topics     Alcohol use: Yes     Drug use: Never       Review and/or order of clinical lab tests:  Review and/or order of radiology tests:  Review and/or order of medicine tests:  Discussion of test results with performing physician:  Decision to obtain old records and/or obtain history from someone other than the patient:  Review and summarization of old records and/or obtaining history from someone other than the patient and.or discussion of case with another health care provider:  Independent visualization of image, tracing or specimen itself:    Time:      João Villanueva MD  "

## 2021-12-09 NOTE — TELEPHONE ENCOUNTER
Patient stopped by the desk after his appointment to request that he be called with his lab results as he does not have MyChart. He said in the past he has gotten an automated call asking him to check MyChart for his results. I told him we would call him when they are available and he said it's fine to leave a detailed message on his machine.

## 2021-12-27 ENCOUNTER — VIRTUAL VISIT (OUTPATIENT)
Dept: BEHAVIORAL HEALTH | Facility: CLINIC | Age: 66
End: 2021-12-27
Payer: MEDICARE

## 2021-12-27 DIAGNOSIS — F42.2 MIXED OBSESSIONAL THOUGHTS AND ACTS: Primary | ICD-10-CM

## 2021-12-27 PROCEDURE — 99443 PR PHYSICIAN TELEPHONE EVALUATION 21-30 MIN: CPT | Mod: 95 | Performed by: PSYCHIATRY & NEUROLOGY

## 2021-12-27 RX ORDER — ESCITALOPRAM OXALATE 20 MG/1
TABLET ORAL
Qty: 90 TABLET | Refills: 1 | Status: SHIPPED | OUTPATIENT
Start: 2021-12-27 | End: 2022-06-27

## 2021-12-27 RX ORDER — FAMOTIDINE 20 MG
1000 TABLET ORAL DAILY
COMMUNITY
End: 2024-08-20

## 2021-12-27 NOTE — PROGRESS NOTES
This video/telephone visit will be conducted via a call between you and your physician/provider. We have found that certain health care needs can be provided without the need for an in-person physical exam. This service lets us provide the care you need with a video /telephone conversation. If a prescription is necessary we can send it directly to your pharmacy. If lab work is needed we can place an order for that and you can then stop by our lab to have the test done at a later time.    Just as we bill insurance for in-person visits, we also bill insurance for video/telephone visits. If you have questions about your insurance coverage, we recommend that you speak with your insurance company.    Patient has given verbal consent for video/Telephone visit? Yes    Patient would like telephone visit, please connect : Telephone [phone]  Reason for visit: Medication visit  Patient verified allergies, medications and pharmacy via telephone.   [place]  to be reviewed by provider.   Medication refill is pended for review and approval by provider.    Patient states he  is ready for visit.    Katherine Queen CMA December 27, 2021 9:17 AM

## 2021-12-27 NOTE — PROGRESS NOTES
Outpatient Psychiatry Note     The patient presented for an initial psychiatric consultation on December 7 of 2020.  He carried a diagnosis of obsessive-compulsive disorder and has had symptoms throughout his entire life.  He presents at this time on Lexapro 20 mg a day.  He has been on this dose for 1 month having started it October 12, 2020 and increased 1 month prior to his initial intake with me.  He has been on psychotropic medication most of his adult life although has been off psychotropic medication for about 1 to 2 years prior to recent restarting of that medication.  Over the years the patient has been on a variety of psychotropic medication with limited success.  He has been on Prozac which she described as having mild efficacy for his OCD symptoms, Celexa with mild efficacy at doses of 80 mg a day, Parnate, Stelazine, Haldol and clomipramine as an adjunct at 25 mg which were all described as ineffective.    The patient describes having onset of OCD symptoms as a child.  His symptoms included obsessive thoughts and compulsive behaviors with constant worry and thoughts about germs, asbestos and he engaged in frequent handwashing checking doors and lights and excessively bought movies and books.  He had an inpatient hospital stay most recently in the 1970s.  He has OCD symptoms caused him to stop attendance at the [facility] and he has been on SSDI for years due to this.    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  It was at this time that he chose to come off his Lexapro.  He did fairly well from a psychiatric standpoint for a number of months but since the spring 2020 he has had an increase in obsessive thoughts and anxiety symptoms.  As stated previously he restarted his Lexapro on October 12 of 2020 and that was increased to 20 mg a day 1 month later.    At the initial visit, in part due to the fact that the patient had recently been increased on his Lexapro  we continued with that treatment plan.  He had previously been off all psychotropic medication for a about a year and a half.  He had done fairly well off that medication until the spring 2020 when he had return of his anxiety symptoms.  He was in the process of being reassessed for his sleep apnea.    The patient return to the clinic on February 8 of 2021.  He had restarted individual psychotherapy.  He reported he was doing a bit better and stated that therapy was quite helpful.  He believed he was improving and responding well to the therapy and reassurance.  He was tolerating the recently restarted medication and we continued with that treatment plan.    I saw the patient on May 3, 2021.  At that time he again sought frequent reassurance and was quite talkative and anxious about possibly getting dementia at some point.  He continued to struggle with OCD symptoms.  He continued with his therapist and medical follow-up.  We did not make any medication changes at that visit.      HPI:  On my telephone interview with the patient today he reports no significant change.  He initially told me he was not as good as he typically was but then admitted he probably was unchanged.  He was a no-show for that appointment in September and perseverated on this.  He believes he may have regressed a little bit but then told me the Lexapro was doing well and he is less depressed.  He denied any desire to be dead or thoughts of suicide.  No symptoms of óscar.  No hallucinations or delusions.    The patient reported that over the summer he was diagnosed with bladder cancer but unfortunately they lost the tissue sample so they could not type it.  He stated his urologist was fairly competent and was a slow moving cancer but they are treating him rather aggressively and are following him every 2 months for that.  He was anxious about this.    He also asked about cognitive decline.  He stated that he was being followed by his medical  team for this.  He told me at the last physical exam he forgot one of the 3 items he was supposed to remember but told me he now remembered it and we talked about this for quite a bit.  The patient denied having any manic symptoms.  No psychotic symptoms.  He does report some lower energy.  He has begun to go to movies again and is a bit more social.  He is going on walks which seems to help his energy level.  He continues to work with his therapist.  He denies significant side effects to the medication.  We discussed a variety of treatment options and have elected to continue with the current treatment plan.      Current Medications:  Medications were personally reviewed at this meeting.  Please see the chart for current medication list.      Mental Status Exam:  Appearance: The patient was interviewed by phone.  Behavior: The patient again sought frequent reassurance and was a bit perseverative.  He reports no recent behavioral difficulties.  Speech: Again, the patient was quite talkative and was difficult to redirect at times.  Sentence structure was intact.  Not pressured.  Mood/Affect: Not significantly depressed.  He was baseline anxious and perseverative.  No lability or irritability.  No evidence of any óscar.  Thought Content: No hallucinations or delusions.  Suicidal or Homicidal Thoughts: None apparent or reported  Thought Process/Formulation: No significant change.  He again was perseverative and at times difficult to redirect.  He was able however to track and follow the conversation quite well.  Again overly inclusive with details and dates and times with his history.  Associations: Grossly adequate  Fund of Knowledge: Grossly intact  Attention/Concentration: Attentive.  Again he was a bit hyper alert and perseverative.  He was directable with some effort.  Insight: Fair.  No significant change.  Judgement: Fair  Memory: Grossly intact  Motor Status: Patient denies having any new issues.  No new  tremors or asymmetries.  No recent changes.  Fund of Knowledge: Adequate  Orientation: Grossly oriented      Recent Labs:  Please see the chart.      Diagnosis:  JHON   OCD, by history  Major depressive disorder, recurrent, moderate, without psychotic features      The patient's chart review, interview and documentation review took 24 min    Plan:  I will make no psychotropic medication changes at this time..    He will continue to follow-up with his medical team, urologist as well as he has neurologist and psychologist.     The patient will seek out appropriate emergency services should that become necessary.  I will make myself available if any questions, concerns, or problems arise.    Elie Jones MD

## 2021-12-27 NOTE — PROGRESS NOTES
Medications Phoned  to Pharmacy [] yes [x]no  Name of Pharmacist:  List Medications, including dose, quantity and instructions     Medications ordered this visit were e-scribed.  Verified by order class [x] yes  [] no    Medication changes or discontinuations were communicated to patient's pharmacy: [] yes  [x] no     Dictation completed at time of chart check: [x] yes  [] no     I have checked the documentation for today s encounters and the above information has been reviewed and completed.        Katherine Queen CMA December 27, 2021 10:04 AM

## 2021-12-27 NOTE — PATIENT INSTRUCTIONS
I will make no psychotropic medication changes at this time.  We will continue with the Lexapro as ordered.  He will continue to follow with his therapist.  He should return to this clinic in 3 to 4 months for medication check and agrees to call before then with any questions or concerns.

## 2022-01-03 ENCOUNTER — VIRTUAL VISIT (OUTPATIENT)
Dept: NEUROLOGY | Facility: CLINIC | Age: 67
End: 2022-01-03
Payer: MEDICARE

## 2022-01-03 DIAGNOSIS — F41.1 GAD (GENERALIZED ANXIETY DISORDER): Primary | ICD-10-CM

## 2022-01-03 PROCEDURE — 90834 PSYTX W PT 45 MINUTES: CPT | Mod: 95 | Performed by: PSYCHOLOGIST

## 2022-01-03 NOTE — LETTER
1/3/2022         RE: Berto Pretty  [address]  [facility] [address]        Dear Colleague,    Thank you for referring your patient, Berto Pretty, to the [facility]. Please see a copy of my visit note below.    Psychology Progress Note    Date: January 03, 2022    Time length and type of treatment: 46 minutes (9:01 AM -9:47 AM), individual therapy    After review of the patient's situation, this visit was changed from an in-person visit to a telephone visit to reduce the risk of COVID 19 exposure. Patient was informed that policies and procedures that govern in-person sessions would also apply to telephone sessions. Patient was also informed that telephone sessions would be discontinued when COVID 19 exposure is no longer a concern (as determined by [facility]).     Patient location: Patient home in [place], [place]  Provider location:  [facility], [place], [place]    Patient was in agreement with proceeding with a telephone session.      Necessity: This session is necessary to address the patient's depression, anxiety, hoarding, and OCD.  Today we focus on revising the patient's treatment plan. The reader is invited to review the patient's full treatment plan in the Media section of the patient's Epic medical record.    Psychotherapeutic Technique: This writer utilized motivational interviewing, active listening, reassurance and support in the context of cognitive behavioral therapy to address the above.      MENTAL STATUS EVALUATION  Grooming: Unable to determine due to telephone visit  Attire: Unable to determine due to telephone visit  Age: Unable to determine due to telephone visit  Behavior Towards Examiner: Cooperative  Motor Activity: Unable to determine due to telephone visit  Eye Contact: Unable to determine due to telephone visit  Mood: Anxious   Affect: full range  Speech/Language: Mildly pressured  Attention:  "Fair  Concentration: Sufficient  Thought Process: tangential  Thought Content: Clear    Orientation: Appeared oriented to person, place, and time, though not formally established  Memory: No evidence of impairment.  Judgement: Adequate  Estimated Intelligence: Average  Demonstrated Insight: Adequate  Fund of Knowledge: Adequate    Intervention:   Patient was last seen on October 19, 2021 but reported he has generally been doing well until the beginning of December.  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 He has some anxiety about a tremor but was reassured by his physician. Patient did need to reschedule his colonoscopy appointment for February, 2022, and discussed some anxiety over this appointment. He was able to use cognitive strategies to help him manage his anxiety related to anticipating this appointment. He also expressed happiness that he has been eating every day and his weight has been improving.    Patient was readministered the PHQ-9 and JHON-7 as part of revising his treatment plan, and consistent with patient report, his score of 18 on the PHQ-9 and 16 on the JHON-7 is suggestive of severe depression and anxiety.  Patient reported he was making slow but continuing process on his hoarding until December when he stopped working to address this.  However, he is not adding to hoarded material.  He reported that he has continued to reduce the amount of time he spends on handwashing, stating that he has reduced handwashing from 25 to 30 minutes following an activity he considers \"dirty\" to 10 to 13 minutes.  Patient responded to efforts to reinforce this positive progress with minimization, and his minimization was discussed in session.    Progress:  Patient's treatment plan was jointly revised.     Plan:   We will meet again in 2 weeks to address the patient's anxiety, depression, hoarding, and OCD.  Estimated " duration of treatment is 10+ individual therapy sessions (17549) at twice monthly intervals. Treatment is expected to be completed by July 2022.     Diagnosis:  Generalized Anxiety Disorder  Major Depressive Disorder, recurrent, severe  Hoarding Disorder  Obsessive-Compulsive Disorder (OCD)      Again, thank you for allowing me to participate in the care of your patient.        Sincerely,        Lisa Stout Psy.D, LP

## 2022-01-03 NOTE — PROGRESS NOTES
Psychology Progress Note    Date: January 03, 2022    Time length and type of treatment: 46 minutes (9:01 AM -9:47 AM), individual therapy    After review of the patient's situation, this visit was changed from an in-person visit to a telephone visit to reduce the risk of COVID 19 exposure. Patient was informed that policies and procedures that govern in-person sessions would also apply to telephone sessions. Patient was also informed that telephone sessions would be discontinued when COVID 19 exposure is no longer a concern (as determined by [facility]).     Patient location: Patient home in [place], [place]  Provider location:  [facility], [place], [place]    Patient was in agreement with proceeding with a telephone session.      Necessity: This session is necessary to address the patient's depression, anxiety, hoarding, and OCD.  Today we focus on revising the patient's treatment plan. The reader is invited to review the patient's full treatment plan in the Media section of the patient's Epic medical record.    Psychotherapeutic Technique: This writer utilized motivational interviewing, active listening, reassurance and support in the context of cognitive behavioral therapy to address the above.      MENTAL STATUS EVALUATION  Grooming: Unable to determine due to telephone visit  Attire: Unable to determine due to telephone visit  Age: Unable to determine due to telephone visit  Behavior Towards Examiner: Cooperative  Motor Activity: Unable to determine due to telephone visit  Eye Contact: Unable to determine due to telephone visit  Mood: Anxious   Affect: full range  Speech/Language: Mildly pressured  Attention: Fair  Concentration: Sufficient  Thought Process: tangential  Thought Content: Clear    Orientation: Appeared oriented to person, place, and time, though not formally established  Memory: No evidence of impairment.  Judgement: Adequate  Estimated Intelligence:  "Average  Demonstrated Insight: Adequate  Fund of Knowledge: Adequate    Intervention:   Patient was last seen on October 19, 2021 but reported he has generally been doing well until the beginning of December.  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 He has some anxiety about a tremor but was reassured by his physician. Patient did need to reschedule his colonoscopy appointment for February, 2022, and discussed some anxiety over this appointment. He was able to use cognitive strategies to help him manage his anxiety related to anticipating this appointment. He also expressed happiness that he has been eating every day and his weight has been improving.    Patient was readministered the PHQ-9 and JHON-7 as part of revising his treatment plan, and consistent with patient report, his score of 18 on the PHQ-9 and 16 on the JHON-7 is suggestive of severe depression and anxiety.  Patient reported he was making slow but continuing process on his hoarding until December when he stopped working to address this.  However, he is not adding to hoarded material.  He reported that he has continued to reduce the amount of time he spends on handwashing, stating that he has reduced handwashing from 25 to 30 minutes following an activity he considers \"dirty\" to 10 to 13 minutes.  Patient responded to efforts to reinforce this positive progress with minimization, and his minimization was discussed in session.    Progress:  Patient's treatment plan was jointly revised.     Plan:   We will meet again in 2 weeks to address the patient's anxiety, depression, hoarding, and OCD.  Estimated duration of treatment is 10+ individual therapy sessions (57978) at twice monthly intervals. Treatment is expected to be completed by July 2022.     Diagnosis:  Generalized Anxiety Disorder  Major Depressive Disorder, recurrent, severe  Hoarding " Disorder  Obsessive-Compulsive Disorder (OCD)

## 2022-01-21 ENCOUNTER — VIRTUAL VISIT (OUTPATIENT)
Dept: NEUROLOGY | Facility: CLINIC | Age: 67
End: 2022-01-21
Payer: MEDICARE

## 2022-01-21 DIAGNOSIS — F42.3 HOARDING DISORDER: Primary | ICD-10-CM

## 2022-01-21 PROCEDURE — 90834 PSYTX W PT 45 MINUTES: CPT | Mod: 95 | Performed by: PSYCHOLOGIST

## 2022-01-21 NOTE — PROGRESS NOTES
Psychology Progress Note    Date: January 21, 2022    Time length and type of treatment: 48 minutes (1:06 PM -to 1:54 PM), individual therapy    After review of the patient's situation, this visit was changed from an in-person visit to a  video visit via [employer] to reduce the risk of COVID 19 exposure. Patient was informed that policies and procedures that govern in-person sessions would also apply to  video sessions. Patient was also informed that  video sessions would be discontinued when COVID 19 exposure is no longer a concern (as determined by [facility]).     Patient location: Patient home in [place], [place]  Provider location:  [facility], [place], [place]    Patient was in agreement with proceeding with a  video session.      Necessity: This session is necessary to address the patient's depression, anxiety, hoarding, and OCD.  Today we focus on the patient's treatment plan, specifically exploring coping strategies.  The reader is invited to review the patient's full treatment plan in the Media section of the patient's Epic medical record.    Psychotherapeutic Technique: This writer utilized motivational interviewing, active listening, reassurance and support in the context of cognitive behavioral therapy to address the above.      MENTAL STATUS EVALUATION  Grooming: Unable to determine due to telephone visit  Attire: Unable to determine due to telephone visit  Age: Unable to determine due to telephone visit  Behavior Towards Examiner: Engaged  Motor Activity: Unable to determine due to telephone visit  Eye Contact: Unable to determine due to telephone visit  Mood: Anxious   Affect: full range  Speech/Language: pressured  Attention: Easily distracted  Concentration: Sufficient  Thought Process: tangential  Thought Content: Clear    Orientation: Appeared oriented to person, place, and time, though not formally established  Memory: No evidence of  impairment.  Judgement: Good  Estimated Intelligence: Average  Demonstrated Insight: Adequate  Fund of Knowledge: Adequate    Intervention:  Patient's hoarding disorder was discussed as well as patient's failure to meet any of his hoarding related goals.  Patient reiterated his goal of having his house clutter free and ready for entertaining within 1 year.  This would give patient approximately 20 days per room.  We discussed a strategy in which the patient would spend no more than 20 days on each room before moving on to work on the next room.  At the end of 1 year, patient would hire a service that helps people with hoarding disorders to declutter, clean, and organize his home.  Patient expressed an intention to reflect on this possible strategy before committing.  Patient's anxiety over perceived trouble concentrating was also explored.  Patient continues to go sometimes several days without eating.  We discussed the importance of proper nutrition on cognitive functioning and patient recommitted to a healthier lifestyle that includes regular eating, exercise, and social connection.      Progress:  Patient has shown improvement in ability to utilize coping skills and is contemplating a plan to more constructively address his hoarding disorder.     Plan:   We will meet again in 2 weeks to address the patient's anxiety, depression, hoarding, and OCD.  Estimated duration of treatment is 10+ individual therapy sessions (76537) at twice monthly intervals. Treatment is expected to be completed by July 2022.     Diagnosis:  Hoarding Disorder  Generalized Anxiety Disorder  Major Depressive Disorder, recurrent, severe  Obsessive Compulsive Disorder (OCD)

## 2022-01-21 NOTE — LETTER
1/21/2022         RE: Berto Pretty  [address]  [place] [address]        Dear Colleague,    Thank you for referring your patient, Berto Pretty, to the [facility]. Please see a copy of my visit note below.    Psychology Progress Note    Date: January 21, 2022    Time length and type of treatment: 48 minutes (1:06 PM -to 1:54 PM), individual therapy    After review of the patient's situation, this visit was changed from an in-person visit to a  video visit via [employer] to reduce the risk of COVID 19 exposure. Patient was informed that policies and procedures that govern in-person sessions would also apply to  video sessions. Patient was also informed that  video sessions would be discontinued when COVID 19 exposure is no longer a concern (as determined by [facility]).     Patient location: Patient home in [place], [place]  Provider location:  [facility], [place], [place]    Patient was in agreement with proceeding with a  video session.      Necessity: This session is necessary to address the patient's depression, anxiety, hoarding, and OCD.  Today we focus on the patient's treatment plan, specifically exploring coping strategies.  The reader is invited to review the patient's full treatment plan in the Media section of the patient's Epic medical record.    Psychotherapeutic Technique: This writer utilized motivational interviewing, active listening, reassurance and support in the context of cognitive behavioral therapy to address the above.      MENTAL STATUS EVALUATION  Grooming: Unable to determine due to telephone visit  Attire: Unable to determine due to telephone visit  Age: Unable to determine due to telephone visit  Behavior Towards Examiner: Engaged  Motor Activity: Unable to determine due to telephone visit  Eye Contact: Unable to determine due to telephone visit  Mood: Anxious   Affect: full range  Speech/Language:  pressured  Attention: Easily distracted  Concentration: Sufficient  Thought Process: tangential  Thought Content: Clear    Orientation: Appeared oriented to person, place, and time, though not formally established  Memory: No evidence of impairment.  Judgement: Good  Estimated Intelligence: Average  Demonstrated Insight: Adequate  Fund of Knowledge: Adequate    Intervention:  Patient's hoarding disorder was discussed as well as patient's failure to meet any of his hoarding related goals.  Patient reiterated his goal of having his house clutter free and ready for entertaining within 1 year.  This would give patient approximately 20 days per room.  We discussed a strategy in which the patient would spend no more than 20 days on each room before moving on to work on the next room.  At the end of 1 year, patient would hire a service that helps people with hoarding disorders to declutter, clean, and organize his home.  Patient expressed an intention to reflect on this possible strategy before committing.  Patient's anxiety over perceived trouble concentrating was also explored.  Patient continues to go sometimes several days without eating.  We discussed the importance of proper nutrition on cognitive functioning and patient recommitted to a healthier lifestyle that includes regular eating, exercise, and social connection.      Progress:  Patient has shown improvement in ability to utilize coping skills and is contemplating a plan to more constructively address his hoarding disorder.     Plan:   We will meet again in 2 weeks to address the patient's anxiety, depression, hoarding, and OCD.  Estimated duration of treatment is 10+ individual therapy sessions (29775) at twice monthly intervals. Treatment is expected to be completed by July 2022.     Diagnosis:  Hoarding Disorder  Generalized Anxiety Disorder  Major Depressive Disorder, recurrent, severe  Obsessive Compulsive Disorder (OCD)      Again, thank you for allowing  me to participate in the care of your patient.        Sincerely,        Lisa Stout Psy.D, LP

## 2022-03-03 ENCOUNTER — VIRTUAL VISIT (OUTPATIENT)
Dept: NEUROLOGY | Facility: CLINIC | Age: 67
End: 2022-03-03
Payer: MEDICARE

## 2022-03-03 DIAGNOSIS — F33.2 MAJOR DEPRESSIVE DISORDER, RECURRENT SEVERE WITHOUT PSYCHOTIC FEATURES (H): Primary | ICD-10-CM

## 2022-03-03 PROCEDURE — 90834 PSYTX W PT 45 MINUTES: CPT | Mod: TEL | Performed by: PSYCHOLOGIST

## 2022-03-03 NOTE — LETTER
3/3/2022         RE: Berto Pretty  [address]  [facility] [address]        Dear Colleague,    Thank you for referring your patient, Berto Pretty, to the [facility]. Please see a copy of my visit note below.    Psychology Progress Note    Date: March 3, 2022    Time length and type of treatment: 43 minutes (9:01 AM - 9:44 AM), individual therapy    After review of the patient's situation, this visit was changed from an in-person visit to a  telephone visit to reduce the risk of COVID 19 exposure. Patient was informed that policies and procedures that govern in-person sessions would also apply to  telephone sessions. Patient was also informed that  telephone sessions would be discontinued when COVID 19 exposure is no longer a concern (as determined by [facility]).     Patient location: Patient home in [place], [place]  Provider location:  [facility] - Provider remote location    Patient was in agreement with proceeding with a telephone session.    Necessity: This session is necessary to address the patient's depression, anxiety, hoarding, and OCD.  Today we focus on the patient's treatment plan, specifically exploring coping strategies and barriers to compliance with medical treatment plan. The reader is invited to review the patient's full treatment plan in the Media section of the patient's Epic medical record.    Psychotherapeutic Technique: This writer utilized motivational interviewing, active listening, reassurance and support in the context of cognitive behavioral therapy to address the above.      MENTAL STATUS EVALUATION  Grooming: Unable to determine due to telephone visit  Attire: Unable to determine due to telephone visit  Age: Unable to determine due to telephone visit  Behavior Towards Examiner: Cooperative  Motor Activity: Unable to determine due to telephone visit  Eye Contact: Unable to determine due to telephone  "visit  Mood: Anxious   Affect: full range  Speech/Language: Mildly pressured  Attention: Normal  Concentration: Sufficient  Thought Process: tangential  Thought Content: Clear    Orientation: Appeared oriented to person, place, and time, though not formally established  Memory: No evidence of impairment.  Judgement: Adequate  Estimated Intelligence: Average  Demonstrated Insight: Adequate  Fund of Knowledge: Adequate    Intervention:  Patient reported that his depression has worsened and he has again stopped eating, explaining he ran out of food 2 - 3 days ago and didn't want to go out so he stopped eating. He has struggled to pay bills and has little interest in things he recognizes he needs to do. Patient recognized that he feels better when he leaves home and committed to behavioral activation strategies. He expressed an intention to go grocery shopping after our appointment.    He expressed concern about \"brain fog\" which he reported preceded his stopping eating. Patient opined that increased isolation secondary to the Omicron surge and events in [facility] contributed to his increased depression.  Patient expressed a desire for neuropsych testing and expressed frustration that he hasn't been able to arrange this because his primary care provider retired and he has had difficulty getting an appointment with a new provider. We discussed steps patient can take to facilitate this process.     Progress:  Mood improved as session progressed and patient expressed an intention to leave his home, go grocery shopping, and go to a movie, all of which he believes will improve mood.     Plan:   We will meet again in 2 weeks to address the patient's depression, anxiety, hoarding, and OCD.  Estimated duration of treatment is 10+ individual therapy sessions (30588) at twice monthly intervals. Treatment is expected to be completed by July 2022.     Diagnosis:  Major Depressive Disorder, recurrent, severe  Hoarding " Disorder  Generalized Anxiety Disorder  Obsessive Compulsive Disorder (OCD)      Again, thank you for allowing me to participate in the care of your patient.        Sincerely,        Lisa Stout Psy.D, LP

## 2022-03-03 NOTE — PROGRESS NOTES
Psychology Progress Note    Date: March 3, 2022    Time length and type of treatment: 43 minutes (9:01 AM - 9:44 AM), individual therapy    After review of the patient's situation, this visit was changed from an in-person visit to a  telephone visit to reduce the risk of COVID 19 exposure. Patient was informed that policies and procedures that govern in-person sessions would also apply to  telephone sessions. Patient was also informed that  telephone sessions would be discontinued when COVID 19 exposure is no longer a concern (as determined by [facility]).     Patient location: Patient home in [place], [place]  Provider location:  [facility] - Provider remote location    Patient was in agreement with proceeding with a telephone session.    Necessity: This session is necessary to address the patient's depression, anxiety, hoarding, and OCD.  Today we focus on the patient's treatment plan, specifically exploring coping strategies and barriers to compliance with medical treatment plan. The reader is invited to review the patient's full treatment plan in the Media section of the patient's Epic medical record.    Psychotherapeutic Technique: This writer utilized motivational interviewing, active listening, reassurance and support in the context of cognitive behavioral therapy to address the above.      MENTAL STATUS EVALUATION  Grooming: Unable to determine due to telephone visit  Attire: Unable to determine due to telephone visit  Age: Unable to determine due to telephone visit  Behavior Towards Examiner: Cooperative  Motor Activity: Unable to determine due to telephone visit  Eye Contact: Unable to determine due to telephone visit  Mood: Anxious   Affect: full range  Speech/Language: Mildly pressured  Attention: Normal  Concentration: Sufficient  Thought Process: tangential  Thought Content: Clear    Orientation: Appeared oriented to person, place, and time, though not formally  "established  Memory: No evidence of impairment.  Judgement: Adequate  Estimated Intelligence: Average  Demonstrated Insight: Adequate  Fund of Knowledge: Adequate    Intervention:  Patient reported that his depression has worsened and he has again stopped eating, explaining he ran out of food 2 - 3 days ago and didn't want to go out so he stopped eating. He has struggled to pay bills and has little interest in things he recognizes he needs to do. Patient recognized that he feels better when he leaves home and committed to behavioral activation strategies. He expressed an intention to go grocery shopping after our appointment.    He expressed concern about \"brain fog\" which he reported preceded his stopping eating. Patient opined that increased isolation secondary to the [place] surge and [place] [facility] contributed to his increased depression.  Patient expressed a desire for neuropsych testing and expressed frustration that he hasn't been able to arrange this because his primary care provider retired and he has had difficulty getting an appointment with a new provider. We discussed steps patient can take to facilitate this process.     Progress:  Mood improved as session progressed and patient expressed an intention to leave his home, go grocery shopping, and go to a movie, all of which he believes will improve mood.     Plan:   We will meet again in 2 weeks to address the patient's depression, anxiety, hoarding, and OCD.  Estimated duration of treatment is 10+ individual therapy sessions (82000) at twice monthly intervals. Treatment is expected to be completed by July 2022.     Diagnosis:  Major Depressive Disorder, recurrent, severe  Hoarding Disorder  Generalized Anxiety Disorder  Obsessive Compulsive Disorder (OCD)  "

## 2022-03-16 ENCOUNTER — TELEPHONE (OUTPATIENT)
Dept: INTERNAL MEDICINE | Facility: CLINIC | Age: 67
End: 2022-03-16
Payer: MEDICARE

## 2022-03-16 NOTE — TELEPHONE ENCOUNTER
Reason for Call: Request for an order or referral:    Order or referral being requested: Referral    Date needed: as soon as possible    Has the patient been seen by the PCP for this problem? Not Applicable    Additional comments: Pt would like a referral for cognitive memory testing, referral to neuropsychologist. Discussed with Neurology - please advise. PCP est care with Dr De Dios in April.      Phone number Patient can be reached at:  Home number on file [phone] (home)    Best Time:  any    Can we leave a detailed message on this number?  YES    Call taken on 3/16/2022 at 3:44 PM by Bib Draper

## 2022-03-16 NOTE — TELEPHONE ENCOUNTER
Former [facility] patient. Patient is scheduled to establish care with Dr. De Dios on 4/13/22. Can we place this referral for patient or will he need to wait until upcoming appointment?  Jennifer Bush CMA ............... 4:18 PM, 03/16/22

## 2022-03-17 ENCOUNTER — VIRTUAL VISIT (OUTPATIENT)
Dept: NEUROLOGY | Facility: CLINIC | Age: 67
End: 2022-03-17
Payer: MEDICARE

## 2022-03-17 DIAGNOSIS — F33.2 MAJOR DEPRESSIVE DISORDER, RECURRENT SEVERE WITHOUT PSYCHOTIC FEATURES (H): Primary | ICD-10-CM

## 2022-03-17 PROCEDURE — 90834 PSYTX W PT 45 MINUTES: CPT | Mod: 95 | Performed by: PSYCHOLOGIST

## 2022-03-17 NOTE — PROGRESS NOTES
Psychology Progress Note    Date: March 17, 2022    Time length and type of treatment: 44 minutes (9:02 AM - 9:46 AM) , individual therapy    After review of the patient's situation, this visit was changed from an in-person visit to a telephone visit to reduce the risk of COVID 19 exposure. Patient was informed that policies and procedures that govern in-person sessions would also apply to telephone sessions. Patient was also informed that telephone sessions would be discontinued when COVID 19 exposure is no longer a concern (as determined by [facility]).     Patient location: Patient home in [place], [place]  Provider location:  [facility] - Provider remote location    Patient was in agreement with proceeding with a telephone session.      Necessity: This session is necessary to address the patient's depression, anxiety, hoarding, and OCD.  Today we focus on the patient's treatment plan, specifically exploring coping strategies and bibliotherapy. The reader is invited to review the patient's full treatment plan in the Media section of the patient's Epic medical record.    Psychotherapeutic Technique: This writer utilized motivational interviewing, active listening, reassurance and support in the context of cognitive behavioral therapy to address the above.      MENTAL STATUS EVALUATION  Grooming: Unable to determine due to telephone visit  Attire: Unable to determine due to telephone visit  Age: Unable to determine due to telephone visit  Behavior Towards Examiner: Cooperative  Motor Activity: Unable to determine due to telephone visit  Eye Contact: Unable to determine due to telephone visit  Mood: Anxious   Affect: full range  Speech/Language: normal  Attention: Normal  Concentration: Sufficient  Thought Process: tangential  Thought Content: Clear    Orientation: Appeared oriented to person, place, and time, though not formally established  Memory: No evidence of  impairment.  Judgement: Adequate  Estimated Intelligence: Average  Demonstrated Insight: Adequate  Fund of Knowledge: Adequate    Intervention:   Patient reported he ate after our last session and has eaten every day since our last session. He has also paid bills that he hadn't been paying despite having the money. He has also taken two walks and recognized a modest improvement in mood when he walked. His Jain has also resumed in person meetings, which has helped. Despite these improvements, patient remains very anxious about perceived decline in cognitive functioning. We discussed the importance of healthy nutrition on brain functioning and a book on the science of brain maintenance as we age was recommended. Psychoeducation was provided related to mindfulness and how to use mindfulness to change behavior. We also discussed the importance of habit in making positive changes. Patient committed to several behavioral activation strategies.     Progress:  Patient has shown improvement in commitment to utilize coping skills.     Plan:   We will meet again in 2 weeks to address the patient's depression, anxiety, hoarding, and OCD.  Estimated duration of treatment is 10+ individual therapy sessions (94677) at twice monthly intervals. Treatment is expected to be completed by July 2022.     Diagnosis:  Major Depressive Disorder, recurrent, severe  Hoarding Disorder  Generalized Anxiety Disorder  Obsessive Compulsive Disorder (OCD)

## 2022-03-17 NOTE — TELEPHONE ENCOUNTER
Please tell him that I want to meet with him first before initiating any new consultations or referrals.  I want him to keep working with psychology Lisa Stout.

## 2022-03-28 ENCOUNTER — VIRTUAL VISIT (OUTPATIENT)
Dept: BEHAVIORAL HEALTH | Facility: CLINIC | Age: 67
End: 2022-03-28
Payer: MEDICARE

## 2022-03-28 DIAGNOSIS — F42.2 MIXED OBSESSIONAL THOUGHTS AND ACTS: Primary | ICD-10-CM

## 2022-03-28 PROCEDURE — 99443 PR PHYSICIAN TELEPHONE EVALUATION 21-30 MIN: CPT | Mod: 95 | Performed by: PSYCHIATRY & NEUROLOGY

## 2022-03-28 RX ORDER — TRAZODONE HYDROCHLORIDE 50 MG/1
50 TABLET, FILM COATED ORAL AT BEDTIME
Qty: 30 TABLET | Refills: 3 | Status: SHIPPED | OUTPATIENT
Start: 2022-03-28 | End: 2023-01-09

## 2022-03-28 NOTE — NURSING NOTE
[facility] Post-Appointment Cart -check      Correct pharmacy verified with patient and updated in chart? [x] yes []no    Charge captured ? [] yes  [] no [x] n/a-virtual     Medications ordered this visit were e-scribed.  Verified by order class [x] yes  [] no    List Medications: Trazodone 50mg at bedtime    Medication changes or discontinuations were communicated to patient's pharmacy: [] yes  [x] no    UA collected [] yes  [] no  [x] n/a-virtual     Outside referrals / labs, etc support staff to follow up: [] yes  [x] no    Future appointment was made: [] yes  [] no  [x] n/a    Dictation completed at time of chart check: [x] yes  [] no    I have checked the documentation for today s encounters and the above information has been reviewed and completed.      Isabella Newton MA on March 28, 2022 at 11:12 AM

## 2022-03-28 NOTE — PATIENT INSTRUCTIONS
I am going to add trazodone 50 mg at night for his insomnia.  The patient is going to have a repeat sleep study in the near future and will await the results of that.  The patient will continue with his psychologist we may do some additional cognitive testing.  The patient should return to this clinic in 2 to 3 months for medication check.  He agrees to call before then with any questions or concerns.

## 2022-03-28 NOTE — PROGRESS NOTES
This video/telephone visit will be conducted via a call between you and your physician/provider. We have found that certain health care needs can be provided without the need for an in-person physical exam. This service lets us provide the care you need with a video /telephone conversation. If a prescription is necessary we can send it directly to your pharmacy. If lab work is needed we can place an order for that and you can then stop by our lab to have the test done at a later time.    Just as we bill insurance for in-person visits, we also bill insurance for video/telephone visits. If you have questions about your insurance coverage, we recommend that you speak with your insurance company.    Patient has given verbal consent for video/Telephone visit? yes    Patient would like telephone visit, please connect : via phone [phone]  Reason for visit: Patient is a 3 month follow up. Patient states he feels his Cognitive and memory skills have decreased since last visit. He also feels his reading and vocabulary skills have decreased as well. Still taking all medications as prescribed.   Patient verified allergies, medications and pharmacy-yes.     JHON-7 scores:    JHON-7 SCORE 10/19/2020   Total Score 20       PHQ-9 scores:   PHQ-9 SCORE 10/19/2020 4/6/2021   PHQ-9 Total Score - -   PHQ-9 Total Score 17 2       Patient states he  is ready for visit.    Isabella Newton MA March 28, 2022 9:44 AM

## 2022-03-28 NOTE — PROGRESS NOTES
Outpatient Psychiatry Note     The patient presented for an initial psychiatric consultation on December 7 of 2020.  He carried a diagnosis of obsessive-compulsive disorder and has had symptoms throughout his entire life.  He presents at this time on Lexapro 20 mg a day.  He has been on this dose for 1 month having started it October 12, 2020 and increased 1 month prior to his initial intake with me.  He has been on psychotropic medication most of his adult life although has been off psychotropic medication for about 1 to 2 years prior to recent restarting of that medication.  Over the years the patient has been on a variety of psychotropic medication with limited success.  He has been on Prozac which she described as having mild efficacy for his OCD symptoms, Celexa with mild efficacy at doses of 80 mg a day, Parnate, Stelazine, Haldol and clomipramine as an adjunct at 25 mg which were all described as ineffective.    The patient describes having onset of OCD symptoms as a child.  His symptoms included obsessive thoughts and compulsive behaviors with constant worry and thoughts about germs, asbestos and he engaged in frequent handwashing checking doors and lights and excessively bought movies and books.  He had an inpatient hospital stay most recently in the 1970s.  He has OCD symptoms caused him to stop attendance at the [facility] and he has been on SSDI for years due to this.    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  It was at this time that he chose to come off his Lexapro.  He did fairly well from a psychiatric standpoint for a number of months but since the spring 2020 he has had an increase in obsessive thoughts and anxiety symptoms.  As stated previously he restarted his Lexapro on October 12 of 2020 and that was increased to 20 mg a day 1 month later.    At the initial visit, in part due to the fact that the patient had recently been increased on his Lexapro  we continued with that treatment plan.  He had previously been off all psychotropic medication for a about a year and a half.  He had done fairly well off that medication until the spring 2020 when he had return of his anxiety symptoms.  He was in the process of being reassessed for his sleep apnea.    The patient return to the clinic on February 8 of 2021.  He had restarted individual psychotherapy.  He reported he was doing a bit better and stated that therapy was quite helpful.  He believed he was improving and responding well to the therapy and reassurance.  He was tolerating the recently restarted medication and we continued with that treatment plan.    I saw the patient on May 3, 2021.  At that time he again sought frequent reassurance and was quite talkative and anxious about possibly getting dementia at some point.  He continued to struggle with OCD symptoms.  He continued with his therapist and medical follow-up.  We did not make any medication changes at that visit.    The patient returns for follow-up visit in December 2021.  There was no significant change at that time.  The patient had a diagnosed and was being treated for bladder cancer.  He was comfortable with the current symptoms and stated that he was seen Dr. Stout and that was going well.  We did not make any medication changes at that visit.      HPI:  I had a telephone interview with the patient today.  The nurse also interviewed the patient and stated he felt his cognition had declined a bit the last 3 months.  He felt he was not reading as well and had trouble with word finding.  He told me he was medication compliant.    On my interview with the patient he stated he felt that things had gone downhill slightly since the last visit but he was a bit vague and again very talkative.  He stated his hands were shaking last week to decrease coffee.  He stated that he told the psychologist that he felt he was having a bit more difficulty with  cognition and so she was going to do some more testing.  He stated however today he felt good and performed well during the interview.  He denied any desire to be dead or thoughts of harming himself.  No hallucinations or delusions.  No óscar.  He denied having any other new medical issues or concerns.  He stated he was tolerating the medication.  His main concern was that he was having a more difficult time with sleep.  He felt that maybe the sleep deprivation was making things worse for him.  We discussed a variety of treatment options and elected to add some low-dose trazodone.  The patient continues to follow-up with his sleep apnea team and is due for his yearly appointment soon and we will await the results of that.      Current Medications:  Medications were personally reviewed at this meeting.  Please see the chart for current medication list.      Mental Status Exam:  Appearance: The patient was interviewed by phone.  Behavior: The patient again was somewhat anxious and very talkative and a bit perseverative.  He tended to control the interview and sought frequent reassurance.  Speech: Slightly rapid, a bit repetitive.  He tended to control the interview and was at times difficult to redirect.  Mood/Affect: Not significantly depressed.  No obvious significant recent change.  No evidence of any óscar.  He was baseline anxious and perseverative.  No lability or irritability.    Thought Content: No hallucinations or delusions.  Suicidal or Homicidal Thoughts: None apparent or reported  Thought Process/Formulation: He appears baseline.  Again he tends to control the interview and at times difficult to redirect but he was following the conversation.  He was participating well at controlling the interview at times.  Associations: Grossly adequate  Fund of Knowledge: Grossly intact  Attention/Concentration: Attentive.  Again he was a bit hyper alert and perseverative.  He was directable with some effort.  Insight:  Fair.  No significant change.  Judgement: Fair  Memory: Grossly intact.  The patient however believes he has had a more difficult time with memory over the last 3 months.  Vague on specifics however.  Motor Status: Patient denies having any new issues.  No new tremors or asymmetries.  No recent changes.  Fund of Knowledge: Adequate  Orientation: Grossly oriented      Recent Labs:  Please see the chart.      Diagnosis:  JHON   OCD, by history  Major depressive disorder, recurrent, moderate, without psychotic features      The patient's chart review, interview and documentation review took 24 min    Plan:  I have added low-dose trazodone at 50 mg at night as a sleep aid.    He will continue to follow-up with his medical team as well as his psychologist.  The psychologist is apparently considering more formalized cognitive testing.    The patient will seek out appropriate emergency services should that become necessary.  I will make myself available if any questions, concerns, or problems arise.    Elie Jones MD

## 2022-04-01 ENCOUNTER — VIRTUAL VISIT (OUTPATIENT)
Dept: NEUROLOGY | Facility: CLINIC | Age: 67
End: 2022-04-01
Payer: MEDICARE

## 2022-04-01 DIAGNOSIS — F33.2 MAJOR DEPRESSIVE DISORDER, RECURRENT SEVERE WITHOUT PSYCHOTIC FEATURES (H): Primary | ICD-10-CM

## 2022-04-01 PROCEDURE — 90834 PSYTX W PT 45 MINUTES: CPT | Mod: 95 | Performed by: PSYCHOLOGIST

## 2022-04-01 NOTE — PROGRESS NOTES
Psychology Progress Note    Date: April 1, 2022    Time length and type of treatment: 49 minutes (2:00 PM -2:49 PM), individual therapy    After review of the patient's situation, this visit was changed from an in-person visit to a  telephone visit via Backchat to reduce the risk of COVID 19 exposure. Patient was informed that policies and procedures that govern in-person sessions would also apply to  tell of sessions. Patient was also informed that  [place] sessions would be discontinued when COVID 19 exposure is no longer a concern (as determined by [facility]).     Patient location: Patient home in [place], [place]  Provider location:  [facility] - Provider remote location    Patient was in agreement with proceeding with a  telephone session.      Necessity: This session is necessary to address the patient's depression, anxiety, hoarding, and OCD.  Today we focus on the patient's treatment plan, specifically revising the patient's treatment plan. The reader is invited to review the patient's full treatment plan in the Media section of the patient's Epic medical record.    Psychotherapeutic Technique: This writer utilized motivational interviewing, active listening, reassurance and support in the context of cognitive behavioral therapy to address the above.      MENTAL STATUS EVALUATION  Grooming: Unable to determine due to telephone visit  Attire: Unable to determine due to telephone visit  Age: Unable to determine due to telephone visit  Behavior Towards Examiner: Cooperative  Motor Activity: Unable to determine due to telephone visit  Eye Contact: Unable to determine due to telephone visit  Mood: Anxious   Affect: full range  Speech/Language: normal  Attention: Normal  Concentration: Sufficient  Thought Process: tangential  Thought Content: Clear    Orientation: Appeared oriented to person, place, and time, though not formally established  Memory: No evidence of  impairment.  Judgement: Adequate  Estimated Intelligence: Average  Demonstrated Insight: Adequate  Fund of Knowledge: Adequate    Intervention:   Patient discussed how he has made significant improvements in managing his OCD and discussed how realizing how much it had taken over his life, and that he could choose to live differently, was a revelation.  Reclaiming things like poetry writing, going to movies, and   Patient stated he has reduced hand washing from 25-30 minutes/time to 10 - 14 minutes/time and he has made modest reductions in how frequently he washes his hands. He has also made modest reductions in how often he checks.  Patient opined that he is ready to start addressing his hoarding more directly and we agreed to add this to his treatment plan.    Progress:  Patient treatment plan was jointly revised.     Plan:   We will meet again in 2 weeks to address the depression, anxiety, hoarding disorder, and OCD.  Estimated duration of treatment is 10+ individual therapy sessions (57723) at twice monthly intervals. Treatment is expected to be completed by March 2023.     Diagnosis:  Major Depressive Disorder, recurrent, severe  Hoarding Disorder  Generalized Anxiety Disorder  Obsessive Compulsive Disorder (OCD

## 2022-04-11 NOTE — PROGRESS NOTES
Office Visit - Follow Up   Berto Pretty   66 year old male    Date of Visit: 4/13/2022    Chief Complaint   Patient presents with     Establish Care        -------------------------------------------------------------------------------------------------------------------------  Assessment and Plan    Brief establish care visit  Overall Berto is doing pretty well    Consultation request transmitted today for neuropsych testing and also sleep clinic, details below    Major Depressive Disorder, recurrent, severe  Hoarding Disorder  Generalized Anxiety Disorder  Obsessive Compulsive Disorder (OCD)  Psychiatrist Lisa Lepe, Psy.D, LP-- q 2 weeks phone appointments  To address the depression, anxiety, hoarding disorder, and OCD.    Estimated duration of treatment is 10+ individual therapy sessions (83594) at twice monthly intervals. Treatment is expected to be completed by March 2023  Obsessive-compulsive disorder. He is on Social Security disability due to this.  escitalopram (LEXAPRO) 20 MG tablet-- helpful    Concerns about memory, ?  Pseudodementia, versus may be memory and cognitive apparent related to untreated obstructive sleep apnea  Word finding, trouble recalling long-term memory, short term even worse  Consultation visit with Dr. Alfred June 30, 2020    Obstructive sleep apnea syndrome  Not using CPAP machine which needs cleaning and maintenance  Current sleep study October 2020, at [facility]  [facility]  7 obstructive events observed, 18 central apneas, 23 hypopneas    Insomnia, psychiatry Dr. Jones prescribed trazodone 50 mg    Monoclonal gammopathy IgG kappa type  Monoclonal IgG-kappa immunoglobulin present. 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  The right side of his  body was weak.   MRI March 16, 2020, 1 no acute or subacute infarction, or hemorrhage.  Chronic infarction in the left precentral gyrus and right cerebellum with additional presumed sequelae of mild to moderate chronic small vessel ischemic disease.     Patient also had an MRI, February 2018, which is included below, right cerebellar infarct was present.- Primary     Vitamin D deficiency    Hyperlipidemia on treatement LDL goal <100, patient has had a stroke in the past.  He also describes having had a TIA event in 2018  Lipid profile nicely controlled measured July 26, 2021 with HDL 82, LDL 56    Malignant neoplasm of urinary bladder, summer 2021  Cystoscopy at [place] [facility], at [facility] July 15, 2021  Dr. Munroe-- will follow up May 6, 2022  Small bladder tumor, just above and lateral to the right ureteral orifice.  Appears to be superficial disease.  No other bladder tumors or suspicious mucosa.  Small transurethral resection of bladder tumor performed.  All tumor was removed.    Overdue for screening colonoscopy, family history colon cancer (father)    Got his third of Pfizer COVID-19 vaccine December 9, 2021, and  he is going to get his booster today April 13, 2022      --------------------------------------------------------------------------------------------------------------------------  History of Present Illness  This 66 year old old     Brief establish care visit  Overall Berto is doing pretty well    Consultation request transmitted today for neuropsych testing and also sleep clinic, details below    Major Depressive Disorder, recurrent, severe  Hoarding Disorder  Generalized Anxiety Disorder  Obsessive Compulsive Disorder (OCD)  Psychiatrist Lisa Lepe, Psy.MELVINA, LP-- q 2 weeks phone appointments  To address the depression, anxiety, hoarding disorder, and OCD.    Estimated duration of treatment is 10+ individual therapy sessions (91804) at twice monthly intervals. Treatment is  expected to be completed by March 2023  Obsessive-compulsive disorder. He is on Social Security disability due to this.  escitalopram (LEXAPRO) 20 MG tablet-- helpful    Concerns about memory, ?  Pseudodementia, versus may be memory and cognitive apparent related to untreated obstructive sleep apnea  Word finding, trouble recalling long-term memory, short term even worse  Consultation visit with Dr. Alfred June 30, 2020    Obstructive sleep apnea syndrome  Not using CPAP machine which needs cleaning and maintenance  Current sleep study October 2020, at [facility]  Sleep study May 30, 2018 done at [facility].  7 obstructive events observed, 18 central apneas, 23 hypopneas    Insomnia, psychiatry Dr. Jones prescribed trazodone 50 mg    Monoclonal gammopathy IgG kappa type  Monoclonal IgG-kappa immunoglobulin present. 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  The right side of his body was weak.   MRI March 16, 2020, 1 no acute or subacute infarction, or hemorrhage.  Chronic infarction in the left precentral gyrus and right cerebellum with additional presumed sequelae of mild to moderate chronic small vessel ischemic disease.     Patient also had an MRI, February 2018, which is included below, right cerebellar infarct was present.- Primary     Vitamin D deficiency    Hyperlipidemia on treatement LDL goal <100, patient has had a stroke in the past.  He also describes having had a TIA event in 2018  Lipid profile nicely controlled measured July 26, 2021 with HDL 82, LDL 56    Malignant neoplasm of urinary bladder, summer 2021  Cystoscopy at [place] [facility], at [facility] July 15, 2021  Dr. Munroe-- will follow up May 6, 2022  Small bladder tumor, just above and lateral to the right ureteral orifice.  Appears to be superficial disease.   No other bladder tumors or suspicious mucosa.  Small transurethral resection of bladder tumor performed.  All tumor was removed.    Overdue for screening colonoscopy, family history colon cancer (father)    Got his third of Pfizer COVID-19 vaccine December 9, 2021, and  he is going to get his booster today April 13, 2022    Immunization History   Administered Date(s) Administered     COVID-19,PF,Pfizer (12+ Yrs) 03/11/2021, 04/05/2021, 12/09/2021     Flu, Unspecified 12/27/2011     Influenza (IIV3) PF 12/28/2005, 01/18/2007, 12/27/2011     Influenza Quad, Recombinant, pf(RIV4) (Flublok) 12/07/2018     Influenza Vaccine IM > 6 months Valent IIV4 (Alfuria,Fluzone) 12/27/2017     Influenza, Quad, High Dose, Pf, 65yr+ (Fluzone HD) 11/03/2020, 12/09/2021     Pneumococcal, Unspecified 06/01/2016     Td (Adult), Adsorbed 1955     Tdap (Adacel,Boostrix) 12/27/2011     Tdap (Adult) Unspecified Formulation 1955         Wt Readings from Last 3 Encounters:   04/13/22 58.2 kg (128 lb 6.4 oz)   12/09/21 60.4 kg (133 lb 3.2 oz)   08/26/21 57.5 kg (126 lb 11.2 oz)     BP Readings from Last 3 Encounters:   04/13/22 112/64   12/09/21 132/76   08/26/21 137/69         ---------------------------------------------------------------------------------------------------------------------------    Medications, Allergies, Social, and Problem List   Current Outpatient Medications   Medication Sig Dispense Refill     aspirin (ASA) 81 MG EC tablet Take 1 tablet (81 mg) by mouth daily       atorvastatin (LIPITOR) 40 MG tablet Take 40 mg by mouth daily       escitalopram (LEXAPRO) 20 MG tablet TAKE 1 TABLET BY MOUTH ONCE DAILY 90 tablet 1     loratadine 10 MG capsule Take 10 mg by mouth daily as needed Spring only       Multiple Vitamins-Minerals (MULTIVITAL PO)        traZODone (DESYREL) 50 MG tablet Take 1 tablet (50 mg) by mouth At Bedtime 30 tablet 3     Vitamin D, Cholecalciferol, 25 MCG (1000 UT) CAPS Take 1,000 mg by mouth  daily       Allergies   Allergen Reactions     Pollen Extract      Latex Rash     Social History     Tobacco Use     Smoking status: Former Smoker     Types: Cigarettes     Quit date: 1974     Years since quittin.3     Smokeless tobacco: Never Used   Substance Use Topics     Alcohol use: Yes     Drug use: Never     Patient Active Problem List   Diagnosis     Obsessive compulsive disorder     Cerebral infarction due to embolism of middle cerebral artery (H)     Cerebrovascular accident (CVA) (H)     Hyperlipidemia LDL goal <100     Obstructive sleep apnea syndrome     Other malaise and fatigue     Vitamin D deficiency     Family history of colon cancer     Abnormal serum protein electrophoresis     Moderate episode of recurrent major depressive disorder (H)     Malignant neoplasm of urinary bladder, unspecified site (H)     Anemia, unspecified type        Reviewed, reconciled and updated       Physical Exam   General Appearance:       /64 (BP Location: Right arm, Patient Position: Sitting, Cuff Size: Adult Regular)   Pulse 82   Temp 97.1  F (36.2  C) (Oral)   Wt 58.2 kg (128 lb 6.4 oz)   SpO2 95%   BMI 22.04 kg/m      Very pleasant, appropriately dressed, affect appropriate     Additional Information   I spent 20 minutes on this encounter, including reviewing interval history since last visit, examining the patient, explaining and counseling the issues enumerated in the Assessment and Plan (patient given a copy)     QUE LIZAMA MD, MD

## 2022-04-13 ENCOUNTER — OFFICE VISIT (OUTPATIENT)
Dept: INTERNAL MEDICINE | Facility: CLINIC | Age: 67
End: 2022-04-13
Payer: MEDICARE

## 2022-04-13 VITALS
HEART RATE: 82 BPM | OXYGEN SATURATION: 95 % | WEIGHT: 128.4 LBS | DIASTOLIC BLOOD PRESSURE: 64 MMHG | SYSTOLIC BLOOD PRESSURE: 112 MMHG | TEMPERATURE: 97.1 F | BODY MASS INDEX: 22.04 KG/M2

## 2022-04-13 DIAGNOSIS — R41.3 MEMORY IMPAIRMENT: Primary | ICD-10-CM

## 2022-04-13 DIAGNOSIS — Z23 NEED FOR VACCINATION: ICD-10-CM

## 2022-04-13 DIAGNOSIS — G47.33 OSA (OBSTRUCTIVE SLEEP APNEA): ICD-10-CM

## 2022-04-13 PROCEDURE — 99213 OFFICE O/P EST LOW 20 MIN: CPT | Performed by: INTERNAL MEDICINE

## 2022-04-13 PROCEDURE — 91305 COVID-19,PF,PFIZER (12+ YRS): CPT | Performed by: INTERNAL MEDICINE

## 2022-04-13 PROCEDURE — 0054A COVID-19,PF,PFIZER (12+ YRS): CPT | Performed by: INTERNAL MEDICINE

## 2022-04-13 NOTE — PATIENT INSTRUCTIONS
Brief establish care visit  Overall Berto is doing pretty well    Consultation request transmitted today for neuropsych testing and also sleep clinic, details below    Major Depressive Disorder, recurrent, severe  Hoarding Disorder  Generalized Anxiety Disorder  Obsessive Compulsive Disorder (OCD)  Psychiatrist Dr Robert Stout, Lisa, Psy.D, LP-- q 2 weeks phone appointments  To address the depression, anxiety, hoarding disorder, and OCD.    Estimated duration of treatment is 10+ individual therapy sessions (51628) at twice monthly intervals. Treatment is expected to be completed by March 2023  Obsessive-compulsive disorder. He is on Social Security disability due to this.  escitalopram (LEXAPRO) 20 MG tablet-- helpful    Concerns about memory, ?  Pseudodementia, versus may be memory and cognitive apparent related to untreated obstructive sleep apnea  Word finding, trouble recalling long-term memory, short term even worse  Consultation visit with Dr. Alfred June 30, 2020    Obstructive sleep apnea syndrome  Not using CPAP machine which needs cleaning and maintenance  Current sleep study October 2020, at [facility]  Sleep study May 30, 2018 done at [facility].  7 obstructive events observed, 18 central apneas, 23 hypopneas    Insomnia, psychiatry Dr. Jones prescribed trazodone 50 mg    Monoclonal gammopathy IgG kappa type  Monoclonal IgG-kappa immunoglobulin present. 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  The right side of his body was weak.   MRI March 16, 2020, 1 no acute or subacute infarction, or hemorrhage.  Chronic infarction in the left precentral gyrus and right cerebellum with additional presumed sequelae of mild to moderate chronic small vessel ischemic disease.     Patient also had an MRI, February 2018, which is  included below, right cerebellar infarct was present.- Primary     Vitamin D deficiency    Hyperlipidemia on treatement LDL goal <100, patient has had a stroke in the past.  He also describes having had a TIA event in 2018  Lipid profile nicely controlled measured July 26, 2021 with HDL 82, LDL 56    Malignant neoplasm of urinary bladder, summer 2021  Cystoscopy at [place] [facility], at [facility] July 15, 2021  Dr. Munroe-- will follow up May 6, 2022  Small bladder tumor, just above and lateral to the right ureteral orifice.  Appears to be superficial disease.  No other bladder tumors or suspicious mucosa.  Small transurethral resection of bladder tumor performed.  All tumor was removed.    Overdue for screening colonoscopy, family history colon cancer (father)    Got his third of Pfizer COVID-19 vaccine December 9, 2021, and  he is going to get his booster today April 13, 2022

## 2022-04-18 ENCOUNTER — VIRTUAL VISIT (OUTPATIENT)
Dept: NEUROLOGY | Facility: CLINIC | Age: 67
End: 2022-04-18
Payer: MEDICARE

## 2022-04-18 DIAGNOSIS — F33.2 MAJOR DEPRESSIVE DISORDER, RECURRENT SEVERE WITHOUT PSYCHOTIC FEATURES (H): Primary | ICD-10-CM

## 2022-04-18 PROCEDURE — 90834 PSYTX W PT 45 MINUTES: CPT | Mod: 95 | Performed by: PSYCHOLOGIST

## 2022-04-18 NOTE — PROGRESS NOTES
Psychology Progress Note    Date: April 18, 2022    Time length and type of treatment: 40 minutes (11:07 AM - 11:47 AM), individual therapy    After review of the patient's situation, this visit was changed from an in-person visit to a telephone visit to reduce the risk of COVID 19 exposure. Patient was informed that policies and procedures that govern in-person sessions would also apply to  telephone sessions. Patient was also informed that  telephone sessions would be discontinued when COVID 19 exposure is no longer a concern (as determined by [facility]).     Patient location: Patient home in [place], [place]  Provider location:  [facility] - Provider remote location    Patient was in agreement with proceeding with a  telephone session.      Necessity: This session is necessary to address the patient's depression, anxiety, hoarding, and OCD. Today we focus on the patient's treatment plan, specifically exploring and reframing cognitive messages and coping strategies.  The reader is invited to review the patient's full treatment plan in the Media section of the patient's Epic medical record.    Psychotherapeutic Technique: This writer utilized motivational interviewing, active listening, reassurance and support in the context of cognitive behavioral therapy to address the above.      MENTAL STATUS EVALUATION  Grooming: Unable to determine due to telephone visit  Attire: Unable to determine due to telephone visit  Age: Unable to determine due to telephone visit  Behavior Towards Examiner: Cooperative  Motor Activity: Unable to determine due to telephone visit  Eye Contact: Unable to determine due to telephone visit  Mood: Anxious   Affect: full range  Speech/Language: pressured  Attention: Normal  Concentration: Sufficient  Thought Process: tangential  Thought Content: Clear    Orientation: Appeared oriented to person, place, and time, though not formally established  Memory: No  evidence of impairment.  Judgement: Adequate  Estimated Intelligence: Average  Demonstrated Insight: Adequate  Fund of Knowledge: Adequate    Intervention:   Patient has filed an extension on his taxes but remains anxious about needing to get his taxes finished. He will be working on this for the next two months and requested we check in regularly on progress since this is a significant source of anxiety for him and he would like support to help him not procrastinate.  Progress patient has made was validated and patient we agreed to regularly check in on this.    Patient discussed how familiarity interferes with his ability to address his OCD and metaphors were used to help patient see his OCD as ego dystonic and changeable. Patient expressed excitement about this reframe, and articulated an intention to actively focus on what his life was like before OCD became so intrusive.     Progress:  Patient demonstrated increased insight into intrapersonal dynamics.     Plan:   We will meet again in 2 weeks to address the patient's depression, anxiety, hoarding disorder, and OCD.  Estimated duration of treatment is 10+ individual therapy sessions (12292) at twice monthly intervals. Treatment is expected to be completed by March 2023.     Diagnosis:  Major Depressive Disorder, recurrent, severe  Hoarding Disorder  Generalized Anxiety Disorder  Obsessive-Compulsive Disorder (OCD)

## 2022-05-02 ENCOUNTER — VIRTUAL VISIT (OUTPATIENT)
Dept: NEUROLOGY | Facility: CLINIC | Age: 67
End: 2022-05-02
Payer: MEDICARE

## 2022-05-02 DIAGNOSIS — F42.2 MIXED OBSESSIONAL THOUGHTS AND ACTS: Primary | ICD-10-CM

## 2022-05-02 PROCEDURE — 90834 PSYTX W PT 45 MINUTES: CPT | Mod: TEL | Performed by: PSYCHOLOGIST

## 2022-05-12 DIAGNOSIS — E78.5 HYPERLIPIDEMIA LDL GOAL <100: Primary | ICD-10-CM

## 2022-05-15 RX ORDER — ATORVASTATIN CALCIUM 40 MG/1
40 TABLET, FILM COATED ORAL DAILY
Qty: 90 TABLET | Refills: 3 | Status: SHIPPED | OUTPATIENT
Start: 2022-05-15 | End: 2023-05-20

## 2022-05-15 NOTE — TELEPHONE ENCOUNTER
"Routing refill request to provider for review/approval because:  Medication is reported/historical    Last Written Prescription Date:  8/26/21  Last Fill Quantity: ,  # refills:    Last office visit provider:  12/9/21     Requested Prescriptions   Pending Prescriptions Disp Refills     atorvastatin (LIPITOR) 40 MG tablet       Sig: Take 1 tablet (40 mg) by mouth daily       Statins Protocol Passed - 5/13/2022  1:39 PM        Passed - LDL on file in past 12 months     Recent Labs   Lab Test 07/26/21  1748   LDL 56             Passed - No abnormal creatine kinase in past 12 months     No lab results found.             Passed - Recent (12 mo) or future (30 days) visit within the authorizing provider's specialty     Patient has had an office visit with the authorizing provider or a provider within the authorizing providers department within the previous 12 mos or has a future within next 30 days. See \"Patient Info\" tab in inbasket, or \"Choose Columns\" in Meds & Orders section of the refill encounter.              Passed - Medication is active on med list        Passed - Patient is age 18 or older             Aleah Holloway RN 05/14/22 7:49 PM  "

## 2022-06-03 ENCOUNTER — LAB (OUTPATIENT)
Dept: INFUSION THERAPY | Facility: HOSPITAL | Age: 67
End: 2022-06-03
Attending: INTERNAL MEDICINE
Payer: MEDICARE

## 2022-06-03 DIAGNOSIS — D64.9 ANEMIA, UNSPECIFIED TYPE: ICD-10-CM

## 2022-06-03 DIAGNOSIS — D47.2 MONOCLONAL PARAPROTEINEMIA: ICD-10-CM

## 2022-06-03 LAB
ERYTHROCYTE [DISTWIDTH] IN BLOOD BY AUTOMATED COUNT: 13 % (ref 10–15)
HCT VFR BLD AUTO: 41.9 % (ref 40–53)
HGB BLD-MCNC: 13.4 G/DL (ref 13.3–17.7)
IGA SERPL-MCNC: 85 MG/DL (ref 65–400)
IGG SERPL-MCNC: 896 MG/DL (ref 700–1700)
IGM SERPL-MCNC: 84 MG/DL (ref 60–280)
MCH RBC QN AUTO: 31.2 PG (ref 26.5–33)
MCHC RBC AUTO-ENTMCNC: 32 G/DL (ref 31.5–36.5)
MCV RBC AUTO: 98 FL (ref 78–100)
PLATELET # BLD AUTO: 152 10E3/UL (ref 150–450)
RBC # BLD AUTO: 4.29 10E6/UL (ref 4.4–5.9)
WBC # BLD AUTO: 4.9 10E3/UL (ref 4–11)

## 2022-06-03 PROCEDURE — 36415 COLL VENOUS BLD VENIPUNCTURE: CPT

## 2022-06-03 PROCEDURE — 83521 IG LIGHT CHAINS FREE EACH: CPT

## 2022-06-03 PROCEDURE — 82784 ASSAY IGA/IGD/IGG/IGM EACH: CPT

## 2022-06-03 PROCEDURE — 85027 COMPLETE CBC AUTOMATED: CPT

## 2022-06-06 LAB
KAPPA LC FREE SER-MCNC: 1.49 MG/DL (ref 0.33–1.94)
KAPPA LC FREE/LAMBDA FREE SER NEPH: 1.57 {RATIO} (ref 0.26–1.65)
LAMBDA LC FREE SERPL-MCNC: 0.95 MG/DL (ref 0.57–2.63)

## 2022-06-09 ENCOUNTER — TELEPHONE (OUTPATIENT)
Dept: ONCOLOGY | Facility: HOSPITAL | Age: 67
End: 2022-06-09
Payer: MEDICARE

## 2022-06-13 ENCOUNTER — ONCOLOGY VISIT (OUTPATIENT)
Dept: ONCOLOGY | Facility: HOSPITAL | Age: 67
End: 2022-06-13
Attending: INTERNAL MEDICINE
Payer: MEDICARE

## 2022-06-13 VITALS
SYSTOLIC BLOOD PRESSURE: 109 MMHG | OXYGEN SATURATION: 97 % | RESPIRATION RATE: 16 BRPM | HEART RATE: 75 BPM | BODY MASS INDEX: 22.73 KG/M2 | DIASTOLIC BLOOD PRESSURE: 66 MMHG | WEIGHT: 132.4 LBS | TEMPERATURE: 97.8 F

## 2022-06-13 DIAGNOSIS — C67.9 MALIGNANT NEOPLASM OF URINARY BLADDER, UNSPECIFIED SITE (H): Primary | ICD-10-CM

## 2022-06-13 DIAGNOSIS — D47.2 MONOCLONAL PARAPROTEINEMIA: ICD-10-CM

## 2022-06-13 PROCEDURE — G0463 HOSPITAL OUTPT CLINIC VISIT: HCPCS

## 2022-06-13 PROCEDURE — 99214 OFFICE O/P EST MOD 30 MIN: CPT | Performed by: INTERNAL MEDICINE

## 2022-06-13 ASSESSMENT — PAIN SCALES - GENERAL: PAINLEVEL: MILD PAIN (2)

## 2022-06-13 NOTE — LETTER
"    6/13/2022         RE: Berto Pretty  [address]  [facility] [address]        Dear Colleague,    Thank you for referring your patient, Berto Pretty, to the [facility] [place]. Please see a copy of my visit note below.    Oncology Rooming Note    June 13, 2022 3:33 PM   Berto Pretty is a 66 year old male who presents for:    Chief Complaint   Patient presents with     Oncology Clinic Visit     Initial Vitals: /66   Pulse 75   Temp 97.8  F (36.6  C) (Oral)   Resp 16   Wt 60.1 kg (132 lb 6.4 oz)   SpO2 97%   BMI 22.73 kg/m   Estimated body mass index is 22.73 kg/m  as calculated from the following:    Height as of 12/9/21: 1.626 m (5' 4\").    Weight as of this encounter: 60.1 kg (132 lb 6.4 oz). Body surface area is 1.65 meters squared.  Mild Pain (2) Comment: Data Unavailable   No LMP for male patient.  Allergies reviewed: Yes  Medications reviewed: Yes    Medications: Medication refills not needed today.  Pharmacy name entered into [employer]: [id number] - [address]    Clinical concerns: Berto is here and is having some neck stiffness. He has concerns about his health and cognitive function. He is interested in getting a dietician perspective on a healthy diet. Wt is stable      Dona Hoffman RN              [facility] Hematology and Oncology Progress Note    Patient: Berto Pretty  MRN: [id number]  Date of Service: Jun 13, 2022           Reason for visit         Problem List Items Addressed This Visit        Urinary    Malignant neoplasm of urinary bladder, unspecified site (H) - Primary      Other Visit Diagnoses     Monoclonal paraproteinemia        Relevant Orders    Immunoglobulins A G and M    Kappa and lambda light chain    CBC with platelets    Comprehensive metabolic panel    Lactate Dehydrogenase            Assessment      1.  Monoclonal gammopathy of uncertain significance  2.  History of anemia now " resolved.  3.  History of bladder issues under care of urology.      Plan      1.  Reviewed the patient's labs with him.  Reassured him that his labs are okay.  We will see him back in a years time.  Then since it is monoclonal gammopathy he needs to be under care and observation.  There is 1 %/year risk of MGUS transforming into multiple myeloma.  We talked about the difference symptoms of multiple myeloma and what can be done for it.  2.  Continue good diet and exercise.  3.  He should have a diet which is rich in vitamin D and calcium.      Medical decision making      Moderately complex.      Risk      Moderate risk of morbidity mortality if it progresses to multiple myeloma.  Significant risk of side effects from intervention.      Cancer Staging  No matching staging information was found for the patient.      History of present illness        Mr. Berto Pretty is a very pleasant 66 old gentleman who has been following up with me regarding his diagnosis of monoclonal gammopathy of IgG type.  This was detected when he had some work-up done by his primary care provider in April 2021.  This was done because he was anemic with a hemoglobin of 12.4.  His evaluation did reveal that he had a small amount of monoclonal myopathy.  Therefore he has been referred here.    Comes in today for scheduled follow-up and with the labs.  He had labs done last week.  He is aware of the lab results which appear to be pretty stable.  His hemoglobin has actually normalized.    He has some bladder issues for which she sees urology.  This seems to be under control.        Review of system      Details noted in the history of present illness.  A 14 point review of systems is otherwise negative.        Past medical history      Past Medical History:   Diagnosis Date     Cancer (H)      Cerebrovascular accident (H)      Dyslipidemia      OCD (obsessive compulsive disorder)      RACHNA (obstructive sleep apnea)      RACHNA (obstructive sleep  apnea)        Past Surgical History:   Procedure Laterality Date     NO PAST SURGERIES         Physical exam        /66   Pulse 75   Temp 97.8  F (36.6  C) (Oral)   Resp 16   Wt 60.1 kg (132 lb 6.4 oz)   SpO2 97%   BMI 22.73 kg/m          GENERAL: Alert and oriented to time place and person.  In no distress.    HEAD: Atraumatic and normocephalic.    EYES: YANICK, EOMI. No pallor. No icterus.    Oral cavity: no mucosal lesion or tonsillar enlargement.    NECK: supple. JVP normal.No thyroid enlargement.    LYMPH NODES: No palpable, cervical, axillary or inguinal lymphadenopathy.    CHEST: clear to auscultation bilaterally. Symmetrical breath movements bilaterally.    CVS: S1 and S2 are Regular rate and rhythm. No murmur heard. No peripheral edema.    ABDOMEN: Soft. Not tender. Not distended. No palpable hepatomegaly or splenomegaly. No other mass palpable. Bowel sounds heard.    EXTREMITIES: Warm.  He thought his right leg was slightly bigger than his left leg.  We did the measurement his calf were exactly the same size bilaterally.    SKIN: no rash, or bruising or purpura.      Lab results      No results found for this or any previous visit (from the past 168 hour(s)).    Imaging results        No results found.    Total time spent on the date of service was 30 minutes or over.    Signed by: Zen Oliver MD,       This note has been dictated using voice recognition software. Any grammatical or context distortions are unintentional and inherent to the software        Again, thank you for allowing me to participate in the care of your patient.        Sincerely,        Zen Oliver MD, MD

## 2022-06-13 NOTE — PROGRESS NOTES
"Oncology Rooming Note    June 13, 2022 3:33 PM   Berto Pretty is a 66 year old male who presents for:    Chief Complaint   Patient presents with     Oncology Clinic Visit     Initial Vitals: /66   Pulse 75   Temp 97.8  F (36.6  C) (Oral)   Resp 16   Wt 60.1 kg (132 lb 6.4 oz)   SpO2 97%   BMI 22.73 kg/m   Estimated body mass index is 22.73 kg/m  as calculated from the following:    Height as of 12/9/21: 1.626 m (5' 4\").    Weight as of this encounter: 60.1 kg (132 lb 6.4 oz). Body surface area is 1.65 meters squared.  Mild Pain (2) Comment: Data Unavailable   No LMP for male patient.  Allergies reviewed: Yes  Medications reviewed: Yes    Medications: Medication refills not needed today.  Pharmacy name entered into [employer]: [facility] [address] [place] [address] [address]    Clinical concerns: Berto is here and is having some neck stiffness. He has concerns about his health and cognitive function. He is interested in getting a dietician perspective on a healthy diet. Wt is stable      Dona Hoffman RN            "

## 2022-06-14 NOTE — PROGRESS NOTES
[facility] Hematology and Oncology Progress Note    Patient: Berto Pretty  MRN: [id number]  Date of Service: Jun 13, 2022           Reason for visit         Problem List Items Addressed This Visit        Urinary    Malignant neoplasm of urinary bladder, unspecified site (H) - Primary      Other Visit Diagnoses     Monoclonal paraproteinemia        Relevant Orders    Immunoglobulins A G and M    Kappa and lambda light chain    CBC with platelets    Comprehensive metabolic panel    Lactate Dehydrogenase            Assessment      1.  Monoclonal gammopathy of uncertain significance  2.  History of anemia now resolved.  3.  History of bladder issues under care of urology.      Plan      1.  Reviewed the patient's labs with him.  Reassured him that his labs are okay.  We will see him back in a years time.  Then since it is monoclonal gammopathy he needs to be under care and observation.  There is 1 %/year risk of MGUS transforming into multiple myeloma.  We talked about the difference symptoms of multiple myeloma and what can be done for it.  2.  Continue good diet and exercise.  3.  He should have a diet which is rich in vitamin D and calcium.      Medical decision making      Moderately complex.      Risk      Moderate risk of morbidity mortality if it progresses to multiple myeloma.  Significant risk of side effects from intervention.      Cancer Staging  No matching staging information was found for the patient.      History of present illness        Mr. Berto Pretty is a very pleasant 66 old gentleman who has been following up with me regarding his diagnosis of monoclonal gammopathy of IgG type.  This was detected when he had some work-up done by his primary care provider in April 2021.  This was done because he was anemic with a hemoglobin of 12.4.  His evaluation did reveal that he had a small amount of monoclonal myopathy.  Therefore he has been referred here.    Comes in today for scheduled  follow-up and with the labs.  He had labs done last week.  He is aware of the lab results which appear to be pretty stable.  His hemoglobin has actually normalized.    He has some bladder issues for which she sees urology.  This seems to be under control.        Review of system      Details noted in the history of present illness.  A 14 point review of systems is otherwise negative.        Past medical history      Past Medical History:   Diagnosis Date     Cancer (H)      Cerebrovascular accident (H)      Dyslipidemia      OCD (obsessive compulsive disorder)      RACHNA (obstructive sleep apnea)      RACHNA (obstructive sleep apnea)        Past Surgical History:   Procedure Laterality Date     NO PAST SURGERIES         Physical exam        /66   Pulse 75   Temp 97.8  F (36.6  C) (Oral)   Resp 16   Wt 60.1 kg (132 lb 6.4 oz)   SpO2 97%   BMI 22.73 kg/m          GENERAL: Alert and oriented to time place and person.  In no distress.    HEAD: Atraumatic and normocephalic.    EYES: YANICK, EOMI. No pallor. No icterus.    Oral cavity: no mucosal lesion or tonsillar enlargement.    NECK: supple. JVP normal.No thyroid enlargement.    LYMPH NODES: No palpable, cervical, axillary or inguinal lymphadenopathy.    CHEST: clear to auscultation bilaterally. Symmetrical breath movements bilaterally.    CVS: S1 and S2 are Regular rate and rhythm. No murmur heard. No peripheral edema.    ABDOMEN: Soft. Not tender. Not distended. No palpable hepatomegaly or splenomegaly. No other mass palpable. Bowel sounds heard.    EXTREMITIES: Warm.  He thought his right leg was slightly bigger than his left leg.  We did the measurement his calf were exactly the same size bilaterally.    SKIN: no rash, or bruising or purpura.      Lab results      No results found for this or any previous visit (from the past 168 hour(s)).    Imaging results        No results found.    Total time spent on the date of service was 30 minutes or over.    Signed  by: Zen Oliver MD,       This note has been dictated using voice recognition software. Any grammatical or context distortions are unintentional and inherent to the software

## 2022-06-15 ENCOUNTER — PATIENT OUTREACH (OUTPATIENT)
Dept: ONCOLOGY | Facility: HOSPITAL | Age: 67
End: 2022-06-15
Payer: MEDICARE

## 2022-06-16 ENCOUNTER — VIRTUAL VISIT (OUTPATIENT)
Dept: NEUROLOGY | Facility: CLINIC | Age: 67
End: 2022-06-16
Payer: MEDICARE

## 2022-06-16 DIAGNOSIS — F41.1 GAD (GENERALIZED ANXIETY DISORDER): Primary | ICD-10-CM

## 2022-06-16 PROCEDURE — 90834 PSYTX W PT 45 MINUTES: CPT | Mod: TEL | Performed by: PSYCHOLOGIST

## 2022-06-16 NOTE — PROGRESS NOTES
Psychology Progress Note    Date: June 16, 2022    Time length and type of treatment: 49 minutes (2:01 PM - 2:50 PM), individual therapy    After review of the patient's situation, this visit was changed from an in-person visit to a  telephone visit to reduce the risk of COVID 19 exposure. Patient was informed that policies and procedures that govern in-person sessions would also apply to  telephone sessions. Patient was also informed that  telephone sessions would be discontinued when COVID 19 exposure is no longer a concern (as determined by [facility]).     Patient location: Patient home in [place], [place]  Provider location:  [facility] Neurology - Provider remote location    Patient was in agreement with proceeding with a  telephone session.      Necessity: This session is necessary to address the patient's depression, anxiety, hoarding, and OCD.  Today we focus on the patient's treatment plan, specifically exploring coping strategies.  The reader is invited to review the patient's full treatment plan in the Media section of the patient's Epic medical record.    Psychotherapeutic Technique: This writer utilized motivational interviewing, active listening, reassurance and support in the context of cognitive behavioral therapy to address the above.      MENTAL STATUS EVALUATION  Grooming: Unable to determine due to telephone visit  Attire: Unable to determine due to telephone visit  Age: Unable to determine due to telephone visit  Behavior Towards Examiner: Cooperative  Motor Activity: Unable to determine due to telephone visit  Eye Contact: Unable to determine due to telephone visit  Mood: Anxious   Affect: full range  Speech/Language: normal  Attention: Normal  Concentration: Sufficient  Thought Process: tangential and overinclusive   Thought Content: Clear    Orientation: Appeared oriented to person, place, and time, though not formally established  Memory: No evidence of  impairment.  Judgement: Adequate  Estimated Intelligence: Average  Demonstrated Insight: Adequate  Fund of Knowledge: Adequate    Intervention:   Patient reported he continues to make positive progress, and the combination of summer, recent good physical health reports, and having resolved one year of back taxes have combined to have patient feeling generally better. He is continuing to throw at least 1 or 2 bags away each week and not adding to hoarded content.  He is also continuing to exercise and has been maintaining a stable weight. Mr. Pretty had one day when he struggled with down mood but was able to remind himself that progress isn't a straight line and did not let this setback derail him.  This positive attitude was validated and reinforced.  We discussed additional strategies patient could utilize to further support positive progress and patient committed to trying to get outside in the morning.      Progress:  Patient has shown continued improvement in ability to utilize coping skills.     Plan:   We will meet again in 2 weeks to address the patient's depression, hoarding, anxiety, and OCD.  Estimated duration of treatment is 10+ individual therapy sessions (44851) at twice monthly intervals. Treatment is expected to be completed by March 2023.     Diagnosis:  Generalized Anxiety Disorder  Major Depressive Disorder, recurrent, severe  Hoarding Disorder  Obsessive-Compulsive Disorder (OCD)

## 2022-06-27 ENCOUNTER — VIRTUAL VISIT (OUTPATIENT)
Dept: BEHAVIORAL HEALTH | Facility: CLINIC | Age: 67
End: 2022-06-27
Attending: PSYCHIATRY & NEUROLOGY
Payer: MEDICARE

## 2022-06-27 DIAGNOSIS — F42.2 MIXED OBSESSIONAL THOUGHTS AND ACTS: ICD-10-CM

## 2022-06-27 PROCEDURE — 99443 PR PHYSICIAN TELEPHONE EVALUATION 21-30 MIN: CPT | Mod: 95 | Performed by: PSYCHIATRY & NEUROLOGY

## 2022-06-27 RX ORDER — TRAZODONE HYDROCHLORIDE 50 MG/1
50 TABLET, FILM COATED ORAL AT BEDTIME
Qty: 30 TABLET | Refills: 3 | Status: CANCELLED | OUTPATIENT
Start: 2022-06-27

## 2022-06-27 RX ORDER — ESCITALOPRAM OXALATE 20 MG/1
TABLET ORAL
Qty: 90 TABLET | Refills: 1 | Status: SHIPPED | OUTPATIENT
Start: 2022-06-27 | End: 2023-01-09

## 2022-06-27 NOTE — PROGRESS NOTES
This video/telephone visit will be conducted via a call between you and your physician/provider. We have found that certain health care needs can be provided without the need for an in-person physical exam. This service lets us provide the care you need with a video /telephone conversation. If a prescription is necessary we can send it directly to your pharmacy. If lab work is needed we can place an order for that and you can then stop by our lab to have the test done at a later time.    Just as we bill insurance for in-person visits, we also bill insurance for video/telephone visits. If you have questions about your insurance coverage, we recommend that you speak with your insurance company.    Patient has given verbal consent for video/Telephone visit? Yes    Patient would like telephone visit, please connect :  [phone]  Reason for visit: Medication follow up  Patient verified allergies, medications and pharmacy via telephone.     JHON-7 scores:    JHON-7 SCORE 10/19/2020   Total Score 20     PHQ-9 scores:   PHQ-9 SCORE 10/19/2020 4/6/2021   PHQ-9 Total Score - -   PHQ-9 Total Score 17 2   [place]  to be reviewed by provider. Medication refill is pended for review and approval by provider.  Patient states he  is ready for visit.  Katherine Queen CMA June 27, 2022 10:46 AM

## 2022-06-27 NOTE — PATIENT INSTRUCTIONS
I have made no medication changes at this time.  The patient should return to this clinic in 3 months for medication check.  He agrees to call before then with any questions or concerns.

## 2022-06-27 NOTE — PROGRESS NOTES
Outpatient Psychiatry Note     The patient presented for an initial psychiatric consultation on December 7 of 2020.  He carried a diagnosis of obsessive-compulsive disorder and has had symptoms throughout his entire life.  He presents at this time on Lexapro 20 mg a day.  He has been on this dose for 1 month having started it October 12, 2020 and increased 1 month prior to his initial intake with me.  He has been on psychotropic medication most of his adult life although has been off psychotropic medication for about 1 to 2 years prior to recent restarting of that medication.  Over the years the patient has been on a variety of psychotropic medication with limited success.  He has been on Prozac which she described as having mild efficacy for his OCD symptoms, Celexa with mild efficacy at doses of 80 mg a day, Parnate, Stelazine, Haldol and clomipramine as an adjunct at 25 mg which were all described as ineffective.    The patient describes having onset of OCD symptoms as a child.  His symptoms included obsessive thoughts and compulsive behaviors with constant worry and thoughts about germs, asbestos and he engaged in frequent handwashing checking doors and lights and excessively bought movies and books.  He had an inpatient hospital stay most recently in the 1970s.  He has OCD symptoms caused him to stop attendance at the [facility] and he has been on SSDI for years due to this.    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  It was at this time that he chose to come off his Lexapro.  He did fairly well from a psychiatric standpoint for a number of months but since the spring 2020 he has had an increase in obsessive thoughts and anxiety symptoms.  As stated previously he restarted his Lexapro on October 12 of 2020 and that was increased to 20 mg a day 1 month later.    At the initial visit, in part due to the fact that the patient had recently been increased on his Lexapro  we continued with that treatment plan.  He had previously been off all psychotropic medication for a about a year and a half.  He had done fairly well off that medication until the spring 2020 when he had return of his anxiety symptoms.  He was in the process of being reassessed for his sleep apnea.    The patient return to the clinic on February 8 of 2021.  He had restarted individual psychotherapy.  He reported he was doing a bit better and stated that therapy was quite helpful.  He believed he was improving and responding well to the therapy and reassurance.  He was tolerating the recently restarted medication and we continued with that treatment plan.    I saw the patient on May 3, 2021.  At that time he again sought frequent reassurance and was quite talkative and anxious about possibly getting dementia at some point.  He continued to struggle with OCD symptoms.  He continued with his therapist and medical follow-up.  We did not make any medication changes at that visit.    The patient returns for follow-up visit in December 2021.  There was no significant change at that time.  The patient had a diagnosed and was being treated for bladder cancer.  He was comfortable with the current symptoms and stated that he was seen Dr. Stout and that was going well.  We did not make any medication changes at that visit.    I saw the patient in March 2022.  He was concerned that he may have had a slight cognitive decline in the last 3 months.  He was not reading as well.  He again was somewhat anxious and needed some reassurance.  His psychologist had apparently ordered some neuropsychological testing.  His main concern was sleep deprivation.  We discussed some sleep hygiene techniques and I did add some low-dose as needed trazodone.      HPI:  The patient presents today stating that he continues on the Lexapro 20 mg a day.  He never filled the trazodone that I ordered the last time he was in because he stated he was  sleeping better.  He reported that he has been doing better cognitively and with regard to his mood over the last 3 months since our visit.  He stated he had more of a positive attitude.  He was more active and going out for walks.  He did talk a bit about his spring allergies which were fairly typical for him.  He stated because he is sleeping better and he did not want to fill the trazodone but liked the fact if the order was still there in case he wanted to revisit that.  He is gotten good news about his cancer.  He apparently had a cystoscope which looked good.  His last blood work with his hematologist was reassuring and he will follow up there in 8 months.  He has a new primary care doctor.    Patient denies having any thoughts of harming himself.  No hallucinations or delusions.  No other new medical issues or concerns.  Again the patient was extremely talkative and was somatically preoccupied but he did seem to be in no significant distress and was friendly throughout the interview.      Current Medications:  Medications were personally reviewed at this meeting.  Please see the chart for current medication list.      Mental Status Exam:  Appearance: The patient was interviewed by phone.  Behavior: The patient again control the interview and was fairly talkative but he was pleasant and polite.  No agitation.  Speech: Again, the patient was quite talkative and slightly rapid.  He was difficult to redirect.  No obvious change.  Mood/Affect: No significant depression or anxiety.  Baseline mild anxiety but was redirectable.  Thought Content: No hallucinations or delusions.  Suicidal or Homicidal Thoughts: None apparent or reported  Thought Process/Formulation: No significant change.  He was again quite talkative and controlled the interview but seemed to be tracking and following conversation.  He was redirectable with some effort.  Associations: Grossly adequate  Fund of Knowledge: Grossly  intact  Attention/Concentration: Attentive but again a bit perseverative.  Not distractible.  Insight: Fair.  No significant change.  Judgement: Fair  Memory: Grossly intact..  He reports it has gotten better over the last 3 months.  He apparently has a pending neuropsych testing.  Motor Status: Patient denies having any new issues.  No new tremors or asymmetries.  No recent changes.  Fund of Knowledge: Adequate  Orientation: Grossly oriented      Recent Labs:  Please see the chart.      Diagnosis:  JHON   OCD, by history  Major depressive disorder, recurrent, moderate, without psychotic features      The patient's chart review, interview and documentation review took 25 min    Plan:  I will make no psychotropic medication changes at this time.    He will continue to follow-up with his medical team as well as his psychologist.  The psychologist is apparently considering more formalized cognitive testing.  That apparently has been scheduled for the near future.    The patient will seek out appropriate emergency services should that become necessary.  I will make myself available if any questions, concerns, or problems arise.    Elie Jones MD

## 2022-06-27 NOTE — PROGRESS NOTES
Medications Phoned  to Pharmacy [] yes [x]no     Medications ordered this visit were e-scribed.  Verified by order class [] yes  [] no  List medications sent to pharmacy: Escitalopram 20mg     Medication changes or discontinuations were communicated to patient's pharmacy: [] yes  [x] no     Dictation completed at time of chart check: [x] yes  [] no     I have checked the documentation for today s encounters and the above information has been reviewed and completed.        Katherine Queen, NEETU June 27, 2022 1:20 PM

## 2022-07-01 ENCOUNTER — VIRTUAL VISIT (OUTPATIENT)
Dept: NEUROLOGY | Facility: CLINIC | Age: 67
End: 2022-07-01
Payer: MEDICARE

## 2022-07-01 DIAGNOSIS — F41.1 GAD (GENERALIZED ANXIETY DISORDER): Primary | ICD-10-CM

## 2022-07-01 PROCEDURE — 90834 PSYTX W PT 45 MINUTES: CPT | Mod: TEL | Performed by: PSYCHOLOGIST

## 2022-07-01 NOTE — PROGRESS NOTES
Psychology Progress Note    Date: July 01, 2022    Time length and type of treatment: 43 minutes (11:01 AM -11:44 AM) individual therapy    After review of the patient's situation, this visit was changed from an in-person visit to a telephone visit to reduce the risk of COVID 19 exposure. Patient was informed that policies and procedures that govern in-person sessions would also apply to telephone sessions. Patient was also informed that telephone sessions would be discontinued when COVID 19 exposure is no longer a concern (as determined by [facility]).     Patient location: Patient home in [place], [place]  Provider location:  [facility] Neurology - Provider remote location    Patient was in agreement with proceeding with a telephone session.      Necessity: This session is necessary to address the patient's depression, anxiety, hoarding disorder, and OCD.  Today we focus on revising the patient's treatment plan.  The reader is invited to review the patient's full treatment plan in the Media section of the patient's Epic medical record.    Psychotherapeutic Technique: This writer utilized motivational interviewing, active listening, reassurance and support in the context of cognitive behavioral therapy to address the above.      MENTAL STATUS EVALUATION  Grooming: Unable to determine due to telephone visit  Attire: Unable to determine due to telephone visit  Age: Unable to determine due to telephone visit  Behavior Towards Examiner: Cooperative  Motor Activity: Unable to determine due to telephone visit  Eye Contact: Unable to determine due to telephone visit  Mood: Anxious   Affect: full range  Speech/Language: Mildly pressured   Attention: Normal  Concentration: Sufficient  Thought Process: tangential and overinclusive   Thought Content: Clear  Does not seem to be responding to internal stimuli   Orientation: Appeared oriented to person, place, and time, though not formally  established  Memory: No evidence of impairment.  Judgement: Adequate  Estimated Intelligence: Above Average  Demonstrated Insight: Adequate  Fund of Knowledge: Adequate    Intervention:   Patient was readministered the PHQ-9 and JHON-7 as part of revising his treatment plan.  His score of 13 on the PHQ-9 is suggestive of moderate depression and is an improvement over his earlier severe score.  His score of 11 on the JHON-7 is suggestive of moderate anxiety and is 2 points lower than his previous score of 13.  Patient agreed that this was accurate, reporting that he is doing somewhat better.  Patient treatment plan was jointly revised.  Remaining time was spent discussing patient's OCD.  Patient noted he has made significant improvement from the 25 to 30 minutes he was spending each time he washed his hands to now only spending 10 minutes, and he recently was able to wash his hands for only 6 minutes, which is the shortest amount of time he has washed his hands in more than 2 years.  Patient initially articulated a desire to intermittently reduce time spent handwashing as his anxiety permits.  Alternatives to this strategy were discussed and patient expressed openness to more dramatic and consistent reductions. This will continue to be a focus of clinical attention.    Progress:  Patient treatment plan was jointly revised.    Plan:   We will meet again in 2 weeks to address the patient's depression, anxiety, OCD, and hoarding disorder.  Estimated duration of treatment is 10+ individual therapy sessions (74041) at twice monthly intervals. Treatment is expected to be completed by March 2023.     Diagnosis:  Generalized Anxiety Disorder  Major Depressive Disorder, recurrent, moderate  Hoarding Disorder  Obsessive Compulsive Disorder

## 2022-07-06 ENCOUNTER — OFFICE VISIT (OUTPATIENT)
Dept: NEUROLOGY | Facility: CLINIC | Age: 67
End: 2022-07-06
Payer: MEDICARE

## 2022-07-06 DIAGNOSIS — F33.0 MAJOR DEPRESSIVE DISORDER, RECURRENT EPISODE, MILD (H): ICD-10-CM

## 2022-07-06 DIAGNOSIS — F42.2 MIXED OBSESSIONAL THOUGHTS AND ACTS: Primary | ICD-10-CM

## 2022-07-06 NOTE — LETTER
7/6/2022         RE: Berto Pretty  [address]  [facility] [address]        Dear Colleague,    Thank you for referring your patient, Berto Pretty, to the [facility]. Please see a copy of my visit note below.      NEUROPSYCHOLOGY EVALUATION  [facility]      NAME: Berto Pretty    YOB: 1955   AGE: 66 years old  EDU: 13  DATE OF EVALUATION: 7/6/2022    REASON FOR REFERRAL:  Mr. Pretty is a 66 year-old, right-handed, White male with hyperlipidemia, untreated RACHNA (diagnosed in 2018), longstanding OCD and depressed mood, history of CVA (2018), and history of bladder cancer (2021; s/p resection). Due to concerns about cognitive decline, he was referred for this neuropsychological evaluation by his PCP, Olvin De Dios MD, in order to assist with differential diagnosis and care planning.     SUMMARY OF FINDINGS: (please refer to Extended Report below for full details and comprehensive clinical history)  Due to the ongoing COVID-19 pandemic, this assessment was conducted using PPE worn by the examiner and a face-mask for the patient. The standard administration of these tests involves direct face-to-face methods. The full impact of applying non-standard administration methods with PPE is not fully appreciated at this time. As such, the diagnostic conclusions and recommendations for treatment provided in this report are being advanced with caution.    With these limitations in mind, results of testing indicate that Mr. Pretty is of estimated high average premorbid intellectual functioning, and all of Mr. Pretty's performances are generally commensurate with that estimate. Specifically, he exhibits performances that are in the average to superior range across all cognitive domains assessed, including attention/concentration, processing speed, visuospatial/constructional skills, language abilities, verbal and nonverbal (visual) learning  and memory, and executive functions (including novel problem solving/concept identification, cognitive inhibition, mental flexibility/set shifting, and planning). There is NO evidence of cognitive impairment in the current profile.    Emotionally, the patient endorses moderate symptoms of anxiety and mild depressive symptoms on self-report questionnaires. This is consistent with his reports during the clinical interview, during which he reports longstanding OCD and depressed mood.Healso acknowledges feeling more irritable and impatient over the past few years.     IMPRESSIONS:    The current profile is fully within normal limits. There is NO evidence of diffuse or focal cerebral dysfunction in the current test results.     As such, Mr. Pretty does NOT meet criteria for a cognitive disorder at this time.    The cognitive issues that he is experiencing are most likely due to non-neurologic factors, including:  o His experience of what are actually normal, age-related cognitive changes. As we age, we all become more forgetful, have more difficulty with word-finding, and have a harder time multi-tasking to an extent. These age-related changes can be all the more frustrating for bright individuals (such as Mr. Pretty) who are used to things coming rather quickly and easily to them.   o Significant anxiety/emotional distress and stress related to the ongoing pandemic may also interfere with Mr. Pretty's cognitive functioning at times in daily life.   o Mr. Pretty also reports chronically low energy throughout the day, possibly secondary to a combination of psychiatric symptoms and poor sleep quality/untreated RACHNA. This fatigue can also contribute to the cognitive inefficiencies that he is noticing.    Nevertheless, Mr. Pretty can be reassured that there is NO evidence of Alzheimer's disease or any other underlying dementia syndrome at this time.    This testing also confirms his experience that he has likely made a  full recovery from his history of strokes. As expected, his remote concussion injuries are also considered noncontributory.    Improvements in his mood, anxiety, and sleep quality will likely elicit perceived improvements in his cognitive functioning over time.    DIAGNOSTIC IMPRESSIONS:  No Cognitive Disorder    Obsessive Compulsive Disorder (per history)    Major Depressive Disorder, Recurrent, Mild (per history)    RECOMMENDATIONS:  1) Mr. Pretty is encouraged to continue with psychotherapeutic and psychiatric interventions to help manage his OCD and mood symptoms. Improvements in his mood and anxiety may very well lead to perceived improvements in his cognition and daytime energy level.    2) The patient is encouraged to follow up with the Sleep Medicine clinic in order to help troubleshoot treatment of RACHNA. There may be other options (e.g., using an oral appliance instead of a CPAP) that the patient will be more willing/able to use. This may help improve his daytime energy level at least to an extent, which could also help him feel cognitively sharper day to day.    3) From a cognitive perspective, I have no concerns about Mr. Pretty's current independent living situation or his ability to independently perform complex daily activities (including driving, medication and financial management, making complex decisions, etc.).    4) The patient is encouraged to utilize cognitive strategies in daily life for his own reassurance, particularly when he is feeling more overwhelmed, stressed, or fatigued. These strategies may include utilizing note pads, checklists, to-do lists, a calendar/planner, labeled alarm reminders, a GPS, a pillbox, and maintaining a daily morning and nighttime routine and an organized living/work environment.    5) Mr. Pretty is strongly encouraged to adhere closely to his medication regimen to help keep his cerebrovascular risk factors (e.g., hyperlipidemia) well controlled. This may help  "to prevent future cognitive decline.    6) Mr. Pretty is encouraged to remain physically, socially, and mentally active in order to optimize his brain health.    7) Neuropsychological follow-up is not clinically indicated at this time. However, the current test data can now serve as a baseline should a repeat assessment be warranted in the future.      FEEDBACK OF ASSESSMENT RESULTS:  Mr. Pretty has requested to receive the results of this evaluation via a formal feedback appointment with me, which is currently scheduled for Monday, 7/25.      Thank you for allowing me to participate in Mr. Pretty's care. Please contact me with any questions regarding the content of this report.        Haven Hummel PsyD, LP  Licensed Clinical Neuropsychologist  [facility] [address], [address]  Phone: [phone]          --------------------------------EXTENDED REPORT--------------------------------    HISTORY OF PRESENTING PROBLEM:  The following information was obtained via medical record review and by interview of the patient.    Mr. Pretty began having concerns about cognitive decline in late 2019. His concern about having possible Alzheimer's disease prompted an evaluation by neurologist, Rk Alfred MD, in 2020. He underwent extensive work-up, including a brain MRI, EEG, and relevant lab work that were all unremarkable. Dr. Alfred suspected that the patient's cognitive concerns were secondary to underlying OCD and depression. He was encouraged to discuss medication options with his PCP in order to better manage his psychiatric symptoms.    Although Mr. Pretty reported that he was reassured by the neurology work-up in 2020, he continues to experience worsening (yet intermittent) cognitive issues. He was subsequently referred for this neuropsychological evaluation by his PCP, Dr. Olvin De Dios.     Currently, Mr. Pretty reported experiencing \"good days and bad days\" with " "regard to his cognitive functioning. He noted that the \"bad days\" are usually when he is more tired in general and feels as though he has \"brain fog.\" He reported today that the initial symptoms he was experiencing in 2019 included difficulty absorbing information while watching movies and having word-finding trouble during conversation. More recently he finds that he forgets what was said while listening to the radio and he has more difficulty articulating his words; they may come out slurred or he makes more paraphasic errors. He feels slower to read as well. He denied noticing any cognitive decline following the CVA he sustained in 2018.     With regard to the activities of daily living, Mr. Pretty reported that he is fully independent with regard to driving, medication and financial management, cooking, and performing chores and errands.    MEDICAL HISTORY:  Mr. Pretty's medical history is significant for hyperlipidemia, untreated RACHNA (diagnosed in 2018), history of CVA (2018), and history of bladder cancer (2021; s/p resection). With regard to his CVA history, his CVA occurred in the left precentral gyrus and right cerebellar hemisphere, after which he experienced right-sided weakness. He feels that he recovered rapidly but experienced heightened anxiety about having another stroke afterward. He did note that he sustained a TIA a few months later in which he experienced expressive language issues for 1-2 minutes that fully resolved. Work-up in the ED was unremarkable.     The patient denied any history of significant brain injuries, known seizure and heart attack. To his knowledge, he has never had COVID-19.    With regard to sleep, the patient reported that he wakes up more frequently than he used to, but is able to fall back to sleep quickly. He was recently prescribed trazodone but his sleep improved so he never started it. He was diagnosed with RACHNA in 2018. He used a CPAP for 3-4 months and felt some " benefit (mainly increased daytime energy); however, he began to worry that the CPAP was too difficult to clean and was acculumating germs and bacteria. This made his OCD symptoms worse so he stopped using the CPAP entirely.     Past Surgical History:   Procedure Laterality Date     NO PAST SURGERIES       Diagnostic studies:  Brain MRI dated 3/16/2020 revealed chronic infarctions in the left precentral gyrus and right cerebellar hemisphere, along with mild to moderate chronic microvascular ischemic change.    EEG dated 6/30/2020 was reported as normal.    Current medications include (per medical record):   Current Outpatient Medications:      aspirin (ASA) 81 MG EC tablet, Take 1 tablet (81 mg) by mouth daily, Disp: , Rfl:      atorvastatin (LIPITOR) 40 MG tablet, Take 1 tablet (40 mg) by mouth daily, Disp: 90 tablet, Rfl: 3     escitalopram (LEXAPRO) 20 MG tablet, TAKE 1 TABLET BY MOUTH ONCE DAILY, Disp: 90 tablet, Rfl: 1     loratadine 10 MG capsule, Take 10 mg by mouth daily as needed Spring only, Disp: , Rfl:      Multiple Vitamins-Minerals (MULTIVITAL PO), , Disp: , Rfl:      traZODone (DESYREL) 50 MG tablet, Take 1 tablet (50 mg) by mouth At Bedtime (Patient not taking: No sig reported), Disp: 30 tablet, Rfl: 3     Vitamin D, Cholecalciferol, 25 MCG (1000 UT) CAPS, Take 1,000 mg by mouth daily, Disp: , Rfl:     RELEVANT FAMILY MEDICAL HISTORY:   Family neurologic history is significant for vascular dementia in the patient's mother after she sustained several major CVAs. His maternal aunt had a brain hemorrhage. Other family medical history is positive for the following:  Family History   Problem Relation Age of Onset     Cerebrovascular Disease Mother      Cerebrovascular Disease Father      Cancer Father      Snoring Mother      Snoring Father      Colon Cancer Father      PSYCHIATRIC HISTORY:  With regard to his psychiatric history, Mr. Pretty endorsed a history of OCD that has reportedly been present for  "most of his life, for which he received ECT treatments in the 1970's and 1980's that were largely ineffective. He was psychiatrically hospitalized for about one month during his ECT treatments. He has also taken medications and participated in psychotherapy. Records additionally note a history of depression and more generallized anxiety, along with hoarding behavior. Please refer to the patient's mental health medical records for further details of his psychiatric history.    Currently, the patient described his mood as \"more irritable and angry\" and \"less patient.\" He has felt this way since late fall 2019. He continues to participate in psychotherapy with Dr. Lisa Stout every other week, which he finds very helpful. He also sees psychiatrist, Dr. Clive Jones for medication management.     With regard to substance abuse, Mr. Pretty is a former smoker but quit in 1974. Other historical or current substance abuse was denied.    SOCIAL HISTORY:  Mr. Pretty graduated high school. He noted that he was a top 10 student out of 650 students in his high school; however, during his senior year his mental health declined and he had frequent panic attacks. He subsequently missed a lot of school and his grades drop. Nevertheless, he went on to complete about one year of college at [employer] but left college due to worsening OCD symptoms. He earned mostly average grades. Significant learning difficulties or developmental attention issues were denied. He attempted to maintain employment but his OCD symptoms were too debilitating. He has been on SSDI since the 1980's. He has never been  and has no children. The patient currently lives alone in his own home in [place], [place].    TESTS ADMINISTERED:   Wechsler Memory Scale-III (WMS-III) select subtests, Wechsler Adult Intelligence Scale-IV (WAIS-IV) select subtests, Wide Range Achievement Test-4 (WRAT-4) select subtests, Wechsler Memory Scale-IV " (WMS-IV) select subtests, Brief Visuospatial Memory Test-Revised (BVMT-R), Rivers Verbal Learning Test-R (HVLT-R), Trailmaking Test (Trails A & B), Stroop Color and Word Test, Controlled Oral Word Association Test (COWAT) and Category Fluency, [place] Naming Test-2 (BNT-2), Partha-Osterrieth Complex Figure Test (RCFT), [place] Card Sorting Test-1 deck (WCST-64), Generalized Anxiety Disorder-7 Assessment (JHON-7) and Geriatric Depression Scale-Short Form (GDS-15).    MOANS norms were used for BNT, Trail Making Test A & B, COWAT & Category Fluency, Stroop, Partha-O Copy     DESCRIPTIVE PERFORMANCE KEY:    Labels for tests with Normal Distributions  Score Label Standard Score %ile Rank   Exceptionally high score  > 130 > 98   Above average score 120-129 91-97   High average score 110-119 75-90   Average score  25-74   Low average score 80-89 9-24   Below average score 70-79 2-8   Exceptionally low score < 70 < 2     Labels for tests with Non-Normal Distributions  Score Label %ile Rank   Within normal expectations/ limits score (WNL) > 24   Low average score 9-24   Below average score 2-8   Exceptionally low score < 2     The following test results utilize score labels as adapted from João Grider, Vic Muñoz, Andria Garcia, DIEGO Bennett, Jana José, Bayron Chávez & Conference Participatnts (2020): American Academy of Clinical Neuropsychology consensus conference statement on uniform labeling of performance test scores, The Clinical Neuropsychologist, DOI: 10.1080/[email or web address]. All scores contain some measure of error; scores are reported here as they are obtained by the individual (without reference to the range of error). These are meant as labels and not interpretation of performance. While other relevant comments regarding task performance are provided below, please see the Summary, Impressions, and Diagnosis sections of this report for interpretation  of the scores and the cognitive profile as a whole, including what does and does not constitute impairment for this particular individual.    COVID-19-ERA NEUROPSYCHOLOGY LIMITATIONS:  Due to the ongoing COVID-19 pandemic, this assessment was conducted with the examiner wearing [employer]-designated PPE and the patient wearing a face mask. The standard administration of these procedures involves direct face-to-face methods. The impact of applying non-standard administration methods has been evaluated only in part by scientific research. While every effort was made to simulate standard assessment practices, the diagnostic conclusions and recommendations for treatment provided in this report are being advanced with these limitations in mind.    BEHAVIORAL OBSERVATIONS:   Mr. Pretty arrived 45 minutes late and unaccompanied to today's appointment. He was appropriately dressed and groomed. He appeared alert and engaged. No vision or hearing difficulties were observed. He was quite loquacious and a detailed historian. Conversational speech was of normal rate, volume, and prosody. No word-finding pauses or paraphasias were noted. His thought process appeared linear and goal-directed. No hallucinations or delusions were apparent. Judgment and insight appeared intact. His mood was anxious and his affect was appropriately reactive. Rapport was easily established and eye contact was appropriate.     During the testing session, Mr. Pretty was alert throughout. He was pleasant and cooperative throughout the evaluation, yet continued to be noticeably anxious. He took frequent short breaks in order to relax (closed his eyes and did some deep breathing). He understood test instructions without difficulty. No frontal signs were observed behaviorally. Mr. Pretty appeared adequately motivated and engaged easily during testing. His score on an embedded measure of performance validity was in the valid range. Overall, the  following results are considered a reasonably valid estimation of his current cognitive abilities.    OPTIMAL PREMORBID INTELLECT:  Optimal premorbid intellectual abilities were estimated as falling in the high average range based on Mr. Pretty's educational and occupational histories and performance on tasks least likely to be affected by acquired brain dysfunction.    SUMMARY OF TEST RESULTS:  ORIENTATION. Performance on a mental status exam, assessing orientation to personal and current information, resulted in a within normal limits score. He was oriented to person, place, time, and date and was able to correctly name the current and previous presidents.    ATTENTION/WORKING MEMORY. The patient's score on a measure sensitive to sustained auditory-verbal attention and concentration (WAIS-IV Digit Span) was classified as average, as he was able to recite up to 6 digits forward (an average score), up to 5 digits backward (an average score), and up to 5 digits in sequence (an average score).      PROCESSING SPEED. Speeded digit-symbol coding was measured as a high average score (WAIS-IV Coding). On a test of complex concentration that requires speeded numeric sequencing (Trails A), the patient's score was average for completion time and without error. On the Stroop test, speeded color naming was high average, and speeded word reading was high average.     LANGUAGE PROCESSING. Language comprehension appeared intact. Confrontation naming was measured as an exceptionally high score (60/60; BNT-2). The patient's performance on a test of phonemic fluency resulted in an average score (COWAT), and his semantic fluency was high average (Category Fluency). His writing sample was intact and there was no evidence of micrographia.     VISUOSPATIAL/CONSTRUCTIONAL SKILLS. Copy of a complex geometric figure slightly below expectation due to subtle imprecision and some slightly misplaced detailed elements; however, his copy was  well-organized in approach (RCFT Copy).       LEARNING/MEMORY. On a 12-item verbal list-learning task (HVLT-R), the patient acquired up to 12 words (100%) of the word list by the 3rd and final learning trial (raw scores over trials = 4, 8, 12). Total learning acquisition was classified as an average score. After a 20-minute delay, the patient recalled 9 items, reflecting an average score with a 75% retention rate. His recognition discriminability score was within normal limits, as he correctly recognized 12/12 items and made only 1 false-positive error.    On a visual memory measure (BVMT-R), immediate recall of six simple geometric figures over three learning trials was average (raw scores over trials = 4, 8, 10 out of 12). Delayed recall of the figures was high average, with a 100% retention rate (raw score = 10 out of 12). Recognition discriminability was perfect, as he correctly recognized 6/6 figures and made 0 false-positive errors.     EXECUTIVE FUNCTIONS. Concept identification and the ability to generate alternative problem solving strategies was intact for age on the [place] Card Sorting Test. He completed 5 out of 3 categories (within normal limits) with a total of only 9 errors, which is high average. Only 5 of his errors were perseverative (high average) and there were 0 failures to maintain set. On a test of inhibition and cognitive flexibility (Stroop), the patient's score was high average for completion time and made only 1 error. On a test that requires speeded alpha-numeric sequencing/cognitive set-shifting (Trails B), performance was average and without error. Phonemic fluency was average (COWAT).     MOOD. On the GDS-15 a self report measure of depressive symptomatology, he obtained a score of 6, placing him in the range of mild depression. On the JHON-7, a self-report measure of anxiety, he obtained a score of 14,  placing him in the range of moderate  anxiety.    ____________________________________________________________________________________    SERVICES PROVIDED & TIME:  On-site Office Visit Details   A clinical interview/neurobehavioral status examination was conducted with the patient and documented. I thoroughly reviewed the medical record, selected the neuropsychological test battery, provided supervision to the trained examiner/technician, interpreted/integrated patient data and test results, and engaged in clinical decision making, treatment planning, report writing/preparation and provision of interactive feedback of test results on 7/25/2022. A trained examiner/technician administered and scored the neuropsychological tests (2+ tests).  Please see below for a breakdown of time spent and the associated codes billed for these services.  Services   Time Spent  CPT Codes   Neurobehavioral Status Exam:  (e.g., clinical interview and neurobehavioral status exam, interpretation, report)   80 minutes   1 x 96116     Neuropsychological Evaluation Services:   (e.g., integration, interpretation, treatment planning, clinical decision making, feedback via telehealth)   235 minutes   1 x 96132  3 x 96133   Neuropsychological Testing by Trained Examiner/Technician:  (e.g., test administration, scoring, 2+ tests administered)   115 minutes   1 x 96138  3 x 96139   For diagnostic and coding purposes, Mr. Pretty has a history of hyperlipidemia, untreated RACHNA (diagnosed in 2018), longstanding OCD and depressed mood, history of CVA (2018), and history of bladder cancer (2021; s/p resection). He was referred for an evaluation of mild neurocognitive disorder. Please note, all charges are filed at the completion of the Episode of Care and associated with the final encounter date (feedback session on 7/25/2022).         The patient was seen for a neuropsychological evaluation for the purposes of diagnostic clarification and treatment planning. 85 minutes of face-to-face  testing were provided by this writer.  The patient was cooperative with testing. No concerns were brought to my attention. Please see Dr. Hummel's report for a detailed description of the charges and interpretation and integration of the findings.    The patient was seen for a neuropsychological evaluation for the purposes of diagnostic clarification and treatment planning. 30 minutes were spent scoring and compiling test results. Please see Dr. Hummel's report for a detailed description of the charges and interpretation and integration of the findings.      Again, thank you for allowing me to participate in the care of your patient.        Sincerely,        Haven Hummel Psy.D, LP

## 2022-07-06 NOTE — PROGRESS NOTES
NEUROPSYCHOLOGY EVALUATION  [facility]      NAME: Berto Pretty    YOB: 1955   AGE: 66 years old  EDU: 13  DATE OF EVALUATION: 7/6/2022    REASON FOR REFERRAL:  Mr. Pretty is a 66 year-old, right-handed, White male with hyperlipidemia, untreated RACHNA (diagnosed in 2018), longstanding OCD and depressed mood, history of CVA (2018), and history of bladder cancer (2021; s/p resection). Due to concerns about cognitive decline, he was referred for this neuropsychological evaluation by his PCP, Olvin De Dios MD, in order to assist with differential diagnosis and care planning.     SUMMARY OF FINDINGS: (please refer to Extended Report below for full details and comprehensive clinical history)  Due to the ongoing COVID-19 pandemic, this assessment was conducted using PPE worn by the examiner and a face-mask for the patient. The standard administration of these tests involves direct face-to-face methods. The full impact of applying non-standard administration methods with PPE is not fully appreciated at this time. As such, the diagnostic conclusions and recommendations for treatment provided in this report are being advanced with caution.    With these limitations in mind, results of testing indicate that Mr. Pretty is of estimated high average premorbid intellectual functioning, and all of Mr. Pretty's performances are generally commensurate with that estimate. Specifically, he exhibits performances that are in the average to superior range across all cognitive domains assessed, including attention/concentration, processing speed, visuospatial/constructional skills, language abilities, verbal and nonverbal (visual) learning and memory, and executive functions (including novel problem solving/concept identification, cognitive inhibition, mental flexibility/set shifting, and planning). There is NO evidence of cognitive impairment in the current profile.    Emotionally, the  patient endorses moderate symptoms of anxiety and mild depressive symptoms on self-report questionnaires. This is consistent with his reports during the clinical interview, during which he reports longstanding OCD and depressed mood.Healso acknowledges feeling more irritable and impatient over the past few years.     IMPRESSIONS:    The current profile is fully within normal limits. There is NO evidence of diffuse or focal cerebral dysfunction in the current test results.     As such, Mr. Pretty does NOT meet criteria for a cognitive disorder at this time.    The cognitive issues that he is experiencing are most likely due to non-neurologic factors, including:  o His experience of what are actually normal, age-related cognitive changes. As we age, we all become more forgetful, have more difficulty with word-finding, and have a harder time multi-tasking to an extent. These age-related changes can be all the more frustrating for bright individuals (such as Mr. Pretty) who are used to things coming rather quickly and easily to them.   o Significant anxiety/emotional distress and stress related to the ongoing pandemic may also interfere with Mr. Pretty's cognitive functioning at times in daily life.   o Mr. Pretty also reports chronically low energy throughout the day, possibly secondary to a combination of psychiatric symptoms and poor sleep quality/untreated RACHNA. This fatigue can also contribute to the cognitive inefficiencies that he is noticing.    Nevertheless, Mr. Pretty can be reassured that there is NO evidence of Alzheimer's disease or any other underlying dementia syndrome at this time.    This testing also confirms his experience that he has likely made a full recovery from his history of strokes. As expected, his remote concussion injuries are also considered noncontributory.    Improvements in his mood, anxiety, and sleep quality will likely elicit perceived improvements in his cognitive functioning  over time.    DIAGNOSTIC IMPRESSIONS:  No Cognitive Disorder    Obsessive Compulsive Disorder (per history)    Major Depressive Disorder, Recurrent, Mild (per history)    RECOMMENDATIONS:  1) Mr. Pretty is encouraged to continue with psychotherapeutic and psychiatric interventions to help manage his OCD and mood symptoms. Improvements in his mood and anxiety may very well lead to perceived improvements in his cognition and daytime energy level.    2) The patient is encouraged to follow up with the Sleep Medicine clinic in order to help troubleshoot treatment of RACHNA. There may be other options (e.g., using an oral appliance instead of a CPAP) that the patient will be more willing/able to use. This may help improve his daytime energy level at least to an extent, which could also help him feel cognitively sharper day to day.    3) From a cognitive perspective, I have no concerns about Mr. Pretty's current independent living situation or his ability to independently perform complex daily activities (including driving, medication and financial management, making complex decisions, etc.).    4) The patient is encouraged to utilize cognitive strategies in daily life for his own reassurance, particularly when he is feeling more overwhelmed, stressed, or fatigued. These strategies may include utilizing note pads, checklists, to-do lists, a calendar/planner, labeled alarm reminders, a GPS, a pillbox, and maintaining a daily morning and nighttime routine and an organized living/work environment.    5) Mr. Pretty is strongly encouraged to adhere closely to his medication regimen to help keep his cerebrovascular risk factors (e.g., hyperlipidemia) well controlled. This may help to prevent future cognitive decline.    6) Mr. Pretty is encouraged to remain physically, socially, and mentally active in order to optimize his brain health.    7) Neuropsychological follow-up is not clinically indicated at this time. However, the  "current test data can now serve as a baseline should a repeat assessment be warranted in the future.      FEEDBACK OF ASSESSMENT RESULTS:  Mr. Prtety has requested to receive the results of this evaluation via a formal feedback appointment with me, which is currently scheduled for Monday, 7/25.      Thank you for allowing me to participate in Mr. Pretty's care. Please contact me with any questions regarding the content of this report.        Haven Hummel PsyD, LP  Licensed Clinical Neuropsychologist  [facility] [address], [address]  Phone: [phone]          --------------------------------EXTENDED REPORT--------------------------------    HISTORY OF PRESENTING PROBLEM:  The following information was obtained via medical record review and by interview of the patient.    Mr. Pretty began having concerns about cognitive decline in late 2019. His concern about having possible Alzheimer's disease prompted an evaluation by neurologist, Rk Alfred MD, in 2020. He underwent extensive work-up, including a brain MRI, EEG, and relevant lab work that were all unremarkable. Dr. Alfred suspected that the patient's cognitive concerns were secondary to underlying OCD and depression. He was encouraged to discuss medication options with his PCP in order to better manage his psychiatric symptoms.    Although Mr. Pretty reported that he was reassured by the neurology work-up in 2020, he continues to experience worsening (yet intermittent) cognitive issues. He was subsequently referred for this neuropsychological evaluation by his PCP, Dr. Olvin De Dios.     Currently, Mr. Pretty reported experiencing \"good days and bad days\" with regard to his cognitive functioning. He noted that the \"bad days\" are usually when he is more tired in general and feels as though he has \"brain fog.\" He reported today that the initial symptoms he was experiencing in 2019 included difficulty " absorbing information while watching movies and having word-finding trouble during conversation. More recently he finds that he forgets what was said while listening to the radio and he has more difficulty articulating his words; they may come out slurred or he makes more paraphasic errors. He feels slower to read as well. He denied noticing any cognitive decline following the CVA he sustained in 2018.     With regard to the activities of daily living, Mr. Pretty reported that he is fully independent with regard to driving, medication and financial management, cooking, and performing chores and errands.    MEDICAL HISTORY:  Mr. Pretty's medical history is significant for hyperlipidemia, untreated RACHNA (diagnosed in 2018), history of CVA (2018), and history of bladder cancer (2021; s/p resection). With regard to his CVA history, his CVA occurred in the left precentral gyrus and right cerebellar hemisphere, after which he experienced right-sided weakness. He feels that he recovered rapidly but experienced heightened anxiety about having another stroke afterward. He did note that he sustained a TIA a few months later in which he experienced expressive language issues for 1-2 minutes that fully resolved. Work-up in the ED was unremarkable.     The patient denied any history of significant brain injuries, known seizure and heart attack. To his knowledge, he has never had COVID-19.    With regard to sleep, the patient reported that he wakes up more frequently than he used to, but is able to fall back to sleep quickly. He was recently prescribed trazodone but his sleep improved so he never started it. He was diagnosed with RACHNA in 2018. He used a CPAP for 3-4 months and felt some benefit (mainly increased daytime energy); however, he began to worry that the CPAP was too difficult to clean and was acculumating germs and bacteria. This made his OCD symptoms worse so he stopped using the CPAP entirely.     Past Surgical  History:   Procedure Laterality Date     NO PAST SURGERIES       Diagnostic studies:  Brain MRI dated 3/16/2020 revealed chronic infarctions in the left precentral gyrus and right cerebellar hemisphere, along with mild to moderate chronic microvascular ischemic change.    EEG dated 6/30/2020 was reported as normal.    Current medications include (per medical record):   Current Outpatient Medications:      aspirin (ASA) 81 MG EC tablet, Take 1 tablet (81 mg) by mouth daily, Disp: , Rfl:      atorvastatin (LIPITOR) 40 MG tablet, Take 1 tablet (40 mg) by mouth daily, Disp: 90 tablet, Rfl: 3     escitalopram (LEXAPRO) 20 MG tablet, TAKE 1 TABLET BY MOUTH ONCE DAILY, Disp: 90 tablet, Rfl: 1     loratadine 10 MG capsule, Take 10 mg by mouth daily as needed Spring only, Disp: , Rfl:      Multiple Vitamins-Minerals (MULTIVITAL PO), , Disp: , Rfl:      traZODone (DESYREL) 50 MG tablet, Take 1 tablet (50 mg) by mouth At Bedtime (Patient not taking: No sig reported), Disp: 30 tablet, Rfl: 3     Vitamin D, Cholecalciferol, 25 MCG (1000 UT) CAPS, Take 1,000 mg by mouth daily, Disp: , Rfl:     RELEVANT FAMILY MEDICAL HISTORY:   Family neurologic history is significant for vascular dementia in the patient's mother after she sustained several major CVAs. His maternal aunt had a brain hemorrhage. Other family medical history is positive for the following:  Family History   Problem Relation Age of Onset     Cerebrovascular Disease Mother      Cerebrovascular Disease Father      Cancer Father      Snoring Mother      Snoring Father      Colon Cancer Father      PSYCHIATRIC HISTORY:  With regard to his psychiatric history, Mr. Pretty endorsed a history of OCD that has reportedly been present for most of his life, for which he received ECT treatments in the 1970's and 1980's that were largely ineffective. He was psychiatrically hospitalized for about one month during his ECT treatments. He has also taken medications and participated in  "psychotherapy. Records additionally note a history of depression and more generallized anxiety, along with hoarding behavior. Please refer to the patient's mental health medical records for further details of his psychiatric history.    Currently, the patient described his mood as \"more irritable and angry\" and \"less patient.\" He has felt this way since late fall 2019. He continues to participate in psychotherapy with Dr. Lisa Stout every other week, which he finds very helpful. He also sees psychiatrist, Dr. Clive Jones for medication management.     With regard to substance abuse, Mr. Pretty is a former smoker but quit in 1974. Other historical or current substance abuse was denied.    SOCIAL HISTORY:  Mr. Pretty graduated high school. He noted that he was a top 10 student out of 650 students in his high school; however, during his senior year his mental health declined and he had frequent panic attacks. He subsequently missed a lot of school and his grades drop. Nevertheless, he went on to complete about one year of college at [employer] but left college due to worsening OCD symptoms. He earned mostly average grades. Significant learning difficulties or developmental attention issues were denied. He attempted to maintain employment but his OCD symptoms were too debilitating. He has been on SSDI since the 1980's. He has never been  and has no children. The patient currently lives alone in his own home in [place], [place].    TESTS ADMINISTERED:   Wechsler Memory Scale-III (WMS-III) select subtests, Wechsler Adult Intelligence Scale-IV (WAIS-IV) select subtests, Wide Range Achievement Test-4 (WRAT-4) select subtests, Wechsler Memory Scale-IV (WMS-IV) select subtests, Brief Visuospatial Memory Test-Revised (BVMT-R), Rivers Verbal Learning Test-R (HVLT-R), Trailmaking Test (Trails A & B), Stroop Color and Word Test, Controlled Oral Word Association Test (COWAT) and Category Fluency, " Saltese Naming Test-2 (BNT-2), Partha-Osterrieth Complex Figure Test (RCFT), Wisconsin Card Sorting Test-1 deck (WCST-64), Generalized Anxiety Disorder-7 Assessment (JHON-7) and Geriatric Depression Scale-Short Form (GDS-15).    MOANS norms were used for BNT, Trail Making Test A & B, COWAT & Category Fluency, Stroop, Partha-O Copy     DESCRIPTIVE PERFORMANCE KEY:    Labels for tests with Normal Distributions  Score Label Standard Score %ile Rank   Exceptionally high score  > 130 > 98   Above average score 120-129 91-97   High average score 110-119 75-90   Average score  25-74   Low average score 80-89 9-24   Below average score 70-79 2-8   Exceptionally low score < 70 < 2     Labels for tests with Non-Normal Distributions  Score Label %ile Rank   Within normal expectations/ limits score (WNL) > 24   Low average score 9-24   Below average score 2-8   Exceptionally low score < 2     The following test results utilize score labels as adapted from João Grider, Vic Muñoz, Andria Garcia, DIEGO Bennett, Jana José, Russ Ruiz, Bayron Ayala & Conference Participatnts (2020): American Academy of Clinical Neuropsychology consensus conference statement on uniform labeling of performance test scores, The Clinical Neuropsychologist, DOI: 10.1080/[email or web address]. All scores contain some measure of error; scores are reported here as they are obtained by the individual (without reference to the range of error). These are meant as labels and not interpretation of performance. While other relevant comments regarding task performance are provided below, please see the Summary, Impressions, and Diagnosis sections of this report for interpretation of the scores and the cognitive profile as a whole, including what does and does not constitute impairment for this particular individual.    COVID-19-ERA NEUROPSYCHOLOGY LIMITATIONS:  Due to the ongoing COVID-19 pandemic, this assessment was  conducted with the examiner wearing [facility]-designated PPE and the patient wearing a face mask. The standard administration of these procedures involves direct face-to-face methods. The impact of applying non-standard administration methods has been evaluated only in part by scientific research. While every effort was made to simulate standard assessment practices, the diagnostic conclusions and recommendations for treatment provided in this report are being advanced with these limitations in mind.    BEHAVIORAL OBSERVATIONS:   Mr. Pretty arrived 45 minutes late and unaccompanied to today's appointment. He was appropriately dressed and groomed. He appeared alert and engaged. No vision or hearing difficulties were observed. He was quite loquacious and a detailed historian. Conversational speech was of normal rate, volume, and prosody. No word-finding pauses or paraphasias were noted. His thought process appeared linear and goal-directed. No hallucinations or delusions were apparent. Judgment and insight appeared intact. His mood was anxious and his affect was appropriately reactive. Rapport was easily established and eye contact was appropriate.     During the testing session, Mr. Pretty was alert throughout. He was pleasant and cooperative throughout the evaluation, yet continued to be noticeably anxious. He took frequent short breaks in order to relax (closed his eyes and did some deep breathing). He understood test instructions without difficulty. No frontal signs were observed behaviorally. Mr. Pretty appeared adequately motivated and engaged easily during testing. His score on an embedded measure of performance validity was in the valid range. Overall, the following results are considered a reasonably valid estimation of his current cognitive abilities.    OPTIMAL PREMORBID INTELLECT:  Optimal premorbid intellectual abilities were estimated as falling in the high average range based on Mr. Pretty's  educational and occupational histories and performance on tasks least likely to be affected by acquired brain dysfunction.    SUMMARY OF TEST RESULTS:  ORIENTATION. Performance on a mental status exam, assessing orientation to personal and current information, resulted in a within normal limits score. He was oriented to person, place, time, and date and was able to correctly name the current and previous presidents.    ATTENTION/WORKING MEMORY. The patient's score on a measure sensitive to sustained auditory-verbal attention and concentration (WAIS-IV Digit Span) was classified as average, as he was able to recite up to 6 digits forward (an average score), up to 5 digits backward (an average score), and up to 5 digits in sequence (an average score).      PROCESSING SPEED. Speeded digit-symbol coding was measured as a high average score (WAIS-IV Coding). On a test of complex concentration that requires speeded numeric sequencing (Trails A), the patient's score was average for completion time and without error. On the Stroop test, speeded color naming was high average, and speeded word reading was high average.     LANGUAGE PROCESSING. Language comprehension appeared intact. Confrontation naming was measured as an exceptionally high score (60/60; BNT-2). The patient's performance on a test of phonemic fluency resulted in an average score (COWAT), and his semantic fluency was high average (Category Fluency). His writing sample was intact and there was no evidence of micrographia.     VISUOSPATIAL/CONSTRUCTIONAL SKILLS. Copy of a complex geometric figure slightly below expectation due to subtle imprecision and some slightly misplaced detailed elements; however, his copy was well-organized in approach (RCFT Copy).       LEARNING/MEMORY. On a 12-item verbal list-learning task (HVLT-R), the patient acquired up to 12 words (100%) of the word list by the 3rd and final learning trial (raw scores over trials = 4, 8, 12). Total  learning acquisition was classified as an average score. After a 20-minute delay, the patient recalled 9 items, reflecting an average score with a 75% retention rate. His recognition discriminability score was within normal limits, as he correctly recognized 12/12 items and made only 1 false-positive error.    On a visual memory measure (BVMT-R), immediate recall of six simple geometric figures over three learning trials was average (raw scores over trials = 4, 8, 10 out of 12). Delayed recall of the figures was high average, with a 100% retention rate (raw score = 10 out of 12). Recognition discriminability was perfect, as he correctly recognized 6/6 figures and made 0 false-positive errors.     EXECUTIVE FUNCTIONS. Concept identification and the ability to generate alternative problem solving strategies was intact for age on the Wisconsin Card Sorting Test. He completed 5 out of 3 categories (within normal limits) with a total of only 9 errors, which is high average. Only 5 of his errors were perseverative (high average) and there were 0 failures to maintain set. On a test of inhibition and cognitive flexibility (Stroop), the patient's score was high average for completion time and made only 1 error. On a test that requires speeded alpha-numeric sequencing/cognitive set-shifting (Trails B), performance was average and without error. Phonemic fluency was average (COWAT).     MOOD. On the GDS-15 a self report measure of depressive symptomatology, he obtained a score of 6, placing him in the range of mild depression. On the JHON-7, a self-report measure of anxiety, he obtained a score of 14,  placing him in the range of moderate anxiety.    ____________________________________________________________________________________    SERVICES PROVIDED & TIME:  On-site Office Visit Details   A clinical interview/neurobehavioral status examination was conducted with the patient and documented. I thoroughly reviewed the medical  record, selected the neuropsychological test battery, provided supervision to the trained examiner/technician, interpreted/integrated patient data and test results, and engaged in clinical decision making, treatment planning, report writing/preparation and provision of interactive feedback of test results on 7/25/2022. A trained examiner/technician administered and scored the neuropsychological tests (2+ tests).  Please see below for a breakdown of time spent and the associated codes billed for these services.  Services   Time Spent  CPT Codes   Neurobehavioral Status Exam:  (e.g., clinical interview and neurobehavioral status exam, interpretation, report)   80 minutes   1 x 96116     Neuropsychological Evaluation Services:   (e.g., integration, interpretation, treatment planning, clinical decision making, feedback via telehealth)   235 minutes   1 x 96132  3 x 96133   Neuropsychological Testing by Trained Examiner/Technician:  (e.g., test administration, scoring, 2+ tests administered)   115 minutes   1 x 96138  3 x 96139   For diagnostic and coding purposes, Mr. Pretty has a history of hyperlipidemia, untreated RACHNA (diagnosed in 2018), longstanding OCD and depressed mood, history of CVA (2018), and history of bladder cancer (2021; s/p resection). He was referred for an evaluation of mild neurocognitive disorder. Please note, all charges are filed at the completion of the Episode of Care and associated with the final encounter date (feedback session on 7/25/2022).

## 2022-07-06 NOTE — PROGRESS NOTES
The patient was seen for a neuropsychological evaluation for the purposes of diagnostic clarification and treatment planning. 85 minutes of face-to-face testing were provided by this writer.  The patient was cooperative with testing. No concerns were brought to my attention. Please see Dr. Hummel's report for a detailed description of the charges and interpretation and integration of the findings.

## 2022-07-13 NOTE — PROGRESS NOTES
The patient was seen for a neuropsychological evaluation for the purposes of diagnostic clarification and treatment planning. 30 minutes were spent scoring and compiling test results. Please see Dr. Hummel's report for a detailed description of the charges and interpretation and integration of the findings.

## 2022-07-14 ENCOUNTER — VIRTUAL VISIT (OUTPATIENT)
Dept: NEUROLOGY | Facility: CLINIC | Age: 67
End: 2022-07-14
Payer: MEDICARE

## 2022-07-14 DIAGNOSIS — F42.2 MIXED OBSESSIONAL THOUGHTS AND ACTS: Primary | ICD-10-CM

## 2022-07-14 PROCEDURE — 90834 PSYTX W PT 45 MINUTES: CPT | Mod: TEL | Performed by: PSYCHOLOGIST

## 2022-07-14 NOTE — PROGRESS NOTES
Psychology Progress Note    Date: July 14, 2022    Time length and type of treatment: 48 minutes (1:00 PM -1:48 PM), individual therapy    After review of the patient's situation, this visit was changed from an in-person visit to a telephone visit to reduce the risk of COVID 19 exposure. Patient was informed that policies and procedures that govern in-person sessions would also apply to  telephone sessions. Patient was also informed that  telephone sessions would be discontinued when COVID 19 exposure is no longer a concern (as determined by [facility]).     Patient location: Patient home in [place], [place]  Provider location:  [facility] - Provider remote location    Patient was in agreement with proceeding with a  telephone session.      Necessity: This session is necessary to address the patient's depression, anxiety, hoarding disorder, and OCD.  Today we focus on the patient's treatment plan, specifically exploring coping skills.  The reader is invited to review the patient's full treatment plan in the Media section of the patient's Epic medical record.    Psychotherapeutic Technique: This writer utilized motivational interviewing, active listening, reassurance and support in the context of cognitive behavioral therapy to address the above.      MENTAL STATUS EVALUATION  Grooming: Unable to determine due to telephone visit  Attire: Unable to determine due to telephone visit  Age: Unable to determine due to telephone visit  Behavior Towards Examiner: Cooperative  Motor Activity: Unable to determine due to telephone visit  Eye Contact: Unable to determine due to telephone visit  Mood: Anxious   Affect: full range  Speech/Language: Mildly pressured  Attention: Normal  Concentration: Sufficient  Thought Process: tangential and overinclusive   Thought Content: Clear    Orientation: Appeared oriented to person, place, and time, though not formally established  Memory: No evidence of  impairment.  Judgement: Adequate  Estimated Intelligence: Above Average  Demonstrated Insight: Adequate  Fund of Knowledge: Adequate    Intervention:   Patient reported he hasn't been successful with reducing how much time he spends hand washing, but has been able to maintain his approximately 10 minute hand washing.  We discussed what skills were most helpful when patient was reducing handwashing from 30 minutes to 10 minutes, and patient identified immediate rewards, reminding himself what he can do with the extra time, thinking about potential negative consequences of excess handwashing, and accepting that he will feel uncomfortable when he washes for a shorter amount of time. We also discussed how we might tweak current methods to further improve success.      Progress:  Patient consolidated coping skills and demonstrated increased understanding of rationale for various skills.     Plan:   We will meet again in 2 weeks to address the patient's depression, anxiety, hoarding disorder, and OCD.  Estimated duration of treatment is 10+ individual therapy sessions (47591) at twice monthly intervals. Treatment is expected to be completed by March 2023.     Diagnosis:  Obsessive Compulsive Disorder  Generalized Anxiety Disorder  Major Depressive Disorder, recurrent, moderate  Hoarding Disorder

## 2022-07-19 NOTE — PATIENT INSTRUCTIONS
SUMMARY OF FINDINGS:   Results of testing indicate that Mr. Pretty is of estimated high average premorbid intellectual functioning, and all of Mr. Pretty's performances are generally commensurate with that estimate. Specifically, he exhibits performances that are in the average to superior range across all cognitive domains assessed, including attention/concentration, processing speed, visuospatial/constructional skills, language abilities, verbal and nonverbal (visual) learning and memory, and executive functions (including novel problem solving/concept identification, cognitive inhibition, mental flexibility/set shifting, and planning). There is NO evidence of cognitive impairment in the current profile.    Emotionally, the patient endorses moderate symptoms of anxiety and mild depressive symptoms on self-report questionnaires. This is consistent with his reports during the clinical interview, during which he reports longstanding OCD and depressed mood.Healso acknowledges feeling more irritable and impatient over the past few years.     IMPRESSIONS:  The current profile is fully within normal limits. There is NO evidence of diffuse or focal cerebral dysfunction in the current test results.   As such, Mr. Pretty does NOT meet criteria for a cognitive disorder at this time.  The cognitive issues that he is experiencing are most likely due to non-neurologic factors, including:  His experience of what are actually normal, age-related cognitive changes. As we age, we all become more forgetful, have more difficulty with word-finding, and have a harder time multi-tasking to an extent. These age-related changes can be all the more frustrating for bright individuals (such as Mr. Pretty) who are used to things coming rather quickly and easily to them.   Significant anxiety/emotional distress and stress related to the ongoing pandemic may also interfere with Mr. Pretty's cognitive functioning at times in daily life.    Mr. Pretty also reports chronically low energy throughout the day, possibly secondary to a combination of psychiatric symptoms and poor sleep quality/untreated RACHNA. This fatigue can also contribute to the cognitive inefficiencies that he is noticing.  Nevertheless, Mr. Pretty can be reassured that there is NO evidence of Alzheimer's disease or any other underlying dementia syndrome at this time.  This testing also confirms his experience that he has likely made a full recovery from his history of strokes. As expected, his remote concussion injuries are also considered noncontributory.  Improvements in his mood, anxiety, and sleep quality will likely elicit perceived improvements in his cognitive functioning over time.    DIAGNOSTIC IMPRESSIONS:  No Cognitive Disorder    Obsessive Compulsive Disorder (per history)    Major Depressive Disorder, Recurrent, Mild (per history)    RECOMMENDATIONS:  1) Mr. Pretty is encouraged to continue with psychotherapeutic and psychiatric interventions to help manage his OCD and mood symptoms. Improvements in his mood and anxiety may very well lead to perceived improvements in his cognition and daytime energy level.    2) The patient is encouraged to follow up with the Sleep Medicine clinic in order to help troubleshoot treatment of RACHNA. There may be other options (e.g., using an oral appliance instead of a CPAP) that the patient will be more willing/able to use. This may help improve his daytime energy level at least to an extent, which could also help him feel cognitively sharper day to day.    3) From a cognitive perspective, I have no concerns about Mr. Pretty's current independent living situation or his ability to independently perform complex daily activities (including driving, medication and financial management, making complex decisions, etc.).    4) The patient is encouraged to utilize cognitive strategies in daily life for his own reassurance, particularly when he is  feeling more overwhelmed, stressed, or fatigued. These strategies may include utilizing note pads, checklists, to-do lists, a calendar/planner, labeled alarm reminders, a GPS, a pillbox, and maintaining a daily morning and nighttime routine and an organized living/work environment.    5) Mr. Pretty is strongly encouraged to adhere closely to his medication regimen to help keep his cerebrovascular risk factors (e.g., hyperlipidemia) well controlled. This may help to prevent future cognitive decline.    6) Mr. Pretty is encouraged to remain physically, socially, and mentally active in order to optimize his brain health.    7) Neuropsychological follow-up is not clinically indicated at this time. However, the current test data can now serve as a baseline should a repeat assessment be warranted in the future.      FEEDBACK OF ASSESSMENT RESULTS:  Mr. Pretty has requested to receive the results of this evaluation via a formal feedback appointment with me, which is currently scheduled for Monday, 7/25.      Thank you for allowing me to participate in your care. Please contact me with any questions regarding the content of this report.        Haven Hummel PsyD,   Licensed Clinical Neuropsychologist  [facility] [address], [address]  Phone: [phone]      Cognitive Strategies  Take notes! Keep a small notepad with you in your purse/pocket/bag and write things down that you want to remember. You might also keep a notepad in a convenient location in your home (e.g., next to your telephone or calendar). Taking your notes in a note pad (instead of on multiple post-it notes) might help you stay organized, and you will also remember where to go to look for the notes that you took.  Create checklists, grocery lists, and to-do lists. Cross things off as you finish them.  Use a calendar or planner and get in the habit of checking it daily. Make it a part of your morning and/or  nighttime routine to remind yourself of upcoming events, activities, or appointments. Cross the days off as they pass so that you can quickly glance at the calendar to know what day it is and what you have going on.  Set alarm reminders. For example, you can set an alarm to remind you to take your medications, turn off the oven, turn off the sprinkler outside, or to start getting ready for your appointment. If you use a smart phone, often times you can label the alarm that goes off so that you know what the alarm is for when it chimes.  Use a pillbox to follow your medication regimen. A pillbox acts as a nice visual cue to remind us to take our medications. If you have trouble remembering whether or not you have already taken your medications today, a pillbox can be extremely helpful to help with this issue.  Use a GPS when driving to help you stay on route.  When having an important conversation or completing an important task, reduce as many distractions in the environment as you can. For example, turn off the TV or the music in the background. Turn off or silence your cell phone. Clear your work area of everything that you do not need for the task at hand.  Establish set locations for certain items. For example, if you keep your keys on a hook by your door, you will always know where to go to look for them.   Maintain a daily routine, especially for morning and nighttime tasks.

## 2022-07-25 ENCOUNTER — VIRTUAL VISIT (OUTPATIENT)
Dept: NEUROLOGY | Facility: CLINIC | Age: 67
End: 2022-07-25
Payer: MEDICARE

## 2022-07-25 DIAGNOSIS — G47.33 OBSTRUCTIVE SLEEP APNEA SYNDROME: ICD-10-CM

## 2022-07-25 DIAGNOSIS — F33.0 MAJOR DEPRESSIVE DISORDER, RECURRENT EPISODE, MILD (H): ICD-10-CM

## 2022-07-25 DIAGNOSIS — F42.2 MIXED OBSESSIONAL THOUGHTS AND ACTS: Primary | ICD-10-CM

## 2022-07-25 PROCEDURE — 96138 PSYCL/NRPSYC TECH 1ST: CPT | Performed by: CLINICAL NEUROPSYCHOLOGIST

## 2022-07-25 PROCEDURE — 96132 NRPSYC TST EVAL PHYS/QHP 1ST: CPT | Mod: 95 | Performed by: CLINICAL NEUROPSYCHOLOGIST

## 2022-07-25 PROCEDURE — 96116 NUBHVL XM PHYS/QHP 1ST HR: CPT | Performed by: CLINICAL NEUROPSYCHOLOGIST

## 2022-07-25 PROCEDURE — 96139 PSYCL/NRPSYC TST TECH EA: CPT | Performed by: CLINICAL NEUROPSYCHOLOGIST

## 2022-07-25 PROCEDURE — 96133 NRPSYC TST EVAL PHYS/QHP EA: CPT | Mod: 95 | Performed by: CLINICAL NEUROPSYCHOLOGIST

## 2022-07-25 NOTE — PROGRESS NOTES
"NEUROPSYCHOLOGY TELE-HEALTH FEEDBACK VISIT  [facility]    Telephone Visit  Berto Pretty is a 66 year old male who is being evaluated via a billable telephone visit.     The patient has been notified of the following:      \"This telephone visit will be conducted via a call between you and your provider. We have found that certain health care needs can be provided without the need for a physical exam.  This service lets us provide the care you need with a phone conversation. If during the course of the call the provider feels a telephone visit is not appropriate, you will not be charged for this service.\"     Patient has given verbal consent to a Telephone visit? Yes  Consent has been obtained for this service by 1 care team member: yes.      Visit Summary  The purpose of today s appointment was to provide feedback regarding Mr. Pretty recent neuropsychological consult completed on 7/6/2022. We began the session by discussing his experience during the evaluation. I provided Mr. Pretty with detailed feedback regarding his performance on cognitive testing and his pattern of cognitive strengths and weaknesses.  I discussed my overall impressions and recommendations and provided the opportunity for Mr. Pretty to ask any questions that he had about the evaluation. At the end of the session, he indicated that he understood the results and that I had answered all of his questions.       Haven Hummel PsyD, LP  Licensed Clinical Neuropsychologist  [address], [address]  [place] [address]  Phone: [phone]      Telephone Visit Details    Type of service:  Telephone Visit  Start Time: 9:32 AM  End Time: 10:42 AM    Originating Location (pt. Location): Home    Distant Location (provider location): Remote work for [facility]    Mode of Communication:  Telephone    Berto Pretty was evaluated " via a billable telephone visit. For diagnostic and coding purposes, Mr. Pretty was referred for an evaluation of mild neurocognitive disorder. As this is the final date for this Episode of Care (initiated on 7/6/2022) all charges for the entire Episode of Care will be filed today. Please see the 7/6/2022 evaluation for a detailed description of codes and services, including services provided today.      In brief:   1 x 96116  1 x 96132  3 x 96133  1 x 96138  3 x 96139

## 2022-07-25 NOTE — LETTER
"    7/25/2022         RE: Berto Pretty  [address] [address]        Dear Colleague,    Thank you for referring your patient, Berto Pretty, to the [facility]. Please see a copy of my visit note below.    NEUROPSYCHOLOGY TELE-HEALTH FEEDBACK VISIT  [facility]    Telephone Visit  Berto Pretty is a 66 year old male who is being evaluated via a billable telephone visit.     The patient has been notified of the following:      \"This telephone visit will be conducted via a call between you and your provider. We have found that certain health care needs can be provided without the need for a physical exam.  This service lets us provide the care you need with a phone conversation. If during the course of the call the provider feels a telephone visit is not appropriate, you will not be charged for this service.\"     Patient has given verbal consent to a Telephone visit? Yes  Consent has been obtained for this service by 1 care team member: yes.      Visit Summary  The purpose of today s appointment was to provide feedback regarding Mr. Pretty recent neuropsychological consult completed on 7/6/2022. We began the session by discussing his experience during the evaluation. I provided Mr. Pretty with detailed feedback regarding his performance on cognitive testing and his pattern of cognitive strengths and weaknesses.  I discussed my overall impressions and recommendations and provided the opportunity for Mr. Pretty to ask any questions that he had about the evaluation. At the end of the session, he indicated that he understood the results and that I had answered all of his questions.       Haven Hummel PsyD, LP  Licensed Clinical Neuropsychologist  [facility] [place] [id number] [facility], [address]  [place] [address]  Phone: [phone]      Telephone Visit Details    Type of service:  Telephone " Visit  Start Time: 9:32 AM  End Time: 10:42 AM    Originating Location (pt. Location): Home    Distant Location (provider location): Remote work for [facility]    Mode of Communication:  Telephone    Berto Pretty was evaluated via a billable telephone visit. For diagnostic and coding purposes, Mr. Pretty was referred for an evaluation of mild neurocognitive disorder. As this is the final date for this Episode of Care (initiated on 7/6/2022) all charges for the entire Episode of Care will be filed today. Please see the 7/6/2022 evaluation for a detailed description of codes and services, including services provided today.      In brief:   1 x 96116  1 x 96132  3 x 96133  1 x 96138  3 x 96139        Again, thank you for allowing me to participate in the care of your patient.        Sincerely,        Haven Hummel Psy.D, LP

## 2022-08-01 ENCOUNTER — NURSE TRIAGE (OUTPATIENT)
Dept: NURSING | Facility: CLINIC | Age: 67
End: 2022-08-01

## 2022-08-01 NOTE — TELEPHONE ENCOUNTER
Patient has questions about his wellness check in the morning 8-2-22.  He is going to fast in casr there are labs ordered.  Patient verbalized understanding.    Rowan Baldwin RN  [facility]  medications    Additional Information    Health Information question, no triage required and triager able to answer question    Protocols used: INFORMATION ONLY CALL-A-AH

## 2022-08-08 ENCOUNTER — VIRTUAL VISIT (OUTPATIENT)
Dept: NEUROLOGY | Facility: CLINIC | Age: 67
End: 2022-08-08
Payer: MEDICARE

## 2022-08-08 DIAGNOSIS — F41.1 GAD (GENERALIZED ANXIETY DISORDER): Primary | ICD-10-CM

## 2022-08-08 PROCEDURE — 90834 PSYTX W PT 45 MINUTES: CPT | Mod: 95 | Performed by: PSYCHOLOGIST

## 2022-08-08 NOTE — PROGRESS NOTES
Psychology Progress Note    Date: August 08, 2022    Time length and type of treatment:  46 minutes (9:10 AM - 9:56 AM), individual therapy    After review of the patient's situation, this visit was changed from an in-person visit to a telephone visit to reduce the risk of COVID 19 exposure. Patient was informed that policies and procedures that govern in-person sessions would also apply to telephone sessions. Patient was also informed that telephone sessions would be discontinued when COVID 19 exposure is no longer a concern (as determined by [facility]).     Patient location: Patient home in [place], [place]  Provider location:  [facility] - Provider remote location    Patient was in agreement with proceeding with a telephone session.      Necessity: This session is necessary to address the patient's depression, anxiety, hoarding disorder, and OCD.  Today we focus on the patient's treatment plan, specifically exploring compliance with medical treatment plan.  The reader is invited to review the patient's full treatment plan in the Media section of the patient's Epic medical record.    Psychotherapeutic Technique: This writer utilized motivational interviewing, active listening, reassurance and support in the context of cognitive behavioral therapy to address the above.      MENTAL STATUS EVALUATION  Grooming: Unable to determine due to telephone visit  Attire: Unable to determine due to telephone visit  Age: Unable to determine due to telephone visit  Behavior Towards Examiner: Cooperative  Motor Activity: Unable to determine due to telephone visit  Eye Contact: Unable to determine due to telephone visit  Mood: Anxious   Affect: full range  Speech/Language: Mildly pressured  Attention: Normal  Concentration: Sufficient  Thought Process: overinclusive   Thought Content: Clear    Orientation: Appeared oriented to person, place, and time, though not formally established  Memory: No  evidence of impairment.  Judgement: Adequate  Estimated Intelligence: Above Average  Demonstrated Insight: Adequate  Fund of Knowledge: Adequate    Intervention:   Patient has been reflecting on his neuropsych testing and had many questions about the results and recommendations.  The normal findings were reviewed and normalized, and we discussed how he might better integrate recommendations into his daily life. Patient evidenced some risk of interpreting recommendations as something he must do, and we discussed importance of interpreting recommendations as suggestions rather than requirements that he adheres to rigidly in order to not have recommendations become new material for obsessions. Patient identified multiple recommendations that he is already doing, and this positive progress was validated and reinforced. Patient requested a future session focus on fatigue and this was agreed to.    Progress:  Anxiety appeared to reduce as patient questions were answered and he was reassured about statements he didn't understand (e.g. the meaning of set shifting).     Plan:   We will meet again in 2 weeks to address the patient's depression, anxiety, hoarding disorder, and OCD.  Estimated duration of treatment is 10+ individual therapy sessions (21171) at twice monthly intervals. Treatment is expected to be completed by March 2023.     Diagnosis:  Generalized Anxiety Disorder  Obsessive Compulsive Disorder  Major Depressive Disorder, recurrent, moderate  Hoarding Disorder

## 2022-08-10 ENCOUNTER — OFFICE VISIT (OUTPATIENT)
Dept: INTERNAL MEDICINE | Facility: CLINIC | Age: 67
End: 2022-08-10
Payer: MEDICARE

## 2022-08-10 VITALS
SYSTOLIC BLOOD PRESSURE: 128 MMHG | HEIGHT: 64 IN | WEIGHT: 128.3 LBS | BODY MASS INDEX: 21.91 KG/M2 | HEART RATE: 65 BPM | DIASTOLIC BLOOD PRESSURE: 80 MMHG | OXYGEN SATURATION: 98 %

## 2022-08-10 DIAGNOSIS — Z13.220 SCREENING FOR LIPOID DISORDERS: ICD-10-CM

## 2022-08-10 DIAGNOSIS — Z13.1 SCREENING FOR DIABETES MELLITUS: Primary | ICD-10-CM

## 2022-08-10 DIAGNOSIS — R79.9 ABNORMAL FINDING OF BLOOD CHEMISTRY, UNSPECIFIED: ICD-10-CM

## 2022-08-10 LAB — HBA1C MFR BLD: 5.1 % (ref 0–5.6)

## 2022-08-10 PROCEDURE — 83036 HEMOGLOBIN GLYCOSYLATED A1C: CPT | Performed by: NURSE PRACTITIONER

## 2022-08-10 PROCEDURE — 99213 OFFICE O/P EST LOW 20 MIN: CPT | Performed by: NURSE PRACTITIONER

## 2022-08-10 PROCEDURE — 80061 LIPID PANEL: CPT | Performed by: NURSE PRACTITIONER

## 2022-08-10 PROCEDURE — 36415 COLL VENOUS BLD VENIPUNCTURE: CPT | Performed by: NURSE PRACTITIONER

## 2022-08-10 ASSESSMENT — PATIENT HEALTH QUESTIONNAIRE - PHQ9
SUM OF ALL RESPONSES TO PHQ QUESTIONS 1-9: 12
SUM OF ALL RESPONSES TO PHQ QUESTIONS 1-9: 12
10. IF YOU CHECKED OFF ANY PROBLEMS, HOW DIFFICULT HAVE THESE PROBLEMS MADE IT FOR YOU TO DO YOUR WORK, TAKE CARE OF THINGS AT HOME, OR GET ALONG WITH OTHER PEOPLE: VERY DIFFICULT

## 2022-08-10 NOTE — PROGRESS NOTES
"SUBJECTIVE:   Berto Pretty is a 66 year old male who presents for Preventive Visit.    {Split Bill scripting  The purpose of this visit is to discuss your medical history and prevent health problems before you are sick. You may be responsible for a co-pay, coinsurance, or deductible if your visit today includes services such as checking on a sore throat, having an x-ray or lab test, or treating and evaluating a new or existing condition :572169}  Patient has been advised of split billing requirements and indicates understanding: Yes  Are you in the first 12 months of your Medicare coverage?  No    HPI  Do you feel safe in your environment? Yes    Have you ever done Advance Care Planning? (For example, a Health Directive, POLST, or a discussion with a medical provider or your loved ones about your wishes): No, advance care planning information given to patient to review.  Patient declined advance care planning discussion at this time.    {Hearing Test Done (Optional):343660}   Fall risk  Fallen 2 or more times in the past year?: No  Any fall with injury in the past year?: No    Cognitive Screening   1) Repeat 3 items (Leader, Season, Table)  {Get patient's attention, then say, \"I am going to say three words that I want you to remember now and later.  The words are Leader, Season, and Table.  Please say them for me now.\"  If pt. unable after 3 tries, go to next item}  2) Clock draw: {Say the following phrases in order: \"Please draw a clock.  Start by drawing a large Venetie.  Put all the numbers in the Venetie and set the hands to show 11:10 (10 past 11).\"  If pt cannot complete in 3 minutes, stop and ask for recall items.  \"NORMAL\" test = all twelve numbers in correct location, and clock hands correctly designating 11:10}NORMAL  3) 3 item recall: {Say: \"What were the three words I asked you to remember?\"}Recalls 3 objects  Results: NORMAL clock, 1-2 items recalled: COGNITIVE IMPAIRMENT LESS LIKELY    Mini-CogTM " Copyright HAYDEE Robert. Licensed by the author for use in [facility]; reprinted with permission (angeline@[facility]). All rights reserved.      Do you have sleep apnea, excessive snoring or daytime drowsiness?: yes    Reviewed and updated as needed this visit by clinical staff   Tobacco  Allergies  Meds                Reviewed and updated as needed this visit by Provider                   Social History     Tobacco Use     Smoking status: Former Smoker     Types: Cigarettes     Quit date: 1974     Years since quittin.6     Smokeless tobacco: Never Used   Substance Use Topics     Alcohol use: Yes     If you drink alcohol do you typically have >3 drinks per day or >7 drinks per week? No    No flowsheet data found.{add AUDIT responses (Optional) (A score of 7 for adult men is an indication of hazardous drinking; a score of 8 or more is an indication of an alcohol use disorder.  A score of 7 or more for adult women is an indication of hazardous drinking or an alchohol use disorder):121449}        {additional problems to add (Optional):432278}    Current providers sharing in care for this patient include: {Rooming staff:  Please update Care Team in Rooming Activity, refresh this note and then delete this statement}  Patient Care Team:  Olvin De Dios MD as PCP - General (Internal Medicine)  Zen Oliver MD, MD as Assigned Cancer Care Provider  Lisa Stout, PhD LP as Assigned Behavioral Health Provider  João Villanueva MD as Assigned PCP  Zamzam Stock, RN as Specialty Care Coordinator (Hematology & Oncology)    The following health maintenance items are reviewed in Epic and correct as of today:  Health Maintenance Due   Topic Date Due     ANNUAL REVIEW OF HM ORDERS  Never done     DEPRESSION ACTION PLAN  Never done     ZOSTER IMMUNIZATION (1 of 2) Never done     Pneumococcal Vaccine: 65+ Years (2 - PPSV23 or PCV20) 2019     COLORECTAL CANCER SCREENING  10/10/2019     MEDICARE ANNUAL  "WELLNESS VISIT  2021     DTAP/TDAP/TD IMMUNIZATION (2 - Td or Tdap) 2021     PHQ-9  09/10/2022     {Chronicprobdata (optional):513315}  {Decision Support (Optional):242303}        Review of Systems  {ROS COMP (Optional):341865}    OBJECTIVE:   /80   Pulse 65   Ht 1.626 m (5' 4\")   Wt 58.2 kg (128 lb 4.8 oz)   SpO2 98%   BMI 22.02 kg/m   Estimated body mass index is 22.02 kg/m  as calculated from the following:    Height as of this encounter: 1.626 m (5' 4\").    Weight as of this encounter: 58.2 kg (128 lb 4.8 oz).  Physical Exam  {Exam (Optional) :510189}    {Diagnostic Test Results (Optional):929908::\"Diagnostic Test Results:\",\"Labs reviewed in Epic\"}    ASSESSMENT / PLAN:   {Diag Picklist:069782}    {Patient advised of split billing (Optional):011348}    COUNSELING:  {Medicare Counselin}    Estimated body mass index is 22.02 kg/m  as calculated from the following:    Height as of this encounter: 1.626 m (5' 4\").    Weight as of this encounter: 58.2 kg (128 lb 4.8 oz).    {Weight Management Plan (ACO) Complete if BMI is abnormal-  Ages 18-64  BMI >24.9.  Age 65+ with BMI <23 or >30 (Optional):966025}    He reports that he quit smoking about 48 years ago. His smoking use included cigarettes. He has never used smokeless tobacco.      Appropriate preventive services were discussed with this patient, including applicable screening as appropriate for cardiovascular disease, diabetes, osteopenia/osteoporosis, and glaucoma.  As appropriate for age/gender, discussed screening for colorectal cancer, prostate cancer, breast cancer, and cervical cancer. Checklist reviewing preventive services available has been given to the patient.    Reviewed patients plan of care and provided an AVS. The {CarePlan:837479} for Berto meets the Care Plan requirement. This Care Plan has been established and reviewed with the {PATIENT, FAMILY MEMBER, CAREGIVER:045563}.    Counseling Resources:  ATP IV " Guidelines  Pooled Cohorts Equation Calculator  Breast Cancer Risk Calculator  Breast Cancer: Medication to Reduce Risk  FRAX Risk Assessment  ICSI Preventive Guidelines  Dietary Guidelines for Americans, 2010  USDA's MyPlate  ASA Prophylaxis  Lung CA Screening    Alonso Harris CNP  [facility]    Identified Health Risks:

## 2022-08-10 NOTE — LETTER
August 11, 2022      Berto P Maria De Jesus  [address] [address]        Dear ,    We are writing to inform you of your test results.    Your cholesterol values are excellent.  And your hemoglobin A1c indicates that you do not have prediabetes or diabetes.    Resulted Orders   Lipid panel reflex to direct LDL Fasting   Result Value Ref Range    Cholesterol 138 <200 mg/dL    Triglycerides 43 <150 mg/dL    Direct Measure HDL 79 >=40 mg/dL    LDL Cholesterol Calculated 50 <=100 mg/dL    Non HDL Cholesterol 59 <130 mg/dL    Narrative    Cholesterol  Desirable:  <200 mg/dL    Triglycerides  Normal:  Less than 150 mg/dL  Borderline High:  150-199 mg/dL  High:  200-499 mg/dL  Very High:  Greater than or equal to 500 mg/dL    Direct Measure HDL  Female:  Greater than or equal to 50 mg/dL   Male:  Greater than or equal to 40 mg/dL    LDL Cholesterol  Desirable:  <100mg/dL  Above Desirable:  100-129 mg/dL   Borderline High:  130-159 mg/dL   High:  160-189 mg/dL   Very High:  >= 190 mg/dL    Non HDL Cholesterol  Desirable:  130 mg/dL  Above Desirable:  130-159 mg/dL  Borderline High:  160-189 mg/dL  High:  190-219 mg/dL  Very High:  Greater than or equal to 220 mg/dL   Hemoglobin A1c   Result Value Ref Range    Hemoglobin A1C 5.1 0.0 - 5.6 %      Comment:      Normal <5.7%   Prediabetes 5.7-6.4%    Diabetes 6.5% or higher     Note: Adopted from ADA consensus guidelines.       If you have any questions or concerns, please call the clinic at the number listed above.       Sincerely,      Alonso Harris, CNP

## 2022-08-10 NOTE — PATIENT INSTRUCTIONS
You need to work on eating better.  No more fast food.    You need to start treating your sleep apnea.    We will help you schedule follow-up appointment with Dr. De Dios.    Blood pressure and other vital signs are good.    We will check labs today.  Results will be mailed to you

## 2022-08-11 LAB
CHOLEST SERPL-MCNC: 138 MG/DL
HDLC SERPL-MCNC: 79 MG/DL
LDLC SERPL CALC-MCNC: 50 MG/DL
NONHDLC SERPL-MCNC: 59 MG/DL
TRIGL SERPL-MCNC: 43 MG/DL

## 2022-08-11 NOTE — PROGRESS NOTES
"  Assessment & Plan     Screening for diabetes mellitus  Is hemoglobin A1c is at goal today although I did tell him that he needs to be better about eating less fast food.  His OCD seems to predispose him to intrusive thoughts about germs being a problem at grocery stores  - Hemoglobin A1c; Future  - Hemoglobin A1c    Screening for lipoid disorders  Cholesterol labs seem acceptable today.  BMI of 22  - Lipid panel reflex to direct LDL Fasting; Future  - Lipid panel reflex to direct LDL Fasting    Patient Instructions   You need to work on eating better.  No more fast food.    You need to start treating your sleep apnea.    We will help you schedule follow-up appointment with Dr. De Dios.    Blood pressure and other vital signs are good.    We will check labs today.  Results will be mailed to you    Return in about 3 months (around 11/10/2022).    Alonso Harris, [facility]    Dima Thomson is a 67 year old presenting for the following health issues:  Wellness Visit (AWV)      HPI     Patient is here today for an annual wellness visit.  I explained to the patient that ideally, we would set him up with his PCP for an annual wellness visit and that we could discuss acute issues today.    He wants to discuss his sleep apnea.  He is not currently treating his sleep apnea with CPAP therapy.  He has OCD and is worried about germs; the CPAP machine cleaning is a problem for him and he admits that he does not routinely clean his CPAP machine and so he is worried about bacterial growth in the tubes and mask.    He is seeing a therapist and psychiatrist for his OCD and is using Lexapro.    He has a history of bladder cancer and is seeing [facility] [place], operated by [facility] urology every 4 months for cystoscopy.      Review of Systems   Constitutional, HEENT, cardiovascular, pulmonary, gi and gu systems are negative, except as otherwise noted.      Objective    /80   Pulse 65   Ht 1.626 m (5' 4\")   Wt 58.2 " kg (128 lb 4.8 oz)   SpO2 98%   BMI 22.02 kg/m    Body mass index is 22.02 kg/m .  Physical Exam     A bit anxious appearing but otherwise nontoxic appearance        .  ..

## 2022-08-22 ENCOUNTER — VIRTUAL VISIT (OUTPATIENT)
Dept: NEUROLOGY | Facility: CLINIC | Age: 67
End: 2022-08-22
Payer: MEDICARE

## 2022-08-22 DIAGNOSIS — F42.2 MIXED OBSESSIONAL THOUGHTS AND ACTS: Primary | ICD-10-CM

## 2022-08-22 PROCEDURE — 90834 PSYTX W PT 45 MINUTES: CPT | Mod: 95 | Performed by: PSYCHOLOGIST

## 2022-08-22 NOTE — PROGRESS NOTES
Psychology Progress Note    Date: August 22, 2022    Time length and type of treatment: 48 minutes (9:00 AM - 9:48 AM), individual therapy    After review of the patient's situation, this visit was changed from an in-person visit to a telephone visit to reduce the risk of COVID 19 exposure. Patient was informed that policies and procedures that govern in-person sessions would also apply to telephone sessions. Patient was also informed that telephone sessions would be discontinued when COVID 19 exposure is no longer a concern (as determined by [facility]).     Patient location: Patient home in [place], [place]  Provider location:  [facility] - Provider remote location    Patient was in agreement with proceeding with a telephone session.      Necessity: This session is necessary to address the patient's depression, anxiety, Hoarding disorder, and OCD.  Today we focus on the patient's treatment plan, specifically exploring coping strategies and sleep hygiene. The reader is invited to review the patient's full treatment plan in the Media section of the patient's Epic medical record.    Psychotherapeutic Technique: This writer utilized motivational interviewing, active listening, reassurance and support in the context of cognitive behavioral therapy to address the above.      MENTAL STATUS EVALUATION  Grooming: Unable to determine due to telephone visit  Attire: Unable to determine due to telephone visit  Age: Unable to determine due to telephone visit  Behavior Towards Examiner: Cooperative  Motor Activity: Unable to determine due to telephone visit  Eye Contact: Unable to determine due to telephone visit  Mood: Anxious   Affect: full range  Speech/Language: Mildly pressured  Attention: Normal  Concentration: Sufficient  Thought Process: overinclusive   Thought Content: Clear    Orientation: Appeared oriented to person, place, and time, though not formally established  Memory: No evidence  of impairment.  Judgement: Adequate  Estimated Intelligence: Above Average  Demonstrated Insight: Adequate  Fund of Knowledge: Adequate    Intervention:   Patient reported he has made a modestly reduction in hand washing since our last appointment, explaining that he has not had any times in which he washed his hands for more than 10 minutes, and is generally averaging 8 - 9 minutes. Patient progress was validated and reinforced.  Patient requested we review strategies to help with increased anxiety secondary to decreased hand washing. Patient continues to identify reminding himself that he is actually hurting his health rather than helping himself with increased hand washing, and thinking of fun things he could be doing instead of hand washing as his most helpful interventions.  Patient continues to discard 1 - 2 bags per week from his house, but noted that this is just keeping him from increasing his hoarded content; it isn't really reducing content.  We discussed increasing goal from 1 - 2 bags/week to 2 - 3 bags/week and patient expressed optimism that he could hit this more modest goal.  Patient reported he has been feeling very tired and he was provided psychoeducation related to sleep debt. Patient noted that he doesn't usually allow himself to sleep longer the night after a night of poor sleep, and will try this to see if it reduces fatigue.     Progress:  Patient has shown improvement in ability to utilize coping skills.     Plan:   We will meet again in 2 weeks to address the patient's depression, anxiety, Hoarding Disorder, and OCD.  Estimated duration of treatment is 10+ individual therapy sessions (06274) at twice monthly intervals. Treatment is expected to be completed by March 2023.     Diagnosis:  Obsessive Compulsive Disorder  Generalized Anxiety Disorder  Major Depressive Disorder, recurrent, moderate  Hoarding Disorder

## 2022-09-26 ENCOUNTER — VIRTUAL VISIT (OUTPATIENT)
Dept: PSYCHIATRY | Facility: CLINIC | Age: 67
End: 2022-09-26
Payer: MEDICARE

## 2022-09-26 DIAGNOSIS — F42.2 MIXED OBSESSIONAL THOUGHTS AND ACTS: Primary | ICD-10-CM

## 2022-09-26 PROCEDURE — 99443 PR PHYSICIAN TELEPHONE EVALUATION 21-30 MIN: CPT | Mod: 95 | Performed by: PSYCHIATRY & NEUROLOGY

## 2022-09-26 ASSESSMENT — ANXIETY QUESTIONNAIRES
1. FEELING NERVOUS, ANXIOUS, OR ON EDGE: MORE THAN HALF THE DAYS
GAD7 TOTAL SCORE: 10
IF YOU CHECKED OFF ANY PROBLEMS ON THIS QUESTIONNAIRE, HOW DIFFICULT HAVE THESE PROBLEMS MADE IT FOR YOU TO DO YOUR WORK, TAKE CARE OF THINGS AT HOME, OR GET ALONG WITH OTHER PEOPLE: VERY DIFFICULT
7. FEELING AFRAID AS IF SOMETHING AWFUL MIGHT HAPPEN: MORE THAN HALF THE DAYS
6. BECOMING EASILY ANNOYED OR IRRITABLE: SEVERAL DAYS
7. FEELING AFRAID AS IF SOMETHING AWFUL MIGHT HAPPEN: MORE THAN HALF THE DAYS
5. BEING SO RESTLESS THAT IT IS HARD TO SIT STILL: NOT AT ALL
4. TROUBLE RELAXING: SEVERAL DAYS
2. NOT BEING ABLE TO STOP OR CONTROL WORRYING: MORE THAN HALF THE DAYS
8. IF YOU CHECKED OFF ANY PROBLEMS, HOW DIFFICULT HAVE THESE MADE IT FOR YOU TO DO YOUR WORK, TAKE CARE OF THINGS AT HOME, OR GET ALONG WITH OTHER PEOPLE?: VERY DIFFICULT
GAD7 TOTAL SCORE: 10
3. WORRYING TOO MUCH ABOUT DIFFERENT THINGS: MORE THAN HALF THE DAYS
GAD7 TOTAL SCORE: 10

## 2022-09-26 ASSESSMENT — PATIENT HEALTH QUESTIONNAIRE - PHQ9
10. IF YOU CHECKED OFF ANY PROBLEMS, HOW DIFFICULT HAVE THESE PROBLEMS MADE IT FOR YOU TO DO YOUR WORK, TAKE CARE OF THINGS AT HOME, OR GET ALONG WITH OTHER PEOPLE: VERY DIFFICULT
SUM OF ALL RESPONSES TO PHQ QUESTIONS 1-9: 15
SUM OF ALL RESPONSES TO PHQ QUESTIONS 1-9: 15

## 2022-09-26 NOTE — NURSING NOTE
Pt had 4 hr cognitive test in [place] in July and would like to review results and get providers opinion ([facility]).    Bethany Toledo VF

## 2022-09-26 NOTE — PATIENT INSTRUCTIONS
The patient was doing relatively well and tolerating the current medications.  Recent neuropsychological testing reassuring.  I did not make any medication changes as he just recently restarted his antidepressant which she had previously run out of.  He will return to this clinic in 3-4 months for medication check and agrees to call before then with any questions or concerns.

## 2022-09-26 NOTE — PROGRESS NOTES
Berto is a 67 year old who is being evaluated via a billable telephone visit.      What phone number would you like to be contacted at? [phone]  How would you like to obtain your AVS? Mail a copy      Bethany JOSEPH

## 2022-10-03 ENCOUNTER — VIRTUAL VISIT (OUTPATIENT)
Dept: NEUROLOGY | Facility: CLINIC | Age: 67
End: 2022-10-03
Payer: MEDICARE

## 2022-10-03 DIAGNOSIS — F33.2 MAJOR DEPRESSIVE DISORDER, RECURRENT SEVERE WITHOUT PSYCHOTIC FEATURES (H): Primary | ICD-10-CM

## 2022-10-03 PROCEDURE — 90834 PSYTX W PT 45 MINUTES: CPT | Mod: 95 | Performed by: PSYCHOLOGIST

## 2022-10-03 NOTE — PROGRESS NOTES
Psychology Progress Note    Date: October 03, 2022    Time length and type of treatment:40 minutes (1:08 PM - 1:48 PM), individual therapy    After review of the patient's situation, this visit was changed from an in-person visit to a telephone visit to reduce the risk of COVID 19 exposure. Patient was informed that policies and procedures that govern in-person sessions would also apply to telephone sessions. Patient was also informed that telephone sessions would be discontinued when COVID 19 exposure is no longer a concern (as determined by [facility]).     Patient location: Patient home in [place], [place]  Provider location:  [facility] - Provider remote location    Patient was in agreement with proceeding with a video session.      Necessity: This session is necessary to address the patient's depression, anxiety, hoarding disorder, and OCD.  Today we focus on revising the patient's treatment plan. The reader is invited to review the patient's full treatment plan in the Media section of the patient's Epic medical record.    Psychotherapeutic Technique: This writer utilized motivational interviewing, active listening, reassurance and support in the context of cognitive behavioral therapy to address the above.      MENTAL STATUS EVALUATION  Grooming: Unable to determine due to telephone visit  Attire:  Unable to determine due to telephone visit  Age:  Unable to determine due to telephone visit  Behavior Towards Examiner: Cooperative  Motor Activity:  Unable to determine due to telephone visit  Eye Contact:  Unable to determine due to telephone visit  Mood: Depressed   Affect: full range  Speech/Language: Mildly pressured  Attention: Normal  Concentration: Sufficient  Thought Process: tangential and overinclusive   Thought Content: Clear    Orientation: Appeared oriented to person, place, and time, though not formally established  Memory: No evidence of impairment.  Judgement:  Adequate  Estimated Intelligence: Above Average  Demonstrated Insight: Adequate  Fund of Knowledge: Adequate    Intervention:  Patient was readministered the PHQ-9 and JHON-7 as part of revising his treatment plan.  His score of 13 on the JHON-7 is suggestive of moderate anxiety and remains similar to his earlier score.  In contrast his score of 21 on the PHQ-9 falls in the range suggestive of severe depression and is a substantial increase over his earlier moderate score of 13.  Patient agreed that this was accurate.  He stated depression began worsening the end of August and attributed his failure to schedule individual therapy appointments was due to feeling too depressed to bother.  He noted he also stopped attending [facility], which is highly unusual for him. He has also returned to a pattern of not eating for several days at a time. Patient stated he has now resumed attending [facility] and has scheduled additional therapy appointments, though scheduling difficulties mean he won't be seen for another month.  Patient treatment plan was jointly revised and in remaining time patient expressed pride that despite his depression, he has continued to not adding to hoarded content, has maintained his handwashing time to under 10 minutes, and is having more frequent periods where he can reduce handwashing to 8 or 9 minutes, and has continued to walk.      Progress:  Patient treatment plan was jointly revised.    Plan:   We will meet again in 1 month to address the patient's depression, anxiety, hoarding disorder, and OCD.  Estimated duration of treatment is 10+ individual therapy sessions (88848) at twice monthly intervals. Treatment is expected to be completed by June 2023.     Diagnosis:  Major Depressive Disorder, recurrent, severe  Generalized Anxiety Disorder  Hoarding Disorder  Obsessive-Compulsive Disorder

## 2022-11-03 ENCOUNTER — VIRTUAL VISIT (OUTPATIENT)
Dept: NEUROLOGY | Facility: CLINIC | Age: 67
End: 2022-11-03
Payer: MEDICARE

## 2022-11-03 DIAGNOSIS — F33.2 MAJOR DEPRESSIVE DISORDER, RECURRENT SEVERE WITHOUT PSYCHOTIC FEATURES (H): Primary | ICD-10-CM

## 2022-11-03 PROCEDURE — 90834 PSYTX W PT 45 MINUTES: CPT | Mod: 95 | Performed by: PSYCHOLOGIST

## 2022-11-03 NOTE — PROGRESS NOTES
Psychology Progress Note    Date: November 3, 2022    Time length and type of treatment: 43 minutes (8:00 AM - 8:43 AM), individual therapy    After review of the patient's situation, this visit was changed from an in-person visit to a  telephone visit to reduce the risk of COVID 19 exposure. Patient was informed that policies and procedures that govern in-person sessions would also apply to  telephone sessions. Patient was also informed that  telephone sessions would be discontinued when COVID 19 exposure is no longer a concern (as determined by [facility]).     Patient location: Patient home in [place], [place]  Provider location:  [facility] - Provider remote location     Patient was in agreement with proceeding with a  telephone session.      Necessity: This session is necessary to address the patient's depression, anxiety, hoarding disorder, and OCD.  Today we focus on the patient's treatment plan, specifically exploring sleep hygiene and other coping strategies.  The reader is invited to review the patient's full treatment plan in the Media section of the patient's Epic medical record.    Psychotherapeutic Technique: This writer utilized motivational interviewing, active listening, reassurance and support in the context of cognitive behavioral therapy to address the above.      MENTAL STATUS EVALUATION  Grooming: Unable to determine due to telephone visit  Attire: Unable to determine due to telephone visit  Age: Unable to determine due to telephone visit  Behavior Towards Examiner: Cooperative  Motor Activity: Unable to determine due to telephone visit  Eye Contact: Unable to determine due to telephone visit  Mood: Anxious   Affect: full range  Speech/Language: pressured  Attention: Normal  Concentration: Sufficient  Thought Process: tangential  Thought Content: Clear    Orientation: Appeared oriented to person, place, and time, though not formally established  Memory: No  evidence of impairment.  Judgement: Adequate  Estimated Intelligence: Above Average  Demonstrated Insight: Adequate  Fund of Knowledge: Adequate    Intervention:   Patient reported that he continues to notice that as the days get shorter, his depression has been worsening.  He has been continuing to exercise, which he notices helps. Patient was encouraged to make sure his prescriber is aware of worsening depression.  He has made small improvements to his diet, and we discussed additional changes he might make to further improve diet. We also discussed the possibility of patient meeting with a dietician. Sleep has been inconsistent. Patient is aware of sleep hygiene, but acknowledged he is inconsistent with applying this. Changes patient has made were positively reinforced and we explored where patient might recommit to improved sleep practices. 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 He continues to find tools to combat procrastination to be helpful.     Progress:  Patient reported increased commitment to working on sleep hygiene strategies.     Plan:   We will meet again in 2 weeks to address the patient's depression, anxiety, hoarding disorder, and OCD.  Estimated duration of treatment is 10+ individual therapy sessions (69331) at twice monthly intervals. Treatment is expected to be completed by June 2023.     Diagnosis:  Major Depressive Disorder, recurrent, severe  Generalized Anxiety Disorder  Hoarding Disorder  Obsessive-Compulsive Disorder

## 2022-11-11 ENCOUNTER — NURSE TRIAGE (OUTPATIENT)
Dept: NURSING | Facility: CLINIC | Age: 67
End: 2022-11-11

## 2022-11-11 NOTE — TELEPHONE ENCOUNTER
Pt calling to see if he can get both the covid booster and flu shot at the same time. Questions answered and transferred back to scheduling to set up vaccine appointment     Reason for Disposition    Requesting COVID-19 vaccine    COVID-19 vaccine, Frequently Asked Questions (FAQs)    Protocols used: CORONAVIRUS (COVID-19) VACCINE QUESTIONS AND [id number] 1.18.2022      Yusra Capone RN [facility] November 11, 2022 3:52 PM

## 2022-12-05 ENCOUNTER — VIRTUAL VISIT (OUTPATIENT)
Dept: NEUROLOGY | Facility: CLINIC | Age: 67
End: 2022-12-05
Payer: MEDICARE

## 2022-12-05 DIAGNOSIS — F33.2 MAJOR DEPRESSIVE DISORDER, RECURRENT SEVERE WITHOUT PSYCHOTIC FEATURES (H): Primary | ICD-10-CM

## 2022-12-05 PROCEDURE — 90834 PSYTX W PT 45 MINUTES: CPT | Mod: 95 | Performed by: PSYCHOLOGIST

## 2022-12-05 NOTE — PROGRESS NOTES
Psychology Progress Note    Date: December 05, 2022    Time length and type of treatment: 51 minutes (9:07 AM -9:58 AM), individual therapy    After review of the patient's situation, this visit was changed from an in-person visit to a  telephone visit to reduce the risk of COVID 19 exposure. Patient was informed that policies and procedures that govern in-person sessions would also apply to  telephone sessions. Patient was also informed that  telephone sessions would be discontinued when COVID 19 exposure is no longer a concern (as determined by [facility]).     Patient location: Patient home in [place], [place]  Provider location:  [facility] - Provider remote location     Patient was in agreement with proceeding with a  telephone session.      Necessity: This session is necessary to address the patient's depression, anxiety, hoarding disorder, and OCD.  Today we focus on the patient's treatment plan, specifically exploring coping strategies and sleep hygiene.  The reader is invited to review the patient's full treatment plan in the Media section of the patient's Epic medical record.    Psychotherapeutic Technique: This writer utilized motivational interviewing, active listening, reassurance and support in the context of cognitive behavioral therapy to address the above.      MENTAL STATUS EVALUATION  Grooming: Unable to determine due to telephone visit  Attire: Unable to determine due to telephone visit  Age: Unable to determine due to telephone visit  Behavior Towards Examiner: Cooperative  Motor Activity: Unable to determine due to telephone visit  Eye Contact: Unable to determine due to telephone visit  Mood: Anxious   Affect: full range  Speech/Language: Mildly pressured  Attention: Normal  Concentration: Sufficient  Thought Process: tangential  Thought Content: Clear    Orientation: Appeared oriented to person, place, and time, though not formally established  Memory: No  evidence of impairment.  Judgement: Adequate  Estimated Intelligence: Above Average  Demonstrated Insight: Adequate  Fund of Knowledge: Adequate    Intervention:   Patient reported he has continued to struggle with illness since our last scheduled appointment, and as a result hasn't been able to make much progress on goals.  However, he is now starting to feel better and has resumed exercising. He reported feeling an immediate mood boost, which reinforced her understanding of the importance of exercise in managing mood.  Patient stated that after our earlier discussion about sleep hygiene, he decided to start getting up earlier and going to bed earlier, in order to better align his schedule with natural sunlight, and gave himself permission to sleep a little longer in the winter. He reported that this also was surprisingly effective for him, and he has felt his mood improving. As a result, at this time, he is not feeling the need to discuss medication changes. Patient positive progress was validated and reinforced. Patient also reported he has gone about 1 1/2 years without a television because, after hearing news reports of people becoming addicted to screens, he worried this could happen to him.  We explored patient's historical television use and given excessive television use has never been a problem for him, patient reported increased comfort with obtaining another TV. Patient also discussed how fear of being scammed has kept him from ever owning a computer.  We discussed ways a computer might be helpful to the patient, and things he might do to mitigate risk.     Progress:  Patient reported gradually improving mood.     Plan:   We will meet again in 2 weeks to address the patient's depression, anxiety, hoarding disorder, and OCD.  Estimated duration of treatment is 10+ individual therapy sessions (33680) at twice monthly intervals. Treatment is expected to be completed by June 2023.     Diagnosis:  Major  Depressive Disorder, recurrent, severe  Generalized Anxiety Disorder  Hoarding Disorder  Obsessive-Compulsive Disorder

## 2022-12-09 ENCOUNTER — OFFICE VISIT (OUTPATIENT)
Dept: INTERNAL MEDICINE | Facility: CLINIC | Age: 67
End: 2022-12-09
Payer: MEDICARE

## 2022-12-09 VITALS
TEMPERATURE: 97.8 F | DIASTOLIC BLOOD PRESSURE: 60 MMHG | HEART RATE: 80 BPM | HEIGHT: 65 IN | RESPIRATION RATE: 18 BRPM | BODY MASS INDEX: 20.49 KG/M2 | SYSTOLIC BLOOD PRESSURE: 112 MMHG | OXYGEN SATURATION: 94 % | WEIGHT: 123 LBS

## 2022-12-09 DIAGNOSIS — Z23 PNEUMOCOCCAL VACCINATION ADMINISTERED AT CURRENT VISIT: ICD-10-CM

## 2022-12-09 DIAGNOSIS — E78.5 HYPERLIPIDEMIA LDL GOAL <100: ICD-10-CM

## 2022-12-09 DIAGNOSIS — D47.2 MONOCLONAL PARAPROTEINEMIA: ICD-10-CM

## 2022-12-09 DIAGNOSIS — F42.2 MIXED OBSESSIONAL THOUGHTS AND ACTS: ICD-10-CM

## 2022-12-09 DIAGNOSIS — Z12.5 SCREENING PSA (PROSTATE SPECIFIC ANTIGEN): ICD-10-CM

## 2022-12-09 DIAGNOSIS — Z00.00 ROUTINE GENERAL MEDICAL EXAMINATION AT A HEALTH CARE FACILITY: Primary | ICD-10-CM

## 2022-12-09 LAB
ALBUMIN SERPL BCG-MCNC: 4.7 G/DL (ref 3.5–5.2)
ALP SERPL-CCNC: 84 U/L (ref 40–129)
ALT SERPL W P-5'-P-CCNC: 35 U/L (ref 10–50)
ANION GAP SERPL CALCULATED.3IONS-SCNC: 13 MMOL/L (ref 7–15)
AST SERPL W P-5'-P-CCNC: 38 U/L (ref 10–50)
BILIRUB SERPL-MCNC: 0.6 MG/DL
BUN SERPL-MCNC: 28 MG/DL (ref 8–23)
CALCIUM SERPL-MCNC: 9.4 MG/DL (ref 8.8–10.2)
CHLORIDE SERPL-SCNC: 102 MMOL/L (ref 98–107)
CREAT SERPL-MCNC: 0.9 MG/DL (ref 0.67–1.17)
DEPRECATED HCO3 PLAS-SCNC: 25 MMOL/L (ref 22–29)
GFR SERPL CREATININE-BSD FRML MDRD: >90 ML/MIN/1.73M2
GLUCOSE SERPL-MCNC: 86 MG/DL (ref 70–99)
POTASSIUM SERPL-SCNC: 4.2 MMOL/L (ref 3.4–5.3)
PROT SERPL-MCNC: 7.1 G/DL (ref 6.4–8.3)
PSA SERPL-MCNC: 0.74 NG/ML (ref 0–4.5)
SODIUM SERPL-SCNC: 140 MMOL/L (ref 136–145)
TSH SERPL DL<=0.005 MIU/L-ACNC: 2.02 UIU/ML (ref 0.3–4.2)

## 2022-12-09 PROCEDURE — G0103 PSA SCREENING: HCPCS | Performed by: INTERNAL MEDICINE

## 2022-12-09 PROCEDURE — 90677 PCV20 VACCINE IM: CPT | Performed by: INTERNAL MEDICINE

## 2022-12-09 PROCEDURE — 84443 ASSAY THYROID STIM HORMONE: CPT | Performed by: INTERNAL MEDICINE

## 2022-12-09 PROCEDURE — G0009 ADMIN PNEUMOCOCCAL VACCINE: HCPCS | Performed by: INTERNAL MEDICINE

## 2022-12-09 PROCEDURE — 80053 COMPREHEN METABOLIC PANEL: CPT | Performed by: INTERNAL MEDICINE

## 2022-12-09 PROCEDURE — 36415 COLL VENOUS BLD VENIPUNCTURE: CPT | Performed by: INTERNAL MEDICINE

## 2022-12-09 PROCEDURE — G0439 PPPS, SUBSEQ VISIT: HCPCS | Performed by: INTERNAL MEDICINE

## 2022-12-09 ASSESSMENT — ENCOUNTER SYMPTOMS
HEMATURIA: 0
PALPITATIONS: 0
SHORTNESS OF BREATH: 0
DYSURIA: 0
PARESTHESIAS: 0
CHILLS: 0
NERVOUS/ANXIOUS: 0
HEARTBURN: 0
ABDOMINAL PAIN: 0
HEMATOCHEZIA: 0
DIARRHEA: 0
NAUSEA: 0
COUGH: 0
ARTHRALGIAS: 0
DIZZINESS: 0
SORE THROAT: 0
EYE PAIN: 0
FREQUENCY: 0
CONSTIPATION: 0
JOINT SWELLING: 0
WEAKNESS: 0
HEADACHES: 0
FEVER: 0
MYALGIAS: 0

## 2022-12-09 ASSESSMENT — PATIENT HEALTH QUESTIONNAIRE - PHQ9
SUM OF ALL RESPONSES TO PHQ QUESTIONS 1-9: 15
SUM OF ALL RESPONSES TO PHQ QUESTIONS 1-9: 15
10. IF YOU CHECKED OFF ANY PROBLEMS, HOW DIFFICULT HAVE THESE PROBLEMS MADE IT FOR YOU TO DO YOUR WORK, TAKE CARE OF THINGS AT HOME, OR GET ALONG WITH OTHER PEOPLE: VERY DIFFICULT

## 2022-12-09 ASSESSMENT — ACTIVITIES OF DAILY LIVING (ADL): CURRENT_FUNCTION: NO ASSISTANCE NEEDED

## 2022-12-09 NOTE — LETTER
December 12, 2022      Berto Pretty  [address]  [facility] [address]        Dear ,    We are writing to inform you of your test results.        Resulted Orders   Comprehensive metabolic panel (BMP + Alb, Alk Phos, ALT, AST, Total. Bili, TP)   Result Value Ref Range    Sodium 140 136 - 145 mmol/L    Potassium 4.2 3.4 - 5.3 mmol/L    Chloride 102 98 - 107 mmol/L    Carbon Dioxide (CO2) 25 22 - 29 mmol/L    Anion Gap 13 7 - 15 mmol/L    Urea Nitrogen 28.0 (H) 8.0 - 23.0 mg/dL    Creatinine 0.90 0.67 - 1.17 mg/dL    Calcium 9.4 8.8 - 10.2 mg/dL    Glucose 86 70 - 99 mg/dL    Alkaline Phosphatase 84 40 - 129 U/L    AST 38 10 - 50 U/L    ALT 35 10 - 50 U/L    Protein Total 7.1 6.4 - 8.3 g/dL    Albumin 4.7 3.5 - 5.2 g/dL    Bilirubin Total 0.6 <=1.2 mg/dL    GFR Estimate >90 >60 mL/min/1.73m2      Comment:      Effective December 21, 2021 eGFRcr in adults is calculated using the 2021 CKD-EPI creatinine equation which includes age and gender (Rashard et al., NEJ, DOI: 10.1056/FGWSyn2164673)   TSH with free T4 reflex   Result Value Ref Range    TSH 2.02 0.30 - 4.20 uIU/mL   PSA, screen   Result Value Ref Range    Prostate Specific Antigen Screen 0.74 0.00 - 4.50 ng/mL    Narrative    This result is obtained using the Roche Elecsys total PSA method on the ye e801 immunoassay analyzer. Results obtained with different assay methods or kits cannot be used interchangeably.       If you have any questions or concerns, please call the clinic at the number listed above.       Sincerely,      Olvin De Dios MD

## 2022-12-09 NOTE — LETTER
December 11, 2022      Berto Pretty  [address] [facility] [address]        Dear ,    Your metabolic panel, TSH thyroid test, and PSA prostate test fine.  The slight elevation of urea nitrogen is not significant.    Resulted Orders   Comprehensive metabolic panel (BMP + Alb, Alk Phos, ALT, AST, Total. Bili, TP)   Result Value Ref Range    Sodium 140 136 - 145 mmol/L    Potassium 4.2 3.4 - 5.3 mmol/L    Chloride 102 98 - 107 mmol/L    Carbon Dioxide (CO2) 25 22 - 29 mmol/L    Anion Gap 13 7 - 15 mmol/L    Urea Nitrogen 28.0 (H) 8.0 - 23.0 mg/dL    Creatinine 0.90 0.67 - 1.17 mg/dL    Calcium 9.4 8.8 - 10.2 mg/dL    Glucose 86 70 - 99 mg/dL    Alkaline Phosphatase 84 40 - 129 U/L    AST 38 10 - 50 U/L    ALT 35 10 - 50 U/L    Protein Total 7.1 6.4 - 8.3 g/dL    Albumin 4.7 3.5 - 5.2 g/dL    Bilirubin Total 0.6 <=1.2 mg/dL    GFR Estimate >90 >60 mL/min/1.73m2      Comment:      Effective December 21, 2021 eGFRcr in adults is calculated using the 2021 CKD-EPI creatinine equation which includes age and gender (Rashard et al., NEJM, DOI: 10.1056/MXAGmu9071703)   TSH with free T4 reflex   Result Value Ref Range    TSH 2.02 0.30 - 4.20 uIU/mL   PSA, screen   Result Value Ref Range    Prostate Specific Antigen Screen 0.74 0.00 - 4.50 ng/mL    Narrative    This result is obtained using the Roche Elecsys total PSA method on the ye e801 immunoassay analyzer. Results obtained with different assay methods or kits cannot be used interchangeably.       If you have any questions or concerns, please call the clinic at the number listed above.       Sincerely,      Olvin De Dios MD

## 2022-12-09 NOTE — PATIENT INSTRUCTIONS
Annual wellness visit, Berto is doing quite well.  He is plugged into the psychology department, and also psychiatry with Dr. Jones.  He is also plugged into urology follow-up with Dr. Braulio Munroe.  Berto is going to get his first ever screening colonoscopy done January 2023 at [facility]    Continues on Lexapro with good effect.    Berto had a good-looking lipid panel done in August 2022, and also had his blood cell counts checked June 2022 and hematology clinic is monitoring for his monoclonal kappa    Today December 9, 2022, will just check a comprehensive metabolic panel, TSH thyroid test, and screening PSA.    Give him a pneumococcal Prevnar 20 vaccine.  He already got his bivalent COVID-19 booster and seasonal flu shot.    His blood pressure looks great, and he will be continuing on his atorvastatin.    Both ear canals are plugged with loose appearing wax, which I think would respond nicely to over-the-counter wax dissolving eardrops, example brand Debrox or Murine.  I told Breto to buy these from his local pharmacy, follow the package directions, and he may even consider making this a habit every week or 2 to keep the ears clear.    Benign skin tag on his left upper chest, total of these have no dangerous potential, and are best left alone  Inclusion cyst on his central upper back at the base of his neck, which general site has been present for years.  No signs of infection.  I told him the best left alone    Tiny right-sided inguinal hernia, which does not seem to be bothering Berto, and is probably best left alone    STABLE Major Depressive Disorder, recurrent, severe  Generalized Anxiety Disorder  Hoarding Disorder  Obsessive-Compulsive Disorder    12/5/2022 [facility]  Lisa Stout, PhD  Psychology  treatment is 10+ individual therapy sessions (90933) at twice monthly intervals, Treatment is expected to be completed by March 2023  Dr Jones every 3  months    Obsessive-compulsive disorder. He is on Social Security disability due to this.  escitalopram (LEXAPRO) 20 MG tablet-- helpful    NEUROPSYCHOLOGY EVALUATION: 7/6/2022  The current profile is fully within normal limits. There is NO evidence of diffuse or focal cerebral dysfunction in the current test results.   DIAGNOSTIC IMPRESSIONS:  No Cognitive Disorder  As such, Mr. Pretty does NOT meet criteria for a cognitive disorder at this time.  The cognitive issues that he is experiencing are most likely due to non-neurologic factors, including:  His experience of what are actually normal, age-related cognitive changes. As we age, we all become more forgetful, have more difficulty with word-finding, and have a harder time multi-tasking to an extent. These age-related changes can be all the more frustrating for bright individuals (such as Mr. Pretty) who are used to things coming rather quickly and easily to them.   Significant anxiety/emotional distress and stress related to the ongoing pandemic may also interfere with Mr. Pretty's cognitive functioning at times in daily life.   Nevertheless, Mr. Pretty can be reassured that there is NO evidence of Alzheimer's disease or any other underlying dementia syndrome at this time.  This testing also confirms his experience that he has likely made a full recovery from his history of strokes. As expected, his remote concussion injuries are also considered noncontributory.    Obstructive sleep apnea syndrome  Not using CPAP machine which needs cleaning and maintenance  Current sleep study October 2020, at [facility]  Sleep study May 30, 2018 done at [facility].  7 obstructive events observed, 18 central apneas, 23 hypopneas    Insomnia, not so much a problem December 2022, and Berto decided he did not need to take the trazodone which Dr. 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  The right side of his body was weak.   MRI March 16, 2020, 1 no acute or subacute infarction, or hemorrhage.  Chronic infarction in the left precentral gyrus and right cerebellum with additional presumed sequelae of mild to moderate chronic small vessel ischemic disease.     Patient also had an MRI, February 2018, which is included below, right cerebellar infarct was present.- Primary     Vitamin D deficiency  Vitamin D, Cholecalciferol, 25 MCG (1000 UT) CAPS     Hyperlipidemia on treatement LDL goal <100,  Atorvastatin 40 mg daily  History of stroke  also describes having had a TIA event in 2018  8-  LDL Cholesterol Calculated <=100 mg/dL 50      Direct Measure HDL >=40 mg/dL 79      Triglycerides <150 mg/dL 43      Cholesterol <200 mg/dL 138      Malignant neoplasm of urinary bladder, summer 2021  Last cystoscopy was October 2022, had been getting them every 2 months.  Dr. Munroe told Ebrto that next surveillance will be April 2023.  Small bladder tumor, just above and lateral to the right ureteral orifice.  Appears to be superficial disease.  No other bladder tumors or suspicious mucosa.  Small transurethral resection of bladder tumor performed.  All tumor was removed.     Overdue for screening colonoscopy, family history colon cancer (father)  He told me he has an appointment to have a screening colonoscopy done at [facility] in January 2023     Got his third of Pfizer COVID-19 vaccine December 9, 2021, and  he is going to get his booster today April 13, 2022    COVID-19 Vaccine Bivalent Booster 12+ (Pfizer) 11/14/2022     Influenza Vaccine 65+ (Fluzone HD) 11/03/2020, 12/09/2021,  11/14/2022     Pneumococcal, Unspecified 06/01/2016       Immunization History   Administered Date(s) Administered    COVID-19 Vaccine 12+ (Pfizer 2022) 04/13/2022    COVID-19 Vaccine 12+ (Pfizer) 03/11/2021, 04/05/2021, 12/09/2021    COVID-19 Vaccine Bivalent Booster 12+ (Pfizer) 11/14/2022    Flu, Unspecified 12/27/2011    Influenza (IIV3) PF 12/28/2005, 01/18/2007, 12/27/2011    Influenza Vaccine 50-64 or 18-64 w/egg allergy (Flublok) 12/07/2018    Influenza Vaccine 65+ (Fluzone HD) 11/03/2020, 12/09/2021, 11/14/2022    Influenza Vaccine >6 months (Alfuria,Fluzone) 12/27/2017    Pneumococcal, Unspecified 06/01/2016    Td (Adult), Adsorbed 1955    Tdap (Adacel,Boostrix) 12/27/2011    Tdap (Adult) Unspecified Formulation 1955

## 2022-12-09 NOTE — PROGRESS NOTES
"SUBJECTIVE:   Berto is a 67 year old who presents for Preventive Visit.    Patient has been advised of split billing requirements and indicates understanding: Yes  Are you in the first 12 months of your Medicare coverage?  No    Healthy Habits:     In general, how would you rate your overall health?  Fair    Frequency of exercise:  6-7 days/week    Duration of exercise:  30-45 minutes    Do you usually eat at least 4 servings of fruit and vegetables a day, include whole grains    & fiber and avoid regularly eating high fat or \"junk\" foods?  No    Taking medications regularly:  Yes    Medication side effects:  Other    Ability to successfully perform activities of daily living:  No assistance needed    Home Safety:  Throw rugs in the hallway    Hearing Impairment:  No hearing concerns    In the past 6 months, have you been bothered by leaking of urine?  No    In general, how would you rate your overall mental or emotional health?  Fair      PHQ-2 Total Score: 3    Additional concerns today:  No      Have you ever done Advance Care Planning? (For example, a Health Directive, POLST, or a discussion with a medical provider or your loved ones about your wishes): Yes, advance care planning is on file.    Fall risk  Fallen 2 or more times in the past year?: Yes  Any fall with injury in the past year?: No    Cognitive Screening   1) Repeat 3 items (Leader, Season, Table)    2) Clock draw: NORMAL  3) 3 item recall: Recalls 3 objects  Results: 3 items recalled: COGNITIVE IMPAIRMENT LESS LIKELY    Mini-CogTM Copyright HAYDEE Robert. Licensed by the author for use in [facility]; reprinted with permission (angeline@.[facility]). All rights reserved.      Do you have sleep apnea, excessive snoring or daytime drowsiness?: no    Reviewed and updated as needed this visit by clinical staff   Tobacco  Allergies  Meds              Reviewed and updated as needed this visit by Provider     Meds             Social History     Tobacco " Use     Smoking status: Former     Types: Cigarettes     Quit date: 1974     Years since quittin.9     Smokeless tobacco: Never   Substance Use Topics     Alcohol use: Yes     If you drink alcohol do you typically have >3 drinks per day or >7 drinks per week? No    Alcohol Use 2022   Prescreen: >3 drinks/day or >7 drinks/week? No   Prescreen: >3 drinks/day or >7 drinks/week? -   Current providers sharing in care for this patient include:   Patient Care Team:  Olvin De Dios MD as PCP - General (Internal Medicine)  Zen Oliver MD, MD as Assigned Cancer Care Provider  Lisa Stout, PhD LP as Assigned Behavioral Health Provider  João Villanueva MD as Assigned PCP  Zamzam Stock RN as Specialty Care Coordinator (Hematology & Oncology)    The following health maintenance items are reviewed in Epic and correct as of today:  Health Maintenance   Topic Date Due     ANNUAL REVIEW OF HM ORDERS  Never done     DEPRESSION ACTION PLAN  Never done     ZOSTER IMMUNIZATION (1 of 2) Never done     Pneumococcal Vaccine: 65+ Years (2 - PPSV23 if available, else PCV20) 2019     COLORECTAL CANCER SCREENING  10/10/2019     MEDICARE ANNUAL WELLNESS VISIT  2021     DTAP/TDAP/TD IMMUNIZATION (2 - Td or Tdap) 2021     PHQ-9  2023     FALL RISK ASSESSMENT  2023     LIPID  08/10/2027     ADVANCE CARE PLANNING  2027     INFLUENZA VACCINE  Completed     AORTIC ANEURYSM SCREENING (SYSTEM ASSIGNED)  Completed     COVID-19 Vaccine  Completed     IPV IMMUNIZATION  Aged Out     MENINGITIS IMMUNIZATION  Aged Out     HEPATITIS C SCREENING  Discontinued       Review of Systems   Constitutional: Negative for chills and fever.   HENT: Negative for congestion, ear pain, hearing loss and sore throat.    Eyes: Negative for pain and visual disturbance.   Respiratory: Negative for cough and shortness of breath.    Cardiovascular: Negative for chest pain, palpitations and peripheral edema.  "  Gastrointestinal: Negative for abdominal pain, constipation, diarrhea, heartburn, hematochezia and nausea.   Genitourinary: Negative for dysuria, frequency, genital sores, hematuria, impotence, penile discharge and urgency.   Musculoskeletal: Negative for arthralgias, joint swelling and myalgias.   Skin: Negative for rash.   Neurological: Negative for dizziness, weakness, headaches and paresthesias.   Psychiatric/Behavioral: Negative for mood changes. The patient is not nervous/anxious.          OBJECTIVE:   /60 (BP Location: Right arm, Patient Position: Sitting, Cuff Size: Adult Regular)   Pulse 80   Temp 97.8  F (36.6  C) (Oral)   Resp 18   Ht 1.657 m (5' 5.25\")   Wt 55.8 kg (123 lb)   SpO2 94%   BMI 20.31 kg/m   Estimated body mass index is 20.31 kg/m  as calculated from the following:    Height as of this encounter: 1.657 m (5' 5.25\").    Weight as of this encounter: 55.8 kg (123 lb).  Physical Exam    General: Alert, in no distress  Skin: No significant lesion seen.  + Benign skin tag on his left upper chest, total of these have no dangerous potential, and are best left alone  Inclusion cyst on his central upper back at the base of his neck, which general site has been present for years.  No signs of infection.  I told him the best left alone  Eyes/nose/throat: Eyes without scleral icterus, eye movements normal, pupils equal and reactive, oropharynx clear  + Both ear canals are plugged with loose appearing wax, which I think would respond nicely to over-the-counter wax dissolving eardrops, example brand Debrox or Murine.  I told Berto to buy these from his local pharmacy, follow the package directions, and he may even consider making this a habit every week or 2 to keep the ears clear.  MSK: Neck with good ROM  Lymphatic: Neck without adenopathy or masses  Endocrine: Thyroid with no nodules to palpation  Pulm: Lungs clear to auscultation bilaterally  Cardiac: Heart with regular rate and rhythm, " no murmur or gallop  GI: Abdomen soft, nontender. No palpable enlargement of liver or spleen  MSK: Extremities no tenderness or edema  Neuro: Moves all extremities, without focal weakness  Psych: Alert, normal mental status. Normal affect and speech    ASSESSMENT / PLAN:     Annual wellness visit, Berto is doing quite well.  He is plugged into the psychology department, and also psychiatry with Dr. Jones.  He is also plugged into urology follow-up with Dr. Braulio Munroe.  Berto is going to get his first ever screening colonoscopy done January 2023 at [facility]    Continues on Lexapro with good effect.    Berto had a good-looking lipid panel done in August 2022, and also had his blood cell counts checked June 2022 and hematology clinic is monitoring for his monoclonal kappa    Today December 9, 2022, will just check a comprehensive metabolic panel, TSH thyroid test, and screening PSA.    Give him a pneumococcal Prevnar 20 vaccine.  He already got his bivalent COVID-19 booster and seasonal flu shot.    His blood pressure looks great, and he will be continuing on his atorvastatin.    Both ear canals are plugged with loose appearing wax, which I think would respond nicely to over-the-counter wax dissolving eardrops, example brand Debrox or Murine.  I told Berto to buy these from his local pharmacy, follow the package directions, and he may even consider making this a habit every week or 2 to keep the ears clear.    Benign skin tag on his left upper chest, total of these have no dangerous potential, and are best left alone  Inclusion cyst on his central upper back at the base of his neck, which general site has been present for years.  No signs of infection.  I told him the best left alone    Tiny right-sided inguinal hernia, which does not seem to be bothering Berto, and is probably best left alone    STABLE Major Depressive Disorder, recurrent, severe  Generalized Anxiety Disorder  Hoarding  Disorder  Obsessive-Compulsive Disorder    12/5/2022 [facility]  Lisa Stout, PhD  Psychology  treatment is 10+ individual therapy sessions (65115) at twice monthly intervals, Treatment is expected to be completed by March 2023  Dr Jones every 3 months    Obsessive-compulsive disorder. He is on Social Security disability due to this.  escitalopram (LEXAPRO) 20 MG tablet-- helpful    NEUROPSYCHOLOGY EVALUATION: 7/6/2022  The current profile is fully within normal limits. There is NO evidence of diffuse or focal cerebral dysfunction in the current test results.   DIAGNOSTIC IMPRESSIONS:  No Cognitive Disorder  As such, Mr. Pretty does NOT meet criteria for a cognitive disorder at this time.  The cognitive issues that he is experiencing are most likely due to non-neurologic factors, including:  His experience of what are actually normal, age-related cognitive changes. As we age, we all become more forgetful, have more difficulty with word-finding, and have a harder time multi-tasking to an extent. These age-related changes can be all the more frustrating for bright individuals (such as Mr. Pretty) who are used to things coming rather quickly and easily to them.   Significant anxiety/emotional distress and stress related to the ongoing pandemic may also interfere with Mr. Pretty's cognitive functioning at times in daily life.   Nevertheless, Mr. Pretty can be reassured that there is NO evidence of Alzheimer's disease or any other underlying dementia syndrome at this time.  This testing also confirms his experience that he has likely made a full recovery from his history of strokes. As expected, his remote concussion injuries are also considered noncontributory.    Obstructive sleep apnea syndrome  Not using CPAP machine which needs cleaning and maintenance  Current sleep study October 2020, at [facility]  Sleep study May 30, 2018 done at [facility].  7 obstructive events  observed, 18 central apneas, 23 hypopneas    Insomnia, not so much a problem December 2022, and Berto decided he did not need to take the trazodone which Dr. 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  The right side of his body was weak.   MRI March 16, 2020, 1 no acute or subacute infarction, or hemorrhage.  Chronic infarction in the left precentral gyrus and right cerebellum with additional presumed sequelae of mild to moderate chronic small vessel ischemic disease.     Patient also had an MRI, February 2018, which is included below, right cerebellar infarct was present.- Primary     Vitamin D deficiency  Vitamin D, Cholecalciferol, 25 MCG (1000 UT) CAPS     Hyperlipidemia on treatement LDL goal <100,  Atorvastatin 40 mg daily  History of stroke  also describes having had a TIA event in 2018  8-  LDL Cholesterol Calculated <=100 mg/dL 50      Direct Measure HDL >=40 mg/dL 79      Triglycerides <150 mg/dL 43      Cholesterol <200 mg/dL 138      Malignant neoplasm of urinary bladder, summer 2021  Last cystoscopy was October 2022, had been getting them every 2 months.  Dr. Munroe told Berto that next surveillance will be April 2023.  Small bladder tumor, just above and lateral to the right ureteral orifice.  Appears to be superficial disease.  No other bladder tumors or suspicious mucosa.  Small transurethral resection of bladder tumor performed.  All tumor was removed.     Overdue for screening colonoscopy, family history colon cancer (father)  He told me he has an appointment to have a screening colonoscopy  done at [facility] in January 2023     Got his third of Pfizer COVID-19 vaccine December 9, 2021, and  he is going to get his booster today April 13, 2022    COVID-19 Vaccine Bivalent Booster 12+ (Pfizer) 11/14/2022     Influenza Vaccine 65+ (Fluzone HD) 11/03/2020, 12/09/2021, 11/14/2022     Pneumococcal, Unspecified 06/01/2016       Immunization History   Administered Date(s) Administered     COVID-19 Vaccine 12+ (Pfizer 2022) 04/13/2022     COVID-19 Vaccine 12+ (Pfizer) 03/11/2021, 04/05/2021, 12/09/2021     COVID-19 Vaccine Bivalent Booster 12+ (Pfizer) 11/14/2022     Flu, Unspecified 12/27/2011     Influenza (IIV3) PF 12/28/2005, 01/18/2007, 12/27/2011     Influenza Vaccine 50-64 or 18-64 w/egg allergy (Flublok) 12/07/2018     Influenza Vaccine 65+ (Fluzone HD) 11/03/2020, 12/09/2021, 11/14/2022     Influenza Vaccine >6 months (Alfuria,Fluzone) 12/27/2017     Pneumococcal, Unspecified 06/01/2016     Td (Adult), Adsorbed 1954     Tdap (Adacel,Boostrix) 12/27/2011     Tdap (Adult) Unspecified Formulation 1955     COUNSELING:  Reviewed preventive health counseling, as reflected in patient instructions       Healthy diet/nutrition    He reports that he quit smoking about 48 years ago. His smoking use included cigarettes. He has never used smokeless tobacco.    Appropriate preventive services were discussed with this patient, including applicable screening as appropriate for cardiovascular disease, diabetes, osteopenia/osteoporosis, and glaucoma.  As appropriate for age/gender, discussed screening for colorectal cancer, prostate cancer, breast cancer, and cervical cancer. Checklist reviewing preventive services available has been given to the patient.    Reviewed patients plan of care and provided an AVS. The Basic Care Plan (routine screening as documented in Health Maintenance) for Berto meets the Care Plan requirement. This Care Plan has been established and reviewed with the  Patient.      QUE LIZAMA MD  [facility]

## 2023-01-02 ENCOUNTER — VIRTUAL VISIT (OUTPATIENT)
Dept: NEUROLOGY | Facility: CLINIC | Age: 68
End: 2023-01-02
Payer: MEDICARE

## 2023-01-02 DIAGNOSIS — F41.1 GAD (GENERALIZED ANXIETY DISORDER): Primary | ICD-10-CM

## 2023-01-02 PROCEDURE — 90834 PSYTX W PT 45 MINUTES: CPT | Mod: 95 | Performed by: PSYCHOLOGIST

## 2023-01-02 NOTE — PROGRESS NOTES
Psychology Progress Note    Date: January 02, 2023    Time length and type of treatment: 47 minutes (9:00 AM - 9:47 AM), individual therapy    After review of the patient's situation, this visit was changed from an in-person visit to a telephone visit to reduce the risk of COVID 19 exposure. Patient was informed that policies and procedures that govern in-person sessions would also apply to telephone sessions. Patient was also informed that telephone sessions would be discontinued when COVID 19 exposure is no longer a concern (as determined by [facility]).     Patient location: Patient home in [place], [place]  Provider location:  [facility] - Provider remote location     Patient was in agreement with proceeding with a telephone session.      Necessity: This session is necessary to address the patient's depression, anxiety, Hoarding Disorder, and OCD.  Today we focus on revising the patient's treatment plan.  The reader is invited to review the patient's full treatment plan in the Media section of the patient's Epic medical record.    Psychotherapeutic Technique: This writer utilized motivational interviewing, active listening, reassurance and support in the context of cognitive behavioral therapy to address the above.      MENTAL STATUS EVALUATION  Grooming: Unable to determine due to telephone visit  Attire: Unable to determine due to telephone visit  Age: Unable to determine due to telephone visit  Behavior Towards Examiner: Cooperative  Motor Activity: Unable to determine due to telephone visit  Eye Contact: Unable to determine due to telephone visit  Mood: Anxious   Affect: full range  Speech/Language: Mildly pressured  Attention: Normal  Concentration: Sufficient  Thought Process: tangential  Thought Content: Clear    Orientation: Appeared oriented to person, place, and time, though not formally established  Memory: No evidence of impairment.  Judgement: Adequate  Estimated  "Intelligence: Above Average  Demonstrated Insight: Adequate  Fund of Knowledge: Adequate    Intervention:   Patient was readministered the PHQ-9 and JHON-7 as part of revising his treatment plan. His score of 12 on the JOHN-7 is similar to his earlier score of 11 and remains suggestive of moderate anxiety. His core of 16 on the PHQ-9 is suggestive of severe depression and his an increase over his earlier moderate score of 13. Patient expressed surprise at this increase in his depression score, stating that he feels like his depression is actually improving now that he knows the days are getting longer. Patient expressed openness to continuing to work on anxiety and depression. He expressed pride in positive progress he has made with OCD but questioned how much more work he wants to do on this. He reiterated that he wishes to wait with addressing his hoarding until he has finished his tax returns for the last few years, and noted that this has been much more complicated and time consuming than he anticipated. Treatment plan was jointly revised and remaining time was spent discussing patient anxiety. Patient committed to developing a name for his anxiety to help with depersonalizing anxiety, recognizing when anxiety is trying to \"take over\" decision making, and helping with developing messages to self soothe.     Progress:  Patient treatment plan was jointly revised    Plan:   We will meet again in 2 weeks to address the patient's depression, anxiety, OCD, and hoarding.  Estimated duration of treatment is 10+ individual therapy sessions (82431) at twice monthly intervals. Treatment is expected to be completed by June 2023.     Diagnosis:  Generalized Anxiety Disorder  Major Depressive Disorder, recurrent, severe  Hoarding Disorder  Obsessive Compulsive Disorder  "

## 2023-01-06 ENCOUNTER — TELEPHONE (OUTPATIENT)
Dept: INTERNAL MEDICINE | Facility: CLINIC | Age: 68
End: 2023-01-06

## 2023-01-06 NOTE — TELEPHONE ENCOUNTER
Pt calling to review lab results.   Reviewed lab results with him in depth.     No questions at this time.     Ginger Hummel RN, BSN  [facility]

## 2023-01-09 ENCOUNTER — VIRTUAL VISIT (OUTPATIENT)
Dept: PSYCHIATRY | Facility: CLINIC | Age: 68
End: 2023-01-09
Payer: MEDICARE

## 2023-01-09 DIAGNOSIS — F42.2 MIXED OBSESSIONAL THOUGHTS AND ACTS: ICD-10-CM

## 2023-01-09 PROCEDURE — 99443 PR PHYSICIAN TELEPHONE EVALUATION 21-30 MIN: CPT | Mod: 95 | Performed by: PSYCHIATRY & NEUROLOGY

## 2023-01-09 RX ORDER — TRAZODONE HYDROCHLORIDE 50 MG/1
50 TABLET, FILM COATED ORAL AT BEDTIME
Qty: 30 TABLET | Refills: 3 | Status: SHIPPED | OUTPATIENT
Start: 2023-01-09 | End: 2023-05-02

## 2023-01-09 RX ORDER — ESCITALOPRAM OXALATE 20 MG/1
TABLET ORAL
Qty: 90 TABLET | Refills: 1 | Status: SHIPPED | OUTPATIENT
Start: 2023-01-09 | End: 2023-05-02

## 2023-01-09 ASSESSMENT — ANXIETY QUESTIONNAIRES
GAD7 TOTAL SCORE: 10
8. IF YOU CHECKED OFF ANY PROBLEMS, HOW DIFFICULT HAVE THESE MADE IT FOR YOU TO DO YOUR WORK, TAKE CARE OF THINGS AT HOME, OR GET ALONG WITH OTHER PEOPLE?: VERY DIFFICULT
1. FEELING NERVOUS, ANXIOUS, OR ON EDGE: SEVERAL DAYS
7. FEELING AFRAID AS IF SOMETHING AWFUL MIGHT HAPPEN: MORE THAN HALF THE DAYS
3. WORRYING TOO MUCH ABOUT DIFFERENT THINGS: MORE THAN HALF THE DAYS
IF YOU CHECKED OFF ANY PROBLEMS ON THIS QUESTIONNAIRE, HOW DIFFICULT HAVE THESE PROBLEMS MADE IT FOR YOU TO DO YOUR WORK, TAKE CARE OF THINGS AT HOME, OR GET ALONG WITH OTHER PEOPLE: VERY DIFFICULT
4. TROUBLE RELAXING: SEVERAL DAYS
5. BEING SO RESTLESS THAT IT IS HARD TO SIT STILL: NOT AT ALL
GAD7 TOTAL SCORE: 10
GAD7 TOTAL SCORE: 10
6. BECOMING EASILY ANNOYED OR IRRITABLE: MORE THAN HALF THE DAYS
7. FEELING AFRAID AS IF SOMETHING AWFUL MIGHT HAPPEN: MORE THAN HALF THE DAYS
2. NOT BEING ABLE TO STOP OR CONTROL WORRYING: MORE THAN HALF THE DAYS

## 2023-01-09 ASSESSMENT — PATIENT HEALTH QUESTIONNAIRE - PHQ9
10. IF YOU CHECKED OFF ANY PROBLEMS, HOW DIFFICULT HAVE THESE PROBLEMS MADE IT FOR YOU TO DO YOUR WORK, TAKE CARE OF THINGS AT HOME, OR GET ALONG WITH OTHER PEOPLE: VERY DIFFICULT
SUM OF ALL RESPONSES TO PHQ QUESTIONS 1-9: 12
SUM OF ALL RESPONSES TO PHQ QUESTIONS 1-9: 12

## 2023-01-09 NOTE — PROGRESS NOTES
Outpatient Psychiatry Note     The patient presented for an initial psychiatric consultation on December 7 of 2020.  He carried a diagnosis of obsessive-compulsive disorder and has had symptoms throughout his entire life.  He presents at this time on Lexapro 20 mg a day.  He has been on this dose for 1 month having started it October 12, 2020 and increased 1 month prior to his initial intake with me.  He has been on psychotropic medication most of his adult life although has been off psychotropic medication for about 1 to 2 years prior to recent restarting of that medication.  Over the years the patient has been on a variety of psychotropic medication with limited success.  He has been on Prozac which she described as having mild efficacy for his OCD symptoms, Celexa with mild efficacy at doses of 80 mg a day, Parnate, Stelazine, Haldol and clomipramine as an adjunct at 25 mg which were all described as ineffective.    The patient describes having onset of OCD symptoms as a child.  His symptoms included obsessive thoughts and compulsive behaviors with constant worry and thoughts about germs, asbestos and he engaged in frequent handwashing checking doors and lights and excessively bought movies and books.  He had an inpatient hospital stay most recently in the 1970s.  He has OCD symptoms caused him to stop attendance at the [facility] and he has been on SSDI for years due to this.    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  It was at this time that he chose to come off his Lexapro.  He did fairly well from a psychiatric standpoint for a number of months but since the spring 2020 he has had an increase in obsessive thoughts and anxiety symptoms.  As stated previously he restarted his Lexapro on October 12 of 2020 and that was increased to 20 mg a day 1 month later.    At the initial visit, in part due to the fact that the patient had recently been increased on his Lexapro  we continued with that treatment plan.  He had previously been off all psychotropic medication for a about a year and a half.  He had done fairly well off that medication until the spring 2020 when he had return of his anxiety symptoms.  He was in the process of being reassessed for his sleep apnea.    The patient return to the clinic on February 8 of 2021.  He had restarted individual psychotherapy.  He reported he was doing a bit better and stated that therapy was quite helpful.  He believed he was improving and responding well to the therapy and reassurance.  He was tolerating the recently restarted medication and we continued with that treatment plan.    I saw the patient on May 3, 2021.  At that time he again sought frequent reassurance and was quite talkative and anxious about possibly getting dementia at some point.  He continued to struggle with OCD symptoms.  He continued with his therapist and medical follow-up.  We did not make any medication changes at that visit.    The patient returns for follow-up visit in December 2021.  There was no significant change at that time.  The patient had a diagnosed and was being treated for bladder cancer.  He was comfortable with the current symptoms and stated that he was seen Dr. Stout and that was going well.  We did not make any medication changes at that visit.    I saw the patient in March 2022.  He was concerned that he may have had a slight cognitive decline in the last 3 months.  He was not reading as well.  He again was somewhat anxious and needed some reassurance.  His psychologist had apparently ordered some neuropsychological testing.  His main concern was sleep deprivation.  We discussed some sleep hygiene techniques and I did add some low-dose as needed trazodone.    The patient was seen on June 27, 2022.  He stated he had not refilled his trazodone because he was sleeping better.  He was more active and had a more positive attitude.  He stated he had  gotten good news with his neurologist regarding the cancer follow-up.  We did not make any changes.    The patient was seen on September 26, 2022.  Patient was doing well at that time.  He had run out of Lexapro 2 weeks earlier I reordered the lexapro.  We elected to make no medication changes.  The patient was tolerating the medication.  The patient was going to follow-up with psychology.      HPI:  The patient presents for follow-up today for medication check.  The patient presented for a medication management visit today.  Again he was quite talkative and perseverative but admitted that he was doing better with the current medication regime.  He continues to follow-up with his psychologist.  He reports overall his mood is better.  He is less depressed and less anxious.  He reports he continues with some of his OCD behaviors but they have gotten less frequent and less intense.  No thoughts of harming himself or anybody else.  No hallucinations or delusions.  No óscar.  He reports he is sleeping much better now at 7 to 8 hours a night despite not using the sleep machine.  He continues to follow-up with his medical team regarding that.  He reports that he has changed medical doctors and is now being followed by the medical clinic in [place] and is comfortable with that.  No new medical issues.  No side effects of the medication.  He desires no medication changes at this time and is pleased with his progress.      Current Medications:  Medications were personally reviewed at this meeting.  Please see the chart for current medication list.      Mental Status Exam:  Appearance: The patient was interviewed by phone.  Behavior: Pleasant.  Appreciative.  Again seeking frequent reassurance.  He denies any recent behavioral dyscontrol.  Speech: Talkative and a bit repetitive.  Sentence structure was intact.  A bit rapid.  Answers were consistent and appropriate.  Mood/Affect: Pleasant and polite.  Needing occasional  prompts and redirection as he was quite talkative.  Thought Content: No hallucinations or delusions.  Suicidal or Homicidal Thoughts: None apparent or reported  Thought Process/Formulation: Not loose.  No racing thoughts.  Associations: Grossly adequate  Fund of Knowledge: Grossly intact  Attention/Concentration: Not distractible today.  He was tracking well and participating well.  Insight: Fair.  No significant change.  Judgement: Fair  Memory: Grossly intact..  Recent neuropsych testing was quite reassuring.  Motor Status: No tremors.  No asymmetries.  Fund of Knowledge: Adequate  Orientation: Grossly oriented      Recent Labs:  Please see the chart.      Diagnosis:  JHON   OCD, by history  Major depressive disorder, recurrent, moderate, without psychotic features      The patient's chart review, interview and documentation review took 25 minutes.    Plan:  We will continue with the current medications.    He will continue to follow-up with his medical team as well as his psychologist.  .    The patient will seek out appropriate emergency services should that become necessary.  I will make myself available if any questions, concerns, or problems arise.    Elie Jones MD          Answers for HPI/ROS submitted by the patient on 1/9/2023  If you checked off any problems, how difficult have these problems made it for you to do your work, take care of things at home, or get along with other people?: Very difficult  PHQ9 TOTAL SCORE: 12  JHON 7 TOTAL SCORE: 10

## 2023-01-09 NOTE — PATIENT INSTRUCTIONS
The patient is doing relatively well and tolerating the current medications.  I will make no changes at this time.  He should continue with his therapist and return to this clinic in 3 to 4 months for a medication check.

## 2023-01-09 NOTE — PROGRESS NOTES
Berto is a 67 year old who is being evaluated via a billable telephone visit.      What phone number would you like to be contacted at? [phone]  How would you like to obtain your AVS? Mail a copy

## 2023-01-16 ENCOUNTER — VIRTUAL VISIT (OUTPATIENT)
Dept: NEUROLOGY | Facility: CLINIC | Age: 68
End: 2023-01-16
Payer: MEDICARE

## 2023-01-16 DIAGNOSIS — F42.9 OBSESSIVE-COMPULSIVE DISORDER, UNSPECIFIED TYPE: Primary | ICD-10-CM

## 2023-01-16 PROCEDURE — 90834 PSYTX W PT 45 MINUTES: CPT | Mod: 95 | Performed by: PSYCHOLOGIST

## 2023-01-16 NOTE — PROGRESS NOTES
Psychology Progress Note    Date: January 16, 2023    Time length and type of treatment: 40 minutes (9:11 AM - 9:51 AM), individual therapy    After review of the patient's situation, this visit was changed from an in-person visit to a  telephone visit via [employer] to reduce the risk of COVID 19 exposure. Patient was informed that policies and procedures that govern in-person sessions would also apply to  telephone sessions. Patient was also informed that  telephone sessions would be discontinued when COVID 19 exposure is no longer a concern (as determined by [facility]).     Patient location: Patient home in [place], [place]  Provider location:  [facility] - Provider remote location     Patient was in agreement with proceeding with a  telephone session.      Necessity: This session is necessary to address the patient's depression, anxiety, hoarding disorder, and OCD.  Today we focus on the patient's treatment plan, specifically exploring coping strategies.  The reader is invited to review the patient's full treatment plan in the Media section of the patient's Epic medical record.    Psychotherapeutic Technique: This writer utilized motivational interviewing, active listening, reassurance and support in the context of cognitive behavioral therapy to address the above.      MENTAL STATUS EVALUATION  Grooming: Unable to determine due to telephone visit  Attire: Unable to determine due to telephone visit  Age: Unable to determine due to telephone visit  Behavior Towards Examiner: Cooperative  Motor Activity: Unable to determine due to telephone visit  Eye Contact: Unable to determine due to telephone visit  Mood: Anxious   Affect: full range  Speech/Language: Mildly pressured  Attention: Within normal limits  Concentration: Sufficient  Thought Process: tangential and overinclusive   Thought Content: Clear    Orientation: Appeared oriented to person, place, and time, though not  formally established  Memory: No evidence of impairment.  Judgement: Adequate  Estimated Intelligence: Above Average  Demonstrated Insight: Adequate  Fund of Knowledge: Adequate    Intervention:   Patient is making slow progress on his back taxes, now working on 2019 for about one hour/day. We discussed how further reducing time spent on compulsions would free up more time to get these done. Patient expressed pride that on a single occasion he was able to reduce hand washing to 7 minutes and we explored what helped him be successful. We again discussed right sizing patient anxiety and utilized imagery to facilitate this.  Patient fatigue and strategies to manage fatigue were also explored.    Progress:  Patient has shown improvement in ability to utilize coping skills.     Plan:   We will meet again in 2 weeks to address the patient's depression, anxiety, OCD, and hoarding.  Estimated duration of treatment is 10+ individual therapy sessions (07849) at twice monthly intervals. Treatment is expected to be completed by June 2023.     Diagnosis:  Obsessive Compulsive Disorder  Generalized Anxiety Disorder  Major Depressive Disorder, recurrent, severe  Hoarding Disorder

## 2023-01-26 NOTE — TELEPHONE ENCOUNTER
At 63 years of age he has suffered a stroke.  He needs full strength of statin therapy.  80mg of simvastatin is not an acceptable alternative due to increase side effects and drug interactions  Antwon Mcmillan MD     Implemented All Universal Safety Interventions:  [place] to call system. Call bell, personal items and telephone within reach. Instruct patient to call for assistance. Room bathroom lighting operational. Non-slip footwear when patient is off stretcher. Physically safe environment: no spills, clutter or unnecessary equipment. Stretcher in lowest position, wheels locked, appropriate side rails in place.

## 2023-02-05 NOTE — PROGRESS NOTES
OBSTETRICS H&P    Patient: Love Brice Date: 2023   : 1988 Attending: Rich Boyd MD   34 year old female Admit Date: 2023      Chief Complaint     Chief Complaint   Patient presents with   • Scheduled Induction       History of Present Illness     Love Brice is a 34 year old  female at 39w0d  gestation who presents for induction at term    Pregnancy Course  Pregnancy Course: uncomplicated, AMA  Flu vaccine received  Tdap vaccine received    Last Ultrasound    Not available      Prenatal Labs    Lab Results   Component Value Date    ABR A POSITIVE 2020    RUBEL Immune 2019    HIV NR 2019    HIV Negative 2019    HGB 10.6 (L) 2023    HGB 11.8 (L) 2020    RPR NR 2019       OB History  # 1 - Date: 03/15/17, Sex: Female, Weight: None, GA: 40w0d, Delivery: Vaginal, Spontaneous, Apgar1: None, Apgar5: None, Living: Living, Birth Comments: None    # 2 - Date: 20, Sex: Male, Weight: 3800 g, GA: 39w0d, Delivery: Vaginal, Spontaneous, Apgar1: 9, Apgar5: 9, Living: Living, Birth Comments: None    # 3 - Date: None, Sex: None, Weight: None, GA: None, Delivery: None, Apgar1: None, Apgar5: None, Living: None, Birth Comments: None      Gyn History  denies STIs.  denies abnormal pap smears.     Past Medical History  Past Medical History:   Diagnosis Date   • No known problems        Surgical History  Past Surgical History:   Procedure Laterality Date   • No past surgeries          Social History  Social History     Tobacco Use   • Smoking status: Never   • Smokeless tobacco: Never   Vaping Use   • Vaping Use: never used   Substance Use Topics   • Alcohol use: Never   • Drug use: Never       Family History  History reviewed. No pertinent family history.       Physical Exam       Vital Last Value 24 Hour Range   Temperature 97.7 °F (36.5 °C) Temp  Min: 97.7 °F (36.5 °C)  Max: 97.7 °F (36.5 °C)   Pulse 84 Pulse  Min: 84  Max: 84   Respiratory 16  Outpatient Psychiatry Note     The patient presented for an initial psychiatric consultation on December 7 of 2020.  He carried a diagnosis of obsessive-compulsive disorder and has had symptoms throughout his entire life.  He presents at this time on Lexapro 20 mg a day.  He has been on this dose for 1 month having started it October 12, 2020 and increased 1 month prior to his initial intake with me.  He has been on psychotropic medication most of his adult life although has been off psychotropic medication for about 1 to 2 years prior to recent restarting of that medication.  Over the years the patient has been on a variety of psychotropic medication with limited success.  He has been on Prozac which she described as having mild efficacy for his OCD symptoms, Celexa with mild efficacy at doses of 80 mg a day, Parnate, Stelazine, Haldol and clomipramine as an adjunct at 25 mg which were all described as ineffective.    The patient describes having onset of OCD symptoms as a child.  His symptoms included obsessive thoughts and compulsive behaviors with constant worry and thoughts about germs, asbestos and he engaged in frequent handwashing checking doors and lights and excessively bought movies and books.  He had an inpatient hospital stay most recently in the 1970s.  He has OCD symptoms caused him to stop attendance at the [facility] and he has been on SSDI for years due to this.    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  It was at this time that he chose to come off his Lexapro.  He did fairly well from a psychiatric standpoint for a number of months but since the spring 2020 he has had an increase in obsessive thoughts and anxiety symptoms.  As stated previously he restarted his Lexapro on October 12 of 2020 and that was increased to 20 mg a day 1 month later.    At the initial visit, in part due to the fact that the patient had recently been increased on his Lexapro  Resp  Min: 16  Max: 16   Blood Pressure 136/76 BP  Min: 136/76  Max: 136/76   Pulse Oximetry   No data recorded   CVP   No data recorded     Vital Today Admit   Weight 99.1 kg Weight: 99.1 kg   Height N/A Height: 5' 7\" (170.2 cm)   BMI N/A         General: well developed, well nourished. No acute distress.  Resp: Respirations are non-labored, no accessory muscle usage. No respiratory distress.  CV: Normal peripheral perfusion.  Abdomen: Soft, gravid, non-tender.  : Normal external genitalia.  Extremities: Non-tender, no edema.  Neurological: A&O x3.  Skin: Warm, dry, normal for ethnicity.   Psychiatric: Cooperative.       SVE:    Dilation 2 (23 0845) cm   Effacement 30    Station -3   Consistency Medium   Position         SSE: deferred, intact on SVE    Fetal Surveillance:   Fetal Heart Rate/ Movement:    Mode:External US (23)   Baseline rate: 145 bpm (23)   Baseline Classification:     Variability: Moderate (23)   Pattern: Accelerations (23)     Uterine Activity/ Exam:  Contraction frequency (min): x1 (23)    Fetal Presentation: Cephalic by Bedside ultrasound  EFW: 3400 g  Membrane Status: Intact    Assessment     Love Brice is a 34 year old  female at 39w0d  gestation who presented for induction at term    Plan     - Admit to L&D  - CBC and T&S  - cEFM and Kosse  - IVF  - FWB: Category 1  - GBS: negative  - Labor: Will start pitocin per protocol  - Pain: DEANNA PRN    Attending: ELAINE Boyd DO  2023 9:48 AM     we continued with that treatment plan.  He had previously been off all psychotropic medication for a about a year and a half.  He had done fairly well off that medication until the spring 2020 when he had return of his anxiety symptoms.  He was in the process of being reassessed for his sleep apnea.    The patient return to the clinic on February 8 of 2021.  He had restarted individual psychotherapy.  He reported he was doing a bit better and stated that therapy was quite helpful.  He believed he was improving and responding well to the therapy and reassurance.  He was tolerating the recently restarted medication and we continued with that treatment plan.    I saw the patient on May 3, 2021.  At that time he again sought frequent reassurance and was quite talkative and anxious about possibly getting dementia at some point.  He continued to struggle with OCD symptoms.  He continued with his therapist and medical follow-up.  We did not make any medication changes at that visit.    The patient returns for follow-up visit in December 2021.  There was no significant change at that time.  The patient had a diagnosed and was being treated for bladder cancer.  He was comfortable with the current symptoms and stated that he was seen Dr. Stout and that was going well.  We did not make any medication changes at that visit.    I saw the patient in March 2022.  He was concerned that he may have had a slight cognitive decline in the last 3 months.  He was not reading as well.  He again was somewhat anxious and needed some reassurance.  His psychologist had apparently ordered some neuropsychological testing.  His main concern was sleep deprivation.  We discussed some sleep hygiene techniques and I did add some low-dose as needed trazodone.    The patient was seen on June 27, 2022.  He stated he had not refilled his trazodone because he was sleeping better.  He was more active and had a more positive attitude.  He stated he had  gotten good news with his neurologist regarding the cancer follow-up.  We did not make any changes.      HPI:  The patient presents for follow-up today for medication check.  He recently had neuropsychological testing which apparently did not show any significant cognitive deficits and the neuropsychologist tried to reassure him.  The patient tells me that he was doing quite well but a few weeks ago became more depressed.  He also reported that a few weeks ago he had run out of his Lexapro and his vitamins and he thinks that has something to do with it.  He recently restarted those and is already feeling better.  He continues to follow-up with his psychologist who reported in her last note that his handwashing frequency had decreased a bit and his anxiety was a bit better.  They were working on sleep hygiene.    On my interview with the patient today he states the last few days have been better but he was more depressed before that and, as stated above, this may have been related to the fact that he had stopped his Lexapro and vitamins a few weeks earlier.  He denies having any thoughts of hurting himself or anybody.  No hallucinations or delusions.  Denies having any manic symptoms.  He reports sleep is relatively good although a few days ago it was not good that is gotten better as has his appetite since he restarted the medication.    Again during my conversation with the patient he was a bit perseverative and needed a lot of reassurance but overall admitted he was doing much better and was tolerating the medication.  He has a pending intake with a new internist as well as a follow-up visit with his urologist in the near future.      Current Medications:  Medications were personally reviewed at this meeting.  Please see the chart for current medication list.      Mental Status Exam:  Appearance: The patient was interviewed by phone.  Behavior: Cooperative.  Again quite talkative and needing a lot of reassurance.   He denies having any recent behavioral difficulties.  Speech: Quite talkative and a bit repetitive.  Needing frequent reassurance but answers were consistent and appropriate.  Mood/Affect: Pleasant and polite.  Again baseline anxious but directable.  Thought Content: No hallucinations or delusions.  Suicidal or Homicidal Thoughts: None apparent or reported  Thought Process/Formulation: No significant change.  No actual racing thoughts but his speech was a bit rapid.  Continues to be somewhat repetitive, seeking reassurance.  Associations: Grossly adequate  Fund of Knowledge: Grossly intact  Attention/Concentration: Not distractible today.  He was tracking well and participating well.  Insight: Fair.  No significant change.  Judgement: Fair  Memory: Grossly intact..  Recent neuropsych testing was quite reassuring.  Motor Status: Patient denies having any new issues.  No new tremors or asymmetries.  No recent changes.  Fund of Knowledge: Adequate  Orientation: Grossly oriented      Recent Labs:  Please see the chart.      Diagnosis:  JHON   OCD, by history  Major depressive disorder, recurrent, moderate, without psychotic features      The patient's chart review, interview and documentation review took 26 min    Plan:  I will make no psychotropic medication changes at this time.    He will continue to follow-up with his medical team as well as his psychologist.  The psychologist is apparently considering more formalized cognitive testing.  That apparently has been scheduled for the near future.    The patient will seek out appropriate emergency services should that become necessary.  I will make myself available if any questions, concerns, or problems arise.    Elie Jones MD    Answers for HPI/ROS submitted by the patient on 9/26/2022  If you checked off any problems, how difficult have these problems made it for you to do your work, take care of things at home, or get along with other people?: Very  difficult  PHQ9 TOTAL SCORE: 15  JHON 7 TOTAL SCORE: 10

## 2023-02-13 ENCOUNTER — VIRTUAL VISIT (OUTPATIENT)
Dept: NEUROLOGY | Facility: CLINIC | Age: 68
End: 2023-02-13
Payer: MEDICARE

## 2023-02-13 DIAGNOSIS — F42.2 MIXED OBSESSIONAL THOUGHTS AND ACTS: Primary | ICD-10-CM

## 2023-02-13 PROCEDURE — 90834 PSYTX W PT 45 MINUTES: CPT | Mod: 95 | Performed by: PSYCHOLOGIST

## 2023-02-13 NOTE — PROGRESS NOTES
Psychology Progress Note    Date: February 13, 2023    Time length and type of treatment: 47 minutes (9:01 AM - 9:48 AM), individual therapy    After review of the patient's situation, this visit was changed from an in-person visit to a telephone visit via [employer] to reduce the risk of COVID 19 exposure. Patient was informed that policies and procedures that govern in-person sessions would also apply to telephone sessions. Patient was also informed that telephone sessions would be discontinued when COVID 19 exposure is no longer a concern (as determined by [facility]).     Patient location: Patient home in [place], [place]  Provider location:  [facility] - Provider remote location     Patient was in agreement with proceeding with a telephone session.      Necessity: This session is necessary to address the patient's depression, anxiety, Hoarding Disorder, and OCD.  Today we focus on the patient's treatment plan, specifically increasing awareness of irrational fears, and leaning reality based messages   The reader is invited to review the patient's full treatment plan in the Media section of the patient's Epic medical record.    Psychotherapeutic Technique: This writer utilized motivational interviewing, active listening, reassurance and support in the context of cognitive behavioral therapy to address the above.      MENTAL STATUS EVALUATION  Grooming: Unable to determine due to telephone visit  Attire: Unable to determine due to telephone visit  Age: Unable to determine due to telephone visit  Behavior Towards Examiner: Cooperative  Motor Activity: Unable to determine due to telephone visit  Eye Contact: Appropriate  Mood: Anxious   Affect: full range  Speech/Language: Mildly pressured  Attention: Normal  Concentration: Sufficient  Thought Process: tangential and overinclusive   Thought Content: Clear    Orientation: Appeared oriented to person, place, and time, though not  formally established  Memory: No evidence of impairment.  Judgement: Adequate  Estimated Intelligence: Above Average  Demonstrated Insight: Adequate  Fund of Knowledge: Adequate    Intervention:   Patient listened to radio program on fear of public speaking and, because public speaking is easy for him, was able to identify the irrational thoughts that held people captive to their fear.  We applied those insights to patient's OCD and patient was able to identify irrational thoughts.  We explored how patient might replace these thoughts with more functional self talk, but also acknowledged the reality of sympathetic arousal.  Patient past success was leveraged to reinforce confidence in patient's ability to continue to address his OCD. We also looked at how patient's regularly engaging in public speaking helped him address his initial anxiety and eventually make him a confident , and discussed how patient might apply that same experience to his OCD.    Progress:  Patient expressed increased confidence in his ability to continue addressing his OCD.    Plan:   We will meet again in 2 weeks to address the patient's depression, anxiety, OCD, and hoarding.  Estimated duration of treatment is 10+ individual therapy sessions (94892) at twice monthly intervals. Treatment is expected to be completed by June 2023.     Diagnosis:  Obsessive Compulsive Disorder  Generalized Anxiety Disorder  Major Depressive Disorder, recurrent, severe  Hoarding Disorder

## 2023-03-06 ENCOUNTER — VIRTUAL VISIT (OUTPATIENT)
Dept: NEUROLOGY | Facility: CLINIC | Age: 68
End: 2023-03-06
Payer: MEDICARE

## 2023-03-06 DIAGNOSIS — F33.2 MAJOR DEPRESSIVE DISORDER, RECURRENT SEVERE WITHOUT PSYCHOTIC FEATURES (H): Primary | ICD-10-CM

## 2023-03-06 PROCEDURE — 90834 PSYTX W PT 45 MINUTES: CPT | Mod: 95 | Performed by: PSYCHOLOGIST

## 2023-03-06 NOTE — PROGRESS NOTES
Psychology Progress Note    Date: March 06, 2023    Time length and type of treatment: 43 minutes (9:10 AM - 9:53 AM), individual therapy    After review of the patient's situation, this visit was changed from an in-person visit to a telephone visit to reduce the risk of COVID 19 exposure. Patient was informed that policies and procedures that govern in-person sessions would also apply to telephone sessions. Patient was in agreement with proceeding with a telephone session.    Patient location: Patient home in [place], [place]  Provider location:  [facility] - Provider remote location     Necessity: This session is necessary to address the patient's depression.  Today we focus on the patient's treatment plan, specifically exploring coping strategies and thoughts and expectations of self and others. The reader is invited to review the patient's full treatment plan in the Media section of the patient's Epic medical record.    Psychotherapeutic Technique: This writer utilized motivational interviewing, active listening, reassurance and support in the context of cognitive behavioral therapy to address the above.      Mental Status Evaluation:  Grooming: Unable to determine due to telephone visit  Attire: Unable to determine due to telephone visit  Age: Unable to determine due to telephone visit  Behavior Towards Examiner: Cooperative  Motor Activity: Unable to determine due to telephone visit  Eye Contact: Unable to determine due to telephone visit  Mood: Anxious   Affect: full range  Speech/Language: mildly pressured  Attention: Normal  Concentration: Sufficient  Thought Process: overinclusive   Thought Content: Clear    Orientation: Appeared oriented to person, place, and time, though not formally established  Memory: No evidence of impairment.  Judgement: Adequate  Estimated Intelligence: Above Average  Demonstrated Insight: Adequate  Fund of Knowledge: Adequate    Intervention:   Patient  reported he has noticed his depression worsening, and has stopped doing the things he recognizes help him feel better. Patient was reminded that the choices he makes contribute to his depression, and we explored things patient might resume doing to help him feel better. An analogy was utilized to reinforce moderation and taking breaks from unpleasant tasks before he descends into depression. Patient reported sleep hygiene has been improving and he has been able to keep handwashing at 10 minutes/time which he remains proud of. Patient had another two day period in which he failed to eat, and we reviewed simple things patient might do to ensure he eats a healthier diet.       Progress:  Affect improved as session progressed and patient reported increased hopefulness.     Plan:   We will meet again in 2 weeks to address the patient's depression, anxiety, OCD, and hoarding.  Estimated duration of treatment is 10+ individual therapy sessions (24888) at twice monthly intervals. Treatment is expected to be completed by June 2023.     Diagnosis:  Major Depressive Disorder, recurrent, severe  Generalized Anxiety Disorder  Obsessive Compulsive Disorder  Hoarding Disorder

## 2023-03-20 ENCOUNTER — VIRTUAL VISIT (OUTPATIENT)
Dept: NEUROLOGY | Facility: CLINIC | Age: 68
End: 2023-03-20
Payer: MEDICARE

## 2023-03-20 DIAGNOSIS — F41.1 GAD (GENERALIZED ANXIETY DISORDER): Primary | ICD-10-CM

## 2023-03-20 PROCEDURE — 90834 PSYTX W PT 45 MINUTES: CPT | Mod: 95 | Performed by: PSYCHOLOGIST

## 2023-03-20 NOTE — PROGRESS NOTES
Psychology Progress Note    Date: March 20, 2023    Time length and type of treatment: 43 minutes (9:12 AM - 9:55 AM), individual therapy    After review of the patient's situation, this visit was changed from an in-person visit to a telephone visit. Patient was informed that policies and procedures that govern in-person sessions would also apply to telephone sessions. Patient was in agreement with proceeding with a telephone session.    Patient Location: Patient home in [place], [place]  Provider Location:  [facility] - Provider remote location     Necessity: This session is necessary to address the patient's depression, anxiety, OCD, and Hoarding. Today we focus on the patient's treatment plan, specifically exploring coping strategies.  The reader is invited to review the patient's full treatment plan in the Media section of the patient's Epic medical record.    Psychotherapeutic Technique: This writer utilized motivational interviewing, active listening, reassurance and support in the context of cognitive behavioral therapy to address the above.      Mental Status Evaluation:  Grooming: Unable to determine due to telephone visit  Attire: Unable to determine due to telephone visit  Age: Unable to determine due to telephone visit  Behavior Towards Examiner: Cooperative  Motor Activity: Unable to determine due to telephone visit  Eye Contact: Unable to determine due to telephone visit  Mood: Anxious   Affect: full range  Speech/Language: pressured  Attention: Normal  Concentration: Sufficient  Thought Process: tangential and overinclusive   Thought Content: Clear    Orientation: Appeared oriented to person, place, and time, though not formally established  Memory: No evidence of impairment.  Judgement: Adequate  Estimated Intelligence: Above Average  Demonstrated Insight: Adequate  Fund of Knowledge: Adequate    Intervention:   Patient expressed distress that he had several panic attacks on  "Friday and Saturday, after 1 1/2 years of not having any. He reported these \"came out of the blue\" which was distressing for him. We reviewed psychoeducation patient was previously provided related to panic attacks, useful strategies patient remembered to utilize were validated and reinforced, and we discussed some patient misconceptions and how he might tweak his coping strategies to be more effective.  Lack of sleep was identified as a likely significant contributor to patient's panic attack, but patient was also encouraged to direct his attention to coping skills rather than fixating on causes.      Progress:  Patient has shown improvement in ability to utilize coping skills.     Plan:   We will meet again in 2 weeka to address the patient's depression, anxiety, OCD, and hoarding.  Estimated duration of treatment is 10+ individual therapy sessions (87293) at twice monthly intervals. Treatment is expected to be completed by June 2023.     Diagnosis:  Generalized Anxiety Disorder  Obsessive Compulsive Disorder  Major Depressive Disorder, recurrent, severe  Hoarding Disorder  "

## 2023-04-24 ENCOUNTER — VIRTUAL VISIT (OUTPATIENT)
Dept: NEUROLOGY | Facility: CLINIC | Age: 68
End: 2023-04-24
Payer: MEDICARE

## 2023-04-24 DIAGNOSIS — F33.2 MAJOR DEPRESSIVE DISORDER, RECURRENT SEVERE WITHOUT PSYCHOTIC FEATURES (H): Primary | ICD-10-CM

## 2023-04-24 PROCEDURE — 90834 PSYTX W PT 45 MINUTES: CPT | Mod: 95 | Performed by: PSYCHOLOGIST

## 2023-04-24 NOTE — PROGRESS NOTES
Psychology Progress Note    Date: April 24, 2023    Time length and type of treatment: 52 minutes (9:00 AM - 9:52 AM), individual therapy    After review of the patient's situation, this visit was changed from an in-person visit to a telephone visit via [employer]. Patient was informed that policies and procedures that govern in-person sessions would also apply to telephone sessions. Patient was in agreement with proceeding with a telephone session.    Patient Location: Patient home in [place], [place]  Provider Location:  [facility] - Provider remote location     Necessity: This session is necessary to address the patient's depression, anxiety, OCD, and Hoarding. Today we focus on revising the patient's treatment plan, The reader is invited to review the patient's full treatment plan in the Media section of the patient's Epic medical record.    Psychotherapeutic Technique: This writer utilized motivational interviewing, active listening, reassurance and support in the context of cognitive behavioral therapy to address the above.      Mental Status Evaluation:  Grooming: Unable to determine due to telephone visit  Attire: Unable to determine due to telephone visit  Age: Unable to determine due to telephone visit  Behavior Towards Examiner: Cooperative  Motor Activity: Unable to determine due to telephone visit  Eye Contact: Unable to determine due to telephone visit  Mood: Anxious  Depressed   Affect: blunted  Speech/Language: normal  Attention: Normal  Concentration: Sufficient  Thought Process: tangential and overinclusive   Thought Content: Clear    Orientation: Appeared oriented to   Memory: No evidence of impairment.  Judgement: Adequate  Estimated Intelligence: Within normal limits  Demonstrated Insight: Adequate  Fund of Knowledge: Adequate    Intervention:   Patient was readministered the PHQ-9 and JHON-7 as part of revising his treatment plan.  His score of 17 on the PHQ-9 is  suggestive of severe depression and is similar to his earlier score of 16.  We discussed patient failure to improve relative to 3 months ago, and patient opined that fatigue has been the primary  of his lack of progress.  We discussed factors contributing to fatigue, and patient acknowledged that sleep hasn't been good and he hasn't been eating as healthily because he was angry about rising food costs and has been purchasing cheaper, less healthy food despite having the financial resources to purchase healthier alternatives.  Treatment plan was jointly revised and in remaining time we discussed diet and sleep hygiene.  Patient committed to re-introducing healthier foods into his diet.     Progress:  Patient treatment plan was jointly revised.    Plan:   We will meet again in 2 weeks to address the patient's depression, anxiety, OCD, and Hoarding Disorder. Estimated duration of treatment is 10+ individual therapy sessions (01331) at twice monthly intervals. Treatment is expected to be completed by January 2024.     Diagnosis:  MDD, recurrent, severe  Generalized Anxiety Disorder  Obsessive Compulsive Disorder  Hoarding Disorder

## 2023-05-02 ENCOUNTER — VIRTUAL VISIT (OUTPATIENT)
Dept: NEUROLOGY | Facility: CLINIC | Age: 68
End: 2023-05-02
Payer: MEDICARE

## 2023-05-02 DIAGNOSIS — F42.2 MIXED OBSESSIONAL THOUGHTS AND ACTS: ICD-10-CM

## 2023-05-02 PROCEDURE — 99443 PR PHYSICIAN TELEPHONE EVALUATION 21-30 MIN: CPT | Mod: 95 | Performed by: PSYCHIATRY & NEUROLOGY

## 2023-05-02 RX ORDER — TRAZODONE HYDROCHLORIDE 50 MG/1
50 TABLET, FILM COATED ORAL AT BEDTIME
Qty: 30 TABLET | Refills: 3 | Status: SHIPPED | OUTPATIENT
Start: 2023-05-02 | End: 2023-08-01

## 2023-05-02 RX ORDER — ESCITALOPRAM OXALATE 20 MG/1
TABLET ORAL
Qty: 90 TABLET | Refills: 1 | Status: SHIPPED | OUTPATIENT
Start: 2023-05-02 | End: 2023-08-01

## 2023-05-02 ASSESSMENT — PATIENT HEALTH QUESTIONNAIRE - PHQ9: SUM OF ALL RESPONSES TO PHQ QUESTIONS 1-9: 16

## 2023-05-02 NOTE — LETTER
5/2/2023         RE: Berto Pretty  [address] [facility] [address] [facility] [address]        Dear Colleague,    Thank you for referring your patient, Berto Pretty, to the [facility]. Please see a copy of my visit note below.    Outpatient Psychiatry Note     The patient presented for an initial psychiatric consultation on December 7 of 2020.  He carried a diagnosis of obsessive-compulsive disorder and has had symptoms throughout his entire life.  He presents at this time on Lexapro 20 mg a day.  He has been on this dose for 1 month having started it October 12, 2020 and increased 1 month prior to his initial intake with me.  He has been on psychotropic medication most of his adult life although has been off psychotropic medication for about 1 to 2 years prior to recent restarting of that medication.  Over the years the patient has been on a variety of psychotropic medication with limited success.  He has been on Prozac which she described as having mild efficacy for his OCD symptoms, Celexa with mild efficacy at doses of 80 mg a day, Parnate, Stelazine, Haldol and clomipramine as an adjunct at 25 mg which were all described as ineffective.    The patient describes having onset of OCD symptoms as a child.  His symptoms included obsessive thoughts and compulsive behaviors with constant worry and thoughts about germs, asbestos and he engaged in frequent handwashing checking doors and lights and excessively bought movies and books.  He had an inpatient hospital stay most recently in the 1970s.  He has OCD symptoms caused him to stop attendance at the [facility] and he has been on SSDI for years due to this.    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  It was at this time that he chose to come off his Lexapro.  He did fairly well from a psychiatric standpoint for a number of months but since the spring 2020 he has had an increase in  obsessive thoughts and anxiety symptoms.  As stated previously he restarted his Lexapro on October 12 of 2020 and that was increased to 20 mg a day 1 month later.    At the initial visit, in part due to the fact that the patient had recently been increased on his Lexapro we continued with that treatment plan.  He had previously been off all psychotropic medication for a about a year and a half.  He had done fairly well off that medication until the spring 2020 when he had return of his anxiety symptoms.  He was in the process of being reassessed for his sleep apnea.    The patient return to the clinic on February 8 of 2021.  He had restarted individual psychotherapy.  He reported he was doing a bit better and stated that therapy was quite helpful.  He believed he was improving and responding well to the therapy and reassurance.  He was tolerating the recently restarted medication and we continued with that treatment plan.    I saw the patient on May 3, 2021.  At that time he again sought frequent reassurance and was quite talkative and anxious about possibly getting dementia at some point.  He continued to struggle with OCD symptoms.  He continued with his therapist and medical follow-up.  We did not make any medication changes at that visit.    The patient returns for follow-up visit in December 2021.  There was no significant change at that time.  The patient had a diagnosed and was being treated for bladder cancer.  He was comfortable with the current symptoms and stated that he was seen Dr. Stout and that was going well.  We did not make any medication changes at that visit.    I saw the patient in March 2022.  He was concerned that he may have had a slight cognitive decline in the last 3 months.  He was not reading as well.  He again was somewhat anxious and needed some reassurance.  His psychologist had apparently ordered some neuropsychological testing.  His main concern was sleep deprivation.  We discussed  some sleep hygiene techniques and I did add some low-dose as needed trazodone.    The patient was seen on June 27, 2022.  He stated he had not refilled his trazodone because he was sleeping better.  He was more active and had a more positive attitude.  He stated he had gotten good news with his neurologist regarding the cancer follow-up.  We did not make any changes.    The patient was seen on September 26, 2022.  Patient was doing well at that time.  He had run out of Lexapro 2 weeks earlier I reordered the lexapro.  We elected to make no medication changes.  The patient was tolerating the medication.  The patient was going to follow-up with psychology.    The patient was seen on January 9.  He again was quite talkative and somewhat perseverative but he reported he was less depressed and felt there had been a decline in his OCD behaviors.  He was being set up with a new medical team and was somewhat anxious about that but overall doing quite well and tolerating the medication.  We did not make any changes.      HPI:  The patient returns today for a phone interview.  He states he is doing about the same as last time.  He again was fairly talkative and a bit perseverative but was perhaps a bit easier to redirect.  He admitted that winter was stopped and he felt a bit more down because of that.  He felt that he was less focused.  We discussed strategies around this.  He had no thoughts of hurting himself or anybody else.  No hallucinations or delusions.  He just had a physical that was reassuring.  He is going to be followed for his bladder cancer follow-up again in a couple of weeks and is nervous about that.  He states he is sleeping better at least at least 8 hours a night and has not used any of the trazodone.  He is exercising daily.  No thoughts of wishing he was dead or thoughts of harming himself.  No hallucinations or delusions.  He does not want any medication changes at this time and has no other questions  or concerns.  He continues to follow-up with psychology.      Current Medications:  Medications were personally reviewed at this meeting.  Please see the chart for current medication list.      Mental Status Exam:  Appearance: The patient was interviewed by phone.  Behavior: Pleasant.  Alert and participating well.  He denies any recent behavioral difficulties.  Speech: No changes.  He continues to be a bit talkative and needs occasional redirection.  Answers were consistent and appropriate.  Mood/Affect: Pleasant and polite.  He denies depression.  He is bright and happy.  No apparent depression.  No anxiety.  Thought Content: No hallucinations or delusions.  Suicidal or Homicidal Thoughts: None apparent or reported  Thought Process/Formulation: Not loose.  No racing thoughts.  Associations: Grossly adequate  Fund of Knowledge: Grossly intact  Attention/Concentration: Tracking and participating well.  Not disorganized.  No racing thoughts.  Insight: Adequate..  No significant change.  Judgement: Fair  Memory: Grossly intact..  Recent neuropsych testing was quite reassuring.  Motor Status: He denies having any tremors or asymmetries.  Equal strength.  Fund of Knowledge: Adequate  Orientation: Grossly oriented      Recent Labs:  Please see the chart.      Diagnosis:  JHON   OCD, by history  Major depressive disorder, recurrent, moderate, without psychotic features      The patient's chart review, interview and documentation review took 25 minutes.    Plan:  We will continue with the current medications.    He will continue to follow-up with his medical team as well as his psychologist.  He continues to be followed regarding follow-up for his bladder cancer.    The patient will seek out appropriate emergency services should that become necessary.  I will make myself available if any questions, concerns, or problems arise.    Elie Jones MD        Virtual Visit Details    Type of service:  Telephone Visit   Phone  call duration: 25 minutes           Again, thank you for allowing me to participate in the care of your patient.        Sincerely,        Elie Jones MD

## 2023-05-02 NOTE — NURSING NOTE
Fredrick Cruz III is a 44 y.o. male with a PMHx of eczema and fatty liver disease who presents the ED with complaints of right arm redness. The patient reports a lesion on his right arm that he scratched and became infected about 1.5-2 weeks ago. He reports the lesion became very red, swollen, and painful. He noted purulent discharge but states after 2-3 days his symptoms improved. This morning he noted a red streak from his right inner wrist to his armpit. He denies any associated fever, chills, nausea, vomiting, or diarrhea. He denies any previous history of any skin infections. The patient notes a sore throat a few days ago which has since resolved but still has a mild non productive cough that began a few days ago. The patient denies any chest pain, abdominal pain, headache, numbness/tingling, or dysuria.    In the ED, VSSAF. CBC unremarkable.  Lactic acid WNL.CMP with mild hyperglycemia. CRP and ESR in process. Blood cxs in process. Xrays of right hand with no obvious evidence of an acute injury or process involving the right hand. The patient received zosyn and vancomycin in the ED, noted to have itching with zosyn so was also given benadryl.    Is the patient currently in the state of [place]? YES    Visit mode:TELEPHONE    If the visit is dropped, the patient can be reconnected by: TELEPHONE VISIT: Phone number: [phone]    Will anyone else be joining the visit? NO      How would you like to obtain your AVS? Mail a copy    Are changes needed to the allergy or medication list? NO    Reason for visit: Telephone (Follow-up )      Pt has been losing weight.  Elisha Strickland 5/2/23

## 2023-05-02 NOTE — PROGRESS NOTES
Outpatient Psychiatry Note     The patient presented for an initial psychiatric consultation on December 7 of 2020.  He carried a diagnosis of obsessive-compulsive disorder and has had symptoms throughout his entire life.  He presents at this time on Lexapro 20 mg a day.  He has been on this dose for 1 month having started it October 12, 2020 and increased 1 month prior to his initial intake with me.  He has been on psychotropic medication most of his adult life although has been off psychotropic medication for about 1 to 2 years prior to recent restarting of that medication.  Over the years the patient has been on a variety of psychotropic medication with limited success.  He has been on Prozac which she described as having mild efficacy for his OCD symptoms, Celexa with mild efficacy at doses of 80 mg a day, Parnate, Stelazine, Haldol and clomipramine as an adjunct at 25 mg which were all described as ineffective.    The patient describes having onset of OCD symptoms as a child.  His symptoms included obsessive thoughts and compulsive behaviors with constant worry and thoughts about germs, asbestos and he engaged in frequent handwashing checking doors and lights and excessively bought movies and books.  He had an inpatient hospital stay most recently in the 1970s.  He has OCD symptoms caused him to stop attendance at the [facility] and he has been on SSDI for years due to this.    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  It was at this time that he chose to come off his Lexapro.  He did fairly well from a psychiatric standpoint for a number of months but since the spring 2020 he has had an increase in obsessive thoughts and anxiety symptoms.  As stated previously he restarted his Lexapro on October 12 of 2020 and that was increased to 20 mg a day 1 month later.    At the initial visit, in part due to the fact that the patient had recently been increased on his Lexapro  we continued with that treatment plan.  He had previously been off all psychotropic medication for a about a year and a half.  He had done fairly well off that medication until the spring 2020 when he had return of his anxiety symptoms.  He was in the process of being reassessed for his sleep apnea.    The patient return to the clinic on February 8 of 2021.  He had restarted individual psychotherapy.  He reported he was doing a bit better and stated that therapy was quite helpful.  He believed he was improving and responding well to the therapy and reassurance.  He was tolerating the recently restarted medication and we continued with that treatment plan.    I saw the patient on May 3, 2021.  At that time he again sought frequent reassurance and was quite talkative and anxious about possibly getting dementia at some point.  He continued to struggle with OCD symptoms.  He continued with his therapist and medical follow-up.  We did not make any medication changes at that visit.    The patient returns for follow-up visit in December 2021.  There was no significant change at that time.  The patient had a diagnosed and was being treated for bladder cancer.  He was comfortable with the current symptoms and stated that he was seen Dr. Stout and that was going well.  We did not make any medication changes at that visit.    I saw the patient in March 2022.  He was concerned that he may have had a slight cognitive decline in the last 3 months.  He was not reading as well.  He again was somewhat anxious and needed some reassurance.  His psychologist had apparently ordered some neuropsychological testing.  His main concern was sleep deprivation.  We discussed some sleep hygiene techniques and I did add some low-dose as needed trazodone.    The patient was seen on June 27, 2022.  He stated he had not refilled his trazodone because he was sleeping better.  He was more active and had a more positive attitude.  He stated he had  gotten good news with his neurologist regarding the cancer follow-up.  We did not make any changes.    The patient was seen on September 26, 2022.  Patient was doing well at that time.  He had run out of Lexapro 2 weeks earlier I reordered the lexapro.  We elected to make no medication changes.  The patient was tolerating the medication.  The patient was going to follow-up with psychology.    The patient was seen on January 9.  He again was quite talkative and somewhat perseverative but he reported he was less depressed and felt there had been a decline in his OCD behaviors.  He was being set up with a new medical team and was somewhat anxious about that but overall doing quite well and tolerating the medication.  We did not make any changes.      HPI:  The patient returns today for a phone interview.  He states he is doing about the same as last time.  He again was fairly talkative and a bit perseverative but was perhaps a bit easier to redirect.  He admitted that winter was stopped and he felt a bit more down because of that.  He felt that he was less focused.  We discussed strategies around this.  He had no thoughts of hurting himself or anybody else.  No hallucinations or delusions.  He just had a physical that was reassuring.  He is going to be followed for his bladder cancer follow-up again in a couple of weeks and is nervous about that.  He states he is sleeping better at least at least 8 hours a night and has not used any of the trazodone.  He is exercising daily.  No thoughts of wishing he was dead or thoughts of harming himself.  No hallucinations or delusions.  He does not want any medication changes at this time and has no other questions or concerns.  He continues to follow-up with psychology.      Current Medications:  Medications were personally reviewed at this meeting.  Please see the chart for current medication list.      Mental Status Exam:  Appearance: The patient was interviewed by  phone.  Behavior: Pleasant.  Alert and participating well.  He denies any recent behavioral difficulties.  Speech: No changes.  He continues to be a bit talkative and needs occasional redirection.  Answers were consistent and appropriate.  Mood/Affect: Pleasant and polite.  He denies depression.  He is bright and happy.  No apparent depression.  No anxiety.  Thought Content: No hallucinations or delusions.  Suicidal or Homicidal Thoughts: None apparent or reported  Thought Process/Formulation: Not loose.  No racing thoughts.  Associations: Grossly adequate  Fund of Knowledge: Grossly intact  Attention/Concentration: Tracking and participating well.  Not disorganized.  No racing thoughts.  Insight: Adequate..  No significant change.  Judgement: Fair  Memory: Grossly intact..  Recent neuropsych testing was quite reassuring.  Motor Status: He denies having any tremors or asymmetries.  Equal strength.  Fund of Knowledge: Adequate  Orientation: Grossly oriented      Recent Labs:  Please see the chart.      Diagnosis:  JHON   OCD, by history  Major depressive disorder, recurrent, moderate, without psychotic features      The patient's chart review, interview and documentation review took 25 minutes.    Plan:  We will continue with the current medications.    He will continue to follow-up with his medical team as well as his psychologist.  He continues to be followed regarding follow-up for his bladder cancer.    The patient will seek out appropriate emergency services should that become necessary.  I will make myself available if any questions, concerns, or problems arise.    Elie Jones MD        Virtual Visit Details    Type of service:  Telephone Visit   Phone call duration: 25 minutes

## 2023-05-19 DIAGNOSIS — E78.5 HYPERLIPIDEMIA LDL GOAL <100: ICD-10-CM

## 2023-05-20 RX ORDER — ATORVASTATIN CALCIUM 40 MG/1
TABLET, FILM COATED ORAL
Qty: 90 TABLET | Refills: 0 | Status: SHIPPED | OUTPATIENT
Start: 2023-05-20 | End: 2023-10-02

## 2023-05-20 NOTE — TELEPHONE ENCOUNTER
"Last Written Prescription Date:  5/15/22  Last Fill Quantity: 90,  # refills: 3   Last office visit provider:  12/9/22     Requested Prescriptions   Pending Prescriptions Disp Refills     atorvastatin (LIPITOR) 40 MG tablet [Pharmacy Med Name: Atorvastatin Calcium 40 MG Oral Tablet] 90 tablet 0     Sig: Take 1 tablet by mouth once daily       Statins Protocol Passed - 5/20/2023  7:15 AM        Passed - LDL on file in past 12 months     Recent Labs   Lab Test 08/10/22  1740   LDL 50             Passed - No abnormal creatine kinase in past 12 months     No lab results found.             Passed - Recent (12 mo) or future (30 days) visit within the authorizing provider's specialty     Patient has had an office visit with the authorizing provider or a provider within the authorizing providers department within the previous 12 mos or has a future within next 30 days. See \"Patient Info\" tab in inbasket, or \"Choose Columns\" in Meds & Orders section of the refill encounter.              Passed - Medication is active on med list        Passed - Patient is age 18 or older             Haven Álvarez RN 05/20/23 7:15 AM  "

## 2023-06-02 ENCOUNTER — LAB (OUTPATIENT)
Dept: INFUSION THERAPY | Facility: HOSPITAL | Age: 68
End: 2023-06-02
Attending: INTERNAL MEDICINE
Payer: MEDICARE

## 2023-06-02 DIAGNOSIS — D47.2 MONOCLONAL PARAPROTEINEMIA: ICD-10-CM

## 2023-06-02 LAB
ALBUMIN SERPL BCG-MCNC: 4.3 G/DL (ref 3.5–5.2)
ALP SERPL-CCNC: 94 U/L (ref 40–129)
ALT SERPL W P-5'-P-CCNC: 33 U/L (ref 10–50)
ANION GAP SERPL CALCULATED.3IONS-SCNC: 9 MMOL/L (ref 7–15)
AST SERPL W P-5'-P-CCNC: 37 U/L (ref 10–50)
BILIRUB SERPL-MCNC: 0.4 MG/DL
BUN SERPL-MCNC: 14.2 MG/DL (ref 8–23)
CALCIUM SERPL-MCNC: 9.2 MG/DL (ref 8.8–10.2)
CHLORIDE SERPL-SCNC: 108 MMOL/L (ref 98–107)
CREAT SERPL-MCNC: 0.92 MG/DL (ref 0.67–1.17)
DEPRECATED HCO3 PLAS-SCNC: 28 MMOL/L (ref 22–29)
ERYTHROCYTE [DISTWIDTH] IN BLOOD BY AUTOMATED COUNT: 12.9 % (ref 10–15)
GFR SERPL CREATININE-BSD FRML MDRD: >90 ML/MIN/1.73M2
GLUCOSE SERPL-MCNC: 93 MG/DL (ref 70–99)
HCT VFR BLD AUTO: 38.5 % (ref 40–53)
HGB BLD-MCNC: 12.7 G/DL (ref 13.3–17.7)
LDH SERPL L TO P-CCNC: 175 U/L (ref 0–250)
MCH RBC QN AUTO: 31.7 PG (ref 26.5–33)
MCHC RBC AUTO-ENTMCNC: 33 G/DL (ref 31.5–36.5)
MCV RBC AUTO: 96 FL (ref 78–100)
PLATELET # BLD AUTO: 141 10E3/UL (ref 150–450)
POTASSIUM SERPL-SCNC: 4.3 MMOL/L (ref 3.4–5.3)
PROT SERPL-MCNC: 6.7 G/DL (ref 6.4–8.3)
RBC # BLD AUTO: 4.01 10E6/UL (ref 4.4–5.9)
SODIUM SERPL-SCNC: 145 MMOL/L (ref 136–145)
WBC # BLD AUTO: 4.9 10E3/UL (ref 4–11)

## 2023-06-02 PROCEDURE — 83521 IG LIGHT CHAINS FREE EACH: CPT

## 2023-06-02 PROCEDURE — 82784 ASSAY IGA/IGD/IGG/IGM EACH: CPT

## 2023-06-02 PROCEDURE — 85018 HEMOGLOBIN: CPT

## 2023-06-02 PROCEDURE — 83615 LACTATE (LD) (LDH) ENZYME: CPT

## 2023-06-02 PROCEDURE — 36415 COLL VENOUS BLD VENIPUNCTURE: CPT

## 2023-06-02 PROCEDURE — 80053 COMPREHEN METABOLIC PANEL: CPT

## 2023-06-05 ENCOUNTER — TRANSFERRED RECORDS (OUTPATIENT)
Dept: HEALTH INFORMATION MANAGEMENT | Facility: CLINIC | Age: 68
End: 2023-06-05

## 2023-06-05 LAB
IGA SERPL-MCNC: 77 MG/DL (ref 84–499)
IGG SERPL-MCNC: 803 MG/DL (ref 610–1616)
IGM SERPL-MCNC: 81 MG/DL (ref 35–242)
KAPPA LC FREE SER-MCNC: 1.64 MG/DL (ref 0.33–1.94)
KAPPA LC FREE/LAMBDA FREE SER NEPH: 1.55 {RATIO} (ref 0.26–1.65)
LAMBDA LC FREE SERPL-MCNC: 1.06 MG/DL (ref 0.57–2.63)

## 2023-06-15 ENCOUNTER — ONCOLOGY VISIT (OUTPATIENT)
Dept: ONCOLOGY | Facility: HOSPITAL | Age: 68
End: 2023-06-15
Attending: INTERNAL MEDICINE
Payer: MEDICARE

## 2023-06-15 ENCOUNTER — PATIENT OUTREACH (OUTPATIENT)
Dept: ONCOLOGY | Facility: HOSPITAL | Age: 68
End: 2023-06-15

## 2023-06-15 VITALS
BODY MASS INDEX: 21.26 KG/M2 | TEMPERATURE: 98.5 F | SYSTOLIC BLOOD PRESSURE: 127 MMHG | RESPIRATION RATE: 18 BRPM | DIASTOLIC BLOOD PRESSURE: 70 MMHG | WEIGHT: 127.6 LBS | HEART RATE: 83 BPM | OXYGEN SATURATION: 97 % | HEIGHT: 65 IN

## 2023-06-15 DIAGNOSIS — D64.9 ANEMIA, UNSPECIFIED TYPE: ICD-10-CM

## 2023-06-15 DIAGNOSIS — D47.2 MONOCLONAL PARAPROTEINEMIA: Primary | ICD-10-CM

## 2023-06-15 PROCEDURE — G0463 HOSPITAL OUTPT CLINIC VISIT: HCPCS | Performed by: INTERNAL MEDICINE

## 2023-06-15 PROCEDURE — 99214 OFFICE O/P EST MOD 30 MIN: CPT | Performed by: INTERNAL MEDICINE

## 2023-06-15 ASSESSMENT — PAIN SCALES - GENERAL: PAINLEVEL: NO PAIN (0)

## 2023-06-15 NOTE — PROGRESS NOTES
[facility] Hematology and Oncology Progress Note    Patient: Berto Pretty  MRN: [id number]  Date of Service: Luther 15, 2023           Reason for visit         Problem List Items Addressed This Visit        Hematologic    Anemia, unspecified type   Other Visit Diagnoses     Monoclonal paraproteinemia    -  Primary            Assessment      1.  Monoclonal gammopathy of uncertain significance. This appears to be stable.  2.  History of anemia that initially resolved. Now back being slightly anemic.  3.  History of bladder issues under care of urology. Last cysto was negative.  4.  Other medical conditions that are stable.      Plan      Reviewed Vic's labs with him.  At this time they appear to be very stable.  No evidence of any morphological changes into multiple myeloma.  2.  We did talk about the anemia.  He did have some work-up done including ferritin and iron binding studies.  No evidence of any iron deficiency.  He has MCV of 96 which is slightly towards microcytosis.  That would suggest medication induced issue rather than iron deficienc  3.  We also talked about diet and exercise.  Vic tries to do some exercise on a regular basis.  We encouraged that.  We also talked about diet and water whole some diet is.  Vic seems to have a pretty good understanding of what that is.  4.  Continue to follow-up with his regular doctor and other specialist for the medical issues.  5.  We will see him back in a year with the same labs.      Medical decision making      Moderately complex.      Risk      Moderate risk of morbidity mortality if it progresses to multiple myeloma.  Significant risk of side effects from intervention.       Cancer Staging   No matching staging information was found for the patient.      History of present illness        Mr. Berto Pretty is a very pleasant 67 year old  gentleman who has been following up with me regarding his diagnosis of monoclonal gammopathy of IgG type.  This  "was detected when he had some work-up done by his primary care provider in April 2021.  This was done because he was anemic with a hemoglobin of 12.4.  His evaluation did reveal that he had a small amount of monoclonal gammopathy.  Therefore he was referred here.      At the beginning Vic's IgG levels were at 918.  They have slowly come down.  Normal IgM levels.  IgA level is also normal to slightly low normal.  Hemoglobin was 12.7 at the time of initial work-up.  Then it went up to 13.  Now it is down down to 12.7 again.  Rest of the metabolic panel was pretty normal.    Vic comes in today for scheduled follow-up.  Overall seems to be doing okay.  He may have lost a little weight.  No new medical issues have cropped up since his last visit here a year ago.  He did come in a week before and had his labs drawn.      Review of system      A 14 point review of systems was obtained.  Positive findings noted in the history.  Rest of the review of system is otherwise negative.     Past medical history      Past Medical History:   Diagnosis Date     Cancer (H)      Cerebrovascular accident (H)      Dyslipidemia      OCD (obsessive compulsive disorder)      RACHNA (obstructive sleep apnea)      RACHNA (obstructive sleep apnea)        Past Surgical History:   Procedure Laterality Date     NO PAST SURGERIES         Physical exam        /70   Pulse 83   Temp 98.5  F (36.9  C)   Resp 18   Ht 1.651 m (5' 5\")   Wt 57.9 kg (127 lb 9.6 oz)   SpO2 97%   BMI 21.23 kg/m      GENERAL: No acute distress. Cooperative in conversation.   HEENT:  Pupils are equal, round and reactive. Oral mucosa is clean and intact. No ulcerations or mucositis noted. No bleeding noted.  RESP:Chest symmetric lungs are clear bilaterally per auscultation. Regular respiratory rate. No wheezes or rhonchi.  CV: Normal S1 S2 Regular, rate and rhythm. No murmurs.    ABD: Nondistended, soft, nontender. Positive bowel sounds. No organomegaly. "   EXTREMITIES: No lower extremity edema.   NEURO: Non- focal. Alert and oriented x3.  Cranial nerves appear intact.  PSYCH: Within normal limits. No depression or anxiety.  SKIN: Warm dry intact.       Lab results      No results found for this or any previous visit (from the past 168 hour(s)).    Imaging results        No results found.        Signed by: Zen Oliver MD,       This note has been dictated using voice recognition software. Any grammatical or context distortions are unintentional and inherent to the software

## 2023-06-15 NOTE — PROGRESS NOTES
"Oncology Rooming Note    Bela 15, 2023 1:28 PM   Berto Pretty is a 67 year old male who presents for:    Chief Complaint   Patient presents with     Oncology Clinic Visit     Malignant neoplasm of urinary bladder, unspecified site (H)     Initial Vitals: /70   Pulse 83   Temp 98.5  F (36.9  C)   Resp 18   Ht 1.651 m (5' 5\")   Wt 57.9 kg (127 lb 9.6 oz)   SpO2 97%   BMI 21.23 kg/m   Estimated body mass index is 21.23 kg/m  as calculated from the following:    Height as of this encounter: 1.651 m (5' 5\").    Weight as of this encounter: 57.9 kg (127 lb 9.6 oz). Body surface area is 1.63 meters squared.  No Pain (0) Comment: Data Unavailable   No LMP for male patient.  Allergies reviewed: Yes  Medications reviewed: Yes    Medications: Medication refills not needed today.  Pharmacy name entered into [facility]: [facility] [address] [place] [address] [address]    Clinical concerns: L leg has been having sharp pain for about 2-3 weeks and the pain comes and go. Really tired all the time. Concerned about his weight.        Judit Stovall LPN            "

## 2023-06-15 NOTE — LETTER
"    6/15/2023         RE: Berto Pretty  [address]  [facility] [address]        Dear Colleague,    Thank you for referring your patient, Berto Pretty, to the [facility] [place]. Please see a copy of my visit note below.    Oncology Rooming Note    Bela 15, 2023 1:28 PM   Berto Pretty is a 67 year old male who presents for:    Chief Complaint   Patient presents with     Oncology Clinic Visit     Malignant neoplasm of urinary bladder, unspecified site (H)     Initial Vitals: /70   Pulse 83   Temp 98.5  F (36.9  C)   Resp 18   Ht 1.651 m (5' 5\")   Wt 57.9 kg (127 lb 9.6 oz)   SpO2 97%   BMI 21.23 kg/m   Estimated body mass index is 21.23 kg/m  as calculated from the following:    Height as of this encounter: 1.651 m (5' 5\").    Weight as of this encounter: 57.9 kg (127 lb 9.6 oz). Body surface area is 1.63 meters squared.  No Pain (0) Comment: Data Unavailable   No LMP for male patient.  Allergies reviewed: Yes  Medications reviewed: Yes    Medications: Medication refills not needed today.  Pharmacy name entered into [employer]: [facility] [address] [place] [address] [address]    Clinical concerns: L leg has been having sharp pain for about 2-3 weeks and the pain comes and go. Really tired all the time. Concerned about his weight.        Judit Stovall LPN              [facility] Hematology and Oncology Progress Note    Patient: Berto Pretty  MRN: [id number]  Date of Service: Luther 15, 2023           Reason for visit         Problem List Items Addressed This Visit        Hematologic    Anemia, unspecified type   Other Visit Diagnoses     Monoclonal paraproteinemia    -  Primary            Assessment      1.  Monoclonal gammopathy of uncertain significance. This appears to be stable.  2.  History of anemia that initially resolved. Now back being slightly anemic.  3.  History of bladder issues under care of urology. Last cysto was negative.  4.  " Other medical conditions that are stable.      Plan      Reviewed Vic's labs with him.  At this time they appear to be very stable.  No evidence of any morphological changes into multiple myeloma.  2.  We did talk about the anemia.  He did have some work-up done including ferritin and iron binding studies.  No evidence of any iron deficiency.  He has MCV of 96 which is slightly towards microcytosis.  That would suggest medication induced issue rather than iron deficienc  3.  We also talked about diet and exercise.  Vic tries to do some exercise on a regular basis.  We encouraged that.  We also talked about diet and water whole some diet is.  Vic seems to have a pretty good understanding of what that is.  4.  Continue to follow-up with his regular doctor and other specialist for the medical issues.  5.  We will see him back in a year with the same labs.      Medical decision making      Moderately complex.      Risk      Moderate risk of morbidity mortality if it progresses to multiple myeloma.  Significant risk of side effects from intervention.       Cancer Staging   No matching staging information was found for the patient.      History of present illness        Mr. Berto Pretty is a very pleasant 67 year old  gentleman who has been following up with me regarding his diagnosis of monoclonal gammopathy of IgG type.  This was detected when he had some work-up done by his primary care provider in April 2021.  This was done because he was anemic with a hemoglobin of 12.4.  His evaluation did reveal that he had a small amount of monoclonal gammopathy.  Therefore he was referred here.      At the beginning Vic's IgG levels were at 918.  They have slowly come down.  Normal IgM levels.  IgA level is also normal to slightly low normal.  Hemoglobin was 12.7 at the time of initial work-up.  Then it went up to 13.  Now it is down down to 12.7 again.  Rest of the metabolic panel was pretty normal.    Vic comes in  "today for scheduled follow-up.  Overall seems to be doing okay.  He may have lost a little weight.  No new medical issues have cropped up since his last visit here a year ago.  He did come in a week before and had his labs drawn.      Review of system      A 14 point review of systems was obtained.  Positive findings noted in the history.  Rest of the review of system is otherwise negative.     Past medical history      Past Medical History:   Diagnosis Date     Cancer (H)      Cerebrovascular accident (H)      Dyslipidemia      OCD (obsessive compulsive disorder)      RACHNA (obstructive sleep apnea)      RACHNA (obstructive sleep apnea)        Past Surgical History:   Procedure Laterality Date     NO PAST SURGERIES         Physical exam        /70   Pulse 83   Temp 98.5  F (36.9  C)   Resp 18   Ht 1.651 m (5' 5\")   Wt 57.9 kg (127 lb 9.6 oz)   SpO2 97%   BMI 21.23 kg/m      GENERAL: No acute distress. Cooperative in conversation.   HEENT:  Pupils are equal, round and reactive. Oral mucosa is clean and intact. No ulcerations or mucositis noted. No bleeding noted.  RESP:Chest symmetric lungs are clear bilaterally per auscultation. Regular respiratory rate. No wheezes or rhonchi.  CV: Normal S1 S2 Regular, rate and rhythm. No murmurs.    ABD: Nondistended, soft, nontender. Positive bowel sounds. No organomegaly.   EXTREMITIES: No lower extremity edema.   NEURO: Non- focal. Alert and oriented x3.  Cranial nerves appear intact.  PSYCH: Within normal limits. No depression or anxiety.  SKIN: Warm dry intact.       Lab results      No results found for this or any previous visit (from the past 168 hour(s)).    Imaging results        No results found.        Signed by: Zen Oliver MD,       This note has been dictated using voice recognition software. Any grammatical or context distortions are unintentional and inherent to the software        Again, thank you for allowing me to participate in the care of your " patient.        Sincerely,        Zen Oliver MD, MD

## 2023-06-21 ENCOUNTER — VIRTUAL VISIT (OUTPATIENT)
Dept: NEUROLOGY | Facility: CLINIC | Age: 68
End: 2023-06-21
Payer: MEDICARE

## 2023-06-21 DIAGNOSIS — F42.9 OBSESSIVE-COMPULSIVE DISORDER, UNSPECIFIED TYPE: Primary | ICD-10-CM

## 2023-06-21 PROCEDURE — 90834 PSYTX W PT 45 MINUTES: CPT | Mod: 95 | Performed by: PSYCHOLOGIST

## 2023-06-21 NOTE — PROGRESS NOTES
Psychology Progress Note    Date: June 21, 2023    Time length and type of treatment: 44 minutes (11:08 AM to 11:52 AM), individual therapy    After review of the patient's situation, this visit was changed from an in-person visit to a  telephone visit. Patient was informed that policies and procedures that govern in-person sessions would also apply to telephone sessions. Patient was in agreement with proceeding with a telephone session.    Patient Location: Patient home in [place], [place]  Provider Location:  [facility] - Provider remote location     Necessity: This session is necessary to address the patient's depression, anxiety, OCD, and Hoarding.  Today we focus on the patient's treatment plan, specifically exploring adhering to time goals and time spent on handwashing, and increasing time spent on meaningful activities.  The reader is invited to review the patient's full treatment plan in the Media section of the patient's Epic medical record.    Psychotherapeutic Technique: This writer utilized motivational interviewing, active listening, reassurance and support in the context of cognitive behavioral therapy to address the above.      Mental Status Evaluation:  Grooming: Unable to determine due to telephone visit  Attire:  Unable to determine due to telephone visit  Age:  Unable to determine due to telephone visit  Behavior Towards Examiner: Cooperative  Motor Activity:  Unable to determine due to telephone visit  Eye Contact:  Unable to determine due to telephone visit  Mood: Anxious   Affect: full range  Speech/Language: pressured  Attention: Normal  Concentration: Sufficient  Thought Process: tangential  Thought Content: Clear    Orientation: Appeared oriented to person, place, and time, though not formally established  Memory: No evidence of impairment.  Judgement: Adequate  Estimated Intelligence: Within normal limits  Demonstrated Insight: Adequate  Fund of Knowledge:  Adequate    Intervention:   Patient reported he has continued to limit time spent handwashing to 8 - 10 minutes and once even managed 6 minutes. He has also reduced the number of times he washes his hands to just a few times per day, but acknowledged the temptation to not engage in activities that would leave him wanting to hand wash. We discussed the importance of not limiting activities that make life meaningful. Patient also discussed how positive progress thus far has kept him from making additional progress since 8 or 9 minutes feels so good compared to the 30 minutes he could spend previously.  We again discussed distress tolerance and working with his anxiety when addressing compulsions.  Patient also discussed his physical health and how this has left him feeling more fatigued.  We again reviewed the importance of good nutrition and further tweaks to his diet were discussed.  Patient committed to maintaining his current level of activity in the face of fatigue, even if he doesn't feel able to do more.      Progress:  Patient has shown improvement in ability to utilize coping skills.     Plan:   We will meet again in 2 weeks to address the patient's depression, anxiety, OCD and Hoarding Disorder. Estimated duration of treatment is 10+ individual therapy sessions (57098) at twice monthly intervals. Treatment is expected to be completed by January 2024.     Diagnosis:  Obsessive Compulsive Disorder  MDD, recurrent, severe  Generalized Anxiety Disorder  Hoarding Disorder

## 2023-07-28 ENCOUNTER — VIRTUAL VISIT (OUTPATIENT)
Dept: NEUROLOGY | Facility: CLINIC | Age: 68
End: 2023-07-28
Payer: MEDICARE

## 2023-07-28 DIAGNOSIS — F33.2 MAJOR DEPRESSIVE DISORDER, RECURRENT SEVERE WITHOUT PSYCHOTIC FEATURES (H): Primary | ICD-10-CM

## 2023-07-28 PROCEDURE — 90834 PSYTX W PT 45 MINUTES: CPT | Mod: 95 | Performed by: PSYCHOLOGIST

## 2023-07-28 NOTE — PROGRESS NOTES
Psychology Progress Note    Date: July 20, 2020    Time length and type of treatment: 47 minutes (9:09 AM to 9:56 AM), individual therapy    After review of the patient's situation, this visit was changed from an in-person visit to a telephone visit. Patient was informed that policies and procedures that govern in-person sessions would also apply to telephone sessions. Patient was in agreement with proceeding with a telephone session.    Patient Location: Patient home in [place], [place]  Provider Location:  [facility] - Provider remote location     Necessity: This session is necessary to address the patient's depression, anxiety, OCD, and reporting.  Today we focus on revising the patient's treatment plan.  The reader is invited to review the patient's full treatment plan in the Media section of the patient's Epic medical record.    Psychotherapeutic Technique: This writer utilized motivational interviewing, active listening, reassurance and support in the context of cognitive behavioral therapy to address the above.      Mental Status Evaluation:  Grooming: Unable to determine due to telephone visit  Attire: Unable to determine due to telephone visit  Age: Unable to determine due to telephone visit  Behavior Towards Examiner: Unable to determine due to telephone visit  Motor Activity: Unable to determine due to telephone visit  Eye Contact: Unable to determine due to telephone visit  Mood: Depressed   Affect: full range  Speech/Language: normal  Attention: Normal  Concentration: Sufficient  Thought Process: tangential  Thought Content: Clear    Orientation: Appeared oriented to person, place, and time, though not formally established  Memory: No evidence of impairment.  Judgement: Adequate  Estimated Intelligence: Within normal limits  Demonstrated Insight: Adequate  Fund of Knowledge: Adequate    Intervention:   Patient was readministered the PHQ-9 and JHON-7 as part of revising his  treatment plan.  His score of 20 on the PHQ-9 is suggestive of severe depression and in an increase over his earlier score of 16.  His score of 15 on the JHON-7 is suggestive of moderate anxiety and is an increase over his earlier score of 12.  Patient agreed this was accurate, stating his depression and anxiety have worsened while his OCD symptoms have continued to improve. He has made no progress on hoarding but is proud that he's not adding to his hoarded content. He expressed surprise and caitlyn that he was able to use the information provided last session to help him reduce his hand washing on several occasions to 5 - 6 minutes which he never thought was possible.  This positive progress was validated and reinforced.  Patient stated he has been doing a good job exercising, but his diet has again deteriorated.  We reviewed the importance of a healthy diet to fatigue, thinking, and depression, and patient recommitted to a healthier diet.     Progress:  Patient treatment plan was jointly revised    Plan:   We will meet again in 2 weeks to address the patient's depression, anxiety, OCD, and hoarding disorder.  Estimated duration of treatment is 10+ individual therapy sessions (98361) at twice monthly intervals. Treatment is expected to be completed by January 2024.     Diagnosis:  Major Depressive Disorder, recurrent, severe  Generalized Anxiety Disorder  Obsessive-Compulsive Disorder  Hoarding Disorder

## 2023-08-01 ENCOUNTER — VIRTUAL VISIT (OUTPATIENT)
Dept: NEUROLOGY | Facility: CLINIC | Age: 68
End: 2023-08-01
Payer: MEDICARE

## 2023-08-01 DIAGNOSIS — F42.2 MIXED OBSESSIONAL THOUGHTS AND ACTS: ICD-10-CM

## 2023-08-01 PROCEDURE — 99443 PR PHYSICIAN TELEPHONE EVALUATION 21-30 MIN: CPT | Mod: 95 | Performed by: PSYCHIATRY & NEUROLOGY

## 2023-08-01 RX ORDER — ESCITALOPRAM OXALATE 20 MG/1
TABLET ORAL
Qty: 90 TABLET | Refills: 1 | Status: SHIPPED | OUTPATIENT
Start: 2023-08-01 | End: 2023-11-02

## 2023-08-01 RX ORDER — TRAZODONE HYDROCHLORIDE 50 MG/1
50 TABLET, FILM COATED ORAL AT BEDTIME
Qty: 30 TABLET | Refills: 3 | Status: SHIPPED | OUTPATIENT
Start: 2023-08-01 | End: 2023-11-02

## 2023-08-01 ASSESSMENT — PATIENT HEALTH QUESTIONNAIRE - PHQ9: SUM OF ALL RESPONSES TO PHQ QUESTIONS 1-9: 16

## 2023-08-01 NOTE — LETTER
8/1/2023         RE: Berto Pretty  [address] [facility] [address]        Dear Colleague,    Thank you for referring your patient, Berto Pretty, to the [facility]. Please see a copy of my visit note below.    Virtual Visit Details    Type of service:  Telephone Visit   Phone call duration: 26 minutes     Outpatient Psychiatry Note     The patient presented for an initial psychiatric consultation on December 7 of 2020.  He carried a diagnosis of obsessive-compulsive disorder and has had symptoms throughout his entire life.  He presents at this time on Lexapro 20 mg a day.  He has been on this dose for 1 month having started it October 12, 2020 and increased 1 month prior to his initial intake with me.  He has been on psychotropic medication most of his adult life although has been off psychotropic medication for about 1 to 2 years prior to recent restarting of that medication.  Over the years the patient has been on a variety of psychotropic medication with limited success.  He has been on Prozac which she described as having mild efficacy for his OCD symptoms, Celexa with mild efficacy at doses of 80 mg a day, Parnate, Stelazine, Haldol and clomipramine as an adjunct at 25 mg which were all described as ineffective.    The patient describes having onset of OCD symptoms as a child.  His symptoms included obsessive thoughts and compulsive behaviors with constant worry and thoughts about germs, asbestos and he engaged in frequent handwashing checking doors and lights and excessively bought movies and books.  He had an inpatient hospital stay most recently in the 1970s.  He has OCD symptoms caused him to stop attendance at the [facility] and he has been on SSDI for years due to this.    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  It was at this time that he chose to come off his Lexapro.  He did fairly well from a  psychiatric standpoint for a number of months but since the spring 2020 he has had an increase in obsessive thoughts and anxiety symptoms.  As stated previously he restarted his Lexapro on October 12 of 2020 and that was increased to 20 mg a day 1 month later.    At the initial visit, in part due to the fact that the patient had recently been increased on his Lexapro we continued with that treatment plan.  He had previously been off all psychotropic medication for a about a year and a half.  He had done fairly well off that medication until the spring 2020 when he had return of his anxiety symptoms.  He was in the process of being reassessed for his sleep apnea.    The patient return to the clinic on February 8 of 2021.  He had restarted individual psychotherapy.  He reported he was doing a bit better and stated that therapy was quite helpful.  He believed he was improving and responding well to the therapy and reassurance.  He was tolerating the recently restarted medication and we continued with that treatment plan.    I saw the patient on May 3, 2021.  At that time he again sought frequent reassurance and was quite talkative and anxious about possibly getting dementia at some point.  He continued to struggle with OCD symptoms.  He continued with his therapist and medical follow-up.  We did not make any medication changes at that visit.    The patient returns for follow-up visit in December 2021.  There was no significant change at that time.  The patient had a diagnosed and was being treated for bladder cancer.  He was comfortable with the current symptoms and stated that he was seen Dr. Stout and that was going well.  We did not make any medication changes at that visit.    I saw the patient in March 2022.  He was concerned that he may have had a slight cognitive decline in the last 3 months.  He was not reading as well.  He again was somewhat anxious and needed some reassurance.  His psychologist had  apparently ordered some neuropsychological testing.  His main concern was sleep deprivation.  We discussed some sleep hygiene techniques and I did add some low-dose as needed trazodone.    The patient was seen on June 27, 2022.  He stated he had not refilled his trazodone because he was sleeping better.  He was more active and had a more positive attitude.  He stated he had gotten good news with his neurologist regarding the cancer follow-up.  We did not make any changes.    The patient was seen on September 26, 2022.  Patient was doing well at that time.  He had run out of Lexapro 2 weeks earlier I reordered the lexapro.  We elected to make no medication changes.  The patient was tolerating the medication.  The patient was going to follow-up with psychology.    The patient was seen on January 9.  He again was quite talkative and somewhat perseverative but he reported he was less depressed and felt there had been a decline in his OCD behaviors.  He was being set up with a new medical team and was somewhat anxious about that but overall doing quite well and tolerating the medication.  We did not make any changes.    The patient was seen for follow-up in May 2023.  Again somewhat talkative and perseverative and needing some redirection.  He continue to follow-up with psychology.  We elected not to make any changes in his medication at that time.      HPI:  The patient presents today stating that he is not had any significant change.  He believes his OCD symptoms have improved a bit and his psychologist agreed.  He believes however he is a bit more depressed.  Not hopeless or helpless or worthless.  No thoughts of hurting himself or anybody else.  No óscar.  No hallucinations or delusions.  He reports sleep is still somewhat problematic and variable but he was vague on specifics.  Often he gets plenty of sleep.  He is not using his CPAP and is going to discuss that with his sleep team.  He states his diet is variable.   No change in cognition.  He states his cancer treatment is going well and he last saw them in June.  There was no regrowth.  He continues to follow-up with that team.  We again discussed sleep hygiene and I suggested he utilize the trazodone.  He has not tried any of that.  Otherwise we will continue with the current meds as he is tolerating the treatment with no evidence of any side effects.  No new tremors.      Current Medications:  Medications were personally reviewed at this meeting.  Please see the chart for current medication list.      Mental Status Exam:  Appearance: Patient does not appear uncomfortable.  There is no obvious pain or shortness of breath.  No new issues or concerns are reported.   Behavior: Patient is cooperative.  Able to maintain eye contact. Able to interact appropriately.  He again seeks quite a bit of reassurance.  No agitation.  Not restless.  No reports of any recent behavioral dyscontrol.  The patient does participate and is cooperative.  Able to initiate.  Speech: Again, extremely talkative and control the interview.  He is overly inclusive but answers are consistent and appropriate with no significant change.  Mood/Affect: No obvious depression or anxiety.  No irritability or lability.  No evidence of any óscar.  Thought Content:  No evidence of any psychosis. The patient denies any psychotic symptoms. No reports of any recent psychosis.  Suicidal or Homicidal Thoughts: None apparent or reported.  The patient denies having any homicidal or suicidal ideation.  Thought Process/Formulation: Participating.  Able to follow conversation.  No thought blocking.  No racing thoughts.  Not disorganized.  No evidence of any pressured thinking.  Associations:  Grossly intact.  Processing information appropriately.  Able to track conversation..  Fund of Knowledge:  Grossly intact.   Attention/Concentration: Concentration appears adequate.  The patient is following and participating well.   Attentive.  No disorganization.  No racing thoughts.  Insight:  Grossly intact.   Judgement:  Grossly intact.   Memory:  Grossly intact.   Motor Status:  No recent change reported. No current tremor.  Orientation: Grossly oriented..      Recent Labs:  Please see the chart.      Diagnosis:  JHON   OCD, by history  Major depressive disorder, recurrent, moderate, without psychotic features      The patient's chart review, interview and documentation review took 26 minutes.    Plan:  We will continue with the current medications.    He will continue to follow-up with his medical team as well as his psychologist.  He continues to be followed regarding follow-up for his bladder cancer.    The patient will seek out appropriate emergency services should that become necessary.  I will make myself available if any questions, concerns, or problems arise.    Elie Jones MD        Virtual Visit Details    Type of service:  Telephone Visit   Phone call duration: 25 minutes         Again, thank you for allowing me to participate in the care of your patient.        Sincerely,        Elie Jones MD

## 2023-08-01 NOTE — PATIENT INSTRUCTIONS
No significant change.  The patient is tolerating medication.  He never did start the trazodone and may consider doing that.  He continues to follow-up with his sleep apnea team and has not been using the sleep mask.  He will continue to follow-up with psychology.  I will make no medication changes at this time.  He should return to this clinic in 3 to 4 months for medication check.

## 2023-08-01 NOTE — NURSING NOTE
Is the patient currently in the state of [place]? YES    Visit mode:TELEPHONE    If the visit is dropped, the patient can be reconnected by: TELEPHONE VISIT: Phone number: [phone]    Will anyone else be joining the visit? NO      How would you like to obtain your AVS? Mail a copy    Are changes needed to the allergy or medication list? NO    Reason for visit: Telephone (Follow-up )

## 2023-08-01 NOTE — PROGRESS NOTES
Virtual Visit Details    Type of service:  Telephone Visit   Phone call duration: 26 minutes     Outpatient Psychiatry Note     The patient presented for an initial psychiatric consultation on December 7 of 2020.  He carried a diagnosis of obsessive-compulsive disorder and has had symptoms throughout his entire life.  He presents at this time on Lexapro 20 mg a day.  He has been on this dose for 1 month having started it October 12, 2020 and increased 1 month prior to his initial intake with me.  He has been on psychotropic medication most of his adult life although has been off psychotropic medication for about 1 to 2 years prior to recent restarting of that medication.  Over the years the patient has been on a variety of psychotropic medication with limited success.  He has been on Prozac which she described as having mild efficacy for his OCD symptoms, Celexa with mild efficacy at doses of 80 mg a day, Parnate, Stelazine, Haldol and clomipramine as an adjunct at 25 mg which were all described as ineffective.    The patient describes having onset of OCD symptoms as a child.  His symptoms included obsessive thoughts and compulsive behaviors with constant worry and thoughts about germs, asbestos and he engaged in frequent handwashing checking doors and lights and excessively bought movies and books.  He had an inpatient hospital stay most recently in the 1970s.  He has OCD symptoms caused him to stop attendance at the [facility] and he has been on SSDI for years due to this.    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  It was at this time that he chose to come off his Lexapro.  He did fairly well from a psychiatric standpoint for a number of months but since the spring 2020 he has had an increase in obsessive thoughts and anxiety symptoms.  As stated previously he restarted his Lexapro on October 12 of 2020 and that was increased to 20 mg a day 1 month later.    At the  initial visit, in part due to the fact that the patient had recently been increased on his Lexapro we continued with that treatment plan.  He had previously been off all psychotropic medication for a about a year and a half.  He had done fairly well off that medication until the spring 2020 when he had return of his anxiety symptoms.  He was in the process of being reassessed for his sleep apnea.    The patient return to the clinic on February 8 of 2021.  He had restarted individual psychotherapy.  He reported he was doing a bit better and stated that therapy was quite helpful.  He believed he was improving and responding well to the therapy and reassurance.  He was tolerating the recently restarted medication and we continued with that treatment plan.    I saw the patient on May 3, 2021.  At that time he again sought frequent reassurance and was quite talkative and anxious about possibly getting dementia at some point.  He continued to struggle with OCD symptoms.  He continued with his therapist and medical follow-up.  We did not make any medication changes at that visit.    The patient returns for follow-up visit in December 2021.  There was no significant change at that time.  The patient had a diagnosed and was being treated for bladder cancer.  He was comfortable with the current symptoms and stated that he was seen Dr. Stout and that was going well.  We did not make any medication changes at that visit.    I saw the patient in March 2022.  He was concerned that he may have had a slight cognitive decline in the last 3 months.  He was not reading as well.  He again was somewhat anxious and needed some reassurance.  His psychologist had apparently ordered some neuropsychological testing.  His main concern was sleep deprivation.  We discussed some sleep hygiene techniques and I did add some low-dose as needed trazodone.    The patient was seen on June 27, 2022.  He stated he had not refilled his trazodone because  he was sleeping better.  He was more active and had a more positive attitude.  He stated he had gotten good news with his neurologist regarding the cancer follow-up.  We did not make any changes.    The patient was seen on September 26, 2022.  Patient was doing well at that time.  He had run out of Lexapro 2 weeks earlier I reordered the lexapro.  We elected to make no medication changes.  The patient was tolerating the medication.  The patient was going to follow-up with psychology.    The patient was seen on January 9.  He again was quite talkative and somewhat perseverative but he reported he was less depressed and felt there had been a decline in his OCD behaviors.  He was being set up with a new medical team and was somewhat anxious about that but overall doing quite well and tolerating the medication.  We did not make any changes.    The patient was seen for follow-up in May 2023.  Again somewhat talkative and perseverative and needing some redirection.  He continue to follow-up with psychology.  We elected not to make any changes in his medication at that time.      HPI:  The patient presents today stating that he is not had any significant change.  He believes his OCD symptoms have improved a bit and his psychologist agreed.  He believes however he is a bit more depressed.  Not hopeless or helpless or worthless.  No thoughts of hurting himself or anybody else.  No óscar.  No hallucinations or delusions.  He reports sleep is still somewhat problematic and variable but he was vague on specifics.  Often he gets plenty of sleep.  He is not using his CPAP and is going to discuss that with his sleep team.  He states his diet is variable.  No change in cognition.  He states his cancer treatment is going well and he last saw them in June.  There was no regrowth.  He continues to follow-up with that team.  We again discussed sleep hygiene and I suggested he utilize the trazodone.  He has not tried any of that.   Otherwise we will continue with the current meds as he is tolerating the treatment with no evidence of any side effects.  No new tremors.      Current Medications:  Medications were personally reviewed at this meeting.  Please see the chart for current medication list.      Mental Status Exam:  Appearance: Patient does not appear uncomfortable.  There is no obvious pain or shortness of breath.  No new issues or concerns are reported.   Behavior: Patient is cooperative.  Able to maintain eye contact. Able to interact appropriately.  He again seeks quite a bit of reassurance.  No agitation.  Not restless.  No reports of any recent behavioral dyscontrol.  The patient does participate and is cooperative.  Able to initiate.  Speech: Again, extremely talkative and control the interview.  He is overly inclusive but answers are consistent and appropriate with no significant change.  Mood/Affect: No obvious depression or anxiety.  No irritability or lability.  No evidence of any óscar.  Thought Content:  No evidence of any psychosis. The patient denies any psychotic symptoms. No reports of any recent psychosis.  Suicidal or Homicidal Thoughts: None apparent or reported.  The patient denies having any homicidal or suicidal ideation.  Thought Process/Formulation: Participating.  Able to follow conversation.  No thought blocking.  No racing thoughts.  Not disorganized.  No evidence of any pressured thinking.  Associations:  Grossly intact.  Processing information appropriately.  Able to track conversation..  Fund of Knowledge:  Grossly intact.   Attention/Concentration: Concentration appears adequate.  The patient is following and participating well.  Attentive.  No disorganization.  No racing thoughts.  Insight:  Grossly intact.   Judgement:  Grossly intact.   Memory:  Grossly intact.   Motor Status:  No recent change reported. No current tremor.  Orientation: Grossly oriented..      Recent Labs:  Please see the  chart.      Diagnosis:  JHON   OCD, by history  Major depressive disorder, recurrent, moderate, without psychotic features      The patient's chart review, interview and documentation review took 26 minutes.    Plan:  We will continue with the current medications.    He will continue to follow-up with his medical team as well as his psychologist.  He continues to be followed regarding follow-up for his bladder cancer.    The patient will seek out appropriate emergency services should that become necessary.  I will make myself available if any questions, concerns, or problems arise.    Elie Jones MD        Virtual Visit Details    Type of service:  Telephone Visit   Phone call duration: 25 minutes

## 2023-08-14 ENCOUNTER — VIRTUAL VISIT (OUTPATIENT)
Dept: NEUROLOGY | Facility: CLINIC | Age: 68
End: 2023-08-14
Payer: MEDICARE

## 2023-08-14 DIAGNOSIS — F42.9 OBSESSIVE-COMPULSIVE DISORDER, UNSPECIFIED TYPE: Primary | ICD-10-CM

## 2023-08-14 PROCEDURE — 90834 PSYTX W PT 45 MINUTES: CPT | Mod: 95 | Performed by: PSYCHOLOGIST

## 2023-08-14 NOTE — PROGRESS NOTES
Psychology Progress Note    Date: August 14, 2023    Time length and type of treatment: 41 minutes (9:14 AM - 9:55), individual therapy    After review of the patient's situation, this visit was changed from an in-person visit to a telephone visit via [email or web address]. Patient was informed that policies and procedures that govern in-person sessions would also apply to telephone sessions. Patient was in agreement with proceeding with a telephone session.    Patient Location: Patient home in [place], [place]  Provider Location:  [facility] - Provider remote location     Necessity: This session is necessary to address the patient's anxiety, depression, OCD, and hoarding disorder.  Today we focus on the patient's treatment plan, specifically exploring coping strategies.  The reader is invited to review the patient's full treatment plan in the Media section of the patient's Epic medical record.    Psychotherapeutic Technique: This writer utilized motivational interviewing, active listening, reassurance and support in the context of cognitive behavioral therapy to address the above.      Mental Status Evaluation:  Grooming: Unable to determine due to telephone visit  Attire: Unable to determine due to telephone visit  Age: Unable to determine due to telephone visit  Behavior Towards Examiner: Cooperative  Motor Activity: Unable to determine due to telephone visit  Eye Contact: Unable to determine due to telephone visit  Mood: Anxious   Affect: full range  Speech/Language: normal  Attention: Normal  Concentration: Sufficient  Thought Process: overinclusive   Thought Content: Clear    Orientation: Appeared oriented to person, place, and time, though not formally established  Memory: No evidence of impairment.  Judgement: Adequate  Estimated Intelligence: Within normal limits  Demonstrated Insight: Adequate  Fund of Knowledge: Adequate    Intervention:   Patient reported that his efforts to reduce time  spent on handwashing has gone very well, with his never washing his hands more than 8 minutes at a time, and once even reducing time to 5 minutes. He has also maintained his exercise routine. Patient expressed pride in this accomplishment which was validated and reinforced. However, patient continues to have other obsessions and compulsions worsening as handwashing improves.  We discussed urge suffering and distress tolerance and patient expressed a commitment to work on using this new skill. Patient also discussed an [employer] story he heard on the science of getting unstuck and we discussed how to apply this information to the patient. Patient diet was again addressed and patient recommitted to small dietary improvements.     Progress:  Patient has shown improvement in ability to utilize coping skills.     Plan:   We will meet again in 2 weeks to address the patient's depression, anxiety, OCD, and hoarding disorder.  Estimated duration of treatment is 10+ individual therapy sessions (51521) at twice monthly intervals. Treatment is expected to be completed by January 2024.     Diagnosis:  Obsessive Compulsive Disorder  Major Depressive Disorder, recurrent, severe  Generalized Anxiety Disorder  Hoarding Disorder

## 2023-09-11 ENCOUNTER — VIRTUAL VISIT (OUTPATIENT)
Dept: NEUROLOGY | Facility: CLINIC | Age: 68
End: 2023-09-11
Payer: MEDICARE

## 2023-09-11 DIAGNOSIS — F42.9 OBSESSIVE-COMPULSIVE DISORDER, UNSPECIFIED TYPE: Primary | ICD-10-CM

## 2023-09-11 PROCEDURE — 90834 PSYTX W PT 45 MINUTES: CPT | Mod: 95 | Performed by: PSYCHOLOGIST

## 2023-09-11 NOTE — PROGRESS NOTES
Psychology Progress Note    Date: September 11, 2023    Time length and type of treatment: 40 minutes (9:07 AM - 9:47 AM), individual therapy    After review of the patient's situation, this visit was changed from an in-person visit to a telephone visit via [employer]. Patient was informed that policies and procedures that govern in-person sessions would also apply to telephone sessions. Patient was in agreement with proceeding with a telephone session.    Patient Location: Patient home in [place], [place]  Provider Location:  [facility] - Provider remote location     Necessity: This session is necessary to address the patient's anxiety, depression, OCD, and Hoarding Disorder. Today we focus on the patient's treatment plan, specifically exploring coping skills and adhering to time goals for handwashing.  The reader is invited to review the patient's full treatment plan in the Media section of the patient's Epic medical record.    Psychotherapeutic Technique: This writer utilized motivational interviewing, active listening, reassurance and support in the context of cognitive behavioral therapy to address the above.      Mental Status Evaluation:  Grooming: Unable to determine due to telephone visit  Attire: Unable to determine due to telephone visit  Age: Unable to determine due to telephone visit  Behavior Towards Examiner: Cooperative  Motor Activity: Unable to determine due to telephone visit  Eye Contact: Unable to determine due to telephone visit  Mood: Anxious   Affect: full range  Speech/Language: normal  Attention: Normal  Concentration: Sufficient  Thought Process: overinclusive   Thought Content: Clear    Orientation: Appeared oriented to person, place, and time, though not formally established  Memory: No evidence of impairment.  Judgement: Adequate  Estimated Intelligence: Within normal limits  Demonstrated Insight: Adequate  Fund of Knowledge: Adequate    Intervention:    Patient reported he has continued to make further progress on hand washing and is now keeping handwashing to 6 - 8 minutes/day, with usually managing 7 to 7 1/2 minutes. This positive progress was validated and reinforced.  Patient's germ phobia has interfered with efforts to grocery shop and buy healthier food and we discussed strategies to help patient, but also reviewed distress tolerance skills. Patient has stopped working on back taxes and strategies to help patient reduce avoidance and recommit to 2 hours/day were explored. Patient tendency to not be available at 9:00 and to use my first call as a reminder to wrap up other activities, knowing I'll call back in a few minutes, was discussed and patient need to be ready for a 9:00 phone call was re-established as a therapeutic expectation.      Progress:  Patient continues to reduce time spent handwashing    Plan:   We will meet again in 2 weeks to address the patient's depression, anxiety, OCD, and Hoarding Disorder. Estimated duration of treatment is 10+ individual therapy sessions (61969) at twice monthly intervals. Treatment is expected to be completed by January 2024.     Diagnosis:  Obsessive Compulsive Disorder  Major Depressive Disorder, recurrent, severe  Generalized Anxiety Disorder  Hoarding Disorder

## 2023-10-01 DIAGNOSIS — E78.5 HYPERLIPIDEMIA LDL GOAL <100: ICD-10-CM

## 2023-10-02 RX ORDER — ATORVASTATIN CALCIUM 40 MG/1
TABLET, FILM COATED ORAL
Qty: 30 TABLET | Refills: 0 | Status: SHIPPED | OUTPATIENT
Start: 2023-10-02 | End: 2023-10-13

## 2023-10-04 ENCOUNTER — NURSE TRIAGE (OUTPATIENT)
Dept: INTERNAL MEDICINE | Facility: CLINIC | Age: 68
End: 2023-10-04
Payer: MEDICARE

## 2023-10-04 ENCOUNTER — TELEPHONE (OUTPATIENT)
Dept: INTERNAL MEDICINE | Facility: CLINIC | Age: 68
End: 2023-10-04
Payer: MEDICARE

## 2023-10-04 NOTE — TELEPHONE ENCOUNTER
"S-(situation): [facility] 11/7 colonoscopy    B-(background): high risk colon cancer. Dad passed away at 82 years old. Last colonoscopy 6/1/2015. Hx bladder cancer, tumor removed 2020. Hx hemorrhoids    A-( assessment): Last week having digestive problems.  Having blood in stool, only on tissue, bright red. Small amount of blood.  Diarrhea last month, not every day. Loose, not watery.  Past 24 hrs has not had a BM.  No vomiting.  Last urinated 0700    R-(recommendations): be seen in 2 weeks. Red flag symptoms educated to go to ED. Pt already has appt. Educated to  probiotic. No further questions.     Reason for Disposition   Diarrhea is main symptom   Diarrhea is a chronic symptom (recurrent or ongoing AND lasting > 4 weeks)    Additional Information   Negative: Shock suspected (e.g., cold/pale/clammy skin, too weak to stand, low BP, rapid pulse)   Negative: Difficult to awaken or acting confused (e.g., disoriented, slurred speech)   Negative: Sounds like a life-threatening emergency to the triager   Negative: Vomiting also present and worse than the diarrhea   Negative: Blood in stool and without diarrhea   Negative: SEVERE abdominal pain (e.g., excruciating) and present > 1 hour   Negative: SEVERE abdominal pain and age > 60 years   Negative: Bloody, black, or tarry bowel movements  (Exception: Chronic-unchanged black-grey bowel movements and is taking iron pills or Pepto-Bismol.)     Pt denies blood in stool, \"only on tissue paper, due to hemorrhoids, has had for years\"   Negative: SEVERE diarrhea (e.g., 7 or more times / day more than normal) and age > 60 years   Negative: Constant abdominal pain lasting > 2 hours   Negative: Drinking very little and dehydration suspected (e.g., no urine > 12 hours, very dry mouth, very lightheaded)   Negative: Patient sounds very sick or weak to the triager   Negative: SEVERE diarrhea (e.g., 7 or more times / day more than normal) and present > 24 hours (1 day)   Negative: " MODERATE diarrhea (e.g., 4-6 times / day more than normal) and present > 48 hours (2 days)   Negative: MODERATE diarrhea (e.g., 4-6 times / day more than normal) and age > 70 years   Negative: Abdominal pain (Exception: Pain clears completely with each passage of diarrhea stool.)   Negative: Mucus or pus in stool has been present > 2 days and diarrhea is more than mild   Negative: Weak immune system (e.g., HIV positive, cancer chemo, splenectomy, organ transplant, chronic steroids)   Negative: Travel to a foreign country in past month   Negative: Recent antibiotic therapy (i.e., within last 2 months) and diarrhea present > 3 days since antibiotic was stopped   Negative: Passed out (i.e., fainted, collapsed and was not responding)   Negative: Shock suspected (e.g., cold/pale/clammy skin, too weak to stand, low BP, rapid pulse)   Negative: Vomiting red blood or black (coffee ground) material   Negative: Sounds like a life-threatening emergency to the triager   Negative: Rectal symptoms   Negative: SEVERE rectal bleeding (large blood clots; constant or on and off bleeding)   Negative: Recent hospitalization and diarrhea present > 3 days   Negative: Tube feedings (e.g., nasogastric, g-tube, j-tube)   Negative: MILD diarrhea (e.g., 1-3 or more stools than normal in past 24 hours) diarrhea without known cause and present > 7 days   Negative: Patient wants to be seen   Commented on: Fever > 101 F (38.3 C)     No thermometer   Commented on: Blood in the stool  (Exception: Only on toilet paper. Reason: Diarrhea can cause rectal irritation with blood on wiping.)     Only on toilet paper    Protocols used: Rectal Bleeding-A-OH, Diarrhea-A-OH

## 2023-10-09 ENCOUNTER — VIRTUAL VISIT (OUTPATIENT)
Dept: NEUROLOGY | Facility: CLINIC | Age: 68
End: 2023-10-09
Payer: MEDICARE

## 2023-10-09 DIAGNOSIS — F41.1 GAD (GENERALIZED ANXIETY DISORDER): Primary | ICD-10-CM

## 2023-10-09 PROCEDURE — 90834 PSYTX W PT 45 MINUTES: CPT | Mod: 95 | Performed by: PSYCHOLOGIST

## 2023-10-09 NOTE — PROGRESS NOTES
Psychology Progress Note    Date: October 09, 2023    Time length and type of treatment: 49 minutes (9:01 AM to 9:50 AM), individual therapy    After review of the patient's situation, this visit was changed from an in-person visit to a telephone visit via [employer]. Patient was informed that policies and procedures that govern in-person sessions would also apply to telephone sessions. Patient was in agreement with proceeding with a telephone session.    Patient Location: Patient home in [place], [place]  Provider Location:  [facility] - Provider remote location     Necessity: This session is necessary to address the patient's depression, anxiety, OCD, and Hoarding Disorder. Today we focus on the patient's treatment plan, specifically exploring coping strategies.  The reader is invited to review the patient's full treatment plan in the Media section of the patient's Epic medical record.    Psychotherapeutic Technique: This writer utilized motivational interviewing, active listening, reassurance and support in the context of cognitive behavioral therapy to address the above.      Mental Status Evaluation:  Grooming: Unable to determine due to telephone visit  Attire: Unable to determine due to telephone visit  Age: Unable to determine due to telephone visit  Behavior Towards Examiner: Cooperative  Motor Activity: Unable to determine due to telephone visit  Eye Contact: Unable to determine due to telephone visit  Mood: Anxious   Affect: full range  Speech/Language: Mildly pressured  Attention: Normal  Concentration: Sufficient  Thought Process: overinclusive   Thought Content: Clear    Orientation: Appeared oriented to person, place, and time, though not formally established  Memory: No evidence of impairment.  Judgement: Adequate  Estimated Intelligence: Within normal limits  Demonstrated Insight: Adequate  Fund of Knowledge: Adequate    Intervention:   Patient reported he began having  intestinal discomfort on September 28 and has not been able to maintain treatment targets. His handwashing has gone up to 7-8 minutes, though he is proud that he has kept it below 10 minutes.  He has only had one day in which he ate 3 meals and had one day in which he didn't eat or drink anything, as well as other days when he ate only a single meal. Patient has made an appointment with his primary physician. We discussed distress tolerance skills, the importance of paying enough attention to our health so that we address problems in a timely fashion, but to work on balance and not allowing one problem to derail all areas of his life. We also discussed the possible relationship between diet and exercise, and his not feeling well.  Distress tolerance and emotion regulation skills were reviewed.     Progress:  Patient recommitted to using distress tolerance and emotion regulation skills.     Plan:   We will meet again in 2 weeks to address the patient's OCD, depression, anxiety, and Hoarding Disorder.  Estimated duration of treatment is 10+ individual therapy sessions (73108) at twice monthly intervals. Treatment is expected to be completed by January 2024.     Diagnosis:  Generalized Anxiety Disorder  Major Depressive Disorder, recurrent, severe  Obsessive-Compulsive Disorder  Hoarding Disorder

## 2023-10-13 ENCOUNTER — OFFICE VISIT (OUTPATIENT)
Dept: INTERNAL MEDICINE | Facility: CLINIC | Age: 68
End: 2023-10-13
Payer: MEDICARE

## 2023-10-13 VITALS
DIASTOLIC BLOOD PRESSURE: 80 MMHG | SYSTOLIC BLOOD PRESSURE: 124 MMHG | HEIGHT: 65 IN | BODY MASS INDEX: 21.33 KG/M2 | HEART RATE: 89 BPM | WEIGHT: 128 LBS | RESPIRATION RATE: 16 BRPM | OXYGEN SATURATION: 97 % | TEMPERATURE: 98.6 F

## 2023-10-13 DIAGNOSIS — Z23 NEEDS FLU SHOT: ICD-10-CM

## 2023-10-13 DIAGNOSIS — Z86.73 HISTORY OF STROKE: ICD-10-CM

## 2023-10-13 DIAGNOSIS — E78.5 HYPERLIPIDEMIA LDL GOAL <100: ICD-10-CM

## 2023-10-13 DIAGNOSIS — R53.83 OTHER FATIGUE: Primary | ICD-10-CM

## 2023-10-13 DIAGNOSIS — Z23 HIGH PRIORITY FOR COVID-19 VACCINATION: ICD-10-CM

## 2023-10-13 LAB
ALBUMIN UR-MCNC: NEGATIVE MG/DL
APPEARANCE UR: CLEAR
BACTERIA #/AREA URNS HPF: ABNORMAL /HPF
BILIRUB UR QL STRIP: NEGATIVE
COLOR UR AUTO: YELLOW
GLUCOSE UR STRIP-MCNC: NEGATIVE MG/DL
HGB UR QL STRIP: ABNORMAL
KETONES UR STRIP-MCNC: NEGATIVE MG/DL
LEUKOCYTE ESTERASE UR QL STRIP: NEGATIVE
NITRATE UR QL: NEGATIVE
PH UR STRIP: 5.5 [PH] (ref 5–8)
RBC #/AREA URNS AUTO: ABNORMAL /HPF
SP GR UR STRIP: 1.01 (ref 1–1.03)
UROBILINOGEN UR STRIP-ACNC: 0.2 E.U./DL
WBC #/AREA URNS AUTO: ABNORMAL /HPF

## 2023-10-13 PROCEDURE — 90662 IIV NO PRSV INCREASED AG IM: CPT | Performed by: INTERNAL MEDICINE

## 2023-10-13 PROCEDURE — 36415 COLL VENOUS BLD VENIPUNCTURE: CPT | Performed by: INTERNAL MEDICINE

## 2023-10-13 PROCEDURE — 82306 VITAMIN D 25 HYDROXY: CPT | Mod: GZ | Performed by: INTERNAL MEDICINE

## 2023-10-13 PROCEDURE — G0008 ADMIN INFLUENZA VIRUS VAC: HCPCS | Performed by: INTERNAL MEDICINE

## 2023-10-13 PROCEDURE — 81001 URINALYSIS AUTO W/SCOPE: CPT | Performed by: INTERNAL MEDICINE

## 2023-10-13 PROCEDURE — 82607 VITAMIN B-12: CPT | Performed by: INTERNAL MEDICINE

## 2023-10-13 PROCEDURE — 84443 ASSAY THYROID STIM HORMONE: CPT | Performed by: INTERNAL MEDICINE

## 2023-10-13 PROCEDURE — 91320 SARSCV2 VAC 30MCG TRS-SUC IM: CPT | Performed by: INTERNAL MEDICINE

## 2023-10-13 PROCEDURE — 90480 ADMN SARSCOV2 VAC 1/ONLY CMP: CPT | Performed by: INTERNAL MEDICINE

## 2023-10-13 PROCEDURE — 99213 OFFICE O/P EST LOW 20 MIN: CPT | Mod: 25 | Performed by: INTERNAL MEDICINE

## 2023-10-13 RX ORDER — ATORVASTATIN CALCIUM 40 MG/1
40 TABLET, FILM COATED ORAL DAILY
Qty: 90 TABLET | Refills: 3 | Status: SHIPPED | OUTPATIENT
Start: 2023-10-13

## 2023-10-13 RX ORDER — ASPIRIN 81 MG/1
81 TABLET ORAL DAILY
Qty: 90 TABLET | Refills: 3 | Status: SHIPPED | OUTPATIENT
Start: 2023-10-13

## 2023-10-13 ASSESSMENT — PATIENT HEALTH QUESTIONNAIRE - PHQ9
10. IF YOU CHECKED OFF ANY PROBLEMS, HOW DIFFICULT HAVE THESE PROBLEMS MADE IT FOR YOU TO DO YOUR WORK, TAKE CARE OF THINGS AT HOME, OR GET ALONG WITH OTHER PEOPLE: NOT DIFFICULT AT ALL
SUM OF ALL RESPONSES TO PHQ QUESTIONS 1-9: 0
SUM OF ALL RESPONSES TO PHQ QUESTIONS 1-9: 0

## 2023-10-13 NOTE — LETTER
"October 14, 2023      Berto Pretty  [address]  [facility] [address]        Dear ,    Your test results look good.  I would interpret your urinalysis as normal.  \"Few bacteria\" not significant, since the WBC and RBC was negative.  The dipstick urinalysis had a trace of hemoglobin, also probably not significant at all.  Nothing convincing for infection in the urine.    Normal TSH thyroid test, vitamin B12 level, vitamin D level.    Resulted Orders   TSH with free T4 reflex   Result Value Ref Range    TSH 1.00 0.30 - 4.20 uIU/mL   Vitamin B12   Result Value Ref Range    Vitamin B12 607 232 - 1,245 pg/mL   Vitamin D deficiency screening   Result Value Ref Range    Vitamin D, Total (25-Hydroxy) 28 20 - 50 ng/mL      Comment:      optimum levels    Narrative    Season, race, dietary intake, and treatment affect the concentration of 25-hydroxy-Vitamin D. Values may decrease during winter months and increase during summer months.    Vitamin D determination is routinely performed by an immunoassay specific for 25 hydroxyvitamin D3.  If an individual is on vitamin D2(ergocalciferol) supplementation, please specify 25 OH vitamin D2 and D3 level determination by LCMSMS test VITD23.     UA with Microscopic reflex to Culture - lab collect   Result Value Ref Range    Color Urine Yellow Colorless, Straw, Light Yellow, Yellow    Appearance Urine Clear Clear    Glucose Urine Negative Negative mg/dL    Bilirubin Urine Negative Negative    Ketones Urine Negative Negative mg/dL    Specific Gravity Urine 1.015 1.005 - 1.030    Blood Urine Trace (A) Negative    pH Urine 5.5 5.0 - 8.0    Protein Albumin Urine Negative Negative mg/dL    Urobilinogen Urine 0.2 0.2, 1.0 E.U./dL    Nitrite Urine Negative Negative    Leukocyte Esterase Urine Negative Negative   UA Microscopic with Reflex to Culture   Result Value Ref Range    Bacteria Urine Few (A) None Seen /HPF    RBC Urine 0-2 0-2 /HPF /HPF    WBC Urine 0-5 0-5 /HPF /HPF    " Narrative    Urine Culture not indicated       If you have any questions or concerns, please call the clinic at the number listed above.       Sincerely,      Olvin De Dios MD

## 2023-10-13 NOTE — PROGRESS NOTES
Office Visit - Follow Up   Berto Pretty   68 year old male    Date of Visit: 10/13/2023    Chief Complaint   Patient presents with    Medication Request        -------------------------------------------------------------------------------------------------------------------------  Assessment and Plan    Follow-up multiple issues, and yearly Paras has been a bit worried about some puzzling symptoms that been going on for much of 2023    Poor energy, tingling feeling that seems to permeate his whole body, and sometimes nausea.  He told me that he stopped taking his multivitamin sometime this summer.    He did have a follow-up visit with Dr. Oliver in the hematology clinic because of his monoclonal gammopathy of unknown significance.    His laboratory testing from June 2, 2023 look quite good, with comprehensive metabolic panel normal, CBC with a slightly anemic hemoglobin around 12.5 g.    As far as medications, Berto is taking his Lexapro 20 mg a day, atorvastatin 40 mg a day  Trazodone 50 mg is on his medication list, but Berto says he is not using it these days.    I will have Berto swing by the laboratory this afternoon so we can check his vitamin B12 level, TSH thyroid test, and vitamin D level.    Berto asked me whether there is any concern for cancer, and at this point I would say no.  Yes we know immediately we know he has a monoclonal gammopathy, but there are no signs of leukemia or myeloma.    With a normal comprehensive metabolic panel including liver chemistry tests, I do not see any hits for intra-abdominal malignancy.  Berto has a surveillance cystoscopy scheduled for December 8, 2023 because of history of bladder cancer    Berto does need to move forward with his colonoscopy which is scheduled to be done November 7, 2023.    Plugged into the psychology department, and also psychiatry with Dr. Jones.  He is also plugged into urology follow-up with Dr. Braulio Munroe.  [facility] 2023 at  [facility]  Continues on Lexapro 20 mg with good effect.     STABLE Major Depressive Disorder, recurrent, severe  Generalized Anxiety Disorder  Hoarding Disorder  Obsessive-Compulsive Disorder     Lisa Stout, PhD LP Psychology  Dr Jones every 3 months     Obsessive-compulsive disorder. He is on Social Security disability due to this.  escitalopram (LEXAPRO) 20 MG tablet-- helpful     NEUROPSYCHOLOGY EVALUATION: 7/6/2022  The current profile is fully within normal limits. There is NO evidence of diffuse or focal cerebral dysfunction in the current test results.   DIAGNOSTIC IMPRESSIONS:  No Cognitive Disorder  As such, Mr. Pretty does NOT meet criteria for a cognitive disorder at this time.  The cognitive issues that he is experiencing are most likely due to non-neurologic factors, including:  His experience of what are actually normal, age-related cognitive changes. As we age, we all become more forgetful, have more difficulty with word-finding, and have a harder time multi-tasking to an extent. These age-related changes can be all the more frustrating for bright individuals (such as Mr. Pretty) who are used to things coming rather quickly and easily to them.   Significant anxiety/emotional distress and stress related to the ongoing pandemic may also interfere with Mr. Pretty's cognitive functioning at times in daily life.   Nevertheless, Mr. Pretty can be reassured that there is NO evidence of Alzheimer's disease or any other underlying dementia syndrome at this time.  This testing also confirms his experience that he has likely made a full recovery from his history of strokes. As expected, his remote concussion injuries are also considered noncontributory.     Obstructive sleep apnea syndrome  Not using CPAP machine which needs cleaning and maintenance  Current sleep study October 2020, at [facility]  Sleep study May 30, 2018 done at [facility].  7 obstructive events observed, 18 central  apneas, 23 hypopneas     Insomnia, not so much a problem December 2022, and Berto decided he did not need to take the trazodone which Dr. 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  The right side of his body was weak.   MRI March 16, 2020, 1 no acute or subacute infarction, or hemorrhage.  Chronic infarction in the left precentral gyrus and right cerebellum with additional presumed sequelae of mild to moderate chronic small vessel ischemic disease.     Patient also had an MRI, February 2018, which is included below, right cerebellar infarct was present.- Primary     Vitamin D deficiency  Vitamin D, Cholecalciferol, 25 MCG (1000 UT) CAPS     Hyperlipidemia on treatement LDL goal <100,  Atorvastatin 40 mg daily  History of stroke  also describes having had a TIA event in 2018  8-  LDL Cholesterol Calculated <=100 mg/dL 50       Direct Measure HDL >=40 mg/dL 79       Triglycerides <150 mg/dL 43       Cholesterol <200 mg/dL 138       Malignant neoplasm of urinary bladder, summer 2021  Last cystoscopy was October 2022, had been getting them every 2 months.  Dr. Munroe told Berto that next surveillance will be April 2023.  Small bladder tumor, just above and lateral to the right ureteral orifice.  Appears to be superficial disease.  No other bladder tumors or suspicious mucosa.  Small transurethral resection of bladder tumor performed.  All tumor was removed.    Benign skin tag on his left upper chest, total of these have no dangerous potential, and are best left alone  Inclusion cyst on his central upper back at the base of his neck, which general site has been present for years.  No signs of infection.  I told him the best left alone     Tiny right-sided inguinal hernia, which does not seem to be bothering Berto, and  is probably best left alone     Overdue for screening colonoscopy, family history colon cancer (father)    --------------------------------------------------------------------------------------------------------------------------  History of Present Illness  This 68 year old old     Follow-up multiple issues, and yearly Paras has been a bit worried about some puzzling symptoms that been going on for much of 2023    Poor energy, tingling feeling that seems to permeate his whole body, and sometimes nausea.  He told me that he stopped taking his multivitamin sometime this summer.    He did have a follow-up visit with Dr. Oliver in the hematology clinic because of his monoclonal gammopathy of unknown significance.    His laboratory testing from June 2, 2023 look quite good, with comprehensive metabolic panel normal, CBC with a slightly anemic hemoglobin around 12.5 g.    As far as medications, Berto is taking his Lexapro 20 mg a day, atorvastatin 40 mg a day  Trazodone 50 mg is on his medication list, but Berto says he is not using it these days.    Wt Readings from Last 3 Encounters:   10/13/23 58.1 kg (128 lb)   06/15/23 57.9 kg (127 lb 9.6 oz)   12/09/22 55.8 kg (123 lb)     BP Readings from Last 3 Encounters:   10/13/23 124/80   06/15/23 127/70   12/09/22 112/60     ---------------------------------------------------------------------------------------------------------------------------    Medications, Allergies, Social, and Problem List     Current Outpatient Medications   Medication Sig Dispense Refill    aspirin (ASA) 81 MG EC tablet Take 1 tablet (81 mg) by mouth daily      atorvastatin (LIPITOR) 40 MG tablet Take 1 tablet by mouth once daily 30 tablet 0    escitalopram (LEXAPRO) 20 MG tablet TAKE 1 TABLET BY MOUTH ONCE DAILY 90 tablet 1    loratadine 10 MG capsule Take 10 mg by mouth daily as needed Spring only      Multiple Vitamins-Minerals (MULTIVITAL PO)       traZODone (DESYREL) 50 MG tablet Take 1  "tablet (50 mg) by mouth At Bedtime 30 tablet 3    Vitamin D, Cholecalciferol, 25 MCG (1000 UT) CAPS Take 1,000 mg by mouth daily       Allergies   Allergen Reactions    Pollen Extract     Latex Rash     Social History     Tobacco Use    Smoking status: Former     Types: Cigarettes     Quit date: 1974     Years since quittin.8    Smokeless tobacco: Never   Vaping Use    Vaping Use: Never used   Substance Use Topics    Alcohol use: Yes    Drug use: Never     Patient Active Problem List   Diagnosis    Obsessive compulsive disorder    Cerebral infarction due to embolism of middle cerebral artery (H)    Cerebrovascular accident (CVA) (H)    Hyperlipidemia LDL goal <100    Obstructive sleep apnea syndrome    Other malaise and fatigue    Vitamin D deficiency    Family history of colon cancer    Abnormal serum protein electrophoresis    Moderate episode of recurrent major depressive disorder (H)    Malignant neoplasm of urinary bladder, unspecified site (H)    Anemia, unspecified type        Reviewed, reconciled and updated       Physical Exam   General Appearance:       /80 (BP Location: Right arm, Patient Position: Sitting, Cuff Size: Adult Regular)   Pulse 89   Temp 98.6  F (37  C)   Resp 16   Ht 1.651 m (5' 5\")   Wt 58.1 kg (128 lb)   SpO2 97%   BMI 21.30 kg/m      Berto appears generally well.  Talkative, as always.     Additional Information   I spent 20 minutes on this encounter, including reviewing interval history since last visit, examining the patient, explaining and counseling the issues enumerated in the Assessment and Plan (patient given a copy), ordering indicated tests       QUE LIZAMA MD, MD    "

## 2023-10-13 NOTE — PATIENT INSTRUCTIONS
Follow-up multiple issues, and yearly Paras has been a bit worried about some puzzling symptoms that been going on for much of 2023    Poor energy, tingling feeling that seems to permeate his whole body, and sometimes nausea.  He told me that he stopped taking his multivitamin sometime this summer.    He did have a follow-up visit with Dr. Oliver in the hematology clinic because of his monoclonal gammopathy of unknown significance.    His laboratory testing from June 2, 2023 look quite good, with comprehensive metabolic panel normal, CBC with a slightly anemic hemoglobin around 12.5 g.    As far as medications, Berto is taking his Lexapro 20 mg a day, atorvastatin 40 mg a day  Trazodone 50 mg is on his medication list, but Berto says he is not using it these days.    I will have Berto swing by the laboratory this afternoon so we can check his vitamin B12 level, TSH thyroid test, and vitamin D level.    Berto asked me whether there is any concern for cancer, and at this point I would say no.  Yes we know immediately we know he has a monoclonal gammopathy, but there are no signs of leukemia or myeloma.    With a normal comprehensive metabolic panel including liver chemistry tests, I do not see any hits for intra-abdominal malignancy.  Berto has a surveillance cystoscopy scheduled for December 8, 2023 because of history of bladder cancer    Berto does need to move forward with his colonoscopy which is scheduled to be done November 7, 2023.    Plugged into the psychology department, and also psychiatry with Dr. Jones.  He is also plugged into urology follow-up with Dr. Braulio Munroe.  January 2023 at [facility]  Continues on Lexapro 20 mg with good effect.     STABLE Major Depressive Disorder, recurrent, severe  Generalized Anxiety Disorder  Hoarding Disorder  Obsessive-Compulsive Disorder     Lisa Stout, PhD LP Psychology  Dr Jones every 3 months     Obsessive-compulsive disorder. He is on Social  Security disability due to this.  escitalopram (LEXAPRO) 20 MG tablet-- helpful     NEUROPSYCHOLOGY EVALUATION: 7/6/2022  The current profile is fully within normal limits. There is NO evidence of diffuse or focal cerebral dysfunction in the current test results.   DIAGNOSTIC IMPRESSIONS:  No Cognitive Disorder  As such, Mr. Pretty does NOT meet criteria for a cognitive disorder at this time.  The cognitive issues that he is experiencing are most likely due to non-neurologic factors, including:  His experience of what are actually normal, age-related cognitive changes. As we age, we all become more forgetful, have more difficulty with word-finding, and have a harder time multi-tasking to an extent. These age-related changes can be all the more frustrating for bright individuals (such as Mr. Pretty) who are used to things coming rather quickly and easily to them.   Significant anxiety/emotional distress and stress related to the ongoing pandemic may also interfere with Mr. Pretty's cognitive functioning at times in daily life.   Nevertheless, Mr. Pretty can be reassured that there is NO evidence of Alzheimer's disease or any other underlying dementia syndrome at this time.  This testing also confirms his experience that he has likely made a full recovery from his history of strokes. As expected, his remote concussion injuries are also considered noncontributory.     Obstructive sleep apnea syndrome  Not using CPAP machine which needs cleaning and maintenance  Current sleep study October 2020, at [facility]  Sleep study May 30, 2018 done at [facility].  7 obstructive events observed, 18 central apneas, 23 hypopneas     Insomnia, not so much a problem December 2022, and Berto decided he did not need to take the trazodone which Dr. Tyson originally prescribed      Monoclonal gammopathy IgG kappa type  History Mild normocytic anemia  6-2-2023  Hemoglobin  13.3 - 17.7 g/dL 12.7 Low      History Left precentral  gyrus and right cerebellar infarction  [facility].  The right side of his body was weak.   MRI March 16, 2020, 1 no acute or subacute infarction, or hemorrhage.  Chronic infarction in the left precentral gyrus and right cerebellum with additional presumed sequelae of mild to moderate chronic small vessel ischemic disease.     Patient also had an MRI, February 2018, which is included below, right cerebellar infarct was present.- Primary     Vitamin D deficiency  Vitamin D, Cholecalciferol, 25 MCG (1000 UT) CAPS     Hyperlipidemia on treatement LDL goal <100,  Atorvastatin 40 mg daily  History of stroke  also describes having had a TIA event in 2018  8-  LDL Cholesterol Calculated <=100 mg/dL 50       Direct Measure HDL >=40 mg/dL 79       Triglycerides <150 mg/dL 43       Cholesterol <200 mg/dL 138       Malignant neoplasm of urinary bladder, summer 2021  Last cystoscopy was October 2022, had been getting them every 2 months.  Dr. Munroe told Berto that next surveillance will be April 2023.  Small bladder tumor, just above and lateral to the right ureteral orifice.  Appears to be superficial disease.  No other bladder tumors or suspicious mucosa.  Small transurethral resection of bladder tumor performed.  All tumor was removed.    Benign skin tag on his left upper chest, total of these have no dangerous potential, and are best left alone  Inclusion cyst on his central upper back at the base of his neck, which general site has been present for years.  No signs of infection.  I told him the best left alone     Tiny right-sided inguinal hernia, which does not seem to be bothering Berto, and is probably best left alone     Overdue for screening colonoscopy, family history colon cancer (father)

## 2023-10-14 LAB
TSH SERPL DL<=0.005 MIU/L-ACNC: 1 UIU/ML (ref 0.3–4.2)
VIT B12 SERPL-MCNC: 607 PG/ML (ref 232–1245)
VIT D+METAB SERPL-MCNC: 28 NG/ML (ref 20–50)

## 2023-10-18 ENCOUNTER — TELEPHONE (OUTPATIENT)
Dept: INTERNAL MEDICINE | Facility: CLINIC | Age: 68
End: 2023-10-18
Payer: MEDICARE

## 2023-10-18 NOTE — TELEPHONE ENCOUNTER
Pt calling to receive his recent lab results. Results explained; informed he should be getting a results letter in the mail. No other questions. Medicare visit scheduled.

## 2023-11-02 ENCOUNTER — VIRTUAL VISIT (OUTPATIENT)
Dept: NEUROLOGY | Facility: CLINIC | Age: 68
End: 2023-11-02
Payer: MEDICARE

## 2023-11-02 DIAGNOSIS — F42.2 MIXED OBSESSIONAL THOUGHTS AND ACTS: ICD-10-CM

## 2023-11-02 PROCEDURE — 99443 PR PHYSICIAN TELEPHONE EVALUATION 21-30 MIN: CPT | Mod: 95 | Performed by: PSYCHIATRY & NEUROLOGY

## 2023-11-02 RX ORDER — TRAZODONE HYDROCHLORIDE 50 MG/1
50 TABLET, FILM COATED ORAL AT BEDTIME
Qty: 30 TABLET | Refills: 3 | Status: SHIPPED | OUTPATIENT
Start: 2023-11-02 | End: 2024-02-01

## 2023-11-02 RX ORDER — ESCITALOPRAM OXALATE 20 MG/1
TABLET ORAL
Qty: 90 TABLET | Refills: 1 | Status: SHIPPED | OUTPATIENT
Start: 2023-11-02 | End: 2024-02-01

## 2023-11-02 ASSESSMENT — PATIENT HEALTH QUESTIONNAIRE - PHQ9: SUM OF ALL RESPONSES TO PHQ QUESTIONS 1-9: 17

## 2023-11-02 NOTE — PROGRESS NOTES
Virtual Visit Details    Type of service:  Telephone Visit   Phone call duration: 25 minutes         Outpatient Psychiatry Note     The patient presented for an initial psychiatric consultation on December 7 of 2020.  He carried a diagnosis of obsessive-compulsive disorder and has had symptoms throughout his entire life.  He presents at this time on Lexapro 20 mg a day.  He has been on this dose for 1 month having started it October 12, 2020 and increased 1 month prior to his initial intake with me.  He has been on psychotropic medication most of his adult life although has been off psychotropic medication for about 1 to 2 years prior to recent restarting of that medication.  Over the years the patient has been on a variety of psychotropic medication with limited success.  He has been on Prozac which she described as having mild efficacy for his OCD symptoms, Celexa with mild efficacy at doses of 80 mg a day, Parnate, Stelazine, Haldol and clomipramine as an adjunct at 25 mg which were all described as ineffective.    The patient describes having onset of OCD symptoms as a child.  His symptoms included obsessive thoughts and compulsive behaviors with constant worry and thoughts about germs, asbestos and he engaged in frequent handwashing checking doors and lights and excessively bought movies and books.  He had an inpatient hospital stay most recently in the 1970s.  He has OCD symptoms caused him to stop attendance at the [facility] and he has been on SSDI for years due to this.    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  It was at this time that he chose to come off his Lexapro.  He did fairly well from a psychiatric standpoint for a number of months but since the spring 2020 he has had an increase in obsessive thoughts and anxiety symptoms.  As stated previously he restarted his Lexapro on October 12 of 2020 and that was increased to 20 mg a day 1 month later.    At  the initial visit, in part due to the fact that the patient had recently been increased on his Lexapro we continued with that treatment plan.  He had previously been off all psychotropic medication for a about a year and a half.  He had done fairly well off that medication until the spring 2020 when he had return of his anxiety symptoms.  He was in the process of being reassessed for his sleep apnea.    The patient return to the clinic on February 8 of 2021.  He had restarted individual psychotherapy.  He reported he was doing a bit better and stated that therapy was quite helpful.  He believed he was improving and responding well to the therapy and reassurance.  He was tolerating the recently restarted medication and we continued with that treatment plan.    I saw the patient on May 3, 2021.  At that time he again sought frequent reassurance and was quite talkative and anxious about possibly getting dementia at some point.  He continued to struggle with OCD symptoms.  He continued with his therapist and medical follow-up.  We did not make any medication changes at that visit.    The patient returns for follow-up visit in December 2021.  There was no significant change at that time.  The patient had a diagnosed and was being treated for bladder cancer.  He was comfortable with the current symptoms and stated that he was seen Dr. Stout and that was going well.  We did not make any medication changes at that visit.    I saw the patient in March 2022.  He was concerned that he may have had a slight cognitive decline in the last 3 months.  He was not reading as well.  He again was somewhat anxious and needed some reassurance.  His psychologist had apparently ordered some neuropsychological testing.  His main concern was sleep deprivation.  We discussed some sleep hygiene techniques and I did add some low-dose as needed trazodone.    The patient was seen on June 27, 2022.  He stated he had not refilled his trazodone  because he was sleeping better.  He was more active and had a more positive attitude.  He stated he had gotten good news with his neurologist regarding the cancer follow-up.  We did not make any changes.    The patient was seen on September 26, 2022.  Patient was doing well at that time.  He had run out of Lexapro 2 weeks earlier I reordered the lexapro.  We elected to make no medication changes.  The patient was tolerating the medication.  The patient was going to follow-up with psychology.    The patient was seen on January 9.  He again was quite talkative and somewhat perseverative but he reported he was less depressed and felt there had been a decline in his OCD behaviors.  He was being set up with a new medical team and was somewhat anxious about that but overall doing quite well and tolerating the medication.  We did not make any changes.    The patient was seen for follow-up in May 2023.  Again somewhat talkative and perseverative and needing some redirection.  He continue to follow-up with psychology.  We elected not to make any changes in his medication at that time.    The patient was seen in August 2023.  At that time he questioned whether he might be a bit more depressed.  He was not using his CPAP machine and was still having some sleep issues and I encouraged him to use that.  He told me his cancer treatment was going well.  I encouraged him to utilize the trazodone for sleepless nights as he stated he was not using that all the time.  He continued to see his psychologist.  We did not make any medication changes.      HPI:  The patient presents today stating he is not doing quite as well and things have gone downhill.  With further discussion he revealed that he had been off his Lexapro for about 4 weeks because he had run out of it.  He did not want to go refill or  medications until he got his medical medications reordered as well so would only have to make one trip.  He apparently just  restarted the Lexapro 3 days ago.  He told me that he was found to have some mild anemia and is being treated for that.  He told me he is stopped his vitamins and it was a bit unclear why but I told him to let his medical team know.  No hallucinations or delusions.  No thoughts of hurting himself or anybody else.  Sleep was about the same.  The patient again was overly inclusive and tended to ramble and was somewhat difficult to redirect but overall seemed to be at his baseline.  No óscar.  No behavioral dyscontrol.  He denied having any new medical issues or concerns.  We elected to continue with the recently restarted Lexapro and he would continue with this therapist.  The patient was comfortable with this plan.      Current Medications:  Medications were personally reviewed at this meeting.  Please see the chart for current medication list.      Mental Status Exam:  Appearance: Comfortable and calm.  No pain.  No shortness of breath.  Not agitated or restless.  No distress.  Behavior: No current behavioral dyscontrol.  No reports of any recent behavioral difficulties.  Able to participate.  Not restless or agitated.  Speech: Able to participate and dialogue.  Answers are consistent and appropriate.  Not pressured or rambling.  Answers are consistent.  Patient again was quite talkative needing frequent redirection and overly inclusive in detail.  He again tended to perseverate a bit  Mood/Affect: Mood appears good.  No significant depression. Mild anxiety.  Affect is stable with no lability or irritability.  No evidence of any óscar.  Thought Content:  No evidence of any psychosis. No reports of any recent psychosis.  Suicidal or Homicidal Thoughts:  None apparent or reported. The patient denies any suicidal or homicidal ideation.   Thought Process/Formulation: No racing thoughts.  Able to track and process information.  Associations: Processing information fairly well.  Somewhat rapid in his speech and thoughts  again but this all appears baseline.  Somewhat perseverative.  Not disorganized or loose.  Grossly intact.  Fund of Knowledge:  Grossly intact.  No obvious recent change.  Attention/Concentration: Able to track and follow.  Attentive.  Concentration is grossly intact.  No disorganization.  No racing thoughts.  Insight:  Grossly intact.   Judgement:  Grossly intact  Memory:  Grossly intact.   Motor Status: No recent apparent change.  No current tremor.  Orientation: Grossly oriented.  No reports of any recent change.      Recent Labs:  Please see the chart.      Diagnosis:  JHON   OCD, by history  Major depressive disorder, recurrent, moderate, without psychotic features    Plan:  We will continue with the current medications.  He just restarted his Lexapro 3 days ago after being off of it for 3 months.  Please see above.    He will continue to follow-up with his medical team as well as his psychologist.  He continues to be followed regarding follow-up for his bladder cancer.    The patient will seek out appropriate emergency services should that become necessary.  I will make myself available if any questions, concerns, or problems arise.    Elie Jones MD

## 2023-11-02 NOTE — NURSING NOTE
Is the patient currently in the state of [place]? YES    Visit mode:TELEPHONE    If the visit is dropped, the patient can be reconnected by: TELEPHONE VISIT: Phone number:   Telephone Information:   Mobile [phone]       Will anyone else be joining the visit? NO  (If patient encounters technical issues they should call [phone])    How would you like to obtain your AVS? MyChart    Are changes needed to the allergy or medication list? Pt stated no med changes    Reason for visit: Telephone (Follow-up )    Elisha MOORE

## 2023-11-02 NOTE — LETTER
11/2/2023         RE: Berto Pretty  [address]  [facility] [address]        Dear Colleague,    Thank you for referring your patient, Berto Pretty, to the [facility]. Please see a copy of my visit note below.    Virtual Visit Details    Type of service:  Telephone Visit   Phone call duration: 25 minutes         Outpatient Psychiatry Note     The patient presented for an initial psychiatric consultation on December 7 of 2020.  He carried a diagnosis of obsessive-compulsive disorder and has had symptoms throughout his entire life.  He presents at this time on Lexapro 20 mg a day.  He has been on this dose for 1 month having started it October 12, 2020 and increased 1 month prior to his initial intake with me.  He has been on psychotropic medication most of his adult life although has been off psychotropic medication for about 1 to 2 years prior to recent restarting of that medication.  Over the years the patient has been on a variety of psychotropic medication with limited success.  He has been on Prozac which she described as having mild efficacy for his OCD symptoms, Celexa with mild efficacy at doses of 80 mg a day, Parnate, Stelazine, Haldol and clomipramine as an adjunct at 25 mg which were all described as ineffective.    The patient describes having onset of OCD symptoms as a child.  His symptoms included obsessive thoughts and compulsive behaviors with constant worry and thoughts about germs, asbestos and he engaged in frequent handwashing checking doors and lights and excessively bought movies and books.  He had an inpatient hospital stay most recently in the 1970s.  He has OCD symptoms caused him to stop attendance at the [facility] and he has been on SSDI for years due to this.    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  It was at this time that he chose to come off his Lexapro.  He did fairly well from a  psychiatric standpoint for a number of months but since the spring 2020 he has had an increase in obsessive thoughts and anxiety symptoms.  As stated previously he restarted his Lexapro on October 12 of 2020 and that was increased to 20 mg a day 1 month later.    At the initial visit, in part due to the fact that the patient had recently been increased on his Lexapro we continued with that treatment plan.  He had previously been off all psychotropic medication for a about a year and a half.  He had done fairly well off that medication until the spring 2020 when he had return of his anxiety symptoms.  He was in the process of being reassessed for his sleep apnea.    The patient return to the clinic on February 8 of 2021.  He had restarted individual psychotherapy.  He reported he was doing a bit better and stated that therapy was quite helpful.  He believed he was improving and responding well to the therapy and reassurance.  He was tolerating the recently restarted medication and we continued with that treatment plan.    I saw the patient on May 3, 2021.  At that time he again sought frequent reassurance and was quite talkative and anxious about possibly getting dementia at some point.  He continued to struggle with OCD symptoms.  He continued with his therapist and medical follow-up.  We did not make any medication changes at that visit.    The patient returns for follow-up visit in December 2021.  There was no significant change at that time.  The patient had a diagnosed and was being treated for bladder cancer.  He was comfortable with the current symptoms and stated that he was seen Dr. Stout and that was going well.  We did not make any medication changes at that visit.    I saw the patient in March 2022.  He was concerned that he may have had a slight cognitive decline in the last 3 months.  He was not reading as well.  He again was somewhat anxious and needed some reassurance.  His psychologist had  apparently ordered some neuropsychological testing.  His main concern was sleep deprivation.  We discussed some sleep hygiene techniques and I did add some low-dose as needed trazodone.    The patient was seen on June 27, 2022.  He stated he had not refilled his trazodone because he was sleeping better.  He was more active and had a more positive attitude.  He stated he had gotten good news with his neurologist regarding the cancer follow-up.  We did not make any changes.    The patient was seen on September 26, 2022.  Patient was doing well at that time.  He had run out of Lexapro 2 weeks earlier I reordered the lexapro.  We elected to make no medication changes.  The patient was tolerating the medication.  The patient was going to follow-up with psychology.    The patient was seen on January 9.  He again was quite talkative and somewhat perseverative but he reported he was less depressed and felt there had been a decline in his OCD behaviors.  He was being set up with a new medical team and was somewhat anxious about that but overall doing quite well and tolerating the medication.  We did not make any changes.    The patient was seen for follow-up in May 2023.  Again somewhat talkative and perseverative and needing some redirection.  He continue to follow-up with psychology.  We elected not to make any changes in his medication at that time.    The patient was seen in August 2023.  At that time he questioned whether he might be a bit more depressed.  He was not using his CPAP machine and was still having some sleep issues and I encouraged him to use that.  He told me his cancer treatment was going well.  I encouraged him to utilize the trazodone for sleepless nights as he stated he was not using that all the time.  He continued to see his psychologist.  We did not make any medication changes.      HPI:  The patient presents today stating he is not doing quite as well and things have gone downhill.  With further  discussion he revealed that he had been off his Lexapro for about 4 weeks because he had run out of it.  He did not want to go refill or  medications until he got his medical medications reordered as well so would only have to make one trip.  He apparently just restarted the Lexapro 3 days ago.  He told me that he was found to have some mild anemia and is being treated for that.  He told me he is stopped his vitamins and it was a bit unclear why but I told him to let his medical team know.  No hallucinations or delusions.  No thoughts of hurting himself or anybody else.  Sleep was about the same.  The patient again was overly inclusive and tended to ramble and was somewhat difficult to redirect but overall seemed to be at his baseline.  No óscar.  No behavioral dyscontrol.  He denied having any new medical issues or concerns.  We elected to continue with the recently restarted Lexapro and he would continue with this therapist.  The patient was comfortable with this plan.      Current Medications:  Medications were personally reviewed at this meeting.  Please see the chart for current medication list.      Mental Status Exam:  Appearance: Comfortable and calm.  No pain.  No shortness of breath.  Not agitated or restless.  No distress.  Behavior: No current behavioral dyscontrol.  No reports of any recent behavioral difficulties.  Able to participate.  Not restless or agitated.  Speech: Able to participate and dialogue.  Answers are consistent and appropriate.  Not pressured or rambling.  Answers are consistent.  Patient again was quite talkative needing frequent redirection and overly inclusive in detail.  He again tended to perseverate a bit  Mood/Affect: Mood appears good.  No significant depression. Mild anxiety.  Affect is stable with no lability or irritability.  No evidence of any óscar.  Thought Content:  No evidence of any psychosis. No reports of any recent psychosis.  Suicidal or Homicidal Thoughts:   None apparent or reported. The patient denies any suicidal or homicidal ideation.   Thought Process/Formulation: No racing thoughts.  Able to track and process information.  Associations: Processing information fairly well.  Somewhat rapid in his speech and thoughts again but this all appears baseline.  Somewhat perseverative.  Not disorganized or loose.  Grossly intact.  Fund of Knowledge:  Grossly intact.  No obvious recent change.  Attention/Concentration: Able to track and follow.  Attentive.  Concentration is grossly intact.  No disorganization.  No racing thoughts.  Insight:  Grossly intact.   Judgement:  Grossly intact  Memory:  Grossly intact.   Motor Status: No recent apparent change.  No current tremor.  Orientation: Grossly oriented.  No reports of any recent change.      Recent Labs:  Please see the chart.      Diagnosis:  JHON   OCD, by history  Major depressive disorder, recurrent, moderate, without psychotic features    Plan:  We will continue with the current medications.  He just restarted his Lexapro 3 days ago after being off of it for 3 months.  Please see above.    He will continue to follow-up with his medical team as well as his psychologist.  He continues to be followed regarding follow-up for his bladder cancer.    The patient will seek out appropriate emergency services should that become necessary.  I will make myself available if any questions, concerns, or problems arise.    Elie Jones MD          Again, thank you for allowing me to participate in the care of your patient.        Sincerely,        Elie Jones MD

## 2023-11-02 NOTE — PATIENT INSTRUCTIONS
The patient recently restarted his Lexapro after being off of it for a month.  We will continue with the plan as ordered.  He will continue with his therapist.  He should return to this clinic in 3 months for medication check.  He agrees to contact us before then with any questions or concerns.

## 2023-11-06 ENCOUNTER — VIRTUAL VISIT (OUTPATIENT)
Dept: NEUROLOGY | Facility: CLINIC | Age: 68
End: 2023-11-06
Payer: MEDICARE

## 2023-11-06 DIAGNOSIS — F33.2 MAJOR DEPRESSIVE DISORDER, RECURRENT SEVERE WITHOUT PSYCHOTIC FEATURES (H): Primary | ICD-10-CM

## 2023-11-06 PROCEDURE — 90834 PSYTX W PT 45 MINUTES: CPT | Mod: FQ | Performed by: PSYCHOLOGIST

## 2023-11-06 NOTE — PROGRESS NOTES
Psychology Progress Note    Date: November 06, 2023    Time length and type of treatment: 46 minutes  (9:00 AM to 9:46 AM), individual therapy    After review of the patient's situation, this visit was changed from an in-person visit to a telephone visit. Patient was informed that policies and procedures that govern in-person sessions would also apply to telephone sessions. Patient was in agreement with proceeding with a telephone session.    Patient Location: Patient home in [place], [place]  Provider Location:  [facility] - Provider remote location     Necessity: This session is necessary to address the patient's depression, anxiety, Hoarding Disorder, and OCD.  Today we focus on revising the patient's treatment plan.  The reader is invited to review the patient's full treatment plan in the Media section of the patient's Epic medical record.    Psychotherapeutic Technique: This writer utilized motivational interviewing, active listening, reassurance and support in the context of cognitive behavioral therapy to address the above.      Mental Status Evaluation:  Grooming: Unable to determine due to telephone visit  Attire: Unable to determine due to telephone visit  Age: Unable to determine due to telephone visit  Behavior Towards Examiner: Cooperative  Motor Activity: Unable to determine due to telephone visit  Eye Contact: Unable to determine due to telephone visit  Mood: Depressed   Affect: full range  Speech/Language: normal  Attention: Normal  Concentration: Sufficient  Thought Process: overinclusive   Thought Content: Clear    Orientation: Appeared oriented to person, place, and time, though not formally established  Memory: No evidence of impairment.  Judgement: Adequate  Estimated Intelligence: Within normal limits  Demonstrated Insight: Adequate  Fund of Knowledge: Adequate    Intervention:   Patient was readministered the PHQ-9 and JHON-7 as part of revising his treatment plan.   His score of 15 on the JHON-7 is suggestive of moderately severe anxiety and is identical to his earlier score.  His score of 19 on the PHQ-9 is suggestive of severe depression and is similar to his earlier score of 20. Patient expressed surprise at this, stating he feels like he has been emerging from the worst of his depression and expected his scores to look a little better. Treatment plan was jointly revised. In remaining time, patient reported he modified his goal to reduce hoarding to working on it for 3 times/week and this has worked better. He expressed pride that he has cleaned all the boxes way from one wall in a room. He has also continued to work on eating regularly and improving his diet. This positive progress was validated and reinforced and we discussed how to support maintaining this progress.     Progress:  Patient treatment plan was jointly updated.     Plan:   We will meet again in 2 weeks to address the patient's depression, anxiety, hoarding, and OCD.  Estimated duration of treatment is 10+ individual therapy sessions (22347) at twice monthly intervals. Treatment is expected to be completed by November 2024.     Diagnosis:  Major Depressive Disorder, recurrent, severe  Generalized Anxiety Disorder  Obsessive Compulsive Disorder  Hoarding Disorder

## 2023-12-04 ENCOUNTER — VIRTUAL VISIT (OUTPATIENT)
Dept: NEUROLOGY | Facility: CLINIC | Age: 68
End: 2023-12-04
Payer: MEDICARE

## 2023-12-04 DIAGNOSIS — F42.2 MIXED OBSESSIONAL THOUGHTS AND ACTS: Primary | ICD-10-CM

## 2023-12-04 PROCEDURE — 90834 PSYTX W PT 45 MINUTES: CPT | Mod: FQ | Performed by: PSYCHOLOGIST

## 2023-12-04 NOTE — PROGRESS NOTES
Psychology Progress Note    Date: December 04, 2023    Time length and type of treatment: 47 minutes (11:07 AM - 11:54 AM), individual therapy    After review of the patient's situation, this visit was changed from an in-person visit to a telephone visit. Patient was informed that policies and procedures that govern in-person sessions would also apply to telephone sessions. Patient was in agreement with proceeding with a telephone session.    Patient Location: Patient home in [place], [place]  Provider Location:  [facility] - Provider remote location     Necessity: This session is necessary to address the patient's depression, anxiety, Hoarding Disorder, and OCD. Today we focus on the patient's treatment plan, specifically exploring coping strategies and increasing involvement in meaningful activities. The reader is invited to review the patient's full treatment plan in the Media section of the patient's Epic medical record.    Psychotherapeutic Technique: This writer utilized motivational interviewing, active listening, reassurance and support in the context of cognitive behavioral therapy to address the above.      Mental Status Evaluation:  Grooming: Unable to determine due to telephone visit  Attire: Unable to determine due to telephone visit  Age: Unable to determine due to telephone visit  Behavior Towards Examiner: Cooperative  Motor Activity: Unable to determine due to telephone visit  Eye Contact: Unable to determine due to telephone visit  Mood: Anxious   Affect: full range  Speech/Language: pressured  Attention: Normal  Concentration: Sufficient  Thought Process: tangential  Thought Content: Clear    Orientation: Appeared oriented to person, place, and time, though not formally established  Memory: No evidence of impairment.  Judgement: Adequate  Estimated Intelligence: Within normal limits  Demonstrated Insight: Adequate  Fund of Knowledge: Adequate    Intervention:   Patient  reported he has cleaned another wall in another room of hoarded content and discussed how reinforcing it is to see his hardwood floors on one side of the room.  He expressed pride that he continues to meet targets for handwashing, but noted that his checking compulsions have worsened.  We set targets to cut how often he checks from  - 6 times to 3, and patient expressed optimism about his ability to meet these targets. We also discussed patient loneliness and strategies for ncreasing social connection.     Progress:  Patient has made progress on reducing hoarded content and sent new goals to reduce checking compulsions    Plan:   We will meet again in 2 weeks to address the patient's depression, anxiety, hoarding, and OCD.  Estimated duration of treatment is 10+ individual therapy sessions (74155) at twice monthly intervals. Treatment is expected to be completed by November 2024.     Diagnosis:  Obsessive Compulsive Disorder  Hoarding Disorder   Major Depressive Disorder, recurrent, severe  Generalized Anxiety Disorder

## 2023-12-08 ENCOUNTER — TRANSFERRED RECORDS (OUTPATIENT)
Dept: HEALTH INFORMATION MANAGEMENT | Facility: CLINIC | Age: 68
End: 2023-12-08
Payer: MEDICARE

## 2023-12-18 ENCOUNTER — VIRTUAL VISIT (OUTPATIENT)
Dept: NEUROLOGY | Facility: CLINIC | Age: 68
End: 2023-12-18
Payer: MEDICARE

## 2023-12-18 DIAGNOSIS — F42.9 OBSESSIVE-COMPULSIVE DISORDER, UNSPECIFIED TYPE: Primary | ICD-10-CM

## 2023-12-18 PROCEDURE — 90834 PSYTX W PT 45 MINUTES: CPT | Mod: FQ | Performed by: PSYCHOLOGIST

## 2023-12-18 NOTE — PROGRESS NOTES
Psychology Progress Note    Date: December 18, 2023    Time length and type of treatment:   42 minutes (9:07 AM - 9:49 AM), individual therapy    After review of the patient's situation, this visit was changed from an in-person visit to a telephone visit. Patient was informed that policies and procedures that govern in-person sessions would also apply to telephone sessions. Patient was in agreement with proceeding with a telephone session.    Patient Location: Patient home in [place], [place]  Provider Location:  [facility] - Provider remote location     Necessity: This session is necessary to address the patient's depression, anxiety, Hoarding Disorder, and OCD. Today we focus on the patient's treatment plan, specifically exploring establishing and adhering to OCD reduction goals.  The reader is invited to review the patient's full treatment plan in the Media section of the patient's Epic medical record.    Psychotherapeutic Technique: This writer utilized motivational interviewing, active listening, reassurance and support in the context of cognitive behavioral therapy to address the above.      Mental Status Evaluation:  Grooming: Unable to determine due to telephone visit  Attire: Unable to determine due to telephone visit  Age: Unable to determine due to telephone visit  Behavior Towards Examiner: Cooperative  Motor Activity: Unable to determine due to telephone visit  Eye Contact: Unable to determine due to telephone visit  Mood: Anxious   Affect: full range  Speech/Language: pressured  Attention: Normal  Concentration: Sufficient  Thought Process: tangential  Thought Content: Clear    Orientation: Appeared oriented to person, place, and time, though not formally established  Memory: No evidence of impairment.  Judgement: Adequate  Estimated Intelligence: Within normal limits  Demonstrated Insight: Adequate  Fund of Knowledge: Adequate    Intervention:   Patient reported mood and  anxiety have been improving and he expressed pride that he has reduced checking doors and light switches by 25% since our last appointment. This positive progress was validated and reinforced. Patient noted he continues to struggle with ruminations, especially about current events, and is struggling with fatigue.  It was noted that fatigue is a nonspecific factor and patient was encouraged to discuss this with his primary care provider.  Diet, exercise, and treating anxiety and depression were also acknowledged as possible contributory factors.  Continuing targets to reduce checking were discussed.    Progress:  Patient reports reduced checking and improved depression and anxiety    Plan:   We will meet again in 2 weeks to address the patient's depression, anxiety, hoarding, and OCD.  Estimated duration of treatment is 10+ individual therapy sessions (56136) at twice monthly intervals. Treatment is expected to be completed by November 2024.     Diagnosis:  Obsessive Compulsive Disorder  Hoarding Disorder   Major Depressive Disorder, recurrent, severe  Generalized Anxiety Disorder

## 2024-01-22 ENCOUNTER — VIRTUAL VISIT (OUTPATIENT)
Dept: NEUROLOGY | Facility: CLINIC | Age: 69
End: 2024-01-22
Payer: MEDICARE

## 2024-01-22 DIAGNOSIS — F33.2 MAJOR DEPRESSIVE DISORDER, RECURRENT SEVERE WITHOUT PSYCHOTIC FEATURES (H): Primary | ICD-10-CM

## 2024-01-22 PROCEDURE — 90834 PSYTX W PT 45 MINUTES: CPT | Mod: 93 | Performed by: PSYCHOLOGIST

## 2024-01-22 NOTE — PROGRESS NOTES
Psychology Progress Note    Date: January 22, 2024    Time length and type of treatment: 49 minutes (10:02 AM - 10:51 AM), individual therapy    After review of the patient's situation, this visit was changed from an in-person visit to a telephone visit. Patient was informed that policies and procedures that govern in-person sessions would also apply to telephone sessions. Patient was in agreement with proceeding with a telephone session.    Patient Location: Patient home in [place], [place]  Provider Location:  [facility] - Provider remote location     Necessity: This session is necessary to address the patient's depression, anxiety, Hoarding Disorder, and OCD.  Today we focus on the patient's treatment plan, specifically exploring supporting healthy diet and nutrition, and increasing involvement in meaningful activities. The reader is invited to review the patient's full treatment plan in the Media section of the patient's Epic medical record.    Psychotherapeutic Technique: This writer utilized motivational interviewing, active listening, reassurance and support in the context of cognitive behavioral therapy to address the above.      Mental Status Evaluation:  Grooming: Unable to determine due to telephone visit  Attire: Unable to determine due to telephone visit  Age: Unable to determine due to telephone visit  Behavior Towards Examiner: Cooperative  Motor Activity: Unable to determine due to telephone visit  Eye Contact: Unable to determine due to telephone visit  Mood: Anxious   Affect: full range  Speech/Language: Mildly pressured  Attention: Normal  Concentration: Sufficient  Thought Process: unremarkable  Thought Content: Clear    Orientation: Appeared oriented to person, place, and time, though not formally established  Memory: No evidence of impairment.  Judgement: Adequate  Estimated Intelligence: Average  Demonstrated Insight: Adequate  Fund of Knowledge:  Adequate    Intervention:   Patient reported he has continued to work on eating healthier and identified multiple changes he has made. This positive progress was validated and reinforced.  Patient reported he continues to struggle with fatigue. He was encouraged to discuss this with his provider, but we also explored how depression, anxiety, and isolation can contribute to fatigue.  Patient acknowledged he spends most of his time alone, and we explored how casual social contacts and increased involvement with his Confucianist can improve mood. Patient committed to starting attending Confucianist meetings in person rather than attending by telephone, which he had been doing.     Progress:  Patient has shown improvement in ability to utilize coping skills.     Plan:   We will meet again in 2 weeks to address the patient's depression, anxiety, hoarding, and OCD.  Estimated duration of treatment is 10+ individual therapy sessions (76258) at twice monthly intervals. Treatment is expected to be completed by November 2024.     Diagnosis:  Major Depressive Disorder, recurrent, severe  Obsessive Compulsive Disorder  Hoarding Disorder  Generalized Anxiety Disorder

## 2024-02-01 ENCOUNTER — VIRTUAL VISIT (OUTPATIENT)
Dept: NEUROLOGY | Facility: CLINIC | Age: 69
End: 2024-02-01
Payer: MEDICARE

## 2024-02-01 ENCOUNTER — TELEPHONE (OUTPATIENT)
Dept: NEUROLOGY | Facility: CLINIC | Age: 69
End: 2024-02-01

## 2024-02-01 VITALS — HEIGHT: 65 IN | BODY MASS INDEX: 21.33 KG/M2 | WEIGHT: 128 LBS

## 2024-02-01 DIAGNOSIS — F42.2 MIXED OBSESSIONAL THOUGHTS AND ACTS: ICD-10-CM

## 2024-02-01 PROCEDURE — 99443 PR PHYSICIAN TELEPHONE EVALUATION 21-30 MIN: CPT | Mod: 93 | Performed by: PSYCHIATRY & NEUROLOGY

## 2024-02-01 RX ORDER — TRAZODONE HYDROCHLORIDE 50 MG/1
50 TABLET, FILM COATED ORAL AT BEDTIME
Qty: 30 TABLET | Refills: 3 | Status: SHIPPED | OUTPATIENT
Start: 2024-02-01 | End: 2024-08-20

## 2024-02-01 RX ORDER — ESCITALOPRAM OXALATE 20 MG/1
TABLET ORAL
Qty: 90 TABLET | Refills: 1 | Status: SHIPPED | OUTPATIENT
Start: 2024-02-01 | End: 2024-05-16

## 2024-02-01 ASSESSMENT — PATIENT HEALTH QUESTIONNAIRE - PHQ9: SUM OF ALL RESPONSES TO PHQ QUESTIONS 1-9: 17

## 2024-02-01 ASSESSMENT — PAIN SCALES - GENERAL: PAINLEVEL: NO PAIN (0)

## 2024-02-01 NOTE — PROGRESS NOTES
Virtual Visit Details    Type of service:  Telephone Visit   Phone call duration: 23 minutes     Outpatient Psychiatry Note     The patient presented for an initial psychiatric consultation on December 7 of 2020.  He carried a diagnosis of obsessive-compulsive disorder and has had symptoms throughout his entire life.  He presents at this time on Lexapro 20 mg a day.  He has been on this dose for 1 month having started it October 12, 2020 and increased 1 month prior to his initial intake with me.  He has been on psychotropic medication most of his adult life although has been off psychotropic medication for about 1 to 2 years prior to recent restarting of that medication.  Over the years the patient has been on a variety of psychotropic medication with limited success.  He has been on Prozac which she described as having mild efficacy for his OCD symptoms, Celexa with mild efficacy at doses of 80 mg a day, Parnate, Stelazine, Haldol and clomipramine as an adjunct at 25 mg which were all described as ineffective.    The patient describes having onset of OCD symptoms as a child.  His symptoms included obsessive thoughts and compulsive behaviors with constant worry and thoughts about germs, asbestos and he engaged in frequent handwashing checking doors and lights and excessively bought movies and books.  He had an inpatient hospital stay most recently in the 1970s.  He has OCD symptoms caused him to stop attendance at the [facility] and he has been on SSDI for years due to this.    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  It was at this time that he chose to come off his Lexapro.  He did fairly well from a psychiatric standpoint for a number of months but since the spring 2020 he has had an increase in obsessive thoughts and anxiety symptoms.  As stated previously he restarted his Lexapro on October 12 of 2020 and that was increased to 20 mg a day 1 month later.    At the  initial visit, in part due to the fact that the patient had recently been increased on his Lexapro we continued with that treatment plan.  He had previously been off all psychotropic medication for a about a year and a half.  He had done fairly well off that medication until the spring 2020 when he had return of his anxiety symptoms.  He was in the process of being reassessed for his sleep apnea.    The patient return to the clinic on February 8 of 2021.  He had restarted individual psychotherapy.  He reported he was doing a bit better and stated that therapy was quite helpful.  He believed he was improving and responding well to the therapy and reassurance.  He was tolerating the recently restarted medication and we continued with that treatment plan.    I saw the patient on May 3, 2021.  At that time he again sought frequent reassurance and was quite talkative and anxious about possibly getting dementia at some point.  He continued to struggle with OCD symptoms.  He continued with his therapist and medical follow-up.  We did not make any medication changes at that visit.    The patient returns for follow-up visit in December 2021.  There was no significant change at that time.  The patient had a diagnosed and was being treated for bladder cancer.  He was comfortable with the current symptoms and stated that he was seen Dr. Stout and that was going well.  We did not make any medication changes at that visit.    I saw the patient in March 2022.  He was concerned that he may have had a slight cognitive decline in the last 3 months.  He was not reading as well.  He again was somewhat anxious and needed some reassurance.  His psychologist had apparently ordered some neuropsychological testing.  His main concern was sleep deprivation.  We discussed some sleep hygiene techniques and I did add some low-dose as needed trazodone.    The patient was seen on June 27, 2022.  He stated he had not refilled his trazodone because  he was sleeping better.  He was more active and had a more positive attitude.  He stated he had gotten good news with his neurologist regarding the cancer follow-up.  We did not make any changes.    The patient was seen on September 26, 2022.  Patient was doing well at that time.  He had run out of Lexapro 2 weeks earlier I reordered the lexapro.  We elected to make no medication changes.  The patient was tolerating the medication.  The patient was going to follow-up with psychology.    The patient was seen on January 9.  He again was quite talkative and somewhat perseverative but he reported he was less depressed and felt there had been a decline in his OCD behaviors.  He was being set up with a new medical team and was somewhat anxious about that but overall doing quite well and tolerating the medication.  We did not make any changes.    The patient was seen for follow-up in May 2023.  Again somewhat talkative and perseverative and needing some redirection.  He continue to follow-up with psychology.  We elected not to make any changes in his medication at that time.    The patient was seen in August 2023.  At that time he questioned whether he might be a bit more depressed.  He was not using his CPAP machine and was still having some sleep issues and I encouraged him to use that.  He told me his cancer treatment was going well.  I encouraged him to utilize the trazodone for sleepless nights as he stated he was not using that all the time.  He continued to see his psychologist.  We did not make any medication changes.    The patient was seen for follow-up in November 2023.  He was not doing as well and he had been off the Lexapro for 4 weeks.  It was a bit unclear why he stopped it but he was willing to restart it and actually had restarted that 3 days prior.  He continued with his therapist.  We did not make any changes at that time.      HPI:  The patient presents today again with some somatic concerns he  believes that he has aches and pains and has a pending physical in a week.  He continues to see his psychologist.  He states his mood is a bit down because of his physical health problems but he denies having any thoughts of hurting himself or anybody else.  No óscar.  No psychosis.  He denies having any tremors or side effects to the medication.  He is sleeping well and has not yet tried the trazodone as he has not needed it.  He is having no symptoms of óscar or psychosis.  He is able to contract for safety.  He states he is doing well socially and has no concerns that way.  He continues to live in [facility] and does frequent walking on the [place] and enjoys that.  He wanted to continue with the current treatment plan and we did not make any changes.      Current Medications:  Medications were personally reviewed at this meeting.  Please see the chart for current medication list.      Mental Status Exam:  Appearance:   Good eye contact.  Comfortable.  No distress.  No obvious pain.  Not short of breath.  Patient is able to participate and appears comfortable.  Behavior:   Cooperative.  Pleasant.  No agitation.  Not restless.  No reports of any recent behavioral dyscontrol.  Speech: Participating well.  Sentence structure is intact.  Content is appropriate.  Able to dialogue and initiate.  Not pressured or rambling.    Mood/Affect: No obvious depression or anxiety.  The patient is not irritable.  No obvious frustration.  No lability.  No evidence of any óscar.  Mood appears stable.  Thought Content:  No evidence of any psychosis. No reports of any recent psychosis.  Suicidal or Homicidal Thoughts:  None apparent or reported. The patient denies any suicidal or homicidal ideation.   Thought Process/Formulation:   Patient is able to participate and follow conversation.  Tracking relatively well with no obvious racing thoughts.  No disorganization.  Not loose.  Associations:  Grossly intact.  Following  conversation appropriately.  T  Fund of Knowledge: Grossly adequate.  Able to participate appropriately.  Attention/Concentration: Attentive.  No racing thoughts.  Concentration appears grossly intact.  No disorganization.  Not loose.  Insight:  Grossly intact.   Judgement:  Grossly intact.   Memory:  Grossly intact.   Motor Status:  No recent change reported. No current tremor.  Orientation: Grossly oriented..      Recent Labs:  Please see the chart.      Diagnosis:  JHON   OCD, by history  Major depressive disorder, recurrent, moderate, without psychotic features    Plan:  We will continue with his Lexapro.  He will continue with his therapist.  I will make no changes.    He will continue to follow-up with his medical team as well as his psychologist.  He continues to be followed regarding follow-up for his bladder cancer.    The patient will seek out appropriate emergency services should that become necessary.  I will make myself available if any questions, concerns, or problems arise.    Elie Jones MD

## 2024-02-01 NOTE — LETTER
2/1/2024         RE: Berto Pretty  [address] [facility] [address]        Dear Colleague,    Thank you for referring your patient, Berto Pretty, to the [facility]UROLOG[facility]. Please see a copy of my visit note below.    Virtual Visit Details    Type of service:  Telephone Visit   Phone call duration: 23 minutes     Outpatient Psychiatry Note     The patient presented for an initial psychiatric consultation on December 7 of 2020.  He carried a diagnosis of obsessive-compulsive disorder and has had symptoms throughout his entire life.  He presents at this time on Lexapro 20 mg a day.  He has been on this dose for 1 month having started it October 12, 2020 and increased 1 month prior to his initial intake with me.  He has been on psychotropic medication most of his adult life although has been off psychotropic medication for about 1 to 2 years prior to recent restarting of that medication.  Over the years the patient has been on a variety of psychotropic medication with limited success.  He has been on Prozac which she described as having mild efficacy for his OCD symptoms, Celexa with mild efficacy at doses of 80 mg a day, Parnate, Stelazine, Haldol and clomipramine as an adjunct at 25 mg which were all described as ineffective.    The patient describes having onset of OCD symptoms as a child.  His symptoms included obsessive thoughts and compulsive behaviors with constant worry and thoughts about germs, asbestos and he engaged in frequent handwashing checking doors and lights and excessively bought movies and books.  He had an inpatient hospital stay most recently in the 1970s.  He has OCD symptoms caused him to stop attendance at the [facility] and he has been on SSDI for years due to this.    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  It was at this time that he chose to come off his Lexapro.  He did fairly well from a  psychiatric standpoint for a number of months but since the spring 2020 he has had an increase in obsessive thoughts and anxiety symptoms.  As stated previously he restarted his Lexapro on October 12 of 2020 and that was increased to 20 mg a day 1 month later.    At the initial visit, in part due to the fact that the patient had recently been increased on his Lexapro we continued with that treatment plan.  He had previously been off all psychotropic medication for a about a year and a half.  He had done fairly well off that medication until the spring 2020 when he had return of his anxiety symptoms.  He was in the process of being reassessed for his sleep apnea.    The patient return to the clinic on February 8 of 2021.  He had restarted individual psychotherapy.  He reported he was doing a bit better and stated that therapy was quite helpful.  He believed he was improving and responding well to the therapy and reassurance.  He was tolerating the recently restarted medication and we continued with that treatment plan.    I saw the patient on May 3, 2021.  At that time he again sought frequent reassurance and was quite talkative and anxious about possibly getting dementia at some point.  He continued to struggle with OCD symptoms.  He continued with his therapist and medical follow-up.  We did not make any medication changes at that visit.    The patient returns for follow-up visit in December 2021.  There was no significant change at that time.  The patient had a diagnosed and was being treated for bladder cancer.  He was comfortable with the current symptoms and stated that he was seen Dr. Stout and that was going well.  We did not make any medication changes at that visit.    I saw the patient in March 2022.  He was concerned that he may have had a slight cognitive decline in the last 3 months.  He was not reading as well.  He again was somewhat anxious and needed some reassurance.  His psychologist had  apparently ordered some neuropsychological testing.  His main concern was sleep deprivation.  We discussed some sleep hygiene techniques and I did add some low-dose as needed trazodone.    The patient was seen on June 27, 2022.  He stated he had not refilled his trazodone because he was sleeping better.  He was more active and had a more positive attitude.  He stated he had gotten good news with his neurologist regarding the cancer follow-up.  We did not make any changes.    The patient was seen on September 26, 2022.  Patient was doing well at that time.  He had run out of Lexapro 2 weeks earlier I reordered the lexapro.  We elected to make no medication changes.  The patient was tolerating the medication.  The patient was going to follow-up with psychology.    The patient was seen on January 9.  He again was quite talkative and somewhat perseverative but he reported he was less depressed and felt there had been a decline in his OCD behaviors.  He was being set up with a new medical team and was somewhat anxious about that but overall doing quite well and tolerating the medication.  We did not make any changes.    The patient was seen for follow-up in May 2023.  Again somewhat talkative and perseverative and needing some redirection.  He continue to follow-up with psychology.  We elected not to make any changes in his medication at that time.    The patient was seen in August 2023.  At that time he questioned whether he might be a bit more depressed.  He was not using his CPAP machine and was still having some sleep issues and I encouraged him to use that.  He told me his cancer treatment was going well.  I encouraged him to utilize the trazodone for sleepless nights as he stated he was not using that all the time.  He continued to see his psychologist.  We did not make any medication changes.    The patient was seen for follow-up in November 2023.  He was not doing as well and he had been off the Lexapro for 4  weeks.  It was a bit unclear why he stopped it but he was willing to restart it and actually had restarted that 3 days prior.  He continued with his therapist.  We did not make any changes at that time.      HPI:  The patient presents today again with some somatic concerns he believes that he has aches and pains and has a pending physical in a week.  He continues to see his psychologist.  He states his mood is a bit down because of his physical health problems but he denies having any thoughts of hurting himself or anybody else.  No óscar.  No psychosis.  He denies having any tremors or side effects to the medication.  He is sleeping well and has not yet tried the trazodone as he has not needed it.  He is having no symptoms of óscar or psychosis.  He is able to contract for safety.  He states he is doing well socially and has no concerns that way.  He continues to live in [place] and does frequent walking on the [place] there and enjoys that.  He wanted to continue with the current treatment plan and we did not make any changes.      Current Medications:  Medications were personally reviewed at this meeting.  Please see the chart for current medication list.      Mental Status Exam:  Appearance:   Good eye contact.  Comfortable.  No distress.  No obvious pain.  Not short of breath.  Patient is able to participate and appears comfortable.  Behavior:   Cooperative.  Pleasant.  No agitation.  Not restless.  No reports of any recent behavioral dyscontrol.  Speech: Participating well.  Sentence structure is intact.  Content is appropriate.  Able to dialogue and initiate.  Not pressured or rambling.    Mood/Affect: No obvious depression or anxiety.  The patient is not irritable.  No obvious frustration.  No lability.  No evidence of any óscar.  Mood appears stable.  Thought Content:  No evidence of any psychosis. No reports of any recent psychosis.  Suicidal or Homicidal Thoughts:  None apparent or reported.  The patient denies any suicidal or homicidal ideation.   Thought Process/Formulation:   Patient is able to participate and follow conversation.  Tracking relatively well with no obvious racing thoughts.  No disorganization.  Not loose.  Associations:  Grossly intact.  Following conversation appropriately.  T  Fund of Knowledge: Grossly adequate.  Able to participate appropriately.  Attention/Concentration: Attentive.  No racing thoughts.  Concentration appears grossly intact.  No disorganization.  Not loose.  Insight:  Grossly intact.   Judgement:  Grossly intact.   Memory:  Grossly intact.   Motor Status:  No recent change reported. No current tremor.  Orientation: Grossly oriented..      Recent Labs:  Please see the chart.      Diagnosis:  JHON   OCD, by history  Major depressive disorder, recurrent, moderate, without psychotic features    Plan:  We will continue with his Lexapro.  He will continue with his therapist.  I will make no changes.    He will continue to follow-up with his medical team as well as his psychologist.  He continues to be followed regarding follow-up for his bladder cancer.    The patient will seek out appropriate emergency services should that become necessary.  I will make myself available if any questions, concerns, or problems arise.    Elie Jones MD          Again, thank you for allowing me to participate in the care of your patient.        Sincerely,        Elie Jones MD

## 2024-02-01 NOTE — NURSING NOTE
Is the patient currently in the state of [place]? YES    Visit mode: Telephone     If the visit is dropped, the patient can be reconnected by: TELEPHONE VISIT: Phone number: [phone]    Will anyone else be joining the visit? NO  (If patient encounters technical issues they should call [phone])    How would you like to obtain your AVS? MyChart    Are changes needed to the allergy or medication list? No    Reason for visit: Follow Up    Marietta Das VVF    Depression Response    Patient completed the PHQ-9 assessment for depression and scored >9? Yes  Question 9 on the PHQ-9 was positive for suicidality? No  Does patient have current mental health provider? Yes    Is this a virtual visit? Yes   Does patient have suicidal ideation (positive question 9)? No - offer to place Mental Health Referral.  Patient declined referral/not needed    I personally notified the following: visit provider

## 2024-02-01 NOTE — TELEPHONE ENCOUNTER
Hello, patient had a virtual appointment today with Dr. Jones. Please assist with getting the patient scheduled for their follow-up appt. Checkout notes below.     Return to this clinic in 3-4 months. Due around 05/01/2024.           Thank you!  Marietta Das, Virtual Visit Facilitator

## 2024-02-01 NOTE — PATIENT INSTRUCTIONS
The patient has had some mild depression and anxiety related to generally feeling more aches and pains and he has a pending physical exam and we will await the results of that.  He believes he is doing relatively well regarding his mood medication change is.  We will continue with the medications and he should return to this clinic in 3 to 4 months for medication check.

## 2024-02-08 NOTE — LETTER
3/17/2022         RE: Berto Pretty  [address]  [facility] [address]        Dear Colleague,    Thank you for referring your patient, Berto Pretty, to the [facility]. Please see a copy of my visit note below.    Psychology Progress Note    Date: March 17, 2022    Time length and type of treatment: 44 minutes (9:02 AM - 9:46 AM) , individual therapy    After review of the patient's situation, this visit was changed from an in-person visit to a telephone visit to reduce the risk of COVID 19 exposure. Patient was informed that policies and procedures that govern in-person sessions would also apply to telephone sessions. Patient was also informed that telephone sessions would be discontinued when COVID 19 exposure is no longer a concern (as determined by [facility]).     Patient location: Patient home in [place], [place]  Provider location:  [facility] - Provider remote location    Patient was in agreement with proceeding with a telephone session.      Necessity: This session is necessary to address the patient's depression, anxiety, hoarding, and OCD.  Today we focus on the patient's treatment plan, specifically exploring coping strategies and bibliotherapy. The reader is invited to review the patient's full treatment plan in the Media section of the patient's Epic medical record.    Psychotherapeutic Technique: This writer utilized motivational interviewing, active listening, reassurance and support in the context of cognitive behavioral therapy to address the above.      MENTAL STATUS EVALUATION  Grooming: Unable to determine due to telephone visit  Attire: Unable to determine due to telephone visit  Age: Unable to determine due to telephone visit  Behavior Towards Examiner: Cooperative  Motor Activity: Unable to determine due to telephone visit  Eye Contact: Unable to determine due to telephone visit  Mood: Anxious   Affect: full  range  Speech/Language: normal  Attention: Normal  Concentration: Sufficient  Thought Process: tangential  Thought Content: Clear    Orientation: Appeared oriented to person, place, and time, though not formally established  Memory: No evidence of impairment.  Judgement: Adequate  Estimated Intelligence: Average  Demonstrated Insight: Adequate  Fund of Knowledge: Adequate    Intervention:   Patient reported he ate after our last session and has eaten every day since our last session. He has also paid bills that he hadn't been paying despite having the money. He has also taken two walks and recognized a modest improvement in mood when he walked. His [employer] has also resumed in person meetings, which has helped. Despite these improvements, patient remains very anxious about perceived decline in cognitive functioning. We discussed the importance of healthy nutrition on brain functioning and a book on the science of brain maintenance as we age was recommended. Psychoeducation was provided related to mindfulness and how to use mindfulness to change behavior. We also discussed the importance of habit in making positive changes. Patient committed to several behavioral activation strategies.     Progress:  Patient has shown improvement in commitment to utilize coping skills.     Plan:   We will meet again in 2 weeks to address the patient's depression, anxiety, hoarding, and OCD.  Estimated duration of treatment is 10+ individual therapy sessions (37595) at twice monthly intervals. Treatment is expected to be completed by July 2022.     Diagnosis:  Major Depressive Disorder, recurrent, severe  Hoarding Disorder  Generalized Anxiety Disorder  Obsessive Compulsive Disorder (OCD)      Again, thank you for allowing me to participate in the care of your patient.        Sincerely,        Lisa Stout Psy.D, LP     solids

## 2024-02-19 ENCOUNTER — VIRTUAL VISIT (OUTPATIENT)
Dept: NEUROLOGY | Facility: CLINIC | Age: 69
End: 2024-02-19
Payer: MEDICARE

## 2024-02-19 DIAGNOSIS — F33.2 MAJOR DEPRESSIVE DISORDER, RECURRENT SEVERE WITHOUT PSYCHOTIC FEATURES (H): Primary | ICD-10-CM

## 2024-02-19 PROCEDURE — 90834 PSYTX W PT 45 MINUTES: CPT | Mod: 93 | Performed by: PSYCHOLOGIST

## 2024-02-19 ASSESSMENT — ANXIETY QUESTIONNAIRES
5. BEING SO RESTLESS THAT IT IS HARD TO SIT STILL: SEVERAL DAYS
4. TROUBLE RELAXING: SEVERAL DAYS
1. FEELING NERVOUS, ANXIOUS, OR ON EDGE: SEVERAL DAYS
GAD7 TOTAL SCORE: 11
6. BECOMING EASILY ANNOYED OR IRRITABLE: MORE THAN HALF THE DAYS
7. FEELING AFRAID AS IF SOMETHING AWFUL MIGHT HAPPEN: MORE THAN HALF THE DAYS
3. WORRYING TOO MUCH ABOUT DIFFERENT THINGS: MORE THAN HALF THE DAYS
2. NOT BEING ABLE TO STOP OR CONTROL WORRYING: MORE THAN HALF THE DAYS
IF YOU CHECKED OFF ANY PROBLEMS ON THIS QUESTIONNAIRE, HOW DIFFICULT HAVE THESE PROBLEMS MADE IT FOR YOU TO DO YOUR WORK, TAKE CARE OF THINGS AT HOME, OR GET ALONG WITH OTHER PEOPLE: VERY DIFFICULT

## 2024-02-19 ASSESSMENT — PATIENT HEALTH QUESTIONNAIRE - PHQ9
8. MOVING OR SPEAKING SO SLOWLY THAT OTHER PEOPLE COULD HAVE NOTICED. OR THE OPPOSITE, BEING SO FIGETY OR RESTLESS THAT YOU HAVE BEEN MOVING AROUND A LOT MORE THAN USUAL: NEARLY EVERY DAY
5. POOR APPETITE OR OVEREATING: SEVERAL DAYS
SUM OF ALL RESPONSES TO PHQ9 QUESTIONS 1 & 2: 3
1. LITTLE INTEREST OR PLEASURE IN DOING THINGS: MORE THAN HALF THE DAYS
7. TROUBLE CONCENTRATING ON THINGS, SUCH AS READING THE NEWSPAPER OR WATCHING TELEVISION: NEARLY EVERY DAY
3. TROUBLE FALLING OR STAYING ASLEEP OR SLEEPING TOO MUCH: MORE THAN HALF THE DAYS
9. THOUGHTS THAT YOU WOULD BE BETTER OFF DEAD, OR OF HURTING YOURSELF: NOT AT ALL
4. FEELING TIRED OR HAVING LITTLE ENERGY: NEARLY EVERY DAY
6. FEELING BAD ABOUT YOURSELF - OR THAT YOU ARE A FAILURE OR HAVE LET YOURSELF OR YOUR FAMILY DOWN: SEVERAL DAYS
10. IF YOU CHECKED OFF ANY PROBLEMS, HOW DIFFICULT HAVE THESE PROBLEMS MADE IT FOR YOU TO DO YOUR WORK, TAKE CARE OF THINGS AT HOME, OR GET ALONG WITH OTHER PEOPLE: VERY DIFFICULT
2. FEELING DOWN, DEPRESSED OR HOPELESS: SEVERAL DAYS
SUM OF ALL RESPONSES TO PHQ QUESTIONS 1-9: 16

## 2024-02-19 NOTE — PROGRESS NOTES
Psychology Progress Note    Date: February 19, 2024    Time length and type of treatment: 48 minutes (11:00 AM - 11:48 AM) , individual therapy    Telemedicine visit: The patient's condition can be safely assessed and treated via synchronous audio and visual telemedicine encounter via [employer]. Patient was informed that policies and procedures that govern in-person sessions would also apply to telephone sessions. Patient was in agreement with proceeding with a telephone session.     Patient Location: Patient home in [place], [place]  Provider Location:  [facility] - Provider remote location     Necessity: This session is necessary to address the patient's depression, anxiety, OCD, and hoarding.  Today we focus on revising the patient's treatment plan.  The reader is invited to review the patient's full treatment plan in the Media section of the patient's Epic medical record.    Psychotherapeutic Technique: This writer utilized motivational interviewing, active listening, reassurance and support in the context of cognitive behavioral therapy to address the above.      Mental Status Evaluation:  Grooming: Unable to determine due to telephone visit  Attire: Unable to determine due to telephone visit  Age: Unable to determine due to telephone visit  Behavior Towards Examiner: Cooperative  Motor Activity: Unable to determine due to telephone visit  Eye Contact: Unable to determine due to telephone visit  Mood: Anxious  Depressed   Affect: full range  Speech/Language: normal  Attention: Normal  Concentration: Sufficient  Thought Process: tangential  Thought Content: Clear    Orientation: Appeared oriented to person, place, and time, though not formally established  Memory: No evidence of impairment.  Judgement: Adequate  Estimated Intelligence: Within normal limits  Demonstrated Insight: Adequate  Fund of Knowledge: Adequate    Intervention:   Patient was readministered the PHQ-9 and JHON-7 as  part of revising his treatment plan.  His score of 16 on the PHQ-9 is suggestive of severe depression but is an improvement over his previous score of 19.  His score of 11 on the JHON-7 is suggestive of moderate anxiety and is an improvement over his earlier score of 15.  Patient initially expressed surprise at this, stating that he has felt more depressed the last two weeks, but upon reflection, patient noted he has been using skills more effectively to help him when he feels depressed. Patient stated that he has made minimal progress on disposing of hoarded content, but has not added any new hoarded content.  He stated he has been able to maintain treatment gains around handwashing, but checking behavior has increased recently. Treatment plan was jointly revised and in remaining time we discussed patient efforts to eat a healthier diet and how this affects energy and depression.      Progress:  Patient treatment plan was jointly revised    Plan:   We will meet again in 3 weeks to address the patient's anxiety, depression, OCD, and Hoarding Disorder.  Estimated duration of treatment is 10+ individual therapy sessions (59403) at intervals of once every 3 weeks. Treatment is expected to be completed by November 2024.     Diagnosis:  Major Depressive Disorder, recurrent, severe  Generalized Anxiety Disorder  Obsessive Compulsive Disorder  Hoarding Disorder

## 2024-03-22 ENCOUNTER — TRANSFERRED RECORDS (OUTPATIENT)
Dept: HEALTH INFORMATION MANAGEMENT | Facility: CLINIC | Age: 69
End: 2024-03-22
Payer: MEDICARE

## 2024-03-25 ENCOUNTER — VIRTUAL VISIT (OUTPATIENT)
Dept: NEUROLOGY | Facility: CLINIC | Age: 69
End: 2024-03-25
Payer: MEDICARE

## 2024-03-25 DIAGNOSIS — F42.9 OBSESSIVE-COMPULSIVE DISORDER, UNSPECIFIED TYPE: Primary | ICD-10-CM

## 2024-03-25 PROCEDURE — 90834 PSYTX W PT 45 MINUTES: CPT | Mod: 93 | Performed by: PSYCHOLOGIST

## 2024-03-25 NOTE — PROGRESS NOTES
Psychology Progress Note    Date: March 25, 2024    Time length and type of treatment: 46 minutes (9:06 AM to 9:52 AM), individual therapy    Telemedicine visit: The patient's condition can be safely assessed and treated via synchronous audio and visual telemedicine encounter via [employer]. Patient was informed that policies and procedures that govern in-person sessions would also apply to telephone sessions. Patient was in agreement with proceeding with a telephone session.     Patient Location: Patient home in [place], [place]  Provider Location:  [facility] - Provider remote location     Necessity: This session is necessary to address the patient's depression, anxiety, OCD, and hoarding. Today we focus on the patient's treatment plan, specifically exploring compliance with medical treatment plan and decreasing social isolation.  The reader is invited to review the patient's full treatment plan in the Media section of the patient's Epic medical record.    Psychotherapeutic Technique: This writer utilized motivational interviewing, active listening, reassurance and support in the context of cognitive behavioral therapy to address the above.      Mental Status Evaluation:  Grooming: Unable to determine due to telephone visit  Attire: Unable to determine due to telephone visit  Age: Unable to determine due to telephone visit  Behavior Towards Examiner: Cooperative  Motor Activity: Unable to determine due to telephone visit  Eye Contact: Unable to determine due to telephone visit  Mood: Anxious   Affect: full range  Speech/Language: pressured  Attention: Normal  Concentration: Sufficient  Thought Process: overinclusive   Thought Content: Clear    Orientation: Appeared oriented to person, place, and time, though not formally established  Memory: No evidence of impairment.  Judgement: Adequate  Estimated Intelligence: Within normal limits  Demonstrated Insight: Adequate  Fund of Knowledge:  Adequate    Intervention:   Patient expressed pride that he finally managed to have a colonoscopy.  He acknowledged that since his dad and 3 - 4 cousins  from colon cancer, he was very aware of the importance of having a colonoscopy every 5 years, but had not had one in 10 years.  He discussed the challenge of the colonoscopy prep, how the diarrhea aggravated his germ phobia, and his efforts to manage his handwashing while coping with diarrhea.  Patient acknowledged growing very depressed and distressed during his prep, but expressed pride that he didn't quit, which he has done before, and he identified multiple skills he used to enable him to be successful.  Patient positive progress was validated and reinforced.  Patient also connected with an old friend to bring him to the colonoscopy and discussed his happiness at expanding his social [place], which had grown very small during COVID.  Patient identified continuing to work on a healthier diet and working on his back taxes as goals he plans to continue to address.  He also stated he has made some small improvements on reducing hoarded content.     Progress:  Patient has shown improvement in ability to utilize coping skills.     Plan:   We will meet again in 3 weeks to address the patient's depression, anxiety, OCD, and hoarding.  Estimated duration of treatment is 10+ individual therapy sessions (18460) at intervals of once every 3 weeks. Treatment is expected to be completed by 2024.     Diagnosis:  Obsessive Compulsive Disorder  Major Depressive Disorder, Recurrent, Severe  Generalized Anxiety Disorder    Hoarding Disorder

## 2024-03-29 ENCOUNTER — OFFICE VISIT (OUTPATIENT)
Dept: INTERNAL MEDICINE | Facility: CLINIC | Age: 69
End: 2024-03-29
Payer: MEDICARE

## 2024-03-29 VITALS
HEART RATE: 76 BPM | RESPIRATION RATE: 16 BRPM | HEIGHT: 64 IN | OXYGEN SATURATION: 96 % | BODY MASS INDEX: 20.66 KG/M2 | WEIGHT: 121 LBS | TEMPERATURE: 98.3 F | DIASTOLIC BLOOD PRESSURE: 60 MMHG | SYSTOLIC BLOOD PRESSURE: 94 MMHG

## 2024-03-29 DIAGNOSIS — Z00.00 ROUTINE GENERAL MEDICAL EXAMINATION AT A HEALTH CARE FACILITY: Primary | ICD-10-CM

## 2024-03-29 DIAGNOSIS — Z12.5 SCREENING PSA (PROSTATE SPECIFIC ANTIGEN): ICD-10-CM

## 2024-03-29 DIAGNOSIS — G47.33 OSA (OBSTRUCTIVE SLEEP APNEA): ICD-10-CM

## 2024-03-29 DIAGNOSIS — E78.5 HYPERLIPIDEMIA LDL GOAL <100: ICD-10-CM

## 2024-03-29 DIAGNOSIS — Z86.0100 PERSONAL HISTORY OF COLONIC POLYPS: ICD-10-CM

## 2024-03-29 DIAGNOSIS — F42.2 MIXED OBSESSIONAL THOUGHTS AND ACTS: ICD-10-CM

## 2024-03-29 PROBLEM — Z86.03 HISTORY OF NEOPLASM OF BLADDER: Status: ACTIVE | Noted: 2023-12-07

## 2024-03-29 PROBLEM — K63.5 POLYP OF COLON: Status: ACTIVE | Noted: 2024-03-22

## 2024-03-29 LAB
BASOPHILS # BLD AUTO: 0 10E3/UL (ref 0–0.2)
BASOPHILS NFR BLD AUTO: 1 %
EOSINOPHIL # BLD AUTO: 0.3 10E3/UL (ref 0–0.7)
EOSINOPHIL NFR BLD AUTO: 5 %
ERYTHROCYTE [DISTWIDTH] IN BLOOD BY AUTOMATED COUNT: 12.6 % (ref 10–15)
HCT VFR BLD AUTO: 39.7 % (ref 40–53)
HGB BLD-MCNC: 13.3 G/DL (ref 13.3–17.7)
IMM GRANULOCYTES # BLD: 0 10E3/UL
IMM GRANULOCYTES NFR BLD: 0 %
LYMPHOCYTES # BLD AUTO: 1.3 10E3/UL (ref 0.8–5.3)
LYMPHOCYTES NFR BLD AUTO: 23 %
MCH RBC QN AUTO: 31.7 PG (ref 26.5–33)
MCHC RBC AUTO-ENTMCNC: 33.5 G/DL (ref 31.5–36.5)
MCV RBC AUTO: 95 FL (ref 78–100)
MONOCYTES # BLD AUTO: 0.6 10E3/UL (ref 0–1.3)
MONOCYTES NFR BLD AUTO: 10 %
NEUTROPHILS # BLD AUTO: 3.5 10E3/UL (ref 1.6–8.3)
NEUTROPHILS NFR BLD AUTO: 61 %
PLATELET # BLD AUTO: 160 10E3/UL (ref 150–450)
RBC # BLD AUTO: 4.19 10E6/UL (ref 4.4–5.9)
WBC # BLD AUTO: 5.7 10E3/UL (ref 4–11)

## 2024-03-29 PROCEDURE — 84443 ASSAY THYROID STIM HORMONE: CPT | Performed by: INTERNAL MEDICINE

## 2024-03-29 PROCEDURE — 85025 COMPLETE CBC W/AUTO DIFF WBC: CPT | Performed by: INTERNAL MEDICINE

## 2024-03-29 PROCEDURE — G0103 PSA SCREENING: HCPCS | Performed by: INTERNAL MEDICINE

## 2024-03-29 PROCEDURE — 99214 OFFICE O/P EST MOD 30 MIN: CPT | Mod: 25 | Performed by: INTERNAL MEDICINE

## 2024-03-29 PROCEDURE — 36415 COLL VENOUS BLD VENIPUNCTURE: CPT | Performed by: INTERNAL MEDICINE

## 2024-03-29 PROCEDURE — 80061 LIPID PANEL: CPT | Performed by: INTERNAL MEDICINE

## 2024-03-29 PROCEDURE — G0439 PPPS, SUBSEQ VISIT: HCPCS | Performed by: INTERNAL MEDICINE

## 2024-03-29 PROCEDURE — 80053 COMPREHEN METABOLIC PANEL: CPT | Performed by: INTERNAL MEDICINE

## 2024-03-29 SDOH — HEALTH STABILITY: PHYSICAL HEALTH: ON AVERAGE, HOW MANY MINUTES DO YOU ENGAGE IN EXERCISE AT THIS LEVEL?: 40 MIN

## 2024-03-29 SDOH — HEALTH STABILITY: PHYSICAL HEALTH: ON AVERAGE, HOW MANY DAYS PER WEEK DO YOU ENGAGE IN MODERATE TO STRENUOUS EXERCISE (LIKE A BRISK WALK)?: 5 DAYS

## 2024-03-29 ASSESSMENT — SOCIAL DETERMINANTS OF HEALTH (SDOH): HOW OFTEN DO YOU GET TOGETHER WITH FRIENDS OR RELATIVES?: TWICE A WEEK

## 2024-03-29 ASSESSMENT — PATIENT HEALTH QUESTIONNAIRE - PHQ9
SUM OF ALL RESPONSES TO PHQ QUESTIONS 1-9: 18
SUM OF ALL RESPONSES TO PHQ QUESTIONS 1-9: 18
10. IF YOU CHECKED OFF ANY PROBLEMS, HOW DIFFICULT HAVE THESE PROBLEMS MADE IT FOR YOU TO DO YOUR WORK, TAKE CARE OF THINGS AT HOME, OR GET ALONG WITH OTHER PEOPLE: VERY DIFFICULT

## 2024-03-29 NOTE — PATIENT INSTRUCTIONS
Annual preventive visit, general overall is doing okay.    The most important development is that he did finally undergo his low overdue colonoscopy March 22, 2024, which indeed revealed a fairly large polyp which was excised.  Berto is tremendously relieved by this development.  He is going to need to get another colonoscopy 5 years from now which will be March 2029.    Berto is fasting this afternoon, so we will have him stop by the laboratory for comprehensive metabolic panel, blood cell counts, TSH for thyroid, PSA for prostate, and lipid profile.    Sessile serrated adenoma colon polyp, 11-15 mm, removed March 22, 2024,, recheck 5 years which would be March 2029  Family history colon cancer (father)  Chronic constipation  I agree with the recommendation that Dr. Jama gave to Berto that Berto should take MiraLAX powder 1 capful per day, and adjust that dose in order to try to achieve 1 soft bowel movement either daily or every other day.    Poor energy, tingling feeling that seems to permeate his whole body, and sometimes nausea  But we have not found a metabolic cause  I think Berto should focus on healthy eating, strength training, and trying to improve sleep, including trying out his CPAP machine again to see if he can address sleep apnea.  Borderline low body mass index    He did have a follow-up visit with Dr. Oliver in the hematology clinic because of his monoclonal gammopathy of unknown significance.     As far as medications, Berto is taking his Lexapro 20 mg a day, atorvastatin 40 mg a day  Trazodone 50 mg is on his medication list, but Berto says he is not using it these days.     Plugged into the psychology department, and also psychiatry with Dr. Jones.  He is also plugged into urology follow-up with Dr. Braulio Munroe.  January 2023 at [facility]  Continues on Lexapro 20 mg with good effect.    Berto's body mass index is 20.8, which is technically is at the lower limit of normal.    He  is quite lean, and I think he would benefit from building up some muscle mass.  He lives alone and does his own food preparation.  He does not use the Internet  He asked about where to seek out dietary advice.  I told him that unfortunately dietitian services are not a covered service under Medicare for his medical history  I suggested that Berto stop by a bookstore such as [employer] and take a look at the health and self-care selections, and I feel confident he will find a useful book about diet and nutrition     STABLE Major Depressive Disorder, recurrent, severe  Generalized Anxiety Disorder  Hoarding Disorder  Obsessive-Compulsive Disorder     Lisa Stout, PhD LP Psychology  Dr Jones every 3 months     Obsessive-compulsive disorder. He is on Social Security disability due to this.  escitalopram (LEXAPRO) 20 MG tablet-- helpful  traZODone (DESYREL) 50 MG tablet    Obstructive sleep apnea syndrome  Not using CPAP machine which needs cleaning and maintenance  Current sleep study October 2020, at [facility]  Sleep study May 30, 2018 done at [facility].  7 obstructive events observed, 18 central apneas, 23 hypopneas     Insomnia,  Berto decided he did not need to take the trazodone which Dr. 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  The right side of his body was weak.   MRI March 16, 2020, 1 no acute or subacute infarction, or hemorrhage.  Chronic infarction in the left precentral gyrus and right cerebellum with additional presumed sequelae of mild to moderate chronic small vessel ischemic disease.     Patient also had an MRI, February 2018, which is included below, right cerebellar infarct was present.- Primary     Vitamin D deficiency  Vitamin D, Cholecalciferol, 25 MCG (1000 UT) CAPS     Hyperlipidemia  on treatement LDL goal <100,  Atorvastatin 40 mg daily  History of stroke  also describes having had a TIA event in 2018  8-  LDL Cholesterol Calculated <=100 mg/dL 50       Direct Measure HDL >=40 mg/dL 79       Triglycerides <150 mg/dL 43       Cholesterol <200 mg/dL 138      Satisfactory surveillance cystoscopy done by Dr. Munroe over at [facility] December 9, 2023, and Vic will have a recheck 6 months after that which will be June 2024.    History Malignant neoplasm of urinary bladder, summer 2021  Small bladder tumor, just above and lateral to the right ureteral orifice.  Appears to be superficial disease.  No other bladder tumors or suspicious mucosa.  Small transurethral resection of bladder tumor performed.  All tumor was removed.  Dr. Munroe examines Berto's prostate gland periodically, and thus far it has been reported as normal     Benign skin tag on his left upper chest, total of these have no dangerous potential, and are best left alone  Inclusion cyst on his central upper back at the base of his neck, which general site has been present for years.  No signs of infection.  I told him the best left alone     Tiny right-sided inguinal hernia, which does not seem to be bothering Berto, and is probably best left alone    Immunization History   Administered Date(s) Administered    COVID-19 12+ (2023-24) (Pfizer) 10/13/2023    COVID-19 Bivalent 12+ (Pfizer) 11/14/2022    COVID-19 MONOVALENT 12+ (Pfizer) 03/11/2021, 04/05/2021, 12/09/2021    COVID-19 Monovalent 12+ (Pfizer 2022) 04/13/2022    Flu, Unspecified 12/27/2011    Influenza (IIV3) PF 12/28/2005, 01/18/2007, 12/27/2011    Influenza Vaccine 18-64 (Flublok) 12/07/2018    Influenza Vaccine 65+ (Fluzone HD) 11/03/2020, 12/09/2021, 11/14/2022, 10/13/2023    Influenza Vaccine >6 months,quad, PF 12/27/2017    Pneumococcal 20 valent Conjugate (Prevnar 20) 12/09/2022    Pneumococcal, Unspecified 06/01/2016    TDAP (Adacel,Boostrix) 12/27/2011     Td (Adult), Adsorbed 1955    Tdap (Adult) Unspecified Formulation 1955

## 2024-03-29 NOTE — LETTER
March 30, 2024      Berto Pretty  [address] [facility] [address]        Dear ,    Your test results look good.  I do not think you need to worry about the slight elevation of the AST.  It barely missed the upper cutoff for normal.  AST can come from liver tissue or muscle.  A recent muscle bruise could produce a slight elevation like this.  Your other liver related tests of ALT, alkaline phosphatase, and bilirubin were perfectly normal, so I really think there is no need to worry about the liver whatsoever.    The rest of your comprehensive metabolic panel, which includes tests of kidney function and glucose blood sugar--completely normal.    Cholesterol numbers are beautifully controlled on this dose of atorvastatin.  Normal TSH thyroid test, PSA prostate test, and blood cell counts.    Resulted Orders   Lipid panel reflex to direct LDL Non-fasting   Result Value Ref Range    Cholesterol 141 <200 mg/dL    Triglycerides 39 <150 mg/dL    Direct Measure HDL 76 >=40 mg/dL    LDL Cholesterol Calculated 57 <=100 mg/dL    Non HDL Cholesterol 65 <130 mg/dL    Patient Fasting > 8hrs? Unknown     Narrative    Cholesterol  Desirable:  <200 mg/dL    Triglycerides  Normal:  Less than 150 mg/dL  Borderline High:  150-199 mg/dL  High:  200-499 mg/dL  Very High:  Greater than or equal to 500 mg/dL    Direct Measure HDL  Female:  Greater than or equal to 50 mg/dL   Male:  Greater than or equal to 40 mg/dL    LDL Cholesterol  Desirable:  <100mg/dL  Above Desirable:  100-129 mg/dL   Borderline High:  130-159 mg/dL   High:  160-189 mg/dL   Very High:  >= 190 mg/dL    Non HDL Cholesterol  Desirable:  130 mg/dL  Above Desirable:  130-159 mg/dL  Borderline High:  160-189 mg/dL  High:  190-219 mg/dL  Very High:  Greater than or equal to 220 mg/dL   Comprehensive metabolic panel (BMP + Alb, Alk Phos, ALT, AST, Total. Bili, TP)   Result Value Ref Range    Sodium 143 135 - 145 mmol/L      Comment:      Reference intervals  for this test were updated on 09/26/2023 to more accurately reflect our healthy population. There may be differences in the flagging of prior results with similar values performed with this method. Interpretation of those prior results can be made in the context of the updated reference intervals.     Potassium 3.7 3.4 - 5.3 mmol/L    Carbon Dioxide (CO2) 28 22 - 29 mmol/L    Anion Gap 10 7 - 15 mmol/L    Urea Nitrogen 20.5 8.0 - 23.0 mg/dL    Creatinine 0.85 0.67 - 1.17 mg/dL    GFR Estimate >90 >60 mL/min/1.73m2    Calcium 9.0 8.8 - 10.2 mg/dL    Chloride 105 98 - 107 mmol/L    Glucose 87 70 - 99 mg/dL    Alkaline Phosphatase 84 40 - 150 U/L      Comment:      Reference intervals for this test were updated on 11/14/2023 to more accurately reflect our healthy population. There may be differences in the flagging of prior results with similar values performed with this method. Interpretation of those prior results can be made in the context of the updated reference intervals.    AST 56 (H) 0 - 45 U/L      Comment:      Reference intervals for this test were updated on 6/12/2023 to more accurately reflect our healthy population. There may be differences in the flagging of prior results with similar values performed with this method. Interpretation of those prior results can be made in the context of the updated reference intervals.    ALT 41 0 - 70 U/L      Comment:      Reference intervals for this test were updated on 6/12/2023 to more accurately reflect our healthy population. There may be differences in the flagging of prior results with similar values performed with this method. Interpretation of those prior results can be made in the context of the updated reference intervals.      Protein Total 6.5 6.4 - 8.3 g/dL    Albumin 4.2 3.5 - 5.2 g/dL    Bilirubin Total 0.3 <=1.2 mg/dL   TSH with free T4 reflex   Result Value Ref Range    TSH 0.96 0.30 - 4.20 uIU/mL   PSA, screen   Result Value Ref Range    Prostate  Specific Antigen Screen 0.70 0.00 - 4.50 ng/mL    Narrative    This result is obtained using the Roche Elecsys total PSA method on the ye [id number] immunoassay analyzer. Results obtained with different assay methods or kits cannot be used interchangeably.   CBC with platelets and differential   Result Value Ref Range    WBC Count 5.7 4.0 - 11.0 10e3/uL    RBC Count 4.19 (L) 4.40 - 5.90 10e6/uL    Hemoglobin 13.3 13.3 - 17.7 g/dL    Hematocrit 39.7 (L) 40.0 - 53.0 %    MCV 95 78 - 100 fL    MCH 31.7 26.5 - 33.0 pg    MCHC 33.5 31.5 - 36.5 g/dL    RDW 12.6 10.0 - 15.0 %    Platelet Count 160 150 - 450 10e3/uL    % Neutrophils 61 %    % Lymphocytes 23 %    % Monocytes 10 %    % Eosinophils 5 %    % Basophils 1 %    % Immature Granulocytes 0 %    Absolute Neutrophils 3.5 1.6 - 8.3 10e3/uL    Absolute Lymphocytes 1.3 0.8 - 5.3 10e3/uL    Absolute Monocytes 0.6 0.0 - 1.3 10e3/uL    Absolute Eosinophils 0.3 0.0 - 0.7 10e3/uL    Absolute Basophils 0.0 0.0 - 0.2 10e3/uL    Absolute Immature Granulocytes 0.0 <=0.4 10e3/uL       If you have any questions or concerns, please call the clinic at the number listed above.       Sincerely,      Olvin De Dios MD

## 2024-03-29 NOTE — PROGRESS NOTES
Preventive Care Visit  [facility]  QUE LIZAMA MD, Internal Medicine  Mar 29, 2024        Dima Thomson is a 68 year old, presenting for the following:  Wellness Visit (Fasting  )        3/29/2024     4:05 PM   Additional Questions   Roomed by Patricia PRICE       Health Care Directive  Patient does not have a Health Care Directive or Living Will: Discussed advance care planning with patient; however, patient declined at this time.    HPI    Annual Wellness Visit     Patient has been advised of split billing requirements and indicates understanding: Yes       Health Care Directive  Patient does not have a Health Care Directive or Living Will: Discussed advance care planning with patient; however, patient declined at this time.      3/29/2024   General Health   How would you rate your overall physical health? (!) FAIR   Feel stress (tense, anxious, or unable to sleep) To some extent          3/29/2024   Nutrition   Diet: I don't know         3/29/2024   Exercise   Days per week of moderate/strenous exercise 5 days   Average minutes spent exercising at this level 40 min           3/29/2024   Social Factors   Frequency of gathering with friends or relatives Twice a week   Worry food won't last until get money to buy more Patient declined   Food not last or not have enough money for food? Patient declined   Do you have housing?  Patient declined   Are you worried about losing your housing? Patient declined   Lack of transportation? Patient declined   Unable to get utilities (heat,electricity)? Patient declined           3/29/2024   Activities of Daily Living- Home Safety   Needs help with the following daily activites None of the above   Safety concerns in the home None of the above         3/29/2024   Dental   Dentist two times every year? (!) NO         3/29/2024   Hearing Screening   Hearing concerns? None of the above         3/29/2024   Driving Risk Screening   Patient/family members  have concerns about driving (!) DECLINE         3/29/2024   General Alertness/Fatigue Screening   Have you been more tired than usual lately? (!) YES         3/29/2024   Urinary Incontinence Screening   Bothered by leaking urine in past 6 months No         3/29/2024   TB Screening   Were you born outside of the [place]? No       Social History     Tobacco Use    Smoking status: Former     Types: Cigarettes     Quit date: 1974     Years since quittin.2     Passive exposure: Never    Smokeless tobacco: Never   Vaping Use    Vaping Use: Never used   Substance Use Topics    Alcohol use: Yes    Drug use: Never       Today's PHQ-9 Score:       3/29/2024     4:03 PM   PHQ-9 SCORE   PHQ-9 Total Score MyChart 18 (Moderately severe depression)   PHQ-9 Total Score 18     Last PSA:   Prostate Specific Antigen Screen   Date Value Ref Range Status   2022 0.74 0.00 - 4.50 ng/mL Final   2020 0.7 0.0 - 4.5 ng/mL Final     ASCVD Risk   The ASCVD Risk score (Yefri QUINN, et al., 2019) failed to calculate for the following reasons:    The patient has a prior MI or stroke diagnosis    Reviewed and updated as needed this visit by Provider      Current providers sharing in care for this patient include:  Patient Care Team:  Olvin De Dios MD as PCP - General (Internal Medicine)  Lisa Stout, PhD LP as Assigned Behavioral Health Provider  Zamzam Stock, RN as Specialty Care Coordinator (Hematology & Oncology)  Olvin De Dios MD as Assigned PCP  Zen Oliver MD as Assigned Cancer Care Provider    The following health maintenance items are reviewed in Epic and correct as of today:  Health Maintenance   Topic Date Due    ANNUAL REVIEW OF  ORDERS  Never done    DEPRESSION ACTION PLAN  Never done    ZOSTER IMMUNIZATION (1 of 2) Never done    RSV VACCINE (Pregnancy & 60+) (1 - 1-dose 60+ series) Never done    DTAP/TDAP/TD IMMUNIZATION (2 - Td or Tdap) 2021    LIPID  08/10/2023    PHQ-9   04/29/2024    MEDICARE ANNUAL WELLNESS VISIT  03/29/2025    FALL RISK ASSESSMENT  03/29/2025    GLUCOSE  06/02/2026    ADVANCE CARE PLANNING  12/09/2027    COLORECTAL CANCER SCREENING  03/22/2029    INFLUENZA VACCINE  Completed    Pneumococcal Vaccine: 65+ Years  Completed    AORTIC ANEURYSM SCREENING (SYSTEM ASSIGNED)  Completed    COVID-19 Vaccine  Completed    IPV IMMUNIZATION  Aged Out    HPV IMMUNIZATION  Aged Out    MENINGITIS IMMUNIZATION  Aged Out    RSV MONOCLONAL ANTIBODY  Aged Out    HEPATITIS C SCREENING  Discontinued       Appropriate preventive services were discussed with this patient, including applicable screening as appropriate for fall prevention, nutrition, physical activity, Tobacco-use cessation, weight loss and cognition.  Checklist reviewing preventive services available has been given to the patient.          3/29/2024   Mini Cog   Clock Draw Score 2 Normal   3 Item Recall 3 objects recalled   Mini Cog Total Score 5                3/29/2024   General Health   How would you rate your overall physical health? (!) FAIR   Feel stress (tense, anxious, or unable to sleep) To some extent   (!) STRESS CONCERN      3/29/2024   Nutrition   Diet: I don't know         3/29/2024   Exercise   Days per week of moderate/strenous exercise 5 days   Average minutes spent exercising at this level 40 min         3/29/2024   Social Factors   Frequency of gathering with friends or relatives Twice a week   Worry food won't last until get money to buy more Patient declined   Food not last or not have enough money for food? Patient declined   Do you have housing?  Patient declined   Are you worried about losing your housing? Patient declined   Lack of transportation? Patient declined   Unable to get utilities (heat,electricity)? Patient declined         3/29/2024   Activities of Daily Living- Home Safety   Needs help with the following daily activites None of the above   Safety concerns in the home None of the above          3/29/2024   Dental   Dentist two times every year? (!) NO         3/29/2024   Hearing Screening   Hearing concerns? None of the above         3/29/2024   Driving Risk Screening   Patient/family members have concerns about driving (!) DECLINE         3/29/2024   General Alertness/Fatigue Screening   Have you been more tired than usual lately? (!) YES         3/29/2024   Urinary Incontinence Screening   Bothered by leaking urine in past 6 months No         3/29/2024   TB Screening   Were you born outside of the [place]? No       Today's PHQ-9 Score:       3/29/2024     4:03 PM   PHQ-9 SCORE   PHQ-9 Total Score MyChart 18 (Moderately severe depression)   PHQ-9 Total Score 18         3/29/2024   Substance Use   Alcohol more than 3/day or more than 7/wk No   Do you have a current opioid prescription? No   How severe/bad is pain from 1 to 10? 3/10   Do you use any other substances recreationally? No     Social History     Tobacco Use    Smoking status: Former     Types: Cigarettes     Quit date: 1974     Years since quittin.2     Passive exposure: Never    Smokeless tobacco: Never   Vaping Use    Vaping Use: Never used   Substance Use Topics    Alcohol use: Yes    Drug use: Never       Last PSA:   Prostate Specific Antigen Screen   Date Value Ref Range Status   2022 0.74 0.00 - 4.50 ng/mL Final   2020 0.7 0.0 - 4.5 ng/mL Final     ASCVD Risk   The ASCVD Risk score (Yefri DK, et al., 2019) failed to calculate for the following reasons:    The patient has a prior MI or stroke diagnosis    Reviewed and updated as needed this visit by Provider    Current providers sharing in care for this patient include:  Patient Care Team:  Olvin De Dios MD as PCP - General (Internal Medicine)  Lisa Stout, PhD LP as Assigned Behavioral Health Provider  Zamzam Stock, RN as Specialty Care Coordinator (Hematology & Oncology)  Olvin De Dios MD as Assigned PCP  Zen Oliver MD as Assigned  "Cancer Care Provider    The following health maintenance items are reviewed in Epic and correct as of today:  Health Maintenance   Topic Date Due    ANNUAL REVIEW OF HM ORDERS  Never done    DEPRESSION ACTION PLAN  Never done    ZOSTER IMMUNIZATION (1 of 2) Never done    RSV VACCINE (Pregnancy & 60+) (1 - 1-dose 60+ series) Never done    DTAP/TDAP/TD IMMUNIZATION (2 - Td or Tdap) 12/27/2021    LIPID  08/10/2023    PHQ-9  04/29/2024    MEDICARE ANNUAL WELLNESS VISIT  03/29/2025    FALL RISK ASSESSMENT  03/29/2025    GLUCOSE  06/02/2026    ADVANCE CARE PLANNING  12/09/2027    COLORECTAL CANCER SCREENING  03/22/2029    INFLUENZA VACCINE  Completed    Pneumococcal Vaccine: 65+ Years  Completed    AORTIC ANEURYSM SCREENING (SYSTEM ASSIGNED)  Completed    COVID-19 Vaccine  Completed    IPV IMMUNIZATION  Aged Out    HPV IMMUNIZATION  Aged Out    MENINGITIS IMMUNIZATION  Aged Out    RSV MONOCLONAL ANTIBODY  Aged Out    HEPATITIS C SCREENING  Discontinued         Review of Systems  Constitutional, HEENT, cardiovascular, pulmonary, gi and gu systems are negative, except as otherwise noted.     Objective    Exam  BP 94/60 (BP Location: Right arm, Patient Position: Sitting, Cuff Size: Adult Regular)   Pulse 76   Temp 98.3  F (36.8  C)   Resp 16   Ht 1.626 m (5' 4\")   Wt 54.9 kg (121 lb)   SpO2 96%   BMI 20.77 kg/m     Estimated body mass index is 20.77 kg/m  as calculated from the following:    Height as of this encounter: 1.626 m (5' 4\").    Weight as of this encounter: 54.9 kg (121 lb).    Physical Exam      General: Alert, in no distress  Skin: No significant lesion seen.  Eyes/nose/throat: Eyes without scleral icterus, eye movements normal, pupils equal and reactive, oropharynx clear  + Both ear canals are plugged with loose appearing wax, which I think would respond nicely to over-the-counter wax dissolving eardrops, example brand Debrox or Murine.  I told Berto to buy these from his local pharmacy, follow the " package directions, and he may even consider making this a habit every week or 2 to keep the ears clear.  MSK: Neck with good ROM  Lymphatic: Neck without adenopathy or masses  Endocrine: Thyroid with no nodules to palpation  Pulm: Lungs clear to auscultation bilaterally  Cardiac: Heart with regular rate and rhythm, no murmur or gallop  GI: Abdomen soft, nontender. No palpable enlargement of liver or spleen  MSK: Extremities no tenderness or edema  Neuro: Moves all extremities, without focal weakness  Psych: Alert, normal mental status. Normal affect and speech           3/29/2024   Mini Cog   Clock Draw Score 2 Normal   3 Item Recall 3 objects recalled   Mini Cog Total Score 5           ASSESSMENT AND PLAN    Annual preventive visit, general overall is doing okay.    The most important development is that he did finally undergo his low overdue colonoscopy March 22, 2024, which indeed revealed a fairly large polyp which was excised.  Berto is tremendously relieved by this development.  He is going to need to get another colonoscopy 5 years from now which will be March 2029.    Berto is fasting this afternoon, so we will have him stop by the laboratory for comprehensive metabolic panel, blood cell counts, TSH for thyroid, PSA for prostate, and lipid profile.    Sessile serrated adenoma colon polyp, 11-15 mm, removed March 22, 2024,, recheck 5 years which would be March 2029  Family history colon cancer (father)  Chronic constipation  I agree with the recommendation that Dr. Jama gave to Berto that Berto should take MiraLAX powder 1 capful per day, and adjust that dose in order to try to achieve 1 soft bowel movement either daily or every other day.    Poor energy, tingling feeling that seems to permeate his whole body, and sometimes nausea  But we have not found a metabolic cause  I think Berto should focus on healthy eating, strength training, and trying to improve sleep, including trying out his CPAP machine  again to see if he can address sleep apnea.  Borderline low body mass index    He did have a follow-up visit with Dr. Oliver in the hematology clinic because of his monoclonal gammopathy of unknown significance.     As far as medications, Berto is taking his Lexapro 20 mg a day, atorvastatin 40 mg a day  Trazodone 50 mg is on his medication list, but Berto says he is not using it these days.     Plugged into the psychology department, and also psychiatry with Dr. Jones.  He is also plugged into urology follow-up with Dr. Braulio Munroe.  January 2023 at [facility]  Continues on Lexapro 20 mg with good effect.    Berto's body mass index is 20.8, which is technically is at the lower limit of normal.    He is quite lean, and I think he would benefit from building up some muscle mass.  He lives alone and does his own food preparation.  He does not use the Internet  He asked about where to seek out dietary advice.  I told him that unfortunately dietitian services are not a covered service under Medicare for his medical history  I suggested that Berto stop by a bookstore such as [employer] and take a look at the health and self-care selections, and I feel confident he will find a useful book about diet and nutrition     STABLE Major Depressive Disorder, recurrent, severe  Generalized Anxiety Disorder  Hoarding Disorder  Obsessive-Compulsive Disorder     Lisa Stout, PhD LP Psychology  Dr Jones every 3 months     Obsessive-compulsive disorder. He is on Social Security disability due to this.  escitalopram (LEXAPRO) 20 MG tablet-- helpful  traZODone (DESYREL) 50 MG tablet    Obstructive sleep apnea syndrome  Not using CPAP machine which needs cleaning and maintenance  Current sleep study October 2020, at [facility]  Sleep study May 30, 2018 done at [facility].  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  The right side of his body was weak.   MRI March 16, 2020, 1 no acute or subacute infarction, or hemorrhage.  Chronic infarction in the left precentral gyrus and right cerebellum with additional presumed sequelae of mild to moderate chronic small vessel ischemic disease.     Patient also had an MRI, February 2018, which is included below, right cerebellar infarct was present.- Primary     Vitamin D deficiency  Vitamin D, Cholecalciferol, 25 MCG (1000 UT) CAPS     Hyperlipidemia on treatement LDL goal <100,  Atorvastatin 40 mg daily  History of stroke  also describes having had a TIA event in 2018  8-  LDL Cholesterol Calculated <=100 mg/dL 50       Direct Measure HDL >=40 mg/dL 79       Triglycerides <150 mg/dL 43       Cholesterol <200 mg/dL 138      Satisfactory surveillance cystoscopy done by Dr. Munroe over at [facility] December 9, 2023, and Vic will have a recheck 6 months after that which will be June 2024.    History Malignant neoplasm of urinary bladder, summer 2021  Small bladder tumor, just above and lateral to the right ureteral orifice.  Appears to be superficial disease.  No other bladder tumors or suspicious mucosa.  Small transurethral resection of bladder tumor performed.  All tumor was removed.  Dr. Munroe examines Berto's prostate gland periodically, and thus far it has been reported as normal     Benign skin tag on his left upper chest, total of these have no dangerous potential, and are best left alone  Inclusion cyst on his central upper back at the base of his neck, which general site has been present for years.  No signs of infection.  I told him the best left alone     Tiny right-sided inguinal hernia, which does not seem to be bothering  Berto, and is probably best left alone    Immunization History   Administered Date(s) Administered    COVID-19 12+ (2023-24) (Pfizer) 10/13/2023    COVID-19 Bivalent 12+ (Pfizer) 11/14/2022    COVID-19 MONOVALENT 12+ (Pfizer) 03/11/2021, 04/05/2021, 12/09/2021    COVID-19 Monovalent 12+ (Pfizer 2022) 04/13/2022    Flu, Unspecified 12/27/2011    Influenza (IIV3) PF 12/28/2005, 01/18/2007, 12/27/2011    Influenza Vaccine 18-64 (Flublok) 12/07/2018    Influenza Vaccine 65+ (Fluzone HD) 11/03/2020, 12/09/2021, 11/14/2022, 10/13/2023    Influenza Vaccine >6 months,quad, PF 12/27/2017    Pneumococcal 20 valent Conjugate (Prevnar 20) 12/09/2022    Pneumococcal, Unspecified 06/01/2016    TDAP (Adacel,Boostrix) 12/27/2011    Td (Adult), Adsorbed 1954    Tdap (Adult) Unspecified Formulation 1955         Signed Electronically by: QUE LIZAMA MD    Answers submitted by the patient for this visit:  Patient Health Questionnaire (Submitted on 3/29/2024)  If you checked off any problems, how difficult have these problems made it for you to do your work, take care of things at home, or get along with other people?: Very difficult  PHQ9 TOTAL SCORE: 18

## 2024-03-30 LAB
ALBUMIN SERPL BCG-MCNC: 4.2 G/DL (ref 3.5–5.2)
ALP SERPL-CCNC: 84 U/L (ref 40–150)
ALT SERPL W P-5'-P-CCNC: 41 U/L (ref 0–70)
ANION GAP SERPL CALCULATED.3IONS-SCNC: 10 MMOL/L (ref 7–15)
AST SERPL W P-5'-P-CCNC: 56 U/L (ref 0–45)
BILIRUB SERPL-MCNC: 0.3 MG/DL
BUN SERPL-MCNC: 20.5 MG/DL (ref 8–23)
CALCIUM SERPL-MCNC: 9 MG/DL (ref 8.8–10.2)
CHLORIDE SERPL-SCNC: 105 MMOL/L (ref 98–107)
CHOLEST SERPL-MCNC: 141 MG/DL
CREAT SERPL-MCNC: 0.85 MG/DL (ref 0.67–1.17)
DEPRECATED HCO3 PLAS-SCNC: 28 MMOL/L (ref 22–29)
EGFRCR SERPLBLD CKD-EPI 2021: >90 ML/MIN/1.73M2
FASTING STATUS PATIENT QL REPORTED: NORMAL
GLUCOSE SERPL-MCNC: 87 MG/DL (ref 70–99)
HDLC SERPL-MCNC: 76 MG/DL
LDLC SERPL CALC-MCNC: 57 MG/DL
NONHDLC SERPL-MCNC: 65 MG/DL
POTASSIUM SERPL-SCNC: 3.7 MMOL/L (ref 3.4–5.3)
PROT SERPL-MCNC: 6.5 G/DL (ref 6.4–8.3)
PSA SERPL DL<=0.01 NG/ML-MCNC: 0.7 NG/ML (ref 0–4.5)
SODIUM SERPL-SCNC: 143 MMOL/L (ref 135–145)
TRIGL SERPL-MCNC: 39 MG/DL
TSH SERPL DL<=0.005 MIU/L-ACNC: 0.96 UIU/ML (ref 0.3–4.2)

## 2024-04-01 ENCOUNTER — PATIENT OUTREACH (OUTPATIENT)
Dept: GASTROENTEROLOGY | Facility: CLINIC | Age: 69
End: 2024-04-01
Payer: MEDICARE

## 2024-04-09 ENCOUNTER — TELEPHONE (OUTPATIENT)
Dept: ONCOLOGY | Facility: HOSPITAL | Age: 69
End: 2024-04-09
Payer: MEDICARE

## 2024-04-22 ENCOUNTER — VIRTUAL VISIT (OUTPATIENT)
Dept: NEUROLOGY | Facility: CLINIC | Age: 69
End: 2024-04-22
Payer: MEDICARE

## 2024-04-22 DIAGNOSIS — F41.1 GAD (GENERALIZED ANXIETY DISORDER): Primary | ICD-10-CM

## 2024-04-22 PROCEDURE — 90834 PSYTX W PT 45 MINUTES: CPT | Mod: 93 | Performed by: PSYCHOLOGIST

## 2024-04-22 NOTE — PROGRESS NOTES
Psychology Progress Note    Date: April 22, 2024    Time length and type of treatment: 43 minutes (9:02 AM - 9:45 AM), individual therapy    Telemedicine visit: The patient's condition can be safely assessed and treated via synchronous audio and visual telemedicine encounter via [employer]. Patient was informed that policies and procedures that govern in-person sessions would also apply to telephone sessions. Patient was in agreement with proceeding with a telephone session.     Patient Location: Patient home in [place], [place]  Provider Location:  [facility] - Provider remote location     Necessity: This session is necessary to address the patient's OCD, depression, and anxiety. Today we focus on the patient's treatment plan, specifically exploring increasing involvement in meaningful activities and decreasing social isolation.  The reader is invited to review the patient's full treatment plan in the Media section of the patient's Epic medical record.    Psychotherapeutic Technique: This writer utilized motivational interviewing, active listening, reassurance and support in the context of cognitive behavioral therapy to address the above.      Mental Status Evaluation:  Grooming: Unable to determine due to telephone visit  Attire: Unable to determine due to telephone visit  Age: Unable to determine due to telephone visit  Behavior Towards Examiner: Cooperative  Motor Activity: Unable to determine due to telephone visit  Eye Contact: Unable to determine due to telephone visit  Mood: Anxious   Affect: full range  Speech/Language: Mildly pressured  Attention: Normal  Concentration: Sufficient  Thought Process: tangential and overinclusive   Thought Content: Clear    Orientation: Appeared oriented to person, place, and time, though not formally established  Memory: No evidence of impairment.  Judgement: Adequate  Estimated Intelligence: Within normal limits  Demonstrated Insight:  Adequate  Fund of Knowledge: Adequate    Intervention:   Patient reported using his relief that his colonoscopy came out well to motivate him to complete his taxes and expressed pride that he  completed his taxes before the filing deadline for the first time in 15 years and he was also able to finally complete last year's taxes.  He noted he still has 3 years of back taxes to file, but expressed pride in his positive progress and we discussed how he could continue to build on this momentum.  He expressed some concern that he was not making progress on his hoarding, but noted he has continued to reduce the length of handwashing by a few more seconds. It was noted that patient wasn't adding to his hoarded content and focusing on his back taxes was supported.  Joelle also reported that he has joined the budget committee at his [employer] and is donating a large sum of money as part of a matching campaign. Patient noted he is almost 69 years old, has been very successful, has no children, and would like to see his money doing good things to help people while he's still alive. Patient increasing involvement in meaningful activities and decreasing social isolation were both validated and reinforced.        Progress:  Patient reported depression is resolving and he is completing multiple long-delayed tasks.     Plan:   We will meet again in 3 weeks to address the patient's anxiety, depression, and OCD.  Estimated duration of treatment is 10+ individual therapy sessions (29181) at intervals of once every 3 weeks. Treatment is expected to be completed by November 2024.     Diagnosis:  Generalized Anxiety Disorder  Obsessive Compulsive Disorder  Major Depressive Disorder, recurrent, severe  Hoarding Disorder

## 2024-05-16 ENCOUNTER — VIRTUAL VISIT (OUTPATIENT)
Dept: NEUROLOGY | Facility: CLINIC | Age: 69
End: 2024-05-16
Payer: MEDICARE

## 2024-05-16 DIAGNOSIS — F42.2 MIXED OBSESSIONAL THOUGHTS AND ACTS: ICD-10-CM

## 2024-05-16 PROCEDURE — 99443 PR PHYSICIAN TELEPHONE EVALUATION 21-30 MIN: CPT | Mod: 93 | Performed by: PSYCHIATRY & NEUROLOGY

## 2024-05-16 RX ORDER — ESCITALOPRAM OXALATE 20 MG/1
TABLET ORAL
Qty: 90 TABLET | Refills: 1 | Status: SHIPPED | OUTPATIENT
Start: 2024-05-16 | End: 2024-08-20

## 2024-05-16 NOTE — LETTER
5/16/2024         RE: Berto Pretty  [address] [facility] [address]        Dear Colleague,    Thank you for referring your patient, Berto Pretty, to the [facility] NEUROLOG[facility]. Please see a copy of my visit note below.    Virtual Visit Details    Type of service:  Telephone Visit   Phone call duration: 21 minutes       Outpatient Psychiatry Note     The patient presented for an initial psychiatric consultation on December 7 of 2020.  He carried a diagnosis of obsessive-compulsive disorder and has had symptoms throughout his entire life.  He presents at this time on Lexapro 20 mg a day.  He has been on this dose for 1 month having started it October 12, 2020 and increased 1 month prior to his initial intake with me.  He has been on psychotropic medication most of his adult life although has been off psychotropic medication for about 1 to 2 years prior to recent restarting of that medication.  Over the years the patient has been on a variety of psychotropic medication with limited success.  He has been on Prozac which she described as having mild efficacy for his OCD symptoms, Celexa with mild efficacy at doses of 80 mg a day, Parnate, Stelazine, Haldol and clomipramine as an adjunct at 25 mg which were all described as ineffective.    The patient describes having onset of OCD symptoms as a child.  His symptoms included obsessive thoughts and compulsive behaviors with constant worry and thoughts about germs, asbestos and he engaged in frequent handwashing checking doors and lights and excessively bought movies and books.  He had an inpatient hospital stay most recently in the 1970s.  He has OCD symptoms caused him to stop attendance at the [facility] and he has been on SSDI for years due to this.    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  It was at this time that he chose to come off his Lexapro.  He did fairly well from a  psychiatric standpoint for a number of months but since the spring 2020 he has had an increase in obsessive thoughts and anxiety symptoms.  As stated previously he restarted his Lexapro on October 12 of 2020 and that was increased to 20 mg a day 1 month later.    At the initial visit, in part due to the fact that the patient had recently been increased on his Lexapro we continued with that treatment plan.  He had previously been off all psychotropic medication for a about a year and a half.  He had done fairly well off that medication until the spring 2020 when he had return of his anxiety symptoms.  He was in the process of being reassessed for his sleep apnea.    The patient return to the clinic on February 8 of 2021.  He had restarted individual psychotherapy.  He reported he was doing a bit better and stated that therapy was quite helpful.  He believed he was improving and responding well to the therapy and reassurance.  He was tolerating the recently restarted medication and we continued with that treatment plan.    I saw the patient on May 3, 2021.  At that time he again sought frequent reassurance and was quite talkative and anxious about possibly getting dementia at some point.  He continued to struggle with OCD symptoms.  He continued with his therapist and medical follow-up.  We did not make any medication changes at that visit.    The patient returns for follow-up visit in December 2021.  There was no significant change at that time.  The patient had a diagnosed and was being treated for bladder cancer.  He was comfortable with the current symptoms and stated that he was seen Dr. Stout and that was going well.  We did not make any medication changes at that visit.    I saw the patient in March 2022.  He was concerned that he may have had a slight cognitive decline in the last 3 months.  He was not reading as well.  He again was somewhat anxious and needed some reassurance.  His psychologist had  apparently ordered some neuropsychological testing.  His main concern was sleep deprivation.  We discussed some sleep hygiene techniques and I did add some low-dose as needed trazodone.    The patient was seen on June 27, 2022.  He stated he had not refilled his trazodone because he was sleeping better.  He was more active and had a more positive attitude.  He stated he had gotten good news with his neurologist regarding the cancer follow-up.  We did not make any changes.    The patient was seen on September 26, 2022.  Patient was doing well at that time.  He had run out of Lexapro 2 weeks earlier I reordered the lexapro.  We elected to make no medication changes.  The patient was tolerating the medication.  The patient was going to follow-up with psychology.    The patient was seen on January 9.  He again was quite talkative and somewhat perseverative but he reported he was less depressed and felt there had been a decline in his OCD behaviors.  He was being set up with a new medical team and was somewhat anxious about that but overall doing quite well and tolerating the medication.  We did not make any changes.    The patient was seen for follow-up in May 2023.  Again somewhat talkative and perseverative and needing some redirection.  He continue to follow-up with psychology.  We elected not to make any changes in his medication at that time.    The patient was seen in August 2023.  At that time he questioned whether he might be a bit more depressed.  He was not using his CPAP machine and was still having some sleep issues and I encouraged him to use that.  He told me his cancer treatment was going well.  I encouraged him to utilize the trazodone for sleepless nights as he stated he was not using that all the time.  He continued to see his psychologist.  We did not make any medication changes.    The patient was seen for follow-up in November 2023.  He was not doing as well and he had been off the Lexapro for 4  weeks.  It was a bit unclear why he stopped it but he was willing to restart it and actually had restarted that 3 days prior.  He continued with his therapist.  We did not make any changes at that time.    The patient was seen for follow-up in February 2024.  He was doing well but had not yet tried the trazodone for sleep.  He continued to see his psychologist.  He was being followed by his medical team with no new issues or concerns.  We elected to make no changes and continue with the trazodone as needed for sleep.  He was considering trying that.    HPI:  The patient presents today stating he is doing well.  He still has not yet tried the trazodone.  He states he stopped his CPAP machine because it did not fit right and I suggested he go in and have them reassess that.  He states his mood is good but he is often tired and we discussed again sleep hygiene and the need to follow up with the sleep team.  No thoughts of wishing he was dead.  No hallucinations or delusions.  No óscar.  He reports that socially he is doing well and seeing friends.  He threw a big Mother's Day party that was a lot of work but quite rewarding.  He continues to see the psychologist.  He will be having a cystoscopy follow-up in the near future but that has been stable.  He is requesting no changes at this time and has no other questions or concerns.      Current Medications:  Medications were personally reviewed at this meeting.  Please see the chart for current medication list.      Mental Status Exam:  Appearance: We did have a phone visit today but the patient was in no distress.  The patient was alert, comfortable and calm. No agitation. Not currently in any pain. No evidence of any shortness of breath.  Behavior: He denies having any recent behavioral changes or difficulties.  The patient is calm, cooperative, with no agitation or obvious distress. The patient does participate. No restlessness.   Speech: He again is quite talkative  but able to dialogue.  Sentence structure is intact. Able to dialogue. Answers are consistent and appropriate. Not pressured. Not rambling.  Mood/Affect: Bright and happy.  Patient appears alert. No obvious depression or anxiety. No irritability. No lability.  Thought Content:  No evidence of any psychosis. No reports of any recent psychosis.  Suicidal or Homicidal Thoughts:  None apparent or reported. The patient denies any suicidal or homicidal ideation.   Thought Process/Formulation:  Able to track and follow. No racing thoughts. Not disorganized. Able to participate.  Associations:  Grossly intact. Able to process information.  Fund of Knowledge:  Grossly intact.   Attention/Concentration:  Attentive. Able to track and follow. Concentration appears grossly intact. Not disorganized.  Insight:  Grossly intact.  No reports of any difficulties.  Judgement:  Grossly intact. No reports of any difficulties.  Memory:  Grossly intact.   Motor Status:  No recent change reported. No current tremor.  Orientation: Grossly oriented.      Recent Labs:  Please see the chart.      Diagnosis:  JHON   OCD, by history  Major depressive disorder, recurrent, moderate, without psychotic features    Plan:  I will make no changes.  The patient seems to be doing relatively well.  I suggested he follow up with his sleep apnea team as he is not using that device and complains of some tiredness.  He will continue with his therapist and his medical team.  He should return to this clinic in 3 months for medication check.      The patient will seek out appropriate emergency services should that become necessary.  I will make myself available if any questions, concerns, or problems arise.    Elie Jones MD          Again, thank you for allowing me to participate in the care of your patient.        Sincerely,        Elie Jones MD

## 2024-05-16 NOTE — PATIENT INSTRUCTIONS
I will make no changes.  The patient seems to be doing relatively well.  I suggested he follow up with his sleep apnea team as he is not using that device and complains of some tiredness.  He will continue with his therapist and his medical team.  He should return to this clinic in 3 months for medication check.

## 2024-05-16 NOTE — NURSING NOTE
Is the patient currently in the state of [place]? YES    Visit mode:TELEPHONE    If the visit is dropped, the patient can be reconnected by: TELEPHONE VISIT: Phone number:   Telephone Information:   Mobile [phone]       Will anyone else be joining the visit? NO  (If patient encounters technical issues they should call [phone] :[phone])    How would you like to obtain your AVS? Mail a copy    Are changes needed to the allergy or medication list? Pt stated no med changes    Are refills needed on medications prescribed by this physician? NO    Reason for visit: Telephone (Follow-up )    Elisha MOORE

## 2024-05-16 NOTE — PROGRESS NOTES
Virtual Visit Details    Type of service:  Telephone Visit   Phone call duration: 21 minutes       Outpatient Psychiatry Note     The patient presented for an initial psychiatric consultation on December 7 of 2020.  He carried a diagnosis of obsessive-compulsive disorder and has had symptoms throughout his entire life.  He presents at this time on Lexapro 20 mg a day.  He has been on this dose for 1 month having started it October 12, 2020 and increased 1 month prior to his initial intake with me.  He has been on psychotropic medication most of his adult life although has been off psychotropic medication for about 1 to 2 years prior to recent restarting of that medication.  Over the years the patient has been on a variety of psychotropic medication with limited success.  He has been on Prozac which she described as having mild efficacy for his OCD symptoms, Celexa with mild efficacy at doses of 80 mg a day, Parnate, Stelazine, Haldol and clomipramine as an adjunct at 25 mg which were all described as ineffective.    The patient describes having onset of OCD symptoms as a child.  His symptoms included obsessive thoughts and compulsive behaviors with constant worry and thoughts about germs, asbestos and he engaged in frequent handwashing checking doors and lights and excessively bought movies and books.  He had an inpatient hospital stay most recently in the 1970s.  He has OCD symptoms caused him to stop attendance at the [facility] and he has been on SSDI for years due to this.    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  It was at this time that he chose to come off his Lexapro.  He did fairly well from a psychiatric standpoint for a number of months but since the spring 2020 he has had an increase in obsessive thoughts and anxiety symptoms.  As stated previously he restarted his Lexapro on October 12 of 2020 and that was increased to 20 mg a day 1 month later.    At the  initial visit, in part due to the fact that the patient had recently been increased on his Lexapro we continued with that treatment plan.  He had previously been off all psychotropic medication for a about a year and a half.  He had done fairly well off that medication until the spring 2020 when he had return of his anxiety symptoms.  He was in the process of being reassessed for his sleep apnea.    The patient return to the clinic on February 8 of 2021.  He had restarted individual psychotherapy.  He reported he was doing a bit better and stated that therapy was quite helpful.  He believed he was improving and responding well to the therapy and reassurance.  He was tolerating the recently restarted medication and we continued with that treatment plan.    I saw the patient on May 3, 2021.  At that time he again sought frequent reassurance and was quite talkative and anxious about possibly getting dementia at some point.  He continued to struggle with OCD symptoms.  He continued with his therapist and medical follow-up.  We did not make any medication changes at that visit.    The patient returns for follow-up visit in December 2021.  There was no significant change at that time.  The patient had a diagnosed and was being treated for bladder cancer.  He was comfortable with the current symptoms and stated that he was seen Dr. Stout and that was going well.  We did not make any medication changes at that visit.    I saw the patient in March 2022.  He was concerned that he may have had a slight cognitive decline in the last 3 months.  He was not reading as well.  He again was somewhat anxious and needed some reassurance.  His psychologist had apparently ordered some neuropsychological testing.  His main concern was sleep deprivation.  We discussed some sleep hygiene techniques and I did add some low-dose as needed trazodone.    The patient was seen on June 27, 2022.  He stated he had not refilled his trazodone because  he was sleeping better.  He was more active and had a more positive attitude.  He stated he had gotten good news with his neurologist regarding the cancer follow-up.  We did not make any changes.    The patient was seen on September 26, 2022.  Patient was doing well at that time.  He had run out of Lexapro 2 weeks earlier I reordered the lexapro.  We elected to make no medication changes.  The patient was tolerating the medication.  The patient was going to follow-up with psychology.    The patient was seen on January 9.  He again was quite talkative and somewhat perseverative but he reported he was less depressed and felt there had been a decline in his OCD behaviors.  He was being set up with a new medical team and was somewhat anxious about that but overall doing quite well and tolerating the medication.  We did not make any changes.    The patient was seen for follow-up in May 2023.  Again somewhat talkative and perseverative and needing some redirection.  He continue to follow-up with psychology.  We elected not to make any changes in his medication at that time.    The patient was seen in August 2023.  At that time he questioned whether he might be a bit more depressed.  He was not using his CPAP machine and was still having some sleep issues and I encouraged him to use that.  He told me his cancer treatment was going well.  I encouraged him to utilize the trazodone for sleepless nights as he stated he was not using that all the time.  He continued to see his psychologist.  We did not make any medication changes.    The patient was seen for follow-up in November 2023.  He was not doing as well and he had been off the Lexapro for 4 weeks.  It was a bit unclear why he stopped it but he was willing to restart it and actually had restarted that 3 days prior.  He continued with his therapist.  We did not make any changes at that time.    The patient was seen for follow-up in February 2024.  He was doing well but  had not yet tried the trazodone for sleep.  He continued to see his psychologist.  He was being followed by his medical team with no new issues or concerns.  We elected to make no changes and continue with the trazodone as needed for sleep.  He was considering trying that.    HPI:  The patient presents today stating he is doing well.  He still has not yet tried the trazodone.  He states he stopped his CPAP machine because it did not fit right and I suggested he go in and have them reassess that.  He states his mood is good but he is often tired and we discussed again sleep hygiene and the need to follow up with the sleep team.  No thoughts of wishing he was dead.  No hallucinations or delusions.  No óscar.  He reports that socially he is doing well and seeing friends.  He threw a big Mother's Day party that was a lot of work but quite rewarding.  He continues to see the psychologist.  He will be having a cystoscopy follow-up in the near future but that has been stable.  He is requesting no changes at this time and has no other questions or concerns.      Current Medications:  Medications were personally reviewed at this meeting.  Please see the chart for current medication list.      Mental Status Exam:  Appearance: We did have a phone visit today but the patient was in no distress.  The patient was alert, comfortable and calm. No agitation. Not currently in any pain. No evidence of any shortness of breath.  Behavior: He denies having any recent behavioral changes or difficulties.  The patient is calm, cooperative, with no agitation or obvious distress. The patient does participate. No restlessness.   Speech: He again is quite talkative but able to dialogue.  Sentence structure is intact. Able to dialogue. Answers are consistent and appropriate. Not pressured. Not rambling.  Mood/Affect: Bright and happy.  Patient appears alert. No obvious depression or anxiety. No irritability. No lability.  Thought Content:  No  evidence of any psychosis. No reports of any recent psychosis.  Suicidal or Homicidal Thoughts:  None apparent or reported. The patient denies any suicidal or homicidal ideation.   Thought Process/Formulation:  Able to track and follow. No racing thoughts. Not disorganized. Able to participate.  Associations:  Grossly intact. Able to process information.  Fund of Knowledge:  Grossly intact.   Attention/Concentration:  Attentive. Able to track and follow. Concentration appears grossly intact. Not disorganized.  Insight:  Grossly intact.  No reports of any difficulties.  Judgement:  Grossly intact. No reports of any difficulties.  Memory:  Grossly intact.   Motor Status:  No recent change reported. No current tremor.  Orientation: Grossly oriented.      Recent Labs:  Please see the chart.      Diagnosis:  JHON   OCD, by history  Major depressive disorder, recurrent, moderate, without psychotic features    Plan:  I will make no changes.  The patient seems to be doing relatively well.  I suggested he follow up with his sleep apnea team as he is not using that device and complains of some tiredness.  He will continue with his therapist and his medical team.  He should return to this clinic in 3 months for medication check.      The patient will seek out appropriate emergency services should that become necessary.  I will make myself available if any questions, concerns, or problems arise.    Elie Jones MD

## 2024-05-28 ENCOUNTER — TELEPHONE (OUTPATIENT)
Dept: INTERNAL MEDICINE | Facility: CLINIC | Age: 69
End: 2024-05-28
Payer: MEDICARE

## 2024-05-28 NOTE — TELEPHONE ENCOUNTER
Left pt a detailed message for patient to call back.  Im not sure which result he is looking for.

## 2024-05-28 NOTE — TELEPHONE ENCOUNTER
Test Results    Contacts         Type Contact Phone/Fax    05/28/2024 04:53 PM CDT Phone (Incoming) Berto Pretty (Self) [phone] (M)            Who ordered the test:  Olvin De Dios    Type of test: Lab    Date of test:  March 29, 2024    Where was the test performed:  [facility]    What are your questions/concerns?:  Would like someone to call him and tell him his results. He lost his paperwork.    Okay to leave a detailed message?: Yes at Home number on file [phone] (home)

## 2024-05-30 NOTE — TELEPHONE ENCOUNTER
Patient returning call. Berto might have misplaced/accidentally put his results in the recycling bin. Requesting all labs done last office visit with PCP on 3/29/24 to have it re-mailed to him.

## 2024-06-20 ENCOUNTER — LAB (OUTPATIENT)
Dept: INFUSION THERAPY | Facility: HOSPITAL | Age: 69
End: 2024-06-20
Payer: MEDICARE

## 2024-06-20 DIAGNOSIS — D47.2 MONOCLONAL PARAPROTEINEMIA: ICD-10-CM

## 2024-06-20 DIAGNOSIS — D64.9 ANEMIA, UNSPECIFIED TYPE: ICD-10-CM

## 2024-06-20 LAB
ALBUMIN SERPL BCG-MCNC: 4.1 G/DL (ref 3.5–5.2)
ALP SERPL-CCNC: 99 U/L (ref 40–150)
ALT SERPL W P-5'-P-CCNC: 11 U/L (ref 0–70)
ANION GAP SERPL CALCULATED.3IONS-SCNC: 8 MMOL/L (ref 7–15)
AST SERPL W P-5'-P-CCNC: 22 U/L (ref 0–45)
BILIRUB SERPL-MCNC: 0.4 MG/DL
BUN SERPL-MCNC: 16.3 MG/DL (ref 8–23)
CALCIUM SERPL-MCNC: 9 MG/DL (ref 8.8–10.2)
CHLORIDE SERPL-SCNC: 105 MMOL/L (ref 98–107)
CREAT SERPL-MCNC: 1.04 MG/DL (ref 0.67–1.17)
DEPRECATED HCO3 PLAS-SCNC: 29 MMOL/L (ref 22–29)
EGFRCR SERPLBLD CKD-EPI 2021: 78 ML/MIN/1.73M2
GLUCOSE SERPL-MCNC: 89 MG/DL (ref 70–99)
LDH SERPL L TO P-CCNC: 179 U/L (ref 0–250)
POTASSIUM SERPL-SCNC: 3.9 MMOL/L (ref 3.4–5.3)
PROT SERPL-MCNC: 6.5 G/DL (ref 6.4–8.3)
SODIUM SERPL-SCNC: 142 MMOL/L (ref 135–145)
TOTAL PROTEIN SERUM FOR ELP: 6.3 G/DL (ref 6.4–8.3)

## 2024-06-20 PROCEDURE — 80053 COMPREHEN METABOLIC PANEL: CPT

## 2024-06-20 PROCEDURE — 84155 ASSAY OF PROTEIN SERUM: CPT | Mod: 91

## 2024-06-20 PROCEDURE — 36415 COLL VENOUS BLD VENIPUNCTURE: CPT

## 2024-06-20 PROCEDURE — 82784 ASSAY IGA/IGD/IGG/IGM EACH: CPT

## 2024-06-20 PROCEDURE — 83521 IG LIGHT CHAINS FREE EACH: CPT | Mod: 59

## 2024-06-20 PROCEDURE — 83615 LACTATE (LD) (LDH) ENZYME: CPT

## 2024-06-20 PROCEDURE — 84165 PROTEIN E-PHORESIS SERUM: CPT | Mod: TC | Performed by: PATHOLOGY

## 2024-06-21 LAB
ALBUMIN SERPL ELPH-MCNC: 4 G/DL (ref 3.7–5.1)
ALPHA1 GLOB SERPL ELPH-MCNC: 0.3 G/DL (ref 0.2–0.4)
ALPHA2 GLOB SERPL ELPH-MCNC: 0.6 G/DL (ref 0.5–0.9)
B-GLOBULIN SERPL ELPH-MCNC: 0.7 G/DL (ref 0.6–1)
GAMMA GLOB SERPL ELPH-MCNC: 0.8 G/DL (ref 0.7–1.6)
IGA SERPL-MCNC: 77 MG/DL (ref 84–499)
IGG SERPL-MCNC: 787 MG/DL (ref 610–1616)
IGM SERPL-MCNC: 86 MG/DL (ref 35–242)
KAPPA LC FREE SER-MCNC: 1.49 MG/DL (ref 0.33–1.94)
KAPPA LC FREE/LAMBDA FREE SER NEPH: 1.03 {RATIO} (ref 0.26–1.65)
LAMBDA LC FREE SERPL-MCNC: 1.45 MG/DL (ref 0.57–2.63)
M PROTEIN SERPL ELPH-MCNC: 0.1 G/DL
PROT PATTERN SERPL ELPH-IMP: ABNORMAL

## 2024-06-21 PROCEDURE — 84165 PROTEIN E-PHORESIS SERUM: CPT | Mod: 26 | Performed by: PATHOLOGY

## 2024-06-24 ENCOUNTER — VIRTUAL VISIT (OUTPATIENT)
Dept: NEUROLOGY | Facility: CLINIC | Age: 69
End: 2024-06-24
Payer: MEDICARE

## 2024-06-24 DIAGNOSIS — F41.1 GAD (GENERALIZED ANXIETY DISORDER): Primary | ICD-10-CM

## 2024-06-24 PROCEDURE — 90834 PSYTX W PT 45 MINUTES: CPT | Mod: 93 | Performed by: PSYCHOLOGIST

## 2024-06-24 ASSESSMENT — PATIENT HEALTH QUESTIONNAIRE - PHQ9
10. IF YOU CHECKED OFF ANY PROBLEMS, HOW DIFFICULT HAVE THESE PROBLEMS MADE IT FOR YOU TO DO YOUR WORK, TAKE CARE OF THINGS AT HOME, OR GET ALONG WITH OTHER PEOPLE: EXTREMELY DIFFICULT
7. TROUBLE CONCENTRATING ON THINGS, SUCH AS READING THE NEWSPAPER OR WATCHING TELEVISION: NEARLY EVERY DAY
2. FEELING DOWN, DEPRESSED OR HOPELESS: MORE THAN HALF THE DAYS
5. POOR APPETITE OR OVEREATING: MORE THAN HALF THE DAYS
SUM OF ALL RESPONSES TO PHQ QUESTIONS 1-9: 20
1. LITTLE INTEREST OR PLEASURE IN DOING THINGS: MORE THAN HALF THE DAYS
9. THOUGHTS THAT YOU WOULD BE BETTER OFF DEAD, OR OF HURTING YOURSELF: NOT AT ALL
SUM OF ALL RESPONSES TO PHQ9 QUESTIONS 1 & 2: 4
4. FEELING TIRED OR HAVING LITTLE ENERGY: NEARLY EVERY DAY
3. TROUBLE FALLING OR STAYING ASLEEP OR SLEEPING TOO MUCH: NEARLY EVERY DAY
6. FEELING BAD ABOUT YOURSELF - OR THAT YOU ARE A FAILURE OR HAVE LET YOURSELF OR YOUR FAMILY DOWN: MORE THAN HALF THE DAYS
8. MOVING OR SPEAKING SO SLOWLY THAT OTHER PEOPLE COULD HAVE NOTICED. OR THE OPPOSITE, BEING SO FIGETY OR RESTLESS THAT YOU HAVE BEEN MOVING AROUND A LOT MORE THAN USUAL: NEARLY EVERY DAY

## 2024-06-24 ASSESSMENT — ANXIETY QUESTIONNAIRES
1. FEELING NERVOUS, ANXIOUS, OR ON EDGE: MORE THAN HALF THE DAYS
6. BECOMING EASILY ANNOYED OR IRRITABLE: MORE THAN HALF THE DAYS
4. TROUBLE RELAXING: NEARLY EVERY DAY
3. WORRYING TOO MUCH ABOUT DIFFERENT THINGS: NEARLY EVERY DAY
GAD7 TOTAL SCORE: 17
5. BEING SO RESTLESS THAT IT IS HARD TO SIT STILL: SEVERAL DAYS
IF YOU CHECKED OFF ANY PROBLEMS ON THIS QUESTIONNAIRE, HOW DIFFICULT HAVE THESE PROBLEMS MADE IT FOR YOU TO DO YOUR WORK, TAKE CARE OF THINGS AT HOME, OR GET ALONG WITH OTHER PEOPLE: EXTREMELY DIFFICULT
7. FEELING AFRAID AS IF SOMETHING AWFUL MIGHT HAPPEN: NEARLY EVERY DAY
2. NOT BEING ABLE TO STOP OR CONTROL WORRYING: NEARLY EVERY DAY

## 2024-06-24 NOTE — PROGRESS NOTES
Psychology Progress Note    Date: June 24, 2024    Time length and type of treatment: 47 minutes(9:07 AM - 9:54 AM), individual therapy    Telemedicine visit: The patient's condition can be safely assessed and treated via synchronous audio and visual telemedicine encounter via [employer]. Patient was informed that policies and procedures that govern in-person sessions would also apply to telephone sessions. Patient was in agreement with proceeding with a telephone session.     Patient Location: Patient home in [place], [place]  Provider Location:  [facility] - Provider remote location     Necessity: This session is necessary to address the patient's anxiety, depression, and OCD. Today we focus on updating the patient's treatment plan. The reader is invited to review the patient's full treatment plan in the Media section of the patient's Epic medical record.    Psychotherapeutic Technique: This writer utilized motivational interviewing, active listening, reassurance and support in the context of cognitive behavioral therapy to address the above.      Mental Status Evaluation:  Grooming: Unable to determine due to telephone visit  Attire: Unable to determine due to telephone visit  Age: Unable to determine due to telephone visit  Behavior Towards Examiner: Cooperative  Motor Activity: Unable to determine due to telephone visit  Eye Contact: Unable to determine due to telephone visit  Mood: Anxious   Affect: full range  Speech/Language: Mildly pressured  Attention: Normal  Concentration: Sufficient  Thought Process: overinclusive   Thought Content: Clear    Orientation: Appeared oriented to person, place, and time, though not formally established  Memory: No evidence of impairment.  Judgement: Adequate  Estimated Intelligence: Within normal limits  Demonstrated Insight: Adequate  Fund of Knowledge: Adequate    Intervention:   Patient was readministered the PHQ-9 and JHON-7 as part of revising  his treatment plan.  His score of 20 on the PHQ-9 is suggestive of severe depression and represents a worsening over his earlier severe score of 16.  His score of 17 on the JHON-7 is suggestive of severe depression and represents a worsening over his earlier moderate score of 11.  Patient agreed this was accurate, stating he was doing well until approximately 2 weeks ago when he got absorbed and other tasks and stopped eating.  He noted after he went 1 day without eating, he began struggling with fatigue which further interfered with procurement of food.  We discussed patient's longstanding tendency to lose interest in eating which results in significant weight loss, significant nutritional deficiency, and negatively affects physical and psychological functioning.  Patient was informed he meets criteria for Avoidant/Restrictive Food Intake Disorder and this diagnosis was added to his treatment plan.  It was mutually agreed that we would work on addressing this for the next 6 months.  If this does not significantly refrain on that timeframe, patient will be referred to an eating disorder specialist.    Progress:  Patient treatment plan was jointly revised    Plan:   We will meet again in 3 weeks to address the patient's anxiety, depression, and OCD.  Estimated duration of treatment is 10+ individual therapy sessions (06201) at intervals of once every 3 weeks.. Treatment is expected to be completed by November 2024.     Diagnosis:  Generalized Anxiety Disorder  Obsessive Compulsive Disorder  Major Depressive Disorder, recurrent, severe  Hoarding Disorder   Avoidant/Restrictive Food Intake Disorder

## 2024-06-26 ENCOUNTER — ONCOLOGY VISIT (OUTPATIENT)
Dept: ONCOLOGY | Facility: HOSPITAL | Age: 69
End: 2024-06-26
Payer: MEDICARE

## 2024-06-26 ENCOUNTER — PATIENT OUTREACH (OUTPATIENT)
Dept: ONCOLOGY | Facility: HOSPITAL | Age: 69
End: 2024-06-26

## 2024-06-26 VITALS
BODY MASS INDEX: 22.02 KG/M2 | SYSTOLIC BLOOD PRESSURE: 123 MMHG | OXYGEN SATURATION: 96 % | TEMPERATURE: 98.5 F | WEIGHT: 129 LBS | HEIGHT: 64 IN | RESPIRATION RATE: 18 BRPM | DIASTOLIC BLOOD PRESSURE: 69 MMHG | HEART RATE: 64 BPM

## 2024-06-26 DIAGNOSIS — D64.9 ANEMIA, UNSPECIFIED TYPE: ICD-10-CM

## 2024-06-26 DIAGNOSIS — D47.2 MONOCLONAL PARAPROTEINEMIA: Primary | ICD-10-CM

## 2024-06-26 PROCEDURE — 99214 OFFICE O/P EST MOD 30 MIN: CPT | Performed by: INTERNAL MEDICINE

## 2024-06-26 PROCEDURE — G0463 HOSPITAL OUTPT CLINIC VISIT: HCPCS | Performed by: INTERNAL MEDICINE

## 2024-06-26 PROCEDURE — G2211 COMPLEX E/M VISIT ADD ON: HCPCS | Performed by: INTERNAL MEDICINE

## 2024-06-26 ASSESSMENT — PAIN SCALES - GENERAL: PAINLEVEL: MILD PAIN (2)

## 2024-06-26 NOTE — LETTER
6/26/2024      [facility] Pretty  [address] [facility] [address]      Dear Colleague,    Thank you for referring your patient, Berto Pretty, to the [facility] [place]. Please see a copy of my visit note below.    [facility] Hematology and Oncology Progress Note    Patient: Berto Pretty  MRN: [id number]  Date of Service: Jun 26, 2024           Reason for visit         Problem List Items Addressed This Visit          Hematologic    Anemia, unspecified type    Relevant Orders    Comprehensive metabolic panel    Immunoglobulins A G and M    Kappa and lambda light chain    Lactate Dehydrogenase    CBC with platelets     Other Visit Diagnoses       Monoclonal paraproteinemia    -  Primary    Relevant Orders    Comprehensive metabolic panel    Immunoglobulins A G and M    Kappa and lambda light chain    Lactate Dehydrogenase    CBC with platelets              Assessment      1.  Monoclonal gammopathy of uncertain significance. This appears to be stable.  Immunoglobin levels are stable.  2.  History of anemia that initially resolved. Now back being slightly anemic.  3.  History of bladder issues under care of urology. Last cysto was negative.  Next cystoscopy in December.  4.  Other medical conditions that are stable.      Plan        For his monoclonal gammopathy repeat labs in about a year's time.  Follow-up with urologist regarding his question of superficial bladder tumor.  The biopsy was never done due to specimen displacement.  We talked about managing his diet and exercise.  He has some obsessive-compulsive disorder so in the past has not eaten for few days.  That is causing some degree of body mass loss.  Although his weight is pretty stable.  Follow-up with his regular doctor for other medical issues.  Will see him back in a years time.  The longitudinal plan of care for the diagnosis(es)/condition(s) as documented were addressed during this visit. Due to the added  "complexity in care, I will continue to support Berto in the subsequent management and with ongoing continuity of care.     Medical decision making      Patient presenting with monoclonal dermopathy and some eating disorder.  Reviewed notes from each unique source.  Reviewed each unique test.  Ordered tests if indicated.  Independently interpreted lab tests and radiological exams performed by other physicians.         Cancer Staging   No matching staging information was found for the patient.        History of present illness      Mr. Berto Pretty is a very pleasant 68 year old  gentleman with diagnosis of monoclonal gammopathy of IgG type.  This was detected when he had some work-up done by his primary care provider in April 2021.  This was done because he was anemic with a hemoglobin of 12.4.  His evaluation did reveal that he had a small amount of monoclonal gammopathy.  Therefore he was referred here.    At the beginning Vic's IgG levels were at 918.  They have slowly come down.  Normal IgM levels.  IgA level is also normal to slightly low normal.  Hemoglobin was 12.7 at the time of initial work-up.  Then it went up to 13.  Now it is down down to 12.7 again.  Rest of the metabolic panel was pretty normal.    Vic comes in today for scheduled follow-up.  He was little bit late to the clinic.  He has been having some issues with eating disorder.  Did not eat for few days couple months ago.  His blood labs have been okay.  His weight has fluctuated a little bit.  He has been seen by his primary care doctor.      Review of system      Details noted in the history of present illness.  A detailed review of systems is otherwise negative.      Physical exam        /69 (BP Location: Left arm, Patient Position: Sitting, Cuff Size: Adult Regular)   Pulse 64   Temp 98.5  F (36.9  C) (Tympanic)   Resp 18   Ht 1.626 m (5' 4\")   Wt 58.5 kg (129 lb)   SpO2 96%   BMI 22.14 kg/m      GENERAL: No acute " distress. Cooperative in conversation.   HEENT:  Pupils are equal, round and reactive. Oral mucosa is clean and intact. No ulcerations or mucositis noted. No bleeding noted.  RESP:Chest symmetric lungs are clear bilaterally per auscultation. Regular respiratory rate. No wheezes or rhonchi.  CV: Normal S1 S2 Regular, rate and rhythm.     ABD: Nondistended, soft, nontender. Positive bowel sounds. No organomegaly.   EXTREMITIES: No lower extremity edema.   NEURO: Non- focal. Alert and oriented x3.  Cranial nerves appear intact.  PSYCH: Within normal limits. No depression or anxiety.  SKIN: Warm dry intact.      Lab results Reviewed      Recent Results (from the past 168 hour(s))   Lactate Dehydrogenase   Result Value Ref Range    Lactate Dehydrogenase 179 0 - 250 U/L   Kappa and lambda light chain   Result Value Ref Range    Kappa Free Light Chains 1.49 0.33 - 1.94 mg/dL    Lambda Free Light Chains 1.45 0.57 - 2.63 mg/dL    Kappa /Lambda Ratio 1.03 0.26 - 1.65   Immunoglobulins A G and M   Result Value Ref Range    Immunoglobulin G 787 610 - 1,616 mg/dL    Immunoglobulin A 77 (L) 84 - 499 mg/dL    Immunoglobulin M 86 35 - 242 mg/dL   Comprehensive metabolic panel   Result Value Ref Range    Sodium 142 135 - 145 mmol/L    Potassium 3.9 3.4 - 5.3 mmol/L    Carbon Dioxide (CO2) 29 22 - 29 mmol/L    Anion Gap 8 7 - 15 mmol/L    Urea Nitrogen 16.3 8.0 - 23.0 mg/dL    Creatinine 1.04 0.67 - 1.17 mg/dL    GFR Estimate 78 >60 mL/min/1.73m2    Calcium 9.0 8.8 - 10.2 mg/dL    Chloride 105 98 - 107 mmol/L    Glucose 89 70 - 99 mg/dL    Alkaline Phosphatase 99 40 - 150 U/L    AST 22 0 - 45 U/L    ALT 11 0 - 70 U/L    Protein Total 6.5 6.4 - 8.3 g/dL    Albumin 4.1 3.5 - 5.2 g/dL    Bilirubin Total 0.4 <=1.2 mg/dL   Total Protein, Serum for ELP   Result Value Ref Range    Total Protein Serum for ELP 6.3 (L) 6.4 - 8.3 g/dL   Protein Electrophoresis, Serum   Result Value Ref Range    Albumin 4.0 3.7 - 5.1 g/dL    Alpha 1 0.3 0.2 -  "0.4 g/dL    Alpha 2 0.6 0.5 - 0.9 g/dL    Beta Globulin 0.7 0.6 - 1.0 g/dL    Gamma Globulin 0.8 0.7 - 1.6 g/dL    Monoclonal Peak 0.1 (H) <=0.0 g/dL    ELP Interpretation       Small monoclonal protein (0.1 g/dL) seen in the gamma fraction, not previously characterized in our laboratory. Recommend serum and urine immunofixation for confirmation and further characterization if not previously performed elsewhere. Pathologic significance requires clinical correlation. Larissa Mitchell M.D.       Imaging results Reviewed        No results found.      Signed by: Zen Oliver MD      This note has been dictated using voice recognition software. Any grammatical or context distortions are unintentional and inherent to the software        Oncology Rooming Note    June 26, 2024 2:39 PM   Berto Pretty is a 68 year old male who presents for:    Chief Complaint   Patient presents with     Oncology Clinic Visit     1 year follow up     Initial Vitals: /69 (BP Location: Left arm, Patient Position: Sitting, Cuff Size: Adult Regular)   Pulse 64   Temp 98.5  F (36.9  C) (Tympanic)   Resp 18   Ht 1.626 m (5' 4\")   Wt 58.5 kg (129 lb)   SpO2 96%   BMI 22.14 kg/m   Estimated body mass index is 22.14 kg/m  as calculated from the following:    Height as of this encounter: 1.626 m (5' 4\").    Weight as of this encounter: 58.5 kg (129 lb). Body surface area is 1.63 meters squared.  Mild Pain (2) Comment: Data Unavailable   No LMP for male patient.  Allergies reviewed: Yes  Medications reviewed: Yes    Medications: Medication refills not needed today.  Pharmacy name entered into [facility]: [id number] - [place], [place] - [address] [place] [address] E    Frailty Screening:   Is the patient here for a new oncology consult visit in cancer care? 2. No      Clinical concerns: pt wanting a print out of lab report as well as BISHNU MUNOZ CMA                Again, thank you for allowing me to participate in the care of " your patient.        Sincerely,        Zen Oliver MD

## 2024-06-26 NOTE — PROGRESS NOTES
[facility] Hematology and Oncology Progress Note    Patient: Berto Pretty  MRN: [id number]  Date of Service: Jun 26, 2024           Reason for visit         Problem List Items Addressed This Visit          Hematologic    Anemia, unspecified type    Relevant Orders    Comprehensive metabolic panel    Immunoglobulins A G and M    Kappa and lambda light chain    Lactate Dehydrogenase    CBC with platelets     Other Visit Diagnoses       Monoclonal paraproteinemia    -  Primary    Relevant Orders    Comprehensive metabolic panel    Immunoglobulins A G and M    Kappa and lambda light chain    Lactate Dehydrogenase    CBC with platelets              Assessment      1.  Monoclonal gammopathy of uncertain significance. This appears to be stable.  Immunoglobin levels are stable.  2.  History of anemia that initially resolved. Now back being slightly anemic.  3.  History of bladder issues under care of urology. Last cysto was negative.  Next cystoscopy in December.  4.  Other medical conditions that are stable.      Plan        For his monoclonal gammopathy repeat labs in about a year's time.  Follow-up with urologist regarding his question of superficial bladder tumor.  The biopsy was never done due to specimen displacement.  We talked about managing his diet and exercise.  He has some obsessive-compulsive disorder so in the past has not eaten for few days.  That is causing some degree of body mass loss.  Although his weight is pretty stable.  Follow-up with his regular doctor for other medical issues.  Will see him back in a years time.  The longitudinal plan of care for the diagnosis(es)/condition(s) as documented were addressed during this visit. Due to the added complexity in care, I will continue to support Berto in the subsequent management and with ongoing continuity of care.     Medical decision making      Patient presenting with monoclonal dermopathy and some eating disorder.  Reviewed notes from each  "unique source.  Reviewed each unique test.  Ordered tests if indicated.  Independently interpreted lab tests and radiological exams performed by other physicians.         Cancer Staging   No matching staging information was found for the patient.        History of present illness      Mr. Berto Pretty is a very pleasant 68 year old  gentleman with diagnosis of monoclonal gammopathy of IgG type.  This was detected when he had some work-up done by his primary care provider in April 2021.  This was done because he was anemic with a hemoglobin of 12.4.  His evaluation did reveal that he had a small amount of monoclonal gammopathy.  Therefore he was referred here.    At the beginning Vic's IgG levels were at 918.  They have slowly come down.  Normal IgM levels.  IgA level is also normal to slightly low normal.  Hemoglobin was 12.7 at the time of initial work-up.  Then it went up to 13.  Now it is down down to 12.7 again.  Rest of the metabolic panel was pretty normal.    Vic comes in today for scheduled follow-up.  He was little bit late to the clinic.  He has been having some issues with eating disorder.  Did not eat for few days couple months ago.  His blood labs have been okay.  His weight has fluctuated a little bit.  He has been seen by his primary care doctor.      Review of system      Details noted in the history of present illness.  A detailed review of systems is otherwise negative.      Physical exam        /69 (BP Location: Left arm, Patient Position: Sitting, Cuff Size: Adult Regular)   Pulse 64   Temp 98.5  F (36.9  C) (Tympanic)   Resp 18   Ht 1.626 m (5' 4\")   Wt 58.5 kg (129 lb)   SpO2 96%   BMI 22.14 kg/m      GENERAL: No acute distress. Cooperative in conversation.   HEENT:  Pupils are equal, round and reactive. Oral mucosa is clean and intact. No ulcerations or mucositis noted. No bleeding noted.  RESP:Chest symmetric lungs are clear bilaterally per auscultation. Regular " respiratory rate. No wheezes or rhonchi.  CV: Normal S1 S2 Regular, rate and rhythm.     ABD: Nondistended, soft, nontender. Positive bowel sounds. No organomegaly.   EXTREMITIES: No lower extremity edema.   NEURO: Non- focal. Alert and oriented x3.  Cranial nerves appear intact.  PSYCH: Within normal limits. No depression or anxiety.  SKIN: Warm dry intact.      Lab results Reviewed      Recent Results (from the past 168 hour(s))   Lactate Dehydrogenase   Result Value Ref Range    Lactate Dehydrogenase 179 0 - 250 U/L   Kappa and lambda light chain   Result Value Ref Range    Kappa Free Light Chains 1.49 0.33 - 1.94 mg/dL    Lambda Free Light Chains 1.45 0.57 - 2.63 mg/dL    Kappa /Lambda Ratio 1.03 0.26 - 1.65   Immunoglobulins A G and M   Result Value Ref Range    Immunoglobulin G 787 610 - 1,616 mg/dL    Immunoglobulin A 77 (L) 84 - 499 mg/dL    Immunoglobulin M 86 35 - 242 mg/dL   Comprehensive metabolic panel   Result Value Ref Range    Sodium 142 135 - 145 mmol/L    Potassium 3.9 3.4 - 5.3 mmol/L    Carbon Dioxide (CO2) 29 22 - 29 mmol/L    Anion Gap 8 7 - 15 mmol/L    Urea Nitrogen 16.3 8.0 - 23.0 mg/dL    Creatinine 1.04 0.67 - 1.17 mg/dL    GFR Estimate 78 >60 mL/min/1.73m2    Calcium 9.0 8.8 - 10.2 mg/dL    Chloride 105 98 - 107 mmol/L    Glucose 89 70 - 99 mg/dL    Alkaline Phosphatase 99 40 - 150 U/L    AST 22 0 - 45 U/L    ALT 11 0 - 70 U/L    Protein Total 6.5 6.4 - 8.3 g/dL    Albumin 4.1 3.5 - 5.2 g/dL    Bilirubin Total 0.4 <=1.2 mg/dL   Total Protein, Serum for ELP   Result Value Ref Range    Total Protein Serum for ELP 6.3 (L) 6.4 - 8.3 g/dL   Protein Electrophoresis, Serum   Result Value Ref Range    Albumin 4.0 3.7 - 5.1 g/dL    Alpha 1 0.3 0.2 - 0.4 g/dL    Alpha 2 0.6 0.5 - 0.9 g/dL    Beta Globulin 0.7 0.6 - 1.0 g/dL    Gamma Globulin 0.8 0.7 - 1.6 g/dL    Monoclonal Peak 0.1 (H) <=0.0 g/dL    ELP Interpretation       Small monoclonal protein (0.1 g/dL) seen in the gamma fraction, not  previously characterized in our laboratory. Recommend serum and urine immunofixation for confirmation and further characterization if not previously performed elsewhere. Pathologic significance requires clinical correlation. Larissa Mitchell M.D.       Imaging results Reviewed        No results found.      Signed by: Zen Oliver MD      This note has been dictated using voice recognition software. Any grammatical or context distortions are unintentional and inherent to the software

## 2024-06-26 NOTE — PATIENT INSTRUCTIONS
Recent Results (from the past 240 hour(s))   Lactate Dehydrogenase    Collection Time: 06/20/24 12:02 PM   Result Value Ref Range    Lactate Dehydrogenase 179 0 - 250 U/L   Kappa and lambda light chain    Collection Time: 06/20/24 12:02 PM   Result Value Ref Range    Kappa Free Light Chains 1.49 0.33 - 1.94 mg/dL    Lambda Free Light Chains 1.45 0.57 - 2.63 mg/dL    Kappa /Lambda Ratio 1.03 0.26 - 1.65   Immunoglobulins A G and M    Collection Time: 06/20/24 12:02 PM   Result Value Ref Range    Immunoglobulin G 787 610 - 1,616 mg/dL    Immunoglobulin A 77 (L) 84 - 499 mg/dL    Immunoglobulin M 86 35 - 242 mg/dL   Comprehensive metabolic panel    Collection Time: 06/20/24 12:02 PM   Result Value Ref Range    Sodium 142 135 - 145 mmol/L    Potassium 3.9 3.4 - 5.3 mmol/L    Carbon Dioxide (CO2) 29 22 - 29 mmol/L    Anion Gap 8 7 - 15 mmol/L    Urea Nitrogen 16.3 8.0 - 23.0 mg/dL    Creatinine 1.04 0.67 - 1.17 mg/dL    GFR Estimate 78 >60 mL/min/1.73m2    Calcium 9.0 8.8 - 10.2 mg/dL    Chloride 105 98 - 107 mmol/L    Glucose 89 70 - 99 mg/dL    Alkaline Phosphatase 99 40 - 150 U/L    AST 22 0 - 45 U/L    ALT 11 0 - 70 U/L    Protein Total 6.5 6.4 - 8.3 g/dL    Albumin 4.1 3.5 - 5.2 g/dL    Bilirubin Total 0.4 <=1.2 mg/dL   Total Protein, Serum for ELP    Collection Time: 06/20/24 12:02 PM   Result Value Ref Range    Total Protein Serum for ELP 6.3 (L) 6.4 - 8.3 g/dL   Protein Electrophoresis, Serum    Collection Time: 06/20/24 12:02 PM   Result Value Ref Range    Albumin 4.0 3.7 - 5.1 g/dL    Alpha 1 0.3 0.2 - 0.4 g/dL    Alpha 2 0.6 0.5 - 0.9 g/dL    Beta Globulin 0.7 0.6 - 1.0 g/dL    Gamma Globulin 0.8 0.7 - 1.6 g/dL    Monoclonal Peak 0.1 (H) <=0.0 g/dL    ELP Interpretation       Small monoclonal protein (0.1 g/dL) seen in the gamma fraction, not previously characterized in our laboratory. Recommend serum and urine immunofixation for confirmation and further characterization if not previously performed elsewhere.  Pathologic significance requires clinical correlation. Larissa Mitchell M.D.

## 2024-06-26 NOTE — PROGRESS NOTES
"Oncology Rooming Note    June 26, 2024 2:39 PM   Berto Pretty is a 68 year old male who presents for:    Chief Complaint   Patient presents with    Oncology Clinic Visit     1 year follow up     Initial Vitals: /69 (BP Location: Left arm, Patient Position: Sitting, Cuff Size: Adult Regular)   Pulse 64   Temp 98.5  F (36.9  C) (Tympanic)   Resp 18   Ht 1.626 m (5' 4\")   Wt 58.5 kg (129 lb)   SpO2 96%   BMI 22.14 kg/m   Estimated body mass index is 22.14 kg/m  as calculated from the following:    Height as of this encounter: 1.626 m (5' 4\").    Weight as of this encounter: 58.5 kg (129 lb). Body surface area is 1.63 meters squared.  Mild Pain (2) Comment: Data Unavailable   No LMP for male patient.  Allergies reviewed: Yes  Medications reviewed: Yes    Medications: Medication refills not needed today.  Pharmacy name entered into [facility]: [id number] - [address]    Frailty Screening:   Is the patient here for a new oncology consult visit in cancer care? 2. No      Clinical concerns: pt wanting a print out of lab report as well as BISHNU MUNOZ CMA              "

## 2024-06-26 NOTE — PROGRESS NOTES
[facility]                                                                                            Situation: Patient chart reviewed by care coordinator.    Background: Patient with a diagnosis of monoclonal gammopathy of uncertain significance.    Assessment: Patient comes in today for yearly follow-up where Dr Oliver states that this appears to be stable.  Immunoglobulin levels are stable.  Patient should continue to follow-up with us in a years time with labs 1 week prior.    Plan/Recommendations: \"Checkout appointment request\" has been deferred until 3/1/2025.    Patient was given his after visit summary today and told that we will call him closer to when he is due back in the clinic to schedule an appointment.  He verbalized understanding.    Signature:  Heather Ortiz RN    "

## 2024-07-15 ENCOUNTER — VIRTUAL VISIT (OUTPATIENT)
Dept: NEUROLOGY | Facility: CLINIC | Age: 69
End: 2024-07-15
Payer: MEDICARE

## 2024-07-15 DIAGNOSIS — F42.2 MIXED OBSESSIONAL THOUGHTS AND ACTS: Primary | ICD-10-CM

## 2024-07-15 PROCEDURE — 90834 PSYTX W PT 45 MINUTES: CPT | Mod: 93 | Performed by: PSYCHOLOGIST

## 2024-07-15 NOTE — PROGRESS NOTES
Psychology Progress Note    Date: July 15, 2024    Time length and type of treatment: 40 minutes (9:09 AM - 9:49 AM), individual therapy    Telemedicine visit: The patient's condition can be safely assessed and treated via synchronous audio and visual telemedicine encounter via ethology. Patient was informed that policies and procedures that govern in-person sessions would also apply to telephone sessions. Patient was in agreement with proceeding with a telephone session.     Patient Location: Patient home in [place], [place]  Provider Location:  [facility] - Provider remote location     Necessity: This session is necessary to address the patient's depression, anxiety, OCD, and hoarding.   Today we focus on the patient's treatment plan, specifically exploring nutrition and a healthy diet, and coping strategies.  The reader is invited to review the patient's full treatment plan in the Media section of the patient's Epic medical record.    Psychotherapeutic Technique: This writer utilized motivational interviewing, active listening, reassurance and support in the context of cognitive behavioral therapy to address the above.      Mental Status Evaluation:  Grooming: Unable to determine due to telephone visit  Attire: Unable to determine due to telephone visit  Age: Unable to determine due to telephone visit  Behavior Towards Examiner: Cooperative  Motor Activity: Unable to determine due to telephone visit  Eye Contact: Unable to determine due to telephone visit  Mood: Anxious   Affect: full range  Speech/Language: normal  Attention: Normal  Concentration: Sufficient  Thought Process: overinclusive   Thought Content: Clear    Orientation: Appeared oriented to person, place, and time, though not formally established  Memory: No evidence of impairment.  Judgement: Adequate  Estimated Intelligence: Within normal limits  Demonstrated Insight: Adequate  Fund of Knowledge:  Adequate    Intervention:   Patient stated he has not had any periods of going 5 days without eating, but he has had a couple of periods where he didn't eat for 2 days at a time. He noted that if he doesn't have food in his house and he feels weak, it just seems easier to not eat.  We discussed how patient's fear of germs keeps him from grocery shopping, which then keeps him from eating.  He wears a mask and gloves when he goes to the grocery store and if the store is crowded, he leaves.  Patient noted these behaviors began during COVID but others stopped those behaviors while he continued.  We discussed actual vs perceived sense of risk, and looked at how patient could use exposure and habit formation to help him.  We agreed that patient would initially focus on getting to the grocery store every week.  For now, if he needs to continue wearing a mask and gloves in order to do his grocery shopping, he will do this, but he will try very hard to shop every week so that he has healthy food at home.      Progress:  Patient committed to grocery shopping every week and identified skills he can use to facilitate this.     Plan:   We will meet again in 3 weeks to address the patient's depression, anxiety, OCD, and hoarding disorder.  Estimated duration of treatment is 10+ individual therapy sessions (67135) at intervals of once every 3 weeks. Treatment is expected to be completed by June 2025    Diagnosis:  Obsessive Compulsive Disorder  Avoidant/Restrictive Food Intake Disorder  Generalized Anxiety Disorder  Major Depressive Disorder, recurrent, severe  Hoarding Disorder

## 2024-07-29 ENCOUNTER — VIRTUAL VISIT (OUTPATIENT)
Dept: NEUROLOGY | Facility: CLINIC | Age: 69
End: 2024-07-29
Payer: MEDICARE

## 2024-07-29 DIAGNOSIS — F33.2 MAJOR DEPRESSIVE DISORDER, RECURRENT SEVERE WITHOUT PSYCHOTIC FEATURES (H): Primary | ICD-10-CM

## 2024-07-29 PROCEDURE — 90834 PSYTX W PT 45 MINUTES: CPT | Mod: 93 | Performed by: PSYCHOLOGIST

## 2024-07-29 NOTE — PROGRESS NOTES
Psychology Progress Note    Date: July 29, 2024    Time length and type of treatment: 45 minutes (9:02 AM to 9:47 AM), individual therapy    Telemedicine visit: The patient's condition can be safely assessed and treated via synchronous audio and visual telemedicine encounter via [employer]. Patient was informed that policies and procedures that govern in-person sessions would also apply to telephone sessions. Patient was in agreement with proceeding with a telephone session.     Patient Location: Patient home in [place], [place]  Provider Location:  [facility] - Provider remote location     Necessity: This session is necessary to address the patient's depression, anxiety, OCD, hoarding, and Avoidant/Restrictive Food Intake Disorder. Today we focus on the patient's treatment plan, specifically exploring bibliotherapy, coping strategies, and increasing involvement in meaningful activities.  The reader is invited to review the patient's full treatment plan in the Media section of the patient's Epic medical record.    Psychotherapeutic Technique: This writer utilized motivational interviewing, active listening, reassurance and support in the context of cognitive behavioral therapy to address the above.      Mental Status Evaluation:  Grooming: Unable to determine due to telephone visit  Attire: Unable to determine due to telephone visit  Age: Unable to determine due to telephone visit  Behavior Towards Examiner: Cooperative  Motor Activity: Unable to determine due to telephone visit  Eye Contact: Unable to determine due to telephone visit  Mood: Anxious  Depressed   Affect: blunted  Speech/Language: Mildly pressured  Attention: Normal  Concentration: Sufficient  Thought Process: tangential and overinclusive   Thought Content: Clear    Orientation: Appeared oriented to person, place, and time, though not formally established  Memory: No evidence of impairment.  Judgement: Adequate  Estimated  Intelligence: Within normal limits  Demonstrated Insight: Adequate  Fund of Knowledge: Adequate    Intervention:   Patient reported he has been working on eating regular, healthy meals and expressed pride that he has not skipped any meals since our last appointment. He noted how his fear of germs makes grocery shopping difficult and complicates healthy eating, which made him especially proud of this positive progress. He has also volunteered for several activities at his [employer] and reported improved mood secondary to this increased involvement.  This positive progress was validated and reinforced.  Patient reported he watched a movie that had been previously recommended, but struggled to find personal relevance in the movie.  We discussed the main characters anxiety, how anxiety hijacked thinking, and made even routine tasks difficult for the patient.  Patient expressed to really watch the movie with increased awareness to the role anxiety played.  Patient was gently challenged related to progress on hoarding, committed to focusing more directly on this, and we discussed how to build in increased accountability.      Progress:  Patient reported reduced depression and more consistent eating.      Plan:   We will meet again in 3 weeks to address the patient's depression, anxiety, OCD, hoarding, and Avoidant/Restrictive Food Intake Disorder.  Estimated duration of treatment is 10+ individual therapy sessions (72087) at intervals of once every 3 weeks. Treatment is expected to be completed by June 2025.     Diagnosis:  Major Depressive Disorder, recurrent, severe  Avoidant/Restrictive Food Intake Disorder  Obsessive Compulsive Disorder  Generalized Anxiety Disorder  Hoarding Disorder

## 2024-08-20 ENCOUNTER — VIRTUAL VISIT (OUTPATIENT)
Dept: NEUROLOGY | Facility: CLINIC | Age: 69
End: 2024-08-20
Payer: MEDICARE

## 2024-08-20 DIAGNOSIS — F42.2 MIXED OBSESSIONAL THOUGHTS AND ACTS: ICD-10-CM

## 2024-08-20 PROCEDURE — 99443 PR PHYSICIAN TELEPHONE EVALUATION 21-30 MIN: CPT | Mod: 93 | Performed by: PSYCHIATRY & NEUROLOGY

## 2024-08-20 RX ORDER — TRAZODONE HYDROCHLORIDE 50 MG/1
50 TABLET, FILM COATED ORAL
Qty: 30 TABLET | Refills: 5 | Status: SHIPPED | OUTPATIENT
Start: 2024-08-20

## 2024-08-20 RX ORDER — ESCITALOPRAM OXALATE 20 MG/1
TABLET ORAL
Qty: 90 TABLET | Refills: 1 | Status: SHIPPED | OUTPATIENT
Start: 2024-08-20

## 2024-08-20 ASSESSMENT — PATIENT HEALTH QUESTIONNAIRE - PHQ9: SUM OF ALL RESPONSES TO PHQ QUESTIONS 1-9: 19

## 2024-08-20 NOTE — PROGRESS NOTES
Virtual Visit Details    Type of service:  Telephone Visit   Phone call duration: 20 minutes   Originating Location (pt. Location): The patient's home  Distant Location (provider location): My home office      Outpatient Psychiatry Note     The patient presented for an initial psychiatric consultation on December 7 of 2020.  He carried a diagnosis of obsessive-compulsive disorder and has had symptoms throughout his entire life.  He presents at this time on Lexapro 20 mg a day.  He has been on this dose for 1 month having started it October 12, 2020 and increased 1 month prior to his initial intake with me.  He has been on psychotropic medication most of his adult life although has been off psychotropic medication for about 1 to 2 years prior to recent restarting of that medication.  Over the years the patient has been on a variety of psychotropic medication with limited success.  He has been on Prozac which she described as having mild efficacy for his OCD symptoms, Celexa with mild efficacy at doses of 80 mg a day, Parnate, Stelazine, Haldol and clomipramine as an adjunct at 25 mg which were all described as ineffective.    The patient describes having onset of OCD symptoms as a child.  His symptoms included obsessive thoughts and compulsive behaviors with constant worry and thoughts about germs, asbestos and he engaged in frequent handwashing checking doors and lights and excessively bought movies and books.  He had an inpatient hospital stay most recently in the 1970s.  He has OCD symptoms caused him to stop attendance at the [facility] and he has been on SSDI for years due to this.    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  It was at this time that he chose to come off his Lexapro.  He did fairly well from a psychiatric standpoint for a number of months but since the spring 2020 he has had an increase in obsessive thoughts and anxiety symptoms.  As stated  previously he restarted his Lexapro on October 12 of 2020 and that was increased to 20 mg a day 1 month later.    At the initial visit, in part due to the fact that the patient had recently been increased on his Lexapro we continued with that treatment plan.  He had previously been off all psychotropic medication for a about a year and a half.  He had done fairly well off that medication until the spring 2020 when he had return of his anxiety symptoms.  He was in the process of being reassessed for his sleep apnea.    The patient return to the clinic on February 8 of 2021.  He had restarted individual psychotherapy.  He reported he was doing a bit better and stated that therapy was quite helpful.  He believed he was improving and responding well to the therapy and reassurance.  He was tolerating the recently restarted medication and we continued with that treatment plan.    I saw the patient on May 3, 2021.  At that time he again sought frequent reassurance and was quite talkative and anxious about possibly getting dementia at some point.  He continued to struggle with OCD symptoms.  He continued with his therapist and medical follow-up.  We did not make any medication changes at that visit.    The patient returns for follow-up visit in December 2021.  There was no significant change at that time.  The patient had a diagnosed and was being treated for bladder cancer.  He was comfortable with the current symptoms and stated that he was seen Dr. Stout and that was going well.  We did not make any medication changes at that visit.    I saw the patient in March 2022.  He was concerned that he may have had a slight cognitive decline in the last 3 months.  He was not reading as well.  He again was somewhat anxious and needed some reassurance.  His psychologist had apparently ordered some neuropsychological testing.  His main concern was sleep deprivation.  We discussed some sleep hygiene techniques and I did add some  low-dose as needed trazodone.    The patient was seen on June 27, 2022.  He stated he had not refilled his trazodone because he was sleeping better.  He was more active and had a more positive attitude.  He stated he had gotten good news with his neurologist regarding the cancer follow-up.  We did not make any changes.    The patient was seen on September 26, 2022.  Patient was doing well at that time.  He had run out of Lexapro 2 weeks earlier I reordered the lexapro.  We elected to make no medication changes.  The patient was tolerating the medication.  The patient was going to follow-up with psychology.    The patient was seen on January 9.  He again was quite talkative and somewhat perseverative but he reported he was less depressed and felt there had been a decline in his OCD behaviors.  He was being set up with a new medical team and was somewhat anxious about that but overall doing quite well and tolerating the medication.  We did not make any changes.    The patient was seen for follow-up in May 2023.  Again somewhat talkative and perseverative and needing some redirection.  He continue to follow-up with psychology.  We elected not to make any changes in his medication at that time.    The patient was seen in August 2023.  At that time he questioned whether he might be a bit more depressed.  He was not using his CPAP machine and was still having some sleep issues and I encouraged him to use that.  He told me his cancer treatment was going well.  I encouraged him to utilize the trazodone for sleepless nights as he stated he was not using that all the time.  He continued to see his psychologist.  We did not make any medication changes.    The patient was seen for follow-up in November 2023.  He was not doing as well and he had been off the Lexapro for 4 weeks.  It was a bit unclear why he stopped it but he was willing to restart it and actually had restarted that 3 days prior.  He continued with his therapist.   We did not make any changes at that time.    The patient was seen for follow-up in February 2024.  He was doing well but had not yet tried the trazodone for sleep.  He continued to see his psychologist.  He was being followed by his medical team with no new issues or concerns.  We elected to make no changes and continue with the trazodone as needed for sleep.  He was considering trying that.    The patient presented for follow-up in May 2024.  He continued to follow-up with his therapist as well.  He continued to be somewhat anxious but overall doing well and tolerating the medication.  We elected to continue with the treatment plan and did not make any changes.    HPI:  The patient presented today for a phone visit stating he was doing relatively well.  He again tended to control the interview and sought reassurance but stated his mood was relatively good.  He continues to see his therapist and that has been helpful.  He did again see quite a bit of reassurance.  He was worried about variable appetite but there has been no weight change and he continues to follow with his medical team.  He recently had his annual exam.  He denied having any suicidal ideation.  No hallucinations or delusions.  No óscar.  He denied having any tremors or side effects to the medication.  We again discussed anxiety techniques.  We did not make any changes and I did inform the patient that I would no longer be available at this clinic.      Current Medications:  Medications were personally reviewed at this meeting.  Please see the chart for current medication list.      Mental Status Exam:  Appearance: Patient does not appear uncomfortable.  There is no obvious pain or shortness of breath.  No new issues or concerns are reported.   Behavior: Patient is cooperative.  He again does see quite a bit of reassurance and tends to control the interview.  Able to interact appropriately.  No agitation.  Not restless.  No reports of any recent  behavioral dyscontrol.  The patient does participate and is cooperative.  Able to initiate.  Speech: Participating with intact sentence structure.  A bit repetitive, quite talkative and tends to control the interview a bit.  No pressured or rambling quality.  Answers are appropriate and consistent.  Able to dialogue and initiate.  Mood/Affect: No obvious depression, baseline anxiety and needing reassurance.  No irritability or lability.  No evidence of any óscar.  Thought Content:  No evidence of any psychosis. The patient denies any psychotic symptoms. No reports of any recent psychosis.  Suicidal or Homicidal Thoughts: None apparent or reported.  The patient denies having any homicidal or suicidal ideation.  Thought Process/Formulation: Participating.  Able to follow conversation.  No thought blocking.  No racing thoughts.  Not disorganized.  No evidence of any pressured thinking.  Associations:  Grossly intact.  Processing information appropriately.  Able to track conversation..  Fund of Knowledge:  Grossly intact.   Attention/Concentration: Concentration appears adequate.  The patient is following and participating well.  Attentive.  No disorganization.  No racing thoughts.  Insight:  Grossly intact.   Judgement:  Grossly intact.   Memory:  Grossly intact.   Motor Status:  No recent change reported. No current tremor.  Orientation: Grossly oriented..      Recent Labs:  Please see the chart.      Diagnosis:  JHON   OCD, by history  Major depressive disorder, recurrent, moderate, without psychotic features    Plan:  I will continue with the current treatment plan.  We will continue with the Lexapro 20 mg a day and trazodone 50 mg at night.  He will continue with his therapist and his medical team.  I did inform the patient that I would no longer be available through this clinic.      The patient will seek out appropriate emergency services should that become necessary.  I will make myself available if any  questions, concerns, or problems arise.    Elie Jones MD

## 2024-08-20 NOTE — LETTER
8/20/2024      Berto Pretty  [address] [facility] [address]      Dear Colleague,    Thank you for referring your patient, Berto Pretty, to the [facility]. Please see a copy of my visit note below.    Virtual Visit Details    Type of service:  Telephone Visit   Phone call duration: 20 minutes   Originating Location (pt. Location): The patient's home  Distant Location (provider location): My home office      Outpatient Psychiatry Note     The patient presented for an initial psychiatric consultation on December 7 of 2020.  He carried a diagnosis of obsessive-compulsive disorder and has had symptoms throughout his entire life.  He presents at this time on Lexapro 20 mg a day.  He has been on this dose for 1 month having started it October 12, 2020 and increased 1 month prior to his initial intake with me.  He has been on psychotropic medication most of his adult life although has been off psychotropic medication for about 1 to 2 years prior to recent restarting of that medication.  Over the years the patient has been on a variety of psychotropic medication with limited success.  He has been on Prozac which she described as having mild efficacy for his OCD symptoms, Celexa with mild efficacy at doses of 80 mg a day, Parnate, Stelazine, Haldol and clomipramine as an adjunct at 25 mg which were all described as ineffective.    The patient describes having onset of OCD symptoms as a child.  His symptoms included obsessive thoughts and compulsive behaviors with constant worry and thoughts about germs, asbestos and he engaged in frequent handwashing checking doors and lights and excessively bought movies and books.  He had an inpatient hospital stay most recently in the 1970s.  He has OCD symptoms caused him to stop attendance at the [facility] and he has been on SSDI for years due to this.    The patient has had a history of a CVA x2 both I believe in  October 2019.  It was at this time that he chose to come off his Lexapro.  He did fairly well from a psychiatric standpoint for a number of months but since the spring 2020 he has had an increase in obsessive thoughts and anxiety symptoms.  As stated previously he restarted his Lexapro on October 12 of 2020 and that was increased to 20 mg a day 1 month later.    At the initial visit, in part due to the fact that the patient had recently been increased on his Lexapro we continued with that treatment plan.  He had previously been off all psychotropic medication for a about a year and a half.  He had done fairly well off that medication until the spring 2020 when he had return of his anxiety symptoms.  He was in the process of being reassessed for his sleep apnea.    The patient return to the clinic on February 8 of 2021.  He had restarted individual psychotherapy.  He reported he was doing a bit better and stated that therapy was quite helpful.  He believed he was improving and responding well to the therapy and reassurance.  He was tolerating the recently restarted medication and we continued with that treatment plan.    I saw the patient on May 3, 2021.  At that time he again sought frequent reassurance and was quite talkative and anxious about possibly getting dementia at some point.  He continued to struggle with OCD symptoms.  He continued with his therapist and medical follow-up.  We did not make any medication changes at that visit.    The patient returns for follow-up visit in December 2021.  There was no significant change at that time.  The patient had a diagnosed and was being treated for bladder cancer.  He was comfortable with the current symptoms and stated that he was seen Dr. Stout and that was going well.  We did not make any medication changes at that visit.    I saw the patient in March 2022.  He was concerned that he may have had a slight cognitive decline in the last 3 months.  He was not reading  as well.  He again was somewhat anxious and needed some reassurance.  His psychologist had apparently ordered some neuropsychological testing.  His main concern was sleep deprivation.  We discussed some sleep hygiene techniques and I did add some low-dose as needed trazodone.    The patient was seen on June 27, 2022.  He stated he had not refilled his trazodone because he was sleeping better.  He was more active and had a more positive attitude.  He stated he had gotten good news with his neurologist regarding the cancer follow-up.  We did not make any changes.    The patient was seen on September 26, 2022.  Patient was doing well at that time.  He had run out of Lexapro 2 weeks earlier I reordered the lexapro.  We elected to make no medication changes.  The patient was tolerating the medication.  The patient was going to follow-up with psychology.    The patient was seen on January 9.  He again was quite talkative and somewhat perseverative but he reported he was less depressed and felt there had been a decline in his OCD behaviors.  He was being set up with a new medical team and was somewhat anxious about that but overall doing quite well and tolerating the medication.  We did not make any changes.    The patient was seen for follow-up in May 2023.  Again somewhat talkative and perseverative and needing some redirection.  He continue to follow-up with psychology.  We elected not to make any changes in his medication at that time.    The patient was seen in August 2023.  At that time he questioned whether he might be a bit more depressed.  He was not using his CPAP machine and was still having some sleep issues and I encouraged him to use that.  He told me his cancer treatment was going well.  I encouraged him to utilize the trazodone for sleepless nights as he stated he was not using that all the time.  He continued to see his psychologist.  We did not make any medication changes.    The patient was seen for  follow-up in November 2023.  He was not doing as well and he had been off the Lexapro for 4 weeks.  It was a bit unclear why he stopped it but he was willing to restart it and actually had restarted that 3 days prior.  He continued with his therapist.  We did not make any changes at that time.    The patient was seen for follow-up in February 2024.  He was doing well but had not yet tried the trazodone for sleep.  He continued to see his psychologist.  He was being followed by his medical team with no new issues or concerns.  We elected to make no changes and continue with the trazodone as needed for sleep.  He was considering trying that.    The patient presented for follow-up in May 2024.  He continued to follow-up with his therapist as well.  He continued to be somewhat anxious but overall doing well and tolerating the medication.  We elected to continue with the treatment plan and did not make any changes.    HPI:  The patient presented today for a phone visit stating he was doing relatively well.  He again tended to control the interview and sought reassurance but stated his mood was relatively good.  He continues to see his therapist and that has been helpful.  He did again see quite a bit of reassurance.  He was worried about variable appetite but there has been no weight change and he continues to follow with his medical team.  He recently had his annual exam.  He denied having any suicidal ideation.  No hallucinations or delusions.  No óscar.  He denied having any tremors or side effects to the medication.  We again discussed anxiety techniques.  We did not make any changes and I did inform the patient that I would no longer be available at this clinic.      Current Medications:  Medications were personally reviewed at this meeting.  Please see the chart for current medication list.      Mental Status Exam:  Appearance: Patient does not appear uncomfortable.  There is no obvious pain or shortness of breath.   No new issues or concerns are reported.   Behavior: Patient is cooperative.  He again does see quite a bit of reassurance and tends to control the interview.  Able to interact appropriately.  No agitation.  Not restless.  No reports of any recent behavioral dyscontrol.  The patient does participate and is cooperative.  Able to initiate.  Speech: Participating with intact sentence structure.  A bit repetitive, quite talkative and tends to control the interview a bit.  No pressured or rambling quality.  Answers are appropriate and consistent.  Able to dialogue and initiate.  Mood/Affect: No obvious depression, baseline anxiety and needing reassurance.  No irritability or lability.  No evidence of any óscar.  Thought Content:  No evidence of any psychosis. The patient denies any psychotic symptoms. No reports of any recent psychosis.  Suicidal or Homicidal Thoughts: None apparent or reported.  The patient denies having any homicidal or suicidal ideation.  Thought Process/Formulation: Participating.  Able to follow conversation.  No thought blocking.  No racing thoughts.  Not disorganized.  No evidence of any pressured thinking.  Associations:  Grossly intact.  Processing information appropriately.  Able to track conversation..  Fund of Knowledge:  Grossly intact.   Attention/Concentration: Concentration appears adequate.  The patient is following and participating well.  Attentive.  No disorganization.  No racing thoughts.  Insight:  Grossly intact.   Judgement:  Grossly intact.   Memory:  Grossly intact.   Motor Status:  No recent change reported. No current tremor.  Orientation: Grossly oriented..      Recent Labs:  Please see the chart.      Diagnosis:  JHON   OCD, by history  Major depressive disorder, recurrent, moderate, without psychotic features    Plan:  I will continue with the current treatment plan.  We will continue with the Lexapro 20 mg a day and trazodone 50 mg at night.  He will continue with his  therapist and his medical team.  I did inform the patient that I would no longer be available through this clinic.      The patient will seek out appropriate emergency services should that become necessary.  I will make myself available if any questions, concerns, or problems arise.    Elie Jones MD          Again, thank you for allowing me to participate in the care of your patient.        Sincerely,        Elie Jones MD

## 2024-08-20 NOTE — NURSING NOTE
Current patient location: [address] [facility] [address]    Is the patient currently in the state of [place]? YES    Visit mode:TELEPHONE    If the visit is dropped, the patient can be reconnected by: TELEPHONE VISIT: Phone number:   Telephone Information:   Mobile [phone]       Will anyone else be joining the visit? NO  (If patient encounters technical issues they should call [phone])    How would you like to obtain your AVS? Mail a copy    Are changes needed to the allergy or medication list? Pt stated no med changes    Are refills needed on medications prescribed by this physician? NO    Rooming Documentation:  Not applicable      Reason for visit: Telephone (Follow-up )    Elisha MOORE

## 2024-08-29 ENCOUNTER — TRANSFERRED RECORDS (OUTPATIENT)
Dept: HEALTH INFORMATION MANAGEMENT | Facility: CLINIC | Age: 69
End: 2024-08-29
Payer: MEDICARE

## 2024-09-30 ENCOUNTER — VIRTUAL VISIT (OUTPATIENT)
Dept: NEUROLOGY | Facility: CLINIC | Age: 69
End: 2024-09-30
Payer: MEDICARE

## 2024-09-30 DIAGNOSIS — F41.1 GAD (GENERALIZED ANXIETY DISORDER): Primary | ICD-10-CM

## 2024-09-30 PROCEDURE — 90834 PSYTX W PT 45 MINUTES: CPT | Mod: 93 | Performed by: PSYCHOLOGIST

## 2024-09-30 ASSESSMENT — PATIENT HEALTH QUESTIONNAIRE - PHQ9
5. POOR APPETITE OR OVEREATING: NEARLY EVERY DAY
9. THOUGHTS THAT YOU WOULD BE BETTER OFF DEAD, OR OF HURTING YOURSELF: NOT AT ALL
4. FEELING TIRED OR HAVING LITTLE ENERGY: NEARLY EVERY DAY
8. MOVING OR SPEAKING SO SLOWLY THAT OTHER PEOPLE COULD HAVE NOTICED. OR THE OPPOSITE, BEING SO FIGETY OR RESTLESS THAT YOU HAVE BEEN MOVING AROUND A LOT MORE THAN USUAL: NEARLY EVERY DAY
1. LITTLE INTEREST OR PLEASURE IN DOING THINGS: MORE THAN HALF THE DAYS
7. TROUBLE CONCENTRATING ON THINGS, SUCH AS READING THE NEWSPAPER OR WATCHING TELEVISION: NEARLY EVERY DAY
6. FEELING BAD ABOUT YOURSELF - OR THAT YOU ARE A FAILURE OR HAVE LET YOURSELF OR YOUR FAMILY DOWN: MORE THAN HALF THE DAYS
10. IF YOU CHECKED OFF ANY PROBLEMS, HOW DIFFICULT HAVE THESE PROBLEMS MADE IT FOR YOU TO DO YOUR WORK, TAKE CARE OF THINGS AT HOME, OR GET ALONG WITH OTHER PEOPLE: EXTREMELY DIFFICULT
3. TROUBLE FALLING OR STAYING ASLEEP OR SLEEPING TOO MUCH: NEARLY EVERY DAY
SUM OF ALL RESPONSES TO PHQ9 QUESTIONS 1 & 2: 4
2. FEELING DOWN, DEPRESSED OR HOPELESS: MORE THAN HALF THE DAYS
SUM OF ALL RESPONSES TO PHQ QUESTIONS 1-9: 21

## 2024-09-30 ASSESSMENT — ANXIETY QUESTIONNAIRES
6. BECOMING EASILY ANNOYED OR IRRITABLE: NEARLY EVERY DAY
2. NOT BEING ABLE TO STOP OR CONTROL WORRYING: MORE THAN HALF THE DAYS
4. TROUBLE RELAXING: NEARLY EVERY DAY
1. FEELING NERVOUS, ANXIOUS, OR ON EDGE: MORE THAN HALF THE DAYS
GAD7 TOTAL SCORE: 18
5. BEING SO RESTLESS THAT IT IS HARD TO SIT STILL: MORE THAN HALF THE DAYS
3. WORRYING TOO MUCH ABOUT DIFFERENT THINGS: NEARLY EVERY DAY
IF YOU CHECKED OFF ANY PROBLEMS ON THIS QUESTIONNAIRE, HOW DIFFICULT HAVE THESE PROBLEMS MADE IT FOR YOU TO DO YOUR WORK, TAKE CARE OF THINGS AT HOME, OR GET ALONG WITH OTHER PEOPLE: EXTREMELY DIFFICULT
7. FEELING AFRAID AS IF SOMETHING AWFUL MIGHT HAPPEN: NEARLY EVERY DAY

## 2024-09-30 NOTE — PROGRESS NOTES
Psychology Progress Note    Date: September 30, 2024    Time length and type of treatment: 49 minutes (9:06 AM - 9:55 AM), individual therapy    Telemedicine visit: The patient's condition can be safely assessed and treated via telephone encounter. Patient was informed that policies and procedures that govern in-person sessions would also apply to telephone sessions. Patient was in agreement with proceeding with a telephone session.     Patient Location: Patient home in [place], [place]  Provider Location:  [facility] - Provider remote location     Necessity: This session is necessary to address the patient's depression, anxiety, OCD, Hoarding Disorder, and Avoidant-Restrictive Food Intake Disorder. Today we focus on updating the patient's treatment plan.  The reader is invited to review the patient's full treatment plan in the Media section of the patient's Epic medical record.    Psychotherapeutic Technique: This writer utilized motivational interviewing, active listening, reassurance and support in the context of cognitive behavioral therapy to address the above.      Mental Status Evaluation:  Grooming: Unable to determine due to telephone visit  Attire: Unable to determine due to telephone visit  Age: Unable to determine due to telephone visit  Behavior Towards Examiner: Cooperative  Motor Activity: Unable to determine due to telephone visit  Eye Contact: Unable to determine due to telephone visit  Mood: Anxious   Affect: full range  Speech/Language: pressured  Attention: Normal  Concentration: Sufficient  Thought Process: tangential  Thought Content: Clear    Orientation: Appeared oriented to person, place, and time, though not formally established  Memory: No evidence of impairment.  Judgement: Adequate  Estimated Intelligence: Within normal limits  Demonstrated Insight: Adequate  Fund of Knowledge: Adequate    Intervention:   Patient was readministered the PHQ-9 and JHON-7 as part of  updating his treatment plan.  His score of 21 on the PHQ-9 is suggestive of severe depression and is one point higher than his previous severe score.  Similarly, his score of 18  JHON-7 is suggestive of severe depression and is one point higher than his earlier severe score. Patient agreed this is accurate, stating he was doing better for about two weeks after our last appointment, but then needed to cancel an appointment and wasn't able to reschedule.  His depression and anxiety have gradually worsened since that time.  Patient treatment plan was jointly revised and in remaining time we reviewed what coping mechanisms patient was able to maintain and reviewed coping mechanisms patient has previously found helpful but had stopped using.     Progress:  Patient treatment plan was jointly revised    Plan:   We will meet again in 3 weeks to address the patient's depression, anxiety, OCD, Hoarding Disorder, and Avoidant-Restrictive Food Intake Disorder.  Estimated duration of treatment is 10+ individual therapy sessions (65813) at intervals of once every 3 weeks. Treatment is expected to be completed by June 2025.     Diagnosis:  Generalized Anxiety Disorder  Major Depressive Disorder, recurrent, severe  Obsessive Compulsive Disorder  Hoarding Disorder  Avoidant-Restrictive Food Intake Disorder

## 2024-10-21 ENCOUNTER — VIRTUAL VISIT (OUTPATIENT)
Dept: NEUROLOGY | Facility: CLINIC | Age: 69
End: 2024-10-21
Payer: MEDICARE

## 2024-10-21 DIAGNOSIS — F41.1 GAD (GENERALIZED ANXIETY DISORDER): Primary | ICD-10-CM

## 2024-10-21 PROCEDURE — 90832 PSYTX W PT 30 MINUTES: CPT | Mod: 93 | Performed by: PSYCHOLOGIST

## 2024-10-21 NOTE — PROGRESS NOTES
Psychology Progress Note    Date: October 21, 2024    Time length and type of treatment: 33 minutes (9:13 AM - 9:46 AM) , individual therapy    Telemedicine visit: The patient's condition can be safely assessed and treated via telephone encounter. Patient was informed that policies and procedures that govern in-person sessions would also apply to telephone sessions. Patient was in agreement with proceeding with a telephone session. Session began late due to patient connectivity problems.     Patient Location: Patient home in [place], [place]  Provider Location:  [facility] - Provider remote location     Necessity: This session is necessary to address the patient's anxiety, depression, OCD, Hoarding Disorder, an Avoidant-Restrictive Food Intake Disorder (ARFID). Today we focus on the patient's treatment plan, specifically exploring sleep hygiene and reframing cognitive messages that exacerbate depression. The reader is invited to review the patient's full treatment plan in the Media section of the patient's Epic medical record.    Psychotherapeutic Technique: This writer utilized motivational interviewing, active listening, reassurance and support in the context of cognitive behavioral therapy to address the above.      Mental Status Evaluation:  Grooming: Unable to determine due to telephone visit  Attire: Unable to determine due to telephone visit  Age: Unable to determine due to telephone visit  Behavior Towards Examiner: Cooperative  Motor Activity: Unable to determine due to telephone visit  Eye Contact: Unable to determine due to telephone visit  Mood: Anxious   Affect: full range  Speech/Language: Mildly pressured  Attention: Normal  Concentration: Sufficient  Thought Process: tangential  Thought Content: Clear    Orientation: Appeared oriented to person, place, and time, though not formally established  Memory: No evidence of impairment.  Judgement: Adequate  Estimated Intelligence:  Within normal limits  Demonstrated Insight: Adequate  Fund of Knowledge: Adequate    Intervention:   Patient reported he has been working on his eating, has gone shopping every week, and has even gained a little bit of weight.  This positive progress was validated and reinforced.  Patient expressed disappointment that he did not complete his taxes on time and as a result will be losing a refund he was entitled to.  We discussed how procrastination and illness resulted in this outcome.  Patient also expressed frustration that sleep has been difficult and he has struggled with both inadequate sleep and poor sleep quality.  We discussed factors affecting sleep quality and patient was provided psychoeducation related to sleep hygiene.  He expressed optimism that he could utilize some of these strategies. Patient also stated that since he turned 69, he has been struggling with regret over how OCD has made his life smaller and kept him from having many experiences he would have liked.  We discussed the unpredictability of life, the inability to know if a life not lived would have been better or worse than the one he experienced, and the reality that he remains alive and can still have experiences that he is interested in.  Patient expressed appreciation for the reminder that he can still engage in new activities and agreed to reflect on what experiences he might still like to have and how he might work toward those goals.      Progress:  Affect lightened as the session progressed and the patient reported increased hopefulness.    Plan:   We will meet again in 3 weeks to address the patient's anxiety, depression, OCD, Hoarding Disorder, and ARFID..  Estimated duration of treatment is 10+ individual therapy sessions (29373) at intervals of once every 3 weeks. Treatment is expected to be completed by June 2025.     Diagnosis:  Generalized Anxiety Disorder  Major Depressive Disorder, recurrent, severe  Obsessive Compulsive  Disorder  Hoarding Disorder  Avoidant-Restrictive Food Intake Disorder

## 2024-12-02 ENCOUNTER — VIRTUAL VISIT (OUTPATIENT)
Dept: NEUROLOGY | Facility: CLINIC | Age: 69
End: 2024-12-02
Payer: MEDICARE

## 2024-12-02 DIAGNOSIS — F41.1 GAD (GENERALIZED ANXIETY DISORDER): Primary | ICD-10-CM

## 2024-12-02 PROCEDURE — 90834 PSYTX W PT 45 MINUTES: CPT | Mod: 93 | Performed by: PSYCHOLOGIST

## 2024-12-02 NOTE — PROGRESS NOTES
Psychology Progress Note    Date: December 2, 2024    Time length and type of treatment: 44 minutes (9:06 AM to 9:50 AM), individual therapy    Telemedicine visit: The patient's condition can be safely assessed and treated via synchronous audio and visual telemedicine encounter. Patient was informed that policies and procedures that govern in-person sessions would also apply to telephone sessions. Patient was in agreement with proceeding with a telephone session.     Patient Location: Patient home in [place], [place]  Provider Location:  [facility] - Provider remote location     Necessity: This session is necessary to address the patient's anxiety, depression, OCD, hoarding, and Avoidant Restrictive Food Intake Disorder (ARFID). Today we focus on the patient's treatment plan, specifically exploring coping strategies and thoughts and expectations of self and others.  The reader is invited to review the patient's full treatment plan in the Media section of the patient's Epic medical record.    Psychotherapeutic Technique: This writer utilized motivational interviewing, active listening, reassura unable to determine due to telephone visit nce and support in the context of cognitive behavioral therapy to address the above.      Mental Status Evaluation:  Grooming: Unable to determine due to telephone visit  Attire: Unable to determine due to telephone visit  Age: Unable to determine due to telephone visit  Behavior Towards Examiner: Cooperative  Motor Activity: Unable to determine due to telephone visit  Eye Contact: Unable to determine due to telephone visit  Mood: Anxious   Affect: full range  Speech/Language: normal  Attention: Normal  Concentration: Sufficient  Thought Process: tangential  Thought Content: Clear    Orientation: Appeared oriented to person, place, and time, though not formally established  Memory: No evidence of impairment.  Judgement: Adequate  Estimated Intelligence: Within  normal limits  Demonstrated Insight: Adequate  Fund of Knowledge: Adequate    Intervention:   Patient reported he again went through a period of eating only about 300 calories per day and stated that after about 6 days he was very thin and became sufficiently concerned to go to the store and buy food.  We discussed how patient friends could check in on him at [employer] and once during the week to ensure he's eating, and could help him find a meal delivery service and/or help him arrange for regular grocery delivery of a few core staples. We also discussed how eating out with friends can make eating more pleasurable and support eating.  Patient reported he is making progress discarding hoarded content, explaining that having furnace repair people scheduled to enter his home and needing to clear space for them to enter has been a helpful motivator.  This positive progress was validated and reinforced and we discussed how patient might schedule other routine maintenance needs to help him maintain momentum.    Progress:  Patient reported increased commitment to addressing eating and utilizing his support system.     Plan:   We will meet again in 3 weeks to address the patient's anxiety, depression, OCD, Hoarding Disorder, and ARFID.  Estimated duration of treatment is 10+ individual therapy sessions (66830) at intervals of once every 3 weeks. Treatment is expected to be completed by June 2025.     Diagnosis:  Generalized Anxiety Disorder  Major Depressive Disorder, recurrent, severe  Avoidant-Restrictive Food Intake Disorder  Obsessive Compulsive Disorder  Hoarding Disorder

## 2024-12-30 ENCOUNTER — VIRTUAL VISIT (OUTPATIENT)
Dept: NEUROLOGY | Facility: CLINIC | Age: 69
End: 2024-12-30
Payer: MEDICARE

## 2024-12-30 DIAGNOSIS — F41.1 GAD (GENERALIZED ANXIETY DISORDER): Primary | ICD-10-CM

## 2024-12-30 PROCEDURE — 99207 PR NO CHARGE LOS: CPT | Mod: 93 | Performed by: PSYCHOLOGIST

## 2024-12-30 NOTE — PROGRESS NOTES
Psychology Progress Note    Date: December 30, 2024    Time length and type of treatment: 10 minutes (9:09 AM - 9:19 AM), individual therapy    Telemedicine visit: The patient's condition can be safely assessed and treated via stelephone encounter. Patient was informed that policies and procedures that govern in-person sessions would also apply to telephone sessions. Patient was in agreement with proceeding with a telephone session.     Patient Location: Patient home in [place], [place]  Provider Location:  [facility] - Provider remote location     Necessity: This session is necessary to address the patient's anxiety, depression, OCD, hoarding, and Avoidant Restrictive Food Intake Disorder (ARFID). Today we focus on maintaining treatment gains.  The reader is invited to review the patient's full treatment plan in the Media section of the patient's Epic medical record.    Psychotherapeutic Technique: This writer utilized motivational interviewing, active listening, reassurance and support in the context of cognitive behavioral therapy to address the above.      Mental Status Evaluation:  Grooming: Unable to determine due to telephone visit  Attire: Unable to determine due to telephone visit  Age: Unable to determine due to telephone visit  Behavior Towards Examiner: Cooperative  Motor Activity: Unable to determine due to telephone visit  Eye Contact: Unable to determine due to telephone visit  Mood: Anxious   Affect: full range  Speech/Language: pressured  Attention: Normal  Concentration: Sufficient  Thought Process: tangential  Thought Content: Clear    Orientation: Appeared oriented to person, place, and time, though not formally established  Memory: No evidence of impairment.  Judgement: Adequate  Estimated Intelligence: Within normal limits  Demonstrated Insight: Adequate  Fund of Knowledge: Adequate    Intervention:   Patient briefly checked in, explaining that he is not feeling well  and is having connectivity problems.  He reported he continues to work on maintaining a health diet and staying connected to his [employer].  He is stable.  We mutually agreed to end after 10 minutes due to patient ill health.      Progress:  Patient reported he is stable and continuing to work on treatment goals.    Plan:   We will meet again in 1 month to address the patient's anxiety, depression, hoarding, and ARFID.  Estimated duration of treatment is 10+ individual therapy sessions (32066) at intervals of once every 3 weeks. Treatment is expected to be completed by June 2025.     Diagnosis:  Generalized Anxiety Disorder  Avoidant Restrictive Food Intake Disorder  Major Depressive Disorder, Recurrent, Severe  Hoarding Disorder

## 2024-12-31 ENCOUNTER — IMMUNIZATION (OUTPATIENT)
Dept: FAMILY MEDICINE | Facility: CLINIC | Age: 69
End: 2024-12-31
Payer: MEDICARE

## 2024-12-31 DIAGNOSIS — Z23 ENCOUNTER FOR IMMUNIZATION: Primary | ICD-10-CM

## 2024-12-31 PROCEDURE — 91320 SARSCV2 VAC 30MCG TRS-SUC IM: CPT

## 2024-12-31 PROCEDURE — 90480 ADMN SARSCOV2 VAC 1/ONLY CMP: CPT

## 2024-12-31 PROCEDURE — G0008 ADMIN INFLUENZA VIRUS VAC: HCPCS

## 2024-12-31 PROCEDURE — 90662 IIV NO PRSV INCREASED AG IM: CPT

## 2024-12-31 NOTE — PROGRESS NOTES
Prior to immunization administration, verified patients identity using patient s name and date of birth. Please see Immunization Activity for additional information.     Screening Questionnaire for Adult Immunization    Are you sick today?   No   Do you have allergies to medications, food, a vaccine component or latex?   No   Have you ever had a serious reaction after receiving a vaccination?   No   Do you have a long-term health problem with heart, lung, kidney, or metabolic disease (e.g., diabetes), asthma, a blood disorder, no spleen, complement component deficiency, a cochlear implant, or a spinal fluid leak?  Are you on long-term aspirin therapy?   No   Do you have cancer, leukemia, HIV/AIDS, or any other immune system problem?   No   Do you have a parent, brother, or sister with an immune system problem?   No   In the past 3 months, have you taken medications that affect  your immune system, such as prednisone, other steroids, or anticancer drugs; drugs for the treatment of rheumatoid arthritis, Crohn s disease, or psoriasis; or have you had radiation treatments?   No   Have you had a seizure, or a brain or other nervous system problem?   No   During the past year, have you received a transfusion of blood or blood    products, or been given immune (gamma) globulin or antiviral drug?   No   For women: Are you pregnant or is there a chance you could become       pregnant during the next month?   No   Have you received any vaccinations in the past 4 weeks?   No     Immunization questionnaire answers were all negative.    I have reviewed the following standing orders:   This patient is due and qualifies for the Covid-19 vaccine.     Click here for COVID-19 Standing Order    I have reviewed the vaccines inclusion and exclusion criteria; No concerns regarding eligibility.     This patient is due and qualifies for the Influenza vaccine.    Click here for Influenza Vaccine Standing Order    I have reviewed the  vaccines inclusion and exclusion criteria; No concerns regarding eligibility.     Patient instructed to remain in clinic for 15 minutes afterwards, and to report any adverse reactions.     Screening performed by Marielena Khan RN on 12/31/2024 at 2:09 PM.

## 2025-01-03 DIAGNOSIS — E78.5 HYPERLIPIDEMIA LDL GOAL <100: ICD-10-CM

## 2025-01-06 RX ORDER — ATORVASTATIN CALCIUM 40 MG/1
40 TABLET, FILM COATED ORAL DAILY
Qty: 90 TABLET | Refills: 0 | Status: SHIPPED | OUTPATIENT
Start: 2025-01-06

## 2025-01-28 ENCOUNTER — TELEPHONE (OUTPATIENT)
Dept: INTERNAL MEDICINE | Facility: CLINIC | Age: 70
End: 2025-01-28
Payer: MEDICARE

## 2025-01-28 NOTE — TELEPHONE ENCOUNTER
Reason for Call:  Appointment Request    Patient requesting this type of appt:  Preventive     Requested provider: Olvin De Dios    Reason patient unable to be scheduled: Not within requested timeframe    When does patient want to be seen/preferred time:  Pt is looking at get in for annual wellness in April    Comments: Pts last annual was 3/29/24 and he would like to get in after this day hopefully sometime in April for his annual wellness.     Okay to leave a detailed message?: Yes at Cell number on file:    Telephone Information:   Mobile [phone]       Call taken on 1/28/2025 at 1:16 PM by Heather Brown   PULMONARY/SLEEP CONSULTATION  =====================================================================================================================================  IMPRESSION:   • HYPERSOMNIA    PLAN:  IMPRESSION/OPINION:  Jane Garcia . is a 49 year old female , After Examining, interviewing, Reviewing Lab and radiological Data in my opinion, I have a high suspicion of patient having sleep apnea.    Epwoth Score 6  Neck Size 16  Snoring Yes  Mallampati 4  Witness Apnea none  All, point towards underlying DAVIE      PLAN/OPTIONS:  We should do following changes and interventions to further delineate the opinion , option at this time.    In my opinion is to do a Home sleep Study in order to diagnose DAVIE.    ADVISE:    Patient Education was done by me, patient was advised about obstructive sleep apnea and different medications and relation to follow up for sleep apnea with medication. Side effects of medications were discussed.    Education, Action plans, and case management were discussed with patient.    We discussed Sleep Study in detail including all options of sleepstudy including in home as well as in lab study.  We reviewed the prevalence, pathophysiology, and potential adverse consequences of untreated sleep apnea in detail. We reviewed treatment options, focusing mostly on CPAP.  Risks of Untreated DAVIE were discussed. Pt cautioned against drowsy driving and operating heavy machinery.    AGREED PLAN:    After discussing my thought processes with the patient.  We made the following agreed plan.    Patient will undergo In Home Sleep Study CPT 92284  and further recommendations will be given after the sleep study.    Pathophysiology and risk associated with obstructive sleep apnea were discussed with the patient.    After discussing the Risk Benefits and alternatives of treatment and not treating the underlying disease process, I told patient my assessment of today is an opinion, new findings may develop,  different explanations can be found and additional treatments may be necessary. Counseled Patient, Regarding diagnosis, Regarding diagnostic results, Regarding treatment plan, Regarding prescription, Patient verblized understanding of instructions.     RTC: Patient will come back see after sleep study.    Johnathan Valdes MD  =====================================================================================================================================    Date of encounter:  01/03/22    Patient's Primary Care Physician:  Gisselle Brandt MD   Name: Jane Garcia Age: 49 year old Sex: female    Chief Complaint/History of Present Illness:  This is a 49 year old patient whom I have been asked to consult on by Gisselle Brandt MD for further evaluation of above symptoms.    The patient and patient's spouse have reported snoring, yes : daytime sleepiness, yes ; apneas, no; choking or gasping respirations, yes . Patient reports non refreshing sleep for the past > 1 year.       Objective      PAST, FAMILY, and SOCIAL history were reviewed with patient this visit  Past Medical History:   Diagnosis Date   • Allergy    • Allergy     Contact dermatitis   • Asthma     As child   • Callus    • Difficulty initiating walking    • Dyspepsia and other specified disorders of function of stomach 04/08/2014   • Eczema    • Glucose intolerance (impaired glucose tolerance) 07/08/2014   • High cholesterol 11/04/2021   • Obesity (BMI 30-39.9) 09/19/2016     Family History   Problem Relation Age of Onset   • Arthritis Mother    • Hearing Loss Mother    • High cholesterol Mother    • Thyroid Mother    • Dementia/Alzheimers Mother    • Hypertension Mother    • Osteoporosis Mother    • Hearing Loss Father    • High cholesterol Father    • Arthritis Father    • High blood pressure Father    • Hypertension Father    • Heart disease Father    • Miscarriages / Stillbirths Sister    • Asthma Brother    • Diabetes Brother    • Myocardial  Infarction Paternal Grandmother    • Hypertension Paternal Grandmother    • Stroke Paternal Grandmother    • Glaucoma Paternal Grandmother    • Diabetes Paternal Grandfather    • Myocardial Infarction Paternal Grandfather    • Cirrhosis Paternal Grandfather      ALLERGIES:   Allergen Reactions   • Alupent [Metaproterenol Sulfate] CARDIAC DISTURBANCES     Rapid heart rate   • Ethylenediamine RASH   • Seasonal Other (See Comments)     Stuffy nose; itching throat, eyes, nose     Social History     Occupational History   • Occupation:    Tobacco Use   • Smoking status: Never Smoker   • Smokeless tobacco: Never Used   Vaping Use   • Vaping Use: never used   Substance and Sexual Activity   • Alcohol use: Yes     Alcohol/week: 1.0 - 2.0 standard drink     Types: 1 - 2 Standard drinks or equivalent per week     Comment: occasional   • Drug use: No   • Sexual activity: Yes     Partners: Male     Comment: Partner with vasectomy     (Not in a hospital admission)    Current Outpatient Medications   Medication Sig Dispense Refill   • Ascorbic Acid (Vitamin C CR) 1000 MG Tab CR Take 1,000 mg by mouth daily. 30 tablet    • Cholecalciferol (Vitamin D) 125 MCG (5000 UT) Cap Take 5,000 Int'l Units by mouth daily.  30 capsule    • DISPENSE Provitalize (probiotic)- 2 capsule daily  0   • naproxen (NAPROSYN) 500 MG tablet Take 1 tablet by mouth 2 times daily (with meals). 20 tablet 0   • pimecrolimus (ELIDEL) 1 % cream Apply topically 2 times daily.     • omeprazole (PRILOSEC) 20 MG capsule Take 20 mg by mouth daily.     • Multiple Vitamins-Minerals (MULTIVITAMIN PO) Take 1 tablet by mouth daily.       No current facility-administered medications for this visit.       Review of Systems:  A complete ROS was performed and was negative except as otherwise noted in HPI.  PHYSICAL EXAM:  Vitals:    01/03/22 1349   BP: 122/76   Pulse: 100   Resp: 14   Temp: 97.4 °F (36.3 °C)   TempSrc: Tympanic   SpO2: 94%   Weight: 92  kg (202 lb 13.2 oz)   Height: 5' 7.5\" (1.715 m)   LMP: 09/21/2021       EXAMINATION:  Estimated body mass index is 31.3 kg/m² as calculated from the following:    Height as of this encounter: 5' 7.5\" (1.715 m).    Weight as of this encounter: 92 kg (202 lb 13.2 oz).   FACE/NECK: with moderate excessive soft tissue.   MOUTH: Dental occlusion normal. Tongue is normal and enlarged for oral cavity with posterior mounding and appears obstructive.    Palate is normal and enlarged without tonsillar hypertrophy. The palatal margin is not visible behind the tongue base and appears obstructive.   NOSE: The patient breathes through her nose without alar collapse.  NECK: circumference is 16 Inches.   HEART: normal, regular rate and rhythm, no murmur noted  LUNGS: clear to auscultation      Diagnostic Testing:  Orders Placed This Encounter   • SLEEP STUDY HOME 4 CHANNEL PORTABLE UNATTENDED       I spent 40 minutes of my time with the patient most of time was spent counseling coronary patient's care.  This time was also spent in following activities.  Preparing to see patient (I;e review of test)  Obtaining and/ or reviewing separately obtained history  Performing a medically appropriate examination and/or evaluation  Counseling and educating the patient/family/caregiver  Ordering medications, tests, or procedures  Documenting clinical information in the electronic or other health record    ======================================================================================================================================  [facility]  Mobile:  Phone Monday-Friday 9am-5pm if open [phone]  Phone after hours [phone]  Fax [phone]   If an emergency, call 911.  -Or   use [employer] to contact our clinic - messages sent through [employer] go directly to our nursing staff inbox. Non-urgent messages will be responded to within 2-3 business

## 2025-01-28 NOTE — TELEPHONE ENCOUNTER
"01/28/2025    TC called and spoke to Pt. TC scheduled Pt for AWV for 05/22/2025 @ 9:50AM (Arrival time 9:30AM) with Dr Olvin De Dios. Pt requested to be placed on the \"wait list\". TC placed Pt on the appointment \"wait list\".     Nothing else needed at this time.    Margarita Montague    "

## 2025-02-27 ENCOUNTER — NURSE TRIAGE (OUTPATIENT)
Dept: INTERNAL MEDICINE | Facility: CLINIC | Age: 70
End: 2025-02-27

## 2025-02-27 NOTE — TELEPHONE ENCOUNTER
"Nurse Triage SBAR    Is this a 2nd Level Triage? NO    Situation:  Patient reports loose stools    Background: Patient noticed loose watery stools last 2/24. Patient states he has not taken any OTC meds, has not started any new medication apart from multivitamins a week ago and the V8 vegetable juice drink. He also wants to report that he has recently been to a therapist and was diagnosed with an eating disorder called \"Restrictive Avoidant Food Intake Disorder\".     Assessment: Loose stools started Monday night 2/24 at 11PM as soon as he got to bed he had the urge to run to the bathroom and only happened once. Patient stated he had 2x episodes on Tuesday in which the one in the morning he didn't make it to the bathroom and the loose stool just ran through his legs. He reports 1x episode yesterday and described all stools as loose watery with minimal solids. Denies foul odor, denies fever, denies URI, denies abdominal cramps in general but feels some mild uneasy feeling, denies blood in stools, he reports feeling tired, reports not much appetite but has been eating salami and tries to hydrate. Patient states he has not gone to the bathroom today.    Protocol Recommended Disposition:   See in Office Within 3 Days    Recommendation: RN advised to try BRAT diet; patient has already been booked with [facility] tomorrow.    Does the patient meet one of the following criteria for ADS visit consideration? 16+ years old, with an [facility] PCP     TIP  Providers, please consider if this condition is appropriate for management at one of our Acute and Diagnostic Services sites.     If patient is a good candidate, please use dotphrase <dot>triageresponse and select Refer to ADS to document.  Reason for Disposition   Patient wants to be seen    Additional Information   Negative: Shock suspected (e.g., cold/pale/clammy skin, too weak to stand, low BP, rapid pulse)   Negative: Difficult to awaken or acting confused (e.g., " disoriented, slurred speech)   Negative: Sounds like a life-threatening emergency to the triager   Negative: Vomiting also present and worse than the diarrhea   Negative: Blood in stool and without diarrhea   Negative: Diarrhea begins while taking an antibiotic by mouth (oral antibiotic)   Negative: SEVERE abdominal pain (e.g., excruciating) and present > 1 hour   Negative: SEVERE abdominal pain and age > 60 years   Negative: Bloody, black, or tarry bowel movements  (Exception: Chronic-unchanged black-grey bowel movements and is taking iron pills or Pepto-Bismol.)   Negative: SEVERE diarrhea (e.g., 7 or more times / day more than normal) and age > 60 years   Negative: Constant abdominal pain lasting > 2 hours   Negative: Drinking very little and dehydration suspected (e.g., no urine > 12 hours, very dry mouth, very lightheaded)   Negative: Patient sounds very sick or weak to the triager   Negative: SEVERE diarrhea (e.g., 7 or more times / day more than normal) and present > 24 hours (1 day)   Negative: MODERATE diarrhea (e.g., 4-6 times / day more than normal) and present > 48 hours (2 days)   Negative: MODERATE diarrhea (e.g., 4-6 times / day more than normal) and age > 70 years   Negative: Abdominal pain  (Exceptions: Pain clears completely with each passage of diarrhea stool,  or symptoms similar to previously diagnosed irritable bowel syndrome.)   Negative: Fever > 101 F (38.3 C)   Negative: Blood in the stool  (Exception: Only on toilet paper. Reason: Diarrhea can cause rectal irritation with blood on wiping.)   Negative: Mucus or pus in stool has been present > 2 days and diarrhea is more than mild   Negative: Weak immune system (e.g., HIV positive, cancer chemo, splenectomy, organ transplant, chronic steroids)   Negative: Travel to a foreign country in past month   Negative: Recent antibiotic therapy (i.e., within last 2 months) and diarrhea present > 3 days since antibiotic was stopped   Negative: Recent  hospitalization and diarrhea present > 3 days   Negative: Tube feedings (e.g., nasogastric, g-tube, j-tube)   Negative: MILD diarrhea (e.g., 1-3 or more stools than normal in past 24 hours) diarrhea and present > 7 days  (Exception: Chronic diarrhea that is not worse.)    Protocols used: Diarrhea-A-OH

## 2025-03-03 ENCOUNTER — TELEPHONE (OUTPATIENT)
Dept: INTERNAL MEDICINE | Facility: CLINIC | Age: 70
End: 2025-03-03
Payer: MEDICARE

## 2025-03-03 NOTE — TELEPHONE ENCOUNTER
General Call    Contacts       Contact Date/Time Type Contact Phone/Fax    03/03/2025 04:21 PM CST Phone (Incoming) Berto Pretty (Self) [phone] (M)          Reason for Call:  Pt has appt tomorrow and would like to be sure he is up to date on all vaccines, including measles.  He is able to have any injections needed at appt    What are your questions or concerns:  as above    Date of last appointment with provider: 1/29/25    Okay to leave a detailed message?: Yes at Cell number on file:    Telephone Information:   Mobile [phone]

## 2025-03-10 ENCOUNTER — VIRTUAL VISIT (OUTPATIENT)
Dept: NEUROLOGY | Facility: CLINIC | Age: 70
End: 2025-03-10
Payer: MEDICARE

## 2025-03-10 DIAGNOSIS — F41.1 GAD (GENERALIZED ANXIETY DISORDER): Primary | ICD-10-CM

## 2025-03-10 PROCEDURE — 90834 PSYTX W PT 45 MINUTES: CPT | Mod: 93 | Performed by: PSYCHOLOGIST

## 2025-03-10 NOTE — PROGRESS NOTES
Psychology Progress Note    Date: March 10, 2025    Time length and type of treatment: 45 minutes (9:00 AM - 9:45 AM), individual therapy    Telemedicine visit: Patient visit was changed to a telephone visit due to the patient not having access to video. The patient's condition can be safely assessed and treated via [employer] telephone encounter. Patient was informed that policies and procedures that govern in-person sessions would also apply to telephone sessions. Patient was in agreement with proceeding with a telephone session.     Patient Location: Patient home in [place], [place]  Provider Location:  [facility] - Provider remote location     Necessity: This session is necessary to address the patient's anxiety, depression, OCD, Hoarding, and Avoidant/Restrictive Food Intake Disorder (ARFID). Today we focus on the patient's treatment plan, specifically exploring barriers to compliance with medical treatment plan, and discussing nutrition and a healthy diet. The reader is invited to review the patient's full treatment plan in the Media section of the patient's Epic medical record.    Psychotherapeutic Technique: This writer utilized motivational interviewing, active listening, reassurance and support in the context of cognitive behavioral therapy to address the above.      Mental Status Evaluation:  Grooming: Unable to determine due to telephone visit  Attire: Unable to determine due to telephone visit  Age: Unable to determine due to telephone visit  Behavior Towards Examiner: Cooperative  Motor Activity: Unable to determine due to telephone visit  Eye Contact: Unable to determine due to telephone visit  Mood: Anxious   Affect: full range  Speech/Language: Mildly pressured  Attention: Normal  Concentration: Sufficient  Thought Process: tangential  Thought Content: Clear    Orientation: Appeared oriented to person, place, and time, though not formally established  Memory: No evidence of  impairment.  Judgement: Adequate  Estimated Intelligence: Within normal limits  Demonstrated Insight: Adequate  Fund of Knowledge: Adequate    Intervention:   Patient discussed the challenges of finding a new psychiatrist since his previous psychiatrist is now out of network. He was encouraged to reach out to the [place] Scheduling number to see if other providers are available, and to speak to his primary care provider to determine if they might have an in network psychiatrist to recommend or if they might be open to prescribing his psychiatric methods. Patient initially reported he recently struggled with influenza, but later noted that his only symptom was diarrhea which may have been caused by dietary changes.  We again discussed the importance of a healthy diet, discussed the struggle for patient to eat at all, and his tendency to prefer convenient foods.  We explored the importance of incremental improvements, making healthier choices even when choosing ultra processed food. Patient continues to make modest progress on hoarding and motivational interviewing was utilized to strengthen patient motivation to increase rate of progress.     Progress:  Patient has shown improvement in ability to utilize coping skills.     Plan:   We will meet again in 3 weeks to address the patient's anxiety, depression, hoarding, and ARFID.  Estimated duration of treatment is 10+ individual therapy sessions (89568) at 3-week intervals. Treatment is expected to be last at least until June 2025.     Diagnosis:  Generalized Anxiety Disorder  Major Depressive Disorder, Recurrent, Severe  Hoarding Disorder  Avoidant Restrictive Food Intake Disorder

## 2025-03-11 ENCOUNTER — VIRTUAL VISIT (OUTPATIENT)
Dept: INTERNAL MEDICINE | Facility: CLINIC | Age: 70
End: 2025-03-11
Payer: MEDICARE

## 2025-03-11 DIAGNOSIS — E78.5 HYPERLIPIDEMIA LDL GOAL <100: ICD-10-CM

## 2025-03-11 DIAGNOSIS — R19.7 DIARRHEA OF PRESUMED INFECTIOUS ORIGIN: Primary | ICD-10-CM

## 2025-03-11 DIAGNOSIS — F42.2 MIXED OBSESSIONAL THOUGHTS AND ACTS: ICD-10-CM

## 2025-03-11 PROCEDURE — 98014 SYNCH AUDIO-ONLY EST MOD 30: CPT | Performed by: INTERNAL MEDICINE

## 2025-03-11 RX ORDER — ATORVASTATIN CALCIUM 40 MG/1
40 TABLET, FILM COATED ORAL DAILY
Qty: 90 TABLET | Refills: 2 | Status: SHIPPED | OUTPATIENT
Start: 2025-03-11

## 2025-03-11 RX ORDER — ESCITALOPRAM OXALATE 20 MG/1
TABLET ORAL
Qty: 90 TABLET | Refills: 2 | Status: SHIPPED | OUTPATIENT
Start: 2025-03-11

## 2025-03-11 NOTE — PROGRESS NOTES
Berto is a 69 year old who is being evaluated via a billable telephone visit.    What phone number would you like to be contacted at?arfid [phone]   How would you like to obtain your AVS? Mail a copy  Originating Location (pt. Location): Home    Distant Location (provider location):  On-site  Telephone visit completed due to the patient did not have access to video, while the distant provider did.      Subjective   Berto is a 69 year old, presenting for the following health issues:  loose stools and eating disorder       3/11/2025     7:41 AM   Additional Questions   Roomed by JERSEY PEREIRA   Accompanied by self     HPI  Reason for visit: Diarrhea,fatigue and ARFID concern -Avoidant/restrictive food intake disorder      Objective         Vitals:  No vitals were obtained today due to virtual visit.    Physical Exam   General: Alert and no distress //Respiratory: No audible wheeze, cough, or shortness of breath // Psychiatric:  Appropriate affect, tone, and pace of words    ASSESSMENT PLAN-year-old had a telephone visit today, because of    Acute infectious diarrhea, experiencing this for about 4 days, starting approximately March 1, symptoms are now resolved.  Berto had many, many questions.  I told him likely he contracted some virus or maybe bacterial pathogen from contaminated food.  He is not sure of the source.  He does go to [employer].  I told him norovirus is a likely possibility.  But the most important thing is that he is better.  He told me that he staying well-hydrated, and has been drinking V8 juice and also eating raisin bran, and I told him those are generally good foods.    Berto had additional questions about    Ongoing psychiatric care for OCD and hoarding behaviors.  He was working with Dr. Jones, who recently retired.  Berto did reach out to [facility] psychiatry department, and they told him that they potentially have availability to see him at their [place] location.  I encouraged  Berto to pursue that.  If he needs a referral request from me, that is no problem.  Dr. Jones had been prescribing Berto's escitalopram, which I renewed today, along with his atorvastatin.    Berto asked about vaccines, and I suggested that he get a tetanus booster and RSV at local pharmacy.  He is current on influenza and COVID.    I will see him for annual wellness exam sometime mid year        Immunization History   Administered Date(s) Administered    COVID-19 12+ (Pfizer) 10/13/2023, 12/31/2024    COVID-19 Bivalent 12+ (Pfizer) 11/14/2022    COVID-19 MONOVALENT 12+ (Pfizer) 03/11/2021, 04/05/2021, 12/09/2021    COVID-19 Monovalent 12+ (Pfizer 2022) 04/13/2022    Flu, Unspecified 12/27/2011    Influenza (High Dose) Trivalent,PF (Fluzone) 12/31/2024    Influenza (IIV3) PF 12/28/2005, 01/18/2007, 12/27/2011    Influenza Vaccine 18-64 (Flublok) 12/07/2018    Influenza Vaccine 65+ (Fluzone HD) 11/03/2020, 12/09/2021, 11/14/2022, 10/13/2023    Influenza Vaccine >6 months,quad, PF 12/27/2017    Pneumococcal 20 valent Conjugate (Prevnar 20) 12/09/2022    Pneumococcal, Unspecified 06/01/2016    TDAP (Adacel,Boostrix) 12/27/2011    Td (Adult), Adsorbed 1954    Tdap (Adult) Unspecified Formulation 1954       Phone call duration: 30 minutes  Signed Electronically by: QUE LIZAMA MD

## 2025-05-05 ENCOUNTER — VIRTUAL VISIT (OUTPATIENT)
Dept: NEUROLOGY | Facility: CLINIC | Age: 70
End: 2025-05-05
Payer: MEDICARE

## 2025-05-05 DIAGNOSIS — F33.2 MAJOR DEPRESSIVE DISORDER, RECURRENT SEVERE WITHOUT PSYCHOTIC FEATURES (H): Primary | ICD-10-CM

## 2025-05-05 PROCEDURE — 90834 PSYTX W PT 45 MINUTES: CPT | Mod: 93 | Performed by: PSYCHOLOGIST

## 2025-05-05 ASSESSMENT — ANXIETY QUESTIONNAIRES
3. WORRYING TOO MUCH ABOUT DIFFERENT THINGS: NEARLY EVERY DAY
7. FEELING AFRAID AS IF SOMETHING AWFUL MIGHT HAPPEN: MORE THAN HALF THE DAYS
6. BECOMING EASILY ANNOYED OR IRRITABLE: MORE THAN HALF THE DAYS
GAD7 TOTAL SCORE: 12
IF YOU CHECKED OFF ANY PROBLEMS ON THIS QUESTIONNAIRE, HOW DIFFICULT HAVE THESE PROBLEMS MADE IT FOR YOU TO DO YOUR WORK, TAKE CARE OF THINGS AT HOME, OR GET ALONG WITH OTHER PEOPLE: VERY DIFFICULT
4. TROUBLE RELAXING: SEVERAL DAYS
5. BEING SO RESTLESS THAT IT IS HARD TO SIT STILL: NOT AT ALL
2. NOT BEING ABLE TO STOP OR CONTROL WORRYING: NEARLY EVERY DAY
1. FEELING NERVOUS, ANXIOUS, OR ON EDGE: SEVERAL DAYS

## 2025-05-05 ASSESSMENT — PATIENT HEALTH QUESTIONNAIRE - PHQ9
SUM OF ALL RESPONSES TO PHQ QUESTIONS 1-9: 18
9. THOUGHTS THAT YOU WOULD BE BETTER OFF DEAD, OR OF HURTING YOURSELF: NOT AT ALL
10. IF YOU CHECKED OFF ANY PROBLEMS, HOW DIFFICULT HAVE THESE PROBLEMS MADE IT FOR YOU TO DO YOUR WORK, TAKE CARE OF THINGS AT HOME, OR GET ALONG WITH OTHER PEOPLE: VERY DIFFICULT
5. POOR APPETITE OR OVEREATING: NEARLY EVERY DAY
2. FEELING DOWN, DEPRESSED OR HOPELESS: SEVERAL DAYS
8. MOVING OR SPEAKING SO SLOWLY THAT OTHER PEOPLE COULD HAVE NOTICED. OR THE OPPOSITE, BEING SO FIGETY OR RESTLESS THAT YOU HAVE BEEN MOVING AROUND A LOT MORE THAN USUAL: NEARLY EVERY DAY
6. FEELING BAD ABOUT YOURSELF - OR THAT YOU ARE A FAILURE OR HAVE LET YOURSELF OR YOUR FAMILY DOWN: SEVERAL DAYS
1. LITTLE INTEREST OR PLEASURE IN DOING THINGS: MORE THAN HALF THE DAYS
4. FEELING TIRED OR HAVING LITTLE ENERGY: NEARLY EVERY DAY
7. TROUBLE CONCENTRATING ON THINGS, SUCH AS READING THE NEWSPAPER OR WATCHING TELEVISION: NEARLY EVERY DAY
SUM OF ALL RESPONSES TO PHQ9 QUESTIONS 1 & 2: 3
3. TROUBLE FALLING OR STAYING ASLEEP OR SLEEPING TOO MUCH: MORE THAN HALF THE DAYS

## 2025-05-05 NOTE — PROGRESS NOTES
Psychology Progress Note    Date: May 05, 2025    Time length and type of treatment: 44 minutes (9:02 AM - 9:46 AM), individual therapy    Telemedicine visit: Patient visit was changed to a telephone visit due to the patient not having access to video.  The patient's condition can be safely assessed and treated via telephone encounter. Patient was informed that policies and procedures that govern in-person sessions would also apply to telephone sessions. Patient was in agreement with proceeding with a telephone session.     Patient Location: Patient home in [place], [place]  Provider Location:  [facility] - Provider remote location     Necessity: This session is necessary to address the patient's depression, anxiety, Hoarding Disorder, and Avoidant Restrictive Food Intake Disorder (ARFID). Today we focus on updating the patient's treatment plan. The reader is invited to review the patient's full treatment plan in the Media section of the patient's Epic medical record.    Psychotherapeutic Technique: This writer utilized motivational interviewing, active listening, reassurance and support in the context of cognitive behavioral therapy to address the above.      Mental Status Evaluation:  Grooming: Unable to determine due to telephone visit  Attire: Unable to determine due to telephone visit  Age: Unable to determine due to telephone visit  Behavior Towards Examiner: Cooperative  Motor Activity: Unable to determine due to telephone visit  Eye Contact: Unable to determine due to telephone visit  Mood: Anxious   Affect: full range  Speech/Language: pressured  Attention: Normal  Concentration: Sufficient  Thought Process: tangential and overinclusive   Thought Content: Clear    Orientation: Appeared oriented to person, place, and time, though not formally established  Memory: No evidence of impairment.  Judgement: Adequate  Estimated Intelligence: Within normal limits  Demonstrated Insight:  Adequate  Fund of Knowledge: Adequate    Intervention:   Patient reported he was sick with both influenza and norovirus, which resulted in his having to cancel several appointments.  Once he recovered, he was behind on many tasks and struggled to catch up.  He reported that depression and anxiety remain similar to his previous scores and consistent with this self-report, his score of 18 on the PHQ-9 and 11 on the JHON-7 remains suggestive of severe depression and moderate anxiety. Patient reported that he has made small progress on his hoarding, has maintained reduced handwashing times but has not reduced them further, and his ARFID has gotten worse. He explained that when he was sick, he was unable to grocery shop and when he didn't have foods that he both liked and required minimal preparation, he again stopped eating.  Treatment plan was jointly revised and remaining time was spent reinforcing healthy eating and problem solving, including discussing the possibility of patient subscribing to a meal delivery service.      Progress:  Patient treatment plan was jointly revised    Plan:   We will meet again in 3 weeks to address the patient's depression, anxiety, hoarding, OCD, and ARFID.  Estimated duration of treatment is 10+ individual therapy sessions (61616) at 3-week intervals. Treatment is expected to be completed by May 2025.     Diagnosis:  Major Depressive Disorder, Recurrent, Severe  Generalized Anxiety Disorder  Avoidant/Restrictive Food Intake Disorder  Obsessive Compulsive Disorder  Hoarding Disorder

## 2025-05-21 ENCOUNTER — TELEPHONE (OUTPATIENT)
Dept: INTERNAL MEDICINE | Facility: CLINIC | Age: 70
End: 2025-05-21
Payer: MEDICARE

## 2025-05-21 NOTE — TELEPHONE ENCOUNTER
Patient called regarding AWV scheduled for tomorrow.     Advised to fast 8-12 hours prior to appointment. Advised OK to take medications with water as usual.     Assisted patient with determining immunizations that are due, last chest xray and oncology appointment so he can follow-up on these items as needed.     Lizbeth Louis RN

## 2025-05-22 ENCOUNTER — OFFICE VISIT (OUTPATIENT)
Dept: INTERNAL MEDICINE | Facility: CLINIC | Age: 70
End: 2025-05-22
Payer: MEDICARE

## 2025-05-22 VITALS
OXYGEN SATURATION: 98 % | HEART RATE: 72 BPM | RESPIRATION RATE: 16 BRPM | WEIGHT: 126 LBS | TEMPERATURE: 98.4 F | HEIGHT: 64 IN | SYSTOLIC BLOOD PRESSURE: 118 MMHG | BODY MASS INDEX: 21.51 KG/M2 | DIASTOLIC BLOOD PRESSURE: 74 MMHG

## 2025-05-22 DIAGNOSIS — Z12.5 SCREENING PSA (PROSTATE SPECIFIC ANTIGEN): ICD-10-CM

## 2025-05-22 DIAGNOSIS — F42.2 MIXED OBSESSIONAL THOUGHTS AND ACTS: ICD-10-CM

## 2025-05-22 DIAGNOSIS — Z00.00 ROUTINE GENERAL MEDICAL EXAMINATION AT A HEALTH CARE FACILITY: Primary | ICD-10-CM

## 2025-05-22 DIAGNOSIS — C67.9 MALIGNANT NEOPLASM OF URINARY BLADDER, UNSPECIFIED SITE (H): ICD-10-CM

## 2025-05-22 DIAGNOSIS — E78.5 HYPERLIPIDEMIA LDL GOAL <100: ICD-10-CM

## 2025-05-22 DIAGNOSIS — G47.33 OSA (OBSTRUCTIVE SLEEP APNEA): ICD-10-CM

## 2025-05-22 DIAGNOSIS — J84.10 LUNG GRANULOMA (H): ICD-10-CM

## 2025-05-22 LAB
ERYTHROCYTE [DISTWIDTH] IN BLOOD BY AUTOMATED COUNT: 12.7 % (ref 10–15)
EST. AVERAGE GLUCOSE BLD GHB EST-MCNC: 117 MG/DL
HBA1C MFR BLD: 5.7 % (ref 0–5.6)
HCT VFR BLD AUTO: 40.7 % (ref 40–53)
HGB BLD-MCNC: 13.6 G/DL (ref 13.3–17.7)
MCH RBC QN AUTO: 31.4 PG (ref 26.5–33)
MCHC RBC AUTO-ENTMCNC: 33.4 G/DL (ref 31.5–36.5)
MCV RBC AUTO: 94 FL (ref 78–100)
PLATELET # BLD AUTO: 135 10E3/UL (ref 150–450)
RBC # BLD AUTO: 4.33 10E6/UL (ref 4.4–5.9)
WBC # BLD AUTO: 5.2 10E3/UL (ref 4–11)

## 2025-05-22 SDOH — HEALTH STABILITY: PHYSICAL HEALTH: ON AVERAGE, HOW MANY MINUTES DO YOU ENGAGE IN EXERCISE AT THIS LEVEL?: 0 MIN

## 2025-05-22 SDOH — HEALTH STABILITY: PHYSICAL HEALTH: ON AVERAGE, HOW MANY DAYS PER WEEK DO YOU ENGAGE IN MODERATE TO STRENUOUS EXERCISE (LIKE A BRISK WALK)?: 0 DAYS

## 2025-05-22 ASSESSMENT — SOCIAL DETERMINANTS OF HEALTH (SDOH): HOW OFTEN DO YOU GET TOGETHER WITH FRIENDS OR RELATIVES?: ONCE A WEEK

## 2025-05-22 ASSESSMENT — PATIENT HEALTH QUESTIONNAIRE - PHQ9
SUM OF ALL RESPONSES TO PHQ QUESTIONS 1-9: 10
10. IF YOU CHECKED OFF ANY PROBLEMS, HOW DIFFICULT HAVE THESE PROBLEMS MADE IT FOR YOU TO DO YOUR WORK, TAKE CARE OF THINGS AT HOME, OR GET ALONG WITH OTHER PEOPLE: SOMEWHAT DIFFICULT
SUM OF ALL RESPONSES TO PHQ QUESTIONS 1-9: 10

## 2025-05-22 NOTE — PROGRESS NOTES
Preventive Care Visit  [facility]  QUE LIZAMA MD, Internal Medicine  May 22, 2025    Dima Thomson is a 69 year old, presenting for the following:  Physical (fasting)        5/22/2025     9:46 AM   Additional Questions   Roomed by Shweta CHAVEZ    Counseling  Appropriate preventive services were addressed with this patient via screening, questionnaire, or discussion as appropriate for fall prevention, nutrition, physical activity, Tobacco-use cessation, social engagement, weight loss and cognition.  Checklist reviewing preventive services available has been given to the patient.  Reviewed patient's diet, addressing concerns and/or questions.   If at risk for lack of exercise, the patient was provided with information to increase physical activity for the benefit of well-being.   If at risk for social isolation, the patient was provided with information about the benefit of social connection.   The patient was reminded to see the dentist every 6 months.   If at risk for psychosocial distress, the patient was provided with information to reduce risk.     Advance Care Planning  Discussed advance care planning with patient; however, patient declined at this time.        5/22/2025   General Health   How would you rate your overall physical health? (!) FAIR   Feel stress (tense, anxious, or unable to sleep) Not at all         5/22/2025   Nutrition   Diet: Regular (no restrictions)         5/22/2025   Exercise   Days per week of moderate/strenous exercise 0 days   Average minutes spent exercising at this level 0 min   (!) EXERCISE CONCERN      5/22/2025   Social Factors   Frequency of gathering with friends or relatives Once a week   Worry food won't last until get money to buy more Patient declined   Food not last or not have enough money for food? Patient declined   Do you have housing? (Housing is defined as stable permanent housing and does not include staying outside in a car, in  a tent, in an abandoned building, in an overnight shelter, or couch-surfing.) Patient declined   Are you worried about losing your housing? Patient declined   Lack of transportation? Patient declined   Unable to get utilities (heat,electricity)? Patient declined         2025   Fall Risk   Fallen 2 or more times in the past year? No    Trouble with walking or balance? No        Proxy-reported          2025   Activities of Daily Living- Home Safety   Needs help with the following daily activites None of the above   Safety concerns in the home None of the above         2025   Dental   Dentist two times every year? (!) NO         2025   Hearing Screening   Hearing concerns? None of the above         2025   Driving Risk Screening   Patient/family members have concerns about driving No         2025   General Alertness/Fatigue Screening   Have you been more tired than usual lately? No         2025   Urinary Incontinence Screening   Bothered by leaking urine in past 6 months No       Today's PHQ-9 Score:       2025     9:50 AM   PHQ-9 SCORE   PHQ-9 Total Score MyChart 10 (Moderate depression)   PHQ-9 Total Score 10        Proxy-reported         2025   Substance Use   Alcohol more than 3/day or more than 7/wk No   Do you have a current opioid prescription? No   How severe/bad is pain from 1 to 10? 0/10 (No Pain)   Do you use any other substances recreationally? No     Social History     Tobacco Use    Smoking status: Former     Current packs/day: 0.00     Types: Cigarettes     Quit date: 1974     Years since quittin.4     Passive exposure: Never    Smokeless tobacco: Never   Vaping Use    Vaping status: Never Used   Substance Use Topics    Alcohol use: Yes    Drug use: Never     Current providers sharing in care for this patient include:  Patient Care Team:  Olvin De Dios MD as PCP - General (Internal Medicine)  Lisa Stout, PhD LP as Assigned Behavioral Health  "Provider  Olvin De Dios MD as Assigned PCP  Zen Oliver MD as Assigned Cancer Care Provider    The following health maintenance items are reviewed in Epic and correct as of today:  Health Maintenance   Topic Date Due    ANNUAL REVIEW OF HM ORDERS  Never done    DEPRESSION ACTION PLAN  Never done    ZOSTER IMMUNIZATION (1 of 2) Never done    DTAP/TDAP/TD IMMUNIZATION (2 - Td or Tdap) 12/27/2021    MEDICARE ANNUAL WELLNESS VISIT  03/29/2025    LIPID  03/29/2025    PHQ-9  06/22/2025    COVID-19 Vaccine (8 - 2024-25 season) 06/30/2025    FALL RISK ASSESSMENT  05/22/2026    DIABETES SCREENING  06/20/2027    COLORECTAL CANCER SCREENING  03/22/2029    ADVANCE CARE PLANNING  03/29/2029    RSV VACCINE (1 - 1-dose 75+ series) 08/11/2029    INFLUENZA VACCINE  Completed    Pneumococcal Vaccine: 50+ Years  Completed    AORTIC ANEURYSM SCREENING (SYSTEM ASSIGNED)  Completed    HPV IMMUNIZATION  Aged Out    MENINGITIS IMMUNIZATION  Aged Out    HEPATITIS C SCREENING  Discontinued     Review of Systems  Constitutional, HEENT, cardiovascular, pulmonary, gi and gu systems are negative, except as otherwise noted.     Objective    Exam  /74 (BP Location: Right arm, Patient Position: Sitting, Cuff Size: Adult Regular)   Pulse 72   Temp 98.4  F (36.9  C)   Resp 16   Ht 1.626 m (5' 4\")   Wt 57.2 kg (126 lb)   SpO2 98%   BMI 21.63 kg/m     Estimated body mass index is 21.63 kg/m  as calculated from the following:    Height as of this encounter: 1.626 m (5' 4\").    Weight as of this encounter: 57.2 kg (126 lb).    Physical Exam        5/22/2025   Mini Cog   Clock Draw Score 2 Normal    2 Normal   3 Item Recall 3 objects recalled    3 objects recalled   Mini Cog Total Score 5    5       Multiple values from one day are sorted in reverse-chronological order     General: Alert, in no distress  Skin: + Stable sebaceous cyst about 2 cm diameter left upper back at the base of the neck  Eyes/nose/throat: Eyes without " scleral icterus, eye movements normal, pupils equal and reactive, oropharynx clear  + Both ear canals are plugged with loose appearing wax, which I think would respond nicely to over-the-counter wax dissolving eardrops, example brand Debrox or Murine.  I told Berto to buy these from his local pharmacy, follow the package directions, and he may even consider making this a habit every week or 2 to keep the ears clear.  MSK: Neck with good ROM  Lymphatic: Neck without adenopathy or masses  Endocrine: Thyroid with no nodules to palpation  Pulm: Lungs clear to auscultation bilaterally  Cardiac: Heart with regular rate and rhythm, no murmur or gallop  GI: Abdomen soft, nontender. No palpable enlargement of liver or spleen  + Tiny right-sided inguinal hernia  MSK: Extremities no tenderness or edema  Neuro: Moves all extremities, without focal weakness  Psych: Alert, normal mental status. Normal affect and speech      ASSESSMENT AND PLAN       Annual preventive visit, general overall is doing okay    Berto is fasting this morning, so we will have him stop by the laboratory for comprehensive metabolic panel, blood cell counts, TSH for thyroid, PSA for prostate, and lipid profile.     CXR to recheck left side granuloma    Resolved Acute infectious diarrhea, March 2025     Sessile serrated adenoma colon polyp, 11-15 mm, removed March 22, 2024, recheck 5 years which would be March 2029  Family history colon cancer (father)  Chronic constipation  I agree with the recommendation that Dr. Jama gave to Berto that Berto should take MiraLAX powder 1 capful per day, and adjust that dose in order to try to achieve 1 soft bowel movement either daily or every other day.    Berto gets swelling intermittently in his left lower extremity, which could be on the basis of some venous insufficiency.  Berto says that the swelling  tends to be triggered when he has been sitting, for example after a long car ride or sitting at his desk, then  resolves once he gets his leg up, for example overnight.    I suggested that Shereen use an over-the-counter knee-high compression stocking, and is okay if he only uses it on the 1 leg that causes trouble    Stable sebaceous cyst about 2 cm diameter left upper back at the base of the neck     Eating disorder: Avoidant food intake disorder. Eats once day, but he describes quite healthy food choices    Wt Readings from Last 5 Encounters:   05/22/25 57.2 kg (126 lb)   06/26/24 58.5 kg (129 lb)   03/29/24 54.9 kg (121 lb)   02/01/24 58.1 kg (128 lb)   10/13/23 58.1 kg (128 lb)     He did have a follow-up visit with Dr. Oliver in the hematology clinic because of his monoclonal gammopathy of unknown significance.     As far as medications, Berto is taking his Lexapro 20 mg a day, atorvastatin 40 mg a day  Trazodone 50 mg is on his medication list, but Berto says he is not using it these days.     Plugged into the psychology department  He is also plugged into urology follow-up with Dr. Braulio Munroe.  January 2023 at [facility]  Continues on Lexapro 20 mg with good effect.     Berto's body mass index is 20.8, which is technically is at the lower limit of normal.    He is quite lean, and I think he would benefit from building up some muscle mass.  He lives alone and does his own food preparation.  He does not use the Internet  He asked about where to seek out dietary advice.  I told him that unfortunately dietitian services are not a covered service under Medicare for his medical history  I suggested that Berto stop by a bookstore such as [employer] and take a look at the health and self-care selections, and I feel confident he will find a useful book about diet and nutrition     STABLE Major Depressive Disorder, recurrent, severe  Generalized Anxiety Disorder  Hoarding Disorder  Obsessive-Compulsive Disorder     Lisa Stout, PhD LP Psychology  Dr Jones has retired     Obsessive-compulsive disorder. He is  on Social Security disability due to this.  escitalopram (LEXAPRO) 20 MG tablet-- helpful  traZODone (DESYREL) 50 MG tablet     Obstructive sleep apnea syndrome  Not using CPAP machine which needs cleaning and maintenance  Current sleep study October 2020, at [facility]  Sleep study May 30, 2018 done at [facility].  7 obstructive events observed, 18 central apneas, 23 hypopneas     Benign granuloma left lower lobe -- recheck CXR  EXAM: XR CHEST 2 VIEWS  LOCATION: [facility]  DATE/TIME: 11/3/2020 3:31 PM  IMPRESSION:  The cardiomediastinal silhouette and pulmonary vascularity are normal. Lungs are hyperinflated with no focal consolidation, pneumothorax nor pleural effusion. Calcified granuloma right lower lobe.    Insomnia,  Berto decided he did not need to take the trazodone which Dr. 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  The right side of his body was weak.   MRI March 16, 2020, 1 no acute or subacute infarction, or hemorrhage.  Chronic infarction in the left precentral gyrus and right cerebellum with additional presumed sequelae of mild to moderate chronic small vessel ischemic disease.     Patient also had an MRI, February 2018, which is included below, right cerebellar infarct was present.- Primary     Vitamin D deficiency  Vitamin D, Cholecalciferol, 25 MCG (1000 UT) CAPS     Hyperlipidemia on treatement LDL goal <100,  Atorvastatin 40 mg daily  History of stroke  also describes having had a TIA event in 2018  3-  LDL Cholesterol Calculated 57     Direct Measure HDL 76     Triglycerides 39     Satisfactory surveillance cystoscopy done by Dr. Munroe over at [facility] December 9, 2023, and Vic will have a recheck 6 months after  that which will be June 2024.     History Malignant neoplasm of urinary bladder, summer 2021  Small bladder tumor, just above and lateral to the right ureteral orifice.  Appears to be superficial disease.  No other bladder tumors or suspicious mucosa.  Small transurethral resection of bladder tumor performed.  All tumor was removed.  Dr. Munroe examines Berto's prostate gland periodically, and thus far it has been reported as normal    3-  Prostate Specific Antigen Screen 0.70     Benign skin tag on his left upper chest, total of these have no dangerous potential, and are best left alone  Inclusion cyst on his central upper back at the base of his neck, which general site has been present for years.  No signs of infection.  I told him the best left alone    Both ear canals are plugged with loose appearing wax, which I think would respond nicely to over-the-counter wax dissolving eardrops, example brand Debrox or Murine.  I told Berto to buy these from his local pharmacy, follow the package directions, and he may even consider making this a habit every week or 2 to keep the ears clear.     Tiny right-sided inguinal hernia, which does not seem to be bothering Berto, and is probably best left alone     Berto to get a tetanus booster (Tdap) which he should get at the local pharmacy, since that vaccine is paid under the Medicare prescription drug benefit  Shingles vaccine is also appropriate, and if Berto wants to pursue that, he should also get that one at the pharmacy  Same for the RSV vaccine      Immunization History   Administered Date(s) Administered    COVID-19 12+ (Pfizer) 10/13/2023, 12/31/2024    COVID-19 Bivalent 12+ (Pfizer) 11/14/2022    COVID-19 MONOVALENT 12+ (Pfizer) 03/11/2021, 04/05/2021, 12/09/2021    COVID-19 Monovalent 12+ (Pfizer 2022) 04/13/2022    Flu, Unspecified 12/27/2011    Influenza (High Dose) Trivalent,PF (Fluzone) 12/31/2024    Influenza (IIV3) PF 12/28/2005, 01/18/2007,  12/27/2011    Influenza Vaccine 18-64 (Flublok) 12/07/2018    Influenza Vaccine 65+ (Fluzone HD) 11/03/2020, 12/09/2021, 11/14/2022, 10/13/2023    Influenza Vaccine >6 months,quad, PF 12/27/2017    Pneumococcal 20 valent Conjugate (Prevnar 20) 12/09/2022    Pneumococcal, Unspecified 06/01/2016    TDAP (Adacel,Boostrix) 12/27/2011    Td (Adult), Adsorbed 1954    Tdap (Adult) Unspecified Formulation 1954       Signed Electronically by: QUE LIZAMA MD    Answers submitted by the patient for this visit:  Patient Health Questionnaire (Submitted on 5/22/2025)  If you checked off any problems, how difficult have these problems made it for you to do your work, take care of things at home, or get along with other people?: Somewhat difficult  PHQ9 TOTAL SCORE: 10

## 2025-05-22 NOTE — PATIENT INSTRUCTIONS
Annual preventive visit, general overall is doing okay      Berto is fasting this morning, so we will have him stop by the laboratory for comprehensive metabolic panel, blood cell counts, TSH for thyroid, PSA for prostate, and lipid profile.     CXR to recheck left side granuloma    Resolved Acute infectious diarrhea, March 2025     Sessile serrated adenoma colon polyp, 11-15 mm, removed March 22, 2024, recheck 5 years which would be March 2029  Family history colon cancer (father)  Chronic constipation  I agree with the recommendation that Dr. Jama gave to Berto that Berto should take MiraLAX powder 1 capful per day, and adjust that dose in order to try to achieve 1 soft bowel movement either daily or every other day.    Berto gets swelling intermittently in his left lower extremity, which could be on the basis of some venous insufficiency.  Berto says that the swelling  tends to be triggered when he has been sitting, for example after a long car ride or sitting at his desk, then resolves once he gets his leg up, for example overnight.    I suggested that Shereen use an over-the-counter knee-high compression stocking, and is okay if he only uses it on the 1 leg that causes trouble    Stable sebaceous cyst about 2 cm diameter left upper back at the base of the neck     Eating disorder: Avoidant food intake disorder. Eats once day, but he describes quite healthy food choices    Wt Readings from Last 5 Encounters:   05/22/25 57.2 kg (126 lb)   06/26/24 58.5 kg (129 lb)   03/29/24 54.9 kg (121 lb)   02/01/24 58.1 kg (128 lb)   10/13/23 58.1 kg (128 lb)     He did have a follow-up visit with Dr. Oliver in the hematology clinic because of his monoclonal gammopathy of unknown significance.     As far as medications, Berto is taking his Lexapro 20 mg a day, atorvastatin 40 mg a day  Trazodone 50 mg is on his medication list, but Berto says he is not using it these days.     Plugged into the psychology department  He  is also plugged into urology follow-up with Dr. Braulio Munroe.  January 2023 at [facility]  Continues on Lexapro 20 mg with good effect.     Berto's body mass index is 20.8, which is technically is at the lower limit of normal.    He is quite lean, and I think he would benefit from building up some muscle mass.  He lives alone and does his own food preparation.  He does not use the Internet  He asked about where to seek out dietary advice.  I told him that unfortunately dietitian services are not a covered service under Medicare for his medical history  I suggested that Berto stop by a bookstore such as [employer] and take a look at the health and self-care selections, and I feel confident he will find a useful book about diet and nutrition     STABLE Major Depressive Disorder, recurrent, severe  Generalized Anxiety Disorder  Hoarding Disorder  Obsessive-Compulsive Disorder     Lisa Stout, PhD LP Psychology  Dr Jones has retired     Obsessive-compulsive disorder. He is on Social Security disability due to this.  escitalopram (LEXAPRO) 20 MG tablet-- helpful  traZODone (DESYREL) 50 MG tablet     Obstructive sleep apnea syndrome  Not using CPAP machine which needs cleaning and maintenance  Current sleep study October 2020, at [facility]  Sleep study May 30, 2018 done at [facility].  7 obstructive events observed, 18 central apneas, 23 hypopneas     Benign granuloma left lower lobe -- recheck CXR  EXAM: XR CHEST 2 VIEWS  LOCATION: [facility]  DATE/TIME: 11/3/2020 3:31 PM  IMPRESSION:  The cardiomediastinal silhouette and pulmonary vascularity are normal. Lungs are hyperinflated with no focal consolidation, pneumothorax nor pleural effusion. Calcified granuloma right lower lobe.    Insomnia,  Berto decided he did not need to take the trazodone which Dr. 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  The right side of his body was weak.   MRI March 16, 2020, 1 no acute or subacute infarction, or hemorrhage.  Chronic infarction in the left precentral gyrus and right cerebellum with additional presumed sequelae of mild to moderate chronic small vessel ischemic disease.     Patient also had an MRI, February 2018, which is included below, right cerebellar infarct was present.- Primary     Vitamin D deficiency  Vitamin D, Cholecalciferol, 25 MCG (1000 UT) CAPS     Hyperlipidemia on treatement LDL goal <100,  Atorvastatin 40 mg daily  History of stroke  also describes having had a TIA event in 2018  3-  LDL Cholesterol Calculated 57     Direct Measure HDL 76     Triglycerides 39     Satisfactory surveillance cystoscopy done by Dr. Munroe over at [facility] December 9, 2023, and Vic will have a recheck 6 months after that which will be June 2024.     History Malignant neoplasm of urinary bladder, summer 2021  Small bladder tumor, just above and lateral to the right ureteral orifice.  Appears to be superficial disease.  No other bladder tumors or suspicious mucosa.  Small transurethral resection of bladder tumor performed.  All tumor was removed.  Dr. Munroe examines Berto's prostate gland periodically, and thus far it has been reported as normal    3-  Prostate Specific Antigen Screen 0.70     Benign skin tag on his left upper chest, total of these have no dangerous potential, and are best left alone  Inclusion cyst on his central upper back at the base of his neck, which general site has been present for years.  No signs of infection.  I told him the best left alone    Both ear canals are plugged with loose appearing wax, which I think would respond nicely to over-the-counter wax dissolving eardrops, example brand  Debrox or Murine.  I told Berto to buy these from his local pharmacy, follow the package directions, and he may even consider making this a habit every week or 2 to keep the ears clear.     Tiny right-sided inguinal hernia, which does not seem to be bothering Breto, and is probably best left alone     Berto to get a tetanus booster (Tdap) which he should get at the local pharmacy, since that vaccine is paid under the Medicare prescription drug benefit  Shingles vaccine is also appropriate, and if Berto wants to pursue that, he should also get that one at the pharmacy  Same for the RSV vaccine      Immunization History   Administered Date(s) Administered    COVID-19 12+ (Pfizer) 10/13/2023, 12/31/2024    COVID-19 Bivalent 12+ (Pfizer) 11/14/2022    COVID-19 MONOVALENT 12+ (Pfizer) 03/11/2021, 04/05/2021, 12/09/2021    COVID-19 Monovalent 12+ (Pfizer 2022) 04/13/2022    Flu, Unspecified 12/27/2011    Influenza (High Dose) Trivalent,PF (Fluzone) 12/31/2024    Influenza (IIV3) PF 12/28/2005, 01/18/2007, 12/27/2011    Influenza Vaccine 18-64 (Flublok) 12/07/2018    Influenza Vaccine 65+ (Fluzone HD) 11/03/2020, 12/09/2021, 11/14/2022, 10/13/2023    Influenza Vaccine >6 months,quad, PF 12/27/2017    Pneumococcal 20 valent Conjugate (Prevnar 20) 12/09/2022    Pneumococcal, Unspecified 06/01/2016    TDAP (Adacel,Boostrix) 12/27/2011    Td (Adult), Adsorbed 1955    Tdap (Adult) Unspecified Formulation 1955

## 2025-06-23 ENCOUNTER — VIRTUAL VISIT (OUTPATIENT)
Dept: NEUROLOGY | Facility: CLINIC | Age: 70
End: 2025-06-23
Payer: MEDICARE

## 2025-06-23 DIAGNOSIS — F33.2 MAJOR DEPRESSIVE DISORDER, RECURRENT SEVERE WITHOUT PSYCHOTIC FEATURES (H): Primary | ICD-10-CM

## 2025-06-23 PROCEDURE — 90834 PSYTX W PT 45 MINUTES: CPT | Mod: 93 | Performed by: PSYCHOLOGIST

## 2025-06-23 NOTE — PROGRESS NOTES
Psychology Progress Note    Date: June 23, 2025    Time length and type of treatment: 46 minutes (10:02 AM - 10:48 AM) , individual therapy    Telemedicine visit: Patient visit was changed to a telephone visit due to the patient not having access to video. The patient's condition can be safely assessed and treated via telephone encounter. Patient was informed that policies and procedures that govern in-person sessions would also apply to telephone sessions. Patient was in agreement with proceeding with a telephone session.     Patient Location: Patient home in [place], [place]  Provider Location:  [facility] - Provider remote location     Necessity: This session is necessary to address the patient's depression, anxiety, hoarding, and Avoidant Restrictive Food Intake Disorder. Today we focus on the patient's treatment plan, specifically exploring processing grief.  The reader is invited to review the patient's full treatment plan in the Media section of the patient's Epic medical record.    Psychotherapeutic Technique: This writer utilized motivational interviewing, active listening, reassurance and support in the context of cognitive behavioral therapy to address the above.      Mental Status Evaluation:  Grooming: Unable to determine due to telephone visit  Attire: Unable to determine due to telephone visit  Age: Unable to determine due to telephone visit  Behavior Towards Examiner: Cooperative  Motor Activity: Unable to determine due to telephone visit  Eye Contact: Unable to determine due to telephone visit  Mood: Anxious   Affect: full range  Speech/Language: pressured  Attention: Normal  Concentration: Sufficient  Thought Process: tangential and overinclusive   Thought Content: Clear    Orientation: Appeared oriented to person, place, and time, though not formally established  Memory: No evidence of impairment.  Judgement: Adequate  Estimated Intelligence: Within normal  limits  Demonstrated Insight: Adequate  Fund of Knowledge: Adequate    Intervention:   Patient reported that his , whom he has relied on extensively and is his only local emergency contact, is moving to a new parish and will no longer be a source of support for the patient.  Patient grief was normalized and we discussed using this as an opportunity to expand his social support system.  We reviewed previously mentioned metaphors to reinforce coping.      Progress:  Patient reported increased hopefulness and confidence in his coping skills.     Plan:   We will meet again in 3 weeks to address the patient's depression, anxiety, hoarding, and ARFID.  Estimated duration of treatment is 10+ individual therapy sessions (78138) at 3-week intervals. Treatment is expected to be completed by May 2026.     Diagnosis:  Major Depressive Disorder, Recurrent, Severe  Generalized Anxiety Disorder  Hoarding Disorder  Avoidant Restrictive Food Intake Disorder

## 2025-06-25 ENCOUNTER — TELEPHONE (OUTPATIENT)
Dept: ONCOLOGY | Facility: HOSPITAL | Age: 70
End: 2025-06-25
Payer: MEDICARE

## 2025-06-25 NOTE — TELEPHONE ENCOUNTER
I received a phone call today from Berto.  He is calling because he is scheduled to come tomorrow for lab work and he is checking to see if he needs to fast prior to coming for these labs.  I let him know that fasting is not necessary.  He was happy to hear this.  He also wanted to review his appointment date and time with Dr. Oliver.  All questions answered.  He will come as scheduled.    Carol Rangel RN on 6/25/2025 at 12:33 PM

## 2025-06-30 ENCOUNTER — TELEPHONE (OUTPATIENT)
Dept: ONCOLOGY | Facility: HOSPITAL | Age: 70
End: 2025-06-30
Payer: MEDICARE

## 2025-06-30 NOTE — TELEPHONE ENCOUNTER
Pt called and LVMTCB after receiving a VM from our clinic today, 06/30/25, stating pt missed his lab only apt today and needs to call back to come in today or tomorrow for labs, prior to follow up with Dr. Oliver on 07/30/25. Pt stated in VM he could come tomorrow around 11:00 AM. I called pt and LVM letting him know he was r/s for labs tomorrow 07/01/25, at 11:30 AM. Pt was instructed to call back if this new apt date and time did not work.

## 2025-07-01 ENCOUNTER — LAB (OUTPATIENT)
Dept: INFUSION THERAPY | Facility: HOSPITAL | Age: 70
End: 2025-07-01
Payer: MEDICARE

## 2025-07-01 DIAGNOSIS — D64.9 ANEMIA, UNSPECIFIED TYPE: ICD-10-CM

## 2025-07-01 DIAGNOSIS — D47.2 MONOCLONAL PARAPROTEINEMIA: ICD-10-CM

## 2025-07-01 LAB
ALBUMIN SERPL BCG-MCNC: 4.1 G/DL (ref 3.5–5.2)
ALP SERPL-CCNC: 72 U/L (ref 40–150)
ALT SERPL W P-5'-P-CCNC: 27 U/L (ref 0–70)
ANION GAP SERPL CALCULATED.3IONS-SCNC: 11 MMOL/L (ref 7–15)
AST SERPL W P-5'-P-CCNC: 27 U/L (ref 0–45)
BILIRUB SERPL-MCNC: 0.7 MG/DL
BUN SERPL-MCNC: 16 MG/DL (ref 8–23)
CALCIUM SERPL-MCNC: 8.9 MG/DL (ref 8.8–10.4)
CHLORIDE SERPL-SCNC: 106 MMOL/L (ref 98–107)
CREAT SERPL-MCNC: 0.82 MG/DL (ref 0.67–1.17)
EGFRCR SERPLBLD CKD-EPI 2021: >90 ML/MIN/1.73M2
ERYTHROCYTE [DISTWIDTH] IN BLOOD BY AUTOMATED COUNT: 13.1 % (ref 10–15)
GLUCOSE SERPL-MCNC: 83 MG/DL (ref 70–99)
HCO3 SERPL-SCNC: 27 MMOL/L (ref 22–29)
HCT VFR BLD AUTO: 37 % (ref 40–53)
HGB BLD-MCNC: 11.9 G/DL (ref 13.3–17.7)
LDH SERPL L TO P-CCNC: 175 U/L (ref 0–250)
MCH RBC QN AUTO: 30.9 PG (ref 26.5–33)
MCHC RBC AUTO-ENTMCNC: 32.2 G/DL (ref 31.5–36.5)
MCV RBC AUTO: 96 FL (ref 78–100)
PLATELET # BLD AUTO: 135 10E3/UL (ref 150–450)
POTASSIUM SERPL-SCNC: 4 MMOL/L (ref 3.4–5.3)
PROT SERPL-MCNC: 6.1 G/DL (ref 6.4–8.3)
RBC # BLD AUTO: 3.85 10E6/UL (ref 4.4–5.9)
SODIUM SERPL-SCNC: 144 MMOL/L (ref 135–145)
WBC # BLD AUTO: 6.3 10E3/UL (ref 4–11)

## 2025-07-01 PROCEDURE — 84155 ASSAY OF PROTEIN SERUM: CPT

## 2025-07-01 PROCEDURE — 83615 LACTATE (LD) (LDH) ENZYME: CPT

## 2025-07-01 PROCEDURE — 83521 IG LIGHT CHAINS FREE EACH: CPT

## 2025-07-01 PROCEDURE — 85018 HEMOGLOBIN: CPT

## 2025-07-01 PROCEDURE — 36415 COLL VENOUS BLD VENIPUNCTURE: CPT

## 2025-07-01 PROCEDURE — 82784 ASSAY IGA/IGD/IGG/IGM EACH: CPT

## 2025-07-02 LAB
IGA SERPL-MCNC: 71 MG/DL (ref 84–499)
IGG SERPL-MCNC: 696 MG/DL (ref 610–1616)
IGM SERPL-MCNC: 84 MG/DL (ref 35–242)
KAPPA LC FREE SER-MCNC: 1.28 MG/DL (ref 0.33–1.94)
KAPPA LC FREE/LAMBDA FREE SER NEPH: 1.42 {RATIO} (ref 0.26–1.65)
LAMBDA LC FREE SERPL-MCNC: 0.9 MG/DL (ref 0.57–2.63)

## 2025-07-03 ENCOUNTER — ONCOLOGY VISIT (OUTPATIENT)
Dept: ONCOLOGY | Facility: HOSPITAL | Age: 70
End: 2025-07-03
Attending: INTERNAL MEDICINE
Payer: MEDICARE

## 2025-07-03 VITALS
BODY MASS INDEX: 21.34 KG/M2 | HEIGHT: 64 IN | HEART RATE: 66 BPM | OXYGEN SATURATION: 97 % | SYSTOLIC BLOOD PRESSURE: 137 MMHG | RESPIRATION RATE: 16 BRPM | TEMPERATURE: 98.3 F | WEIGHT: 125 LBS | DIASTOLIC BLOOD PRESSURE: 71 MMHG

## 2025-07-03 DIAGNOSIS — D64.9 ANEMIA, UNSPECIFIED TYPE: ICD-10-CM

## 2025-07-03 DIAGNOSIS — D47.2 MONOCLONAL PARAPROTEINEMIA: Primary | ICD-10-CM

## 2025-07-03 PROCEDURE — G0463 HOSPITAL OUTPT CLINIC VISIT: HCPCS | Performed by: INTERNAL MEDICINE

## 2025-07-03 ASSESSMENT — PAIN SCALES - GENERAL: PAINLEVEL_OUTOF10: NO PAIN (0)

## 2025-07-03 NOTE — LETTER
"7/3/2025      Berto Pretty  [address]  [facility] [address]      Dear Colleague,    Thank you for referring your patient, Berto Pretty, to the [facility] [place]. Please see a copy of my visit note below.    Oncology Rooming Note    July 3, 2025 2:07 PM   Berto Pretty is a 69 year old male who presents for:    Chief Complaint   Patient presents with     Oncology Clinic Visit     1 year follow up labs     Initial Vitals: There were no vitals taken for this visit. Estimated body mass index is 21.63 kg/m  as calculated from the following:    Height as of 5/22/25: 1.626 m (5' 4\").    Weight as of 5/22/25: 57.2 kg (126 lb). There is no height or weight on file to calculate BSA.  Data Unavailable Comment: Data Unavailable   No LMP for male patient.  Allergies reviewed: Yes  Medications reviewed: Yes    Medications: Medication refills not needed today.  Pharmacy name entered into [employer]: [facility] - [address]    Frailty Screening:   Is the patient here for a new oncology consult visit in cancer care? 2. No    PHQ9:  Did this patient require a PHQ9?: No      Clinical concerns: none       Amy Arroyo [facility] Hematology and Oncology Progress Note    Patient: Berto Pretty  MRN: [id number]  Date of Service: Jul 3, 2025           Reason for visit         Problem List Items Addressed This Visit          Blood    Anemia, unspecified type    Relevant Orders    Immunoglobulins A G and M    Kappa and lambda light chain    Lactate Dehydrogenase    CBC with platelets     Other Visit Diagnoses         Monoclonal paraproteinemia    -  Primary    Relevant Orders    Immunoglobulins A G and M    Kappa and lambda light chain    Lactate Dehydrogenase    CBC with platelets              Assessment      1.  Monoclonal gammopathy of uncertain significance. IgG kappa type.  Labs appear to be stable.  2.  History of anemia that " initially resolved.  But now she can of bounces between being slightly anemic to normal.  Most recent hemoglobin 11.9.  3.  History of bladder issues under care of urology. Last cysto was negative.  Next cystoscopy in December.  4.  Other medical conditions that are stable.      Plan      For his monoclonal gammopathy he seems to be pretty stable.  Will see him back in 1 year.  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 He had TURBT but the pathology sample was lost. Last cysto in September 2024 was negative.  Continue with good diet and exercise.  Follow-up with his primary care physician for preventive care and other needs.  The longitudinal plan of care for the diagnosis(es)/condition(s) as documented were addressed during this visit. Due to the added complexity in care, I will continue to support Berto in the subsequent management and with ongoing continuity of care.      Medical decision making      Patient presenting with monoclonal gammopathy.  Personally reviewed his labs including CMP, immunoglobulin levels, kappa lambda light chains, CBC and LDH.  Reviewed notes from internal medicine and psychology.  Personally reviewed images of the CT scan of the chest which does not show any lytic lesions on the bones.  Ordered labs and follow-up visits.     Cancer Staging   No matching staging information was found for the patient.        History of present illness      Mr. Berto Pretty is a very pleasant 69 year old  gentleman with diagnosis of monoclonal gammopathy of IgG type.  This was detected when he had some work-up done by his primary care provider in April 2021.  This was done because he was anemic with a hemoglobin of 12.4.  His evaluation did reveal that he had a small amount of monoclonal gammopathy.  Therefore he was referred here.    At the beginning Vic's IgG levels were at 918.  They have slowly come down.  Normal IgM levels.  IgA level is also normal to  "slightly low normal.  Hemoglobin was 12.7 at the time of initial work-up.  Then it went up to 13.  Now it is down down to 12.7 again.  Rest of the metabolic panel was pretty normal.    Vic comes in today for scheduled follow-up.  He was little bit late to the clinic.  He has been having some issues with eating disorder.  Did not eat for few days couple months ago.  His blood labs have been okay.  His weight has fluctuated a little bit.  He has been seen by his primary care doctor.    Review of system      Details noted in the history of present illness.  A detailed review of systems is otherwise negative.      Physical exam        /71 (BP Location: Right arm, Patient Position: Sitting, Cuff Size: Adult Regular)   Pulse 66   Temp 98.3  F (36.8  C) (Oral)   Resp 16   Ht 1.626 m (5' 4\")   Wt 56.7 kg (125 lb)   SpO2 97%   BMI 21.46 kg/m      GENERAL: No acute distress. Cooperative in conversation.   HEENT:  Pupils are equal, round and reactive. Oral mucosa is clean and intact. No ulcerations or mucositis noted. No bleeding noted.  RESP:Chest symmetric lungs are clear bilaterally per auscultation. Regular respiratory rate. No wheezes or rhonchi.  CV: Normal S1 S2 Regular, rate and rhythm.     ABD: Nondistended, soft, nontender. Positive bowel sounds. No organomegaly.   EXTREMITIES: No lower extremity edema.   NEURO: Non- focal. Alert and oriented x3.  Cranial nerves appear intact.  PSYCH: Within normal limits. No depression or anxiety.  SKIN: Warm dry intact.      Lab results Reviewed      Recent Results (from the past week)   CBC with platelets   Result Value Ref Range    WBC Count 6.3 4.0 - 11.0 10e3/uL    RBC Count 3.85 (L) 4.40 - 5.90 10e6/uL    Hemoglobin 11.9 (L) 13.3 - 17.7 g/dL    Hematocrit 37.0 (L) 40.0 - 53.0 %    MCV 96 78 - 100 fL    MCH 30.9 26.5 - 33.0 pg    MCHC 32.2 31.5 - 36.5 g/dL    RDW 13.1 10.0 - 15.0 %    Platelet Count 135 (L) 150 - 450 10e3/uL   Lactate Dehydrogenase   Result Value " Ref Range    Lactate Dehydrogenase 175 0 - 250 U/L   Kappa and lambda light chain   Result Value Ref Range    Kappa Free Light Chains 1.28 0.33 - 1.94 mg/dL    Lambda Free Light Chains 0.90 0.57 - 2.63 mg/dL    Kappa /Lambda Ratio 1.42 0.26 - 1.65   Immunoglobulins A G and M   Result Value Ref Range    Immunoglobulin G 696 610 - 1,616 mg/dL    Immunoglobulin A 71 (L) 84 - 499 mg/dL    Immunoglobulin M 84 35 - 242 mg/dL   Comprehensive metabolic panel   Result Value Ref Range    Sodium 144 135 - 145 mmol/L    Potassium 4.0 3.4 - 5.3 mmol/L    Carbon Dioxide (CO2) 27 22 - 29 mmol/L    Anion Gap 11 7 - 15 mmol/L    Urea Nitrogen 16.0 8.0 - 23.0 mg/dL    Creatinine 0.82 0.67 - 1.17 mg/dL    GFR Estimate >90 >60 mL/min/1.73m2    Calcium 8.9 8.8 - 10.4 mg/dL    Chloride 106 98 - 107 mmol/L    Glucose 83 70 - 99 mg/dL    Alkaline Phosphatase 72 40 - 150 U/L    AST 27 0 - 45 U/L    ALT 27 0 - 70 U/L    Protein Total 6.1 (L) 6.4 - 8.3 g/dL    Albumin 4.1 3.5 - 5.2 g/dL    Bilirubin Total 0.7 <=1.2 mg/dL       Imaging results Reviewed        No results found.      Signed by: Zen Oliver MD      This note has been dictated using voice recognition software. Any grammatical or context distortions are unintentional and inherent to the software     Again, thank you for allowing me to participate in the care of your patient.        Sincerely,        eZn Oliver MD    Electronically signed

## 2025-07-03 NOTE — PROGRESS NOTES
[facility] Progress Note    Patient: Berto Pretty  MRN: [id number]  Date of Service: Jul 3, 2025           Reason for visit         Problem List Items Addressed This Visit          Blood    Anemia, unspecified type    Relevant Orders    Immunoglobulins A G and M    Kappa and lambda light chain    Lactate Dehydrogenase    CBC with platelets     Other Visit Diagnoses         Monoclonal paraproteinemia    -  Primary    Relevant Orders    Immunoglobulins A G and M    Kappa and lambda light chain    Lactate Dehydrogenase    CBC with platelets              Assessment      1.  Monoclonal gammopathy of uncertain significance. IgG kappa type.  Labs appear to be stable.  2.  History of anemia that initially resolved.  But now she can of bounces between being slightly anemic to normal.  Most recent hemoglobin 11.9.  3.  History of bladder issues under care of urology. Last cysto was negative.  Next cystoscopy in December.  4.  Other medical conditions that are stable.      Plan      For his monoclonal gammopathy he seems to be pretty stable.  Will see him back in 1 year.  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 He had TURBT but the pathology sample was lost. Last cysto in September 2024 was negative.  Continue with good diet and exercise.  Follow-up with his primary care physician for preventive care and other needs.  The longitudinal plan of care for the diagnosis(es)/condition(s) as documented were addressed during this visit. Due to the added complexity in care, I will continue to support Berto in the subsequent management and with ongoing continuity of care.      Medical decision making      Patient presenting with monoclonal gammopathy.  Personally reviewed his labs including CMP, immunoglobulin levels, kappa lambda light chains, CBC and LDH.  Reviewed notes from internal medicine and psychology.  Personally reviewed images of the CT scan of the  "chest which does not show any lytic lesions on the bones.  Ordered labs and follow-up visits.     Cancer Staging   No matching staging information was found for the patient.        History of present illness      Mr. Berto Pretty is a very pleasant 69 year old  gentleman with diagnosis of monoclonal gammopathy of IgG type.  This was detected when he had some work-up done by his primary care provider in April 2021.  This was done because he was anemic with a hemoglobin of 12.4.  His evaluation did reveal that he had a small amount of monoclonal gammopathy.  Therefore he was referred here.    At the beginning Vic's IgG levels were at 918.  They have slowly come down.  Normal IgM levels.  IgA level is also normal to slightly low normal.  Hemoglobin was 12.7 at the time of initial work-up.  Then it went up to 13.  Now it is down down to 12.7 again.  Rest of the metabolic panel was pretty normal.    Vic comes in today for scheduled follow-up.  He was little bit late to the clinic.  He has been having some issues with eating disorder.  Did not eat for few days couple months ago.  His blood labs have been okay.  His weight has fluctuated a little bit.  He has been seen by his primary care doctor.    Review of system      Details noted in the history of present illness.  A detailed review of systems is otherwise negative.      Physical exam        /71 (BP Location: Right arm, Patient Position: Sitting, Cuff Size: Adult Regular)   Pulse 66   Temp 98.3  F (36.8  C) (Oral)   Resp 16   Ht 1.626 m (5' 4\")   Wt 56.7 kg (125 lb)   SpO2 97%   BMI 21.46 kg/m      GENERAL: No acute distress. Cooperative in conversation.   HEENT:  Pupils are equal, round and reactive. Oral mucosa is clean and intact. No ulcerations or mucositis noted. No bleeding noted.  RESP:Chest symmetric lungs are clear bilaterally per auscultation. Regular respiratory rate. No wheezes or rhonchi.  CV: Normal S1 S2 Regular, rate and rhythm.   "   ABD: Nondistended, soft, nontender. Positive bowel sounds. No organomegaly.   EXTREMITIES: No lower extremity edema.   NEURO: Non- focal. Alert and oriented x3.  Cranial nerves appear intact.  PSYCH: Within normal limits. No depression or anxiety.  SKIN: Warm dry intact.      Lab results Reviewed      Recent Results (from the past week)   CBC with platelets   Result Value Ref Range    WBC Count 6.3 4.0 - 11.0 10e3/uL    RBC Count 3.85 (L) 4.40 - 5.90 10e6/uL    Hemoglobin 11.9 (L) 13.3 - 17.7 g/dL    Hematocrit 37.0 (L) 40.0 - 53.0 %    MCV 96 78 - 100 fL    MCH 30.9 26.5 - 33.0 pg    MCHC 32.2 31.5 - 36.5 g/dL    RDW 13.1 10.0 - 15.0 %    Platelet Count 135 (L) 150 - 450 10e3/uL   Lactate Dehydrogenase   Result Value Ref Range    Lactate Dehydrogenase 175 0 - 250 U/L   Kappa and lambda light chain   Result Value Ref Range    Kappa Free Light Chains 1.28 0.33 - 1.94 mg/dL    Lambda Free Light Chains 0.90 0.57 - 2.63 mg/dL    Kappa /Lambda Ratio 1.42 0.26 - 1.65   Immunoglobulins A G and M   Result Value Ref Range    Immunoglobulin G 696 610 - 1,616 mg/dL    Immunoglobulin A 71 (L) 84 - 499 mg/dL    Immunoglobulin M 84 35 - 242 mg/dL   Comprehensive metabolic panel   Result Value Ref Range    Sodium 144 135 - 145 mmol/L    Potassium 4.0 3.4 - 5.3 mmol/L    Carbon Dioxide (CO2) 27 22 - 29 mmol/L    Anion Gap 11 7 - 15 mmol/L    Urea Nitrogen 16.0 8.0 - 23.0 mg/dL    Creatinine 0.82 0.67 - 1.17 mg/dL    GFR Estimate >90 >60 mL/min/1.73m2    Calcium 8.9 8.8 - 10.4 mg/dL    Chloride 106 98 - 107 mmol/L    Glucose 83 70 - 99 mg/dL    Alkaline Phosphatase 72 40 - 150 U/L    AST 27 0 - 45 U/L    ALT 27 0 - 70 U/L    Protein Total 6.1 (L) 6.4 - 8.3 g/dL    Albumin 4.1 3.5 - 5.2 g/dL    Bilirubin Total 0.7 <=1.2 mg/dL       Imaging results Reviewed        No results found.      Signed by: Zen Oliver MD      This note has been dictated using voice recognition software. Any grammatical or context distortions are  unintentional and inherent to the software

## 2025-08-19 ENCOUNTER — VIRTUAL VISIT (OUTPATIENT)
Dept: NEUROLOGY | Facility: CLINIC | Age: 70
End: 2025-08-19
Payer: MEDICARE

## 2025-08-19 DIAGNOSIS — F33.2 MAJOR DEPRESSIVE DISORDER, RECURRENT SEVERE WITHOUT PSYCHOTIC FEATURES (H): Primary | ICD-10-CM

## 2025-08-19 PROCEDURE — 90834 PSYTX W PT 45 MINUTES: CPT | Mod: 93 | Performed by: PSYCHOLOGIST

## 2025-08-19 ASSESSMENT — ANXIETY QUESTIONNAIRES
6. BECOMING EASILY ANNOYED OR IRRITABLE: NEARLY EVERY DAY
IF YOU CHECKED OFF ANY PROBLEMS ON THIS QUESTIONNAIRE, HOW DIFFICULT HAVE THESE PROBLEMS MADE IT FOR YOU TO DO YOUR WORK, TAKE CARE OF THINGS AT HOME, OR GET ALONG WITH OTHER PEOPLE: EXTREMELY DIFFICULT
GAD7 TOTAL SCORE: 16
4. TROUBLE RELAXING: MORE THAN HALF THE DAYS
5. BEING SO RESTLESS THAT IT IS HARD TO SIT STILL: SEVERAL DAYS
3. WORRYING TOO MUCH ABOUT DIFFERENT THINGS: NEARLY EVERY DAY
7. FEELING AFRAID AS IF SOMETHING AWFUL MIGHT HAPPEN: NEARLY EVERY DAY
2. NOT BEING ABLE TO STOP OR CONTROL WORRYING: NEARLY EVERY DAY
1. FEELING NERVOUS, ANXIOUS, OR ON EDGE: SEVERAL DAYS